# Patient Record
Sex: FEMALE | Race: BLACK OR AFRICAN AMERICAN | NOT HISPANIC OR LATINO | Employment: OTHER | URBAN - METROPOLITAN AREA
[De-identification: names, ages, dates, MRNs, and addresses within clinical notes are randomized per-mention and may not be internally consistent; named-entity substitution may affect disease eponyms.]

---

## 2017-01-24 ENCOUNTER — OFFICE VISIT (OUTPATIENT)
Dept: URGENT CARE | Facility: CLINIC | Age: 57
End: 2017-01-24
Payer: COMMERCIAL

## 2017-01-24 VITALS
WEIGHT: 111.56 LBS | BODY MASS INDEX: 20.53 KG/M2 | HEIGHT: 62 IN | OXYGEN SATURATION: 99 % | TEMPERATURE: 98 F | DIASTOLIC BLOOD PRESSURE: 70 MMHG | RESPIRATION RATE: 18 BRPM | SYSTOLIC BLOOD PRESSURE: 110 MMHG | HEART RATE: 56 BPM

## 2017-01-24 DIAGNOSIS — N32.9 BLADDER PROBLEM: Primary | ICD-10-CM

## 2017-01-24 DIAGNOSIS — N39.0 URINARY TRACT INFECTION WITHOUT HEMATURIA, SITE UNSPECIFIED: ICD-10-CM

## 2017-01-24 LAB
BILIRUB SERPL-MCNC: NEGATIVE MG/DL
BLOOD URINE, POC: NEGATIVE
COLOR, POC UA: YELLOW
GLUCOSE UR QL STRIP: NORMAL
KETONES UR QL STRIP: NEGATIVE
LEUKOCYTE ESTERASE URINE, POC: ABNORMAL
NITRITE, POC UA: NEGATIVE
PH, POC UA: 6
PROTEIN, POC: ABNORMAL
SPECIFIC GRAVITY, POC UA: 1
UROBILINOGEN, POC UA: NORMAL

## 2017-01-24 PROCEDURE — 99999 PR PBB SHADOW E&M-EST. PATIENT-LVL V: CPT | Mod: PBBFAC,,, | Performed by: PHYSICIAN ASSISTANT

## 2017-01-24 PROCEDURE — 87086 URINE CULTURE/COLONY COUNT: CPT

## 2017-01-24 PROCEDURE — 81002 URINALYSIS NONAUTO W/O SCOPE: CPT | Mod: S$GLB,,, | Performed by: PHYSICIAN ASSISTANT

## 2017-01-24 PROCEDURE — 1159F MED LIST DOCD IN RCRD: CPT | Mod: S$GLB,,, | Performed by: PHYSICIAN ASSISTANT

## 2017-01-24 PROCEDURE — 99213 OFFICE O/P EST LOW 20 MIN: CPT | Mod: 25,S$GLB,, | Performed by: PHYSICIAN ASSISTANT

## 2017-01-24 RX ORDER — PHENAZOPYRIDINE HYDROCHLORIDE 200 MG/1
200 TABLET, FILM COATED ORAL 3 TIMES DAILY PRN
Qty: 6 TABLET | Refills: 0 | Status: SHIPPED | OUTPATIENT
Start: 2017-01-24 | End: 2017-02-16 | Stop reason: SDUPTHER

## 2017-01-24 RX ORDER — SULFAMETHOXAZOLE AND TRIMETHOPRIM 800; 160 MG/1; MG/1
1 TABLET ORAL 2 TIMES DAILY
Qty: 10 TABLET | Refills: 0 | Status: SHIPPED | OUTPATIENT
Start: 2017-01-24 | End: 2017-01-29

## 2017-01-24 NOTE — PATIENT INSTRUCTIONS
Drink plenty of clear liquids  Avoid caffeine because this is irritating to the bladder  Take full course of antibiotic  Pyridium as needed for burning on urination.  This medication will turn your urine orange    If symptoms worsen or fail to improve with treatment, see your Primary Care Provider or go to the nearest Emergency Room.          Urinary Tract Infections in Women  Urinary tract infections (UTIs) are most often caused by bacteria (germs). These bacteria enter the urinary tract. The bacteria may come from outside the body. Or they may travel from the skin outside the rectum or vagina into the urethra. Female anatomy makes it easier for bacteria from the bowel to enter a womans urinary tract, which is the most common source of UTI. This means women develop UTIs more often than men. Pain in or around the urinary tract is a common UTI symptom. But the only way to know for sure if you have a UTI for the health care provider to test your urine. The two tests that may be done are the urinalysis and urine culture.  Types of UTIs  · Cystitis: A bladder infection (cystitis) is the most common UTI in women. You may have urgent or frequent urination. You may also have pain, burning when you urinate, and bloody urine.  · Urethritis: This is an inflamed urethra, which is the tube that carries urine from the bladder to outside the body. You may have lower stomach or back pain. You may also have urgent or frequent urination.  · Pyelonephritis: This is a kidney infection. If not treated, it can be serious and damage your kidneys. In severe cases, you may be hospitalized. You may have a fever and lower back pain.  Medications to treat a UTI  Most UTIs are treated with antibiotics. These kill the bacteria. The length of time you need to take them depends on the type of infection. It may be as short as 3 days. If you have repeated UTIs, a low-dose antibiotic may be needed for several months. Take antibiotics exactly as  directed. Dont stop taking them until all of the medication is gone. If you stop taking the antibiotic too soon, the infection may not go away, and you may develop a resistance to the antibiotic. This can make it much harder to treat.  Lifestyle changes to treat and prevent UTIs  The lifestyle changes below will help get rid of your UTI. They may also help prevent future UTIs.  · Drink plenty of fluids. This includes water, juice, or other caffeine-free drinks. Fluids help flush bacteria out of your body.  · Empty your bladder. Always empty your bladder when you feel the urge to urinate. And always urinate before going to sleep. Urine that stays in your bladder can lead to infection. Try to urinate before and after sex as well.  · Practice good personal hygiene. Wipe yourself from front to back after using the toilet. This helps keep bacteria from getting into the urethra.  · Use condoms during sex. These help prevent UTIs caused by sexually transmitted bacteria. Also, avoid using spermicides during sex. These can increase the risk of UTIs. Choose other forms of birth control instead. For women who tend to get UTIs after sex, a low-dose of a preventive antibiotic may be used. Be sure to discuss this option with your health care provider.  · Follow up with your health care provider as directed. He or she may test to make sure the infection has cleared. If necessary, additional treatment may be started.  © 2729-2037 [employer]. [address] [place] [address], [place] [address]. All rights reserved. This information is not intended as a substitute for professional medical care. Always follow your healthcare professional's instructions.

## 2017-01-24 NOTE — MR AVS SNAPSHOT
[facility]  [address] [facility] [place] [address]  Phone: [phone]  Fax: [phone]                  Ivone Monroy   2017 5:10 PM   Office Visit    Description:  Female : 1960   Provider:  Tamiko Clemente PA-C   Department:  [facility]           Reason for Visit     Urinary Tract Infection           Diagnoses this Visit        Comments    Bladder problem    -  Primary     Urinary tract infection without hematuria, site unspecified                To Do List           Goals (5 Years of Data)     None       These Medications        Disp Refills Start End    sulfamethoxazole-trimethoprim 800-160mg (BACTRIM DS) 800-160 mg Tab 10 tablet 0 2017    Take 1 tablet by mouth 2 (two) times daily. - Oral    Pharmacy: [facility] #[id number] - [place] - [address] Ph #: [phone]       phenazopyridine (PYRIDIUM) 200 MG tablet 6 tablet 0 2017     Take 1 tablet (200 mg total) by mouth 3 (three) times daily as needed. - Oral    Pharmacy: [facility] #[id number] - [place] - [address] Ph #: [phone]         [facility]     [facility] -  Assistance  Registered nurses in the [facility] provide clinical advisement, health education, appointment booking, and other advisory services.  Call for this free service at [phone].             Medications           Message regarding Medications     Verify the changes and/or additions to your medication regime listed below are the same as discussed with your clinician today.  If any of these changes or additions are incorrect, please notify your healthcare provider.        START taking these NEW medications        Refills    sulfamethoxazole-trimethoprim 800-160mg (BACTRIM DS) 800-160 mg Tab 0    Sig: Take 1 tablet by mouth 2 (two) times daily.    Class: Normal    Route: Oral    phenazopyridine (PYRIDIUM) 200 MG tablet 0    Sig: Take 1 tablet (200  "mg total) by mouth 3 (three) times daily as needed.    Class: Normal    Route: Oral           Verify that the below list of medications is an accurate representation of the medications you are currently taking.  If none reported, the list may be blank. If incorrect, please contact your healthcare provider. Carry this list with you in case of emergency.           Current Medications     bifidobacterium infantis (ALIGN) 4 mg Cap Take by mouth once daily.    FUSION PLUS 130 mg iron -1,250 mcg Cap Take 1 capsule by mouth once daily.    methylcellulose oral powder Take 3.4 g by mouth once daily.    multivitamin (ONE DAILY MULTIVITAMIN) per tablet Take 1 tablet by mouth once daily.    omeprazole (PRILOSEC) 20 MG capsule Take 20 mg by mouth 2 (two) times daily.     cetirizine 10 mg chewable tablet Take 10 mg by mouth once daily. Prn allergies    phenazopyridine (PYRIDIUM) 200 MG tablet Take 1 tablet (200 mg total) by mouth 3 (three) times daily as needed.    sulfamethoxazole-trimethoprim 800-160mg (BACTRIM DS) 800-160 mg Tab Take 1 tablet by mouth 2 (two) times daily.           Clinical Reference Information           Vital Signs - Last Recorded  Most recent update: 1/24/2017  5:24 PM by Xochilt Thakkar LPN    BP Pulse Temp Resp    110/70 (BP Location: Right arm, Patient Position: Sitting, BP Method: Manual) (!) 56 97.6 °F (36.4 °C) (Tympanic) 18    Ht Wt SpO2 BMI    5' 2" (1.575 m) 50.6 kg (111 lb 8.8 oz) 99% 20.4 kg/m2      Blood Pressure          Most Recent Value    BP  110/70      Allergies as of 1/24/2017     Iodine And Iodide Containing Products    Shellfish Containing Products    Sunscreen Spf 8 [Oxybenzone-padimate O]      Immunizations Administered on Date of Encounter - 1/24/2017     None      Orders Placed During Today's Visit      Normal Orders This Visit    POCT urine dipstick without microscope     Urine culture          1/24/2017  5:37 PM - Xochilt Thakkar LPN      Component Results     Component    " Color, UA    YELLOW    Spec Grav, UA    1.005    pH, UA    6    WBC, UA    +    Nitrite, UA    NEGATIVE    Protein, UA    TRACE    Glucose, UA    NORMAL    Ketones, UA    NEGATIVE    Urobilinogen, UA    NORMAL    Bilirubin, UA    NEGATIVE    Blood, UA    NEGATIVE            Instructions        Drink plenty of clear liquids  Avoid caffeine because this is irritating to the bladder  Take full course of antibiotic  Pyridium as needed for burning on urination.  This medication will turn your urine orange    If symptoms worsen or fail to improve with treatment, see your Primary Care Provider or go to the nearest Emergency Room.          Urinary Tract Infections in Women  Urinary tract infections (UTIs) are most often caused by bacteria (germs). These bacteria enter the urinary tract. The bacteria may come from outside the body. Or they may travel from the skin outside the rectum or vagina into the urethra. Female anatomy makes it easier for bacteria from the bowel to enter a womans urinary tract, which is the most common source of UTI. This means women develop UTIs more often than men. Pain in or around the urinary tract is a common UTI symptom. But the only way to know for sure if you have a UTI for the health care provider to test your urine. The two tests that may be done are the urinalysis and urine culture.  Types of UTIs  · Cystitis: A bladder infection (cystitis) is the most common UTI in women. You may have urgent or frequent urination. You may also have pain, burning when you urinate, and bloody urine.  · Urethritis: This is an inflamed urethra, which is the tube that carries urine from the bladder to outside the body. You may have lower stomach or back pain. You may also have urgent or frequent urination.  · Pyelonephritis: This is a kidney infection. If not treated, it can be serious and damage your kidneys. In severe cases, you may be hospitalized. You may have a fever and lower back pain.  Medications to  treat a UTI  Most UTIs are treated with antibiotics. These kill the bacteria. The length of time you need to take them depends on the type of infection. It may be as short as 3 days. If you have repeated UTIs, a low-dose antibiotic may be needed for several months. Take antibiotics exactly as directed. Dont stop taking them until all of the medication is gone. If you stop taking the antibiotic too soon, the infection may not go away, and you may develop a resistance to the antibiotic. This can make it much harder to treat.  Lifestyle changes to treat and prevent UTIs  The lifestyle changes below will help get rid of your UTI. They may also help prevent future UTIs.  · Drink plenty of fluids. This includes water, juice, or other caffeine-free drinks. Fluids help flush bacteria out of your body.  · Empty your bladder. Always empty your bladder when you feel the urge to urinate. And always urinate before going to sleep. Urine that stays in your bladder can lead to infection. Try to urinate before and after sex as well.  · Practice good personal hygiene. Wipe yourself from front to back after using the toilet. This helps keep bacteria from getting into the urethra.  · Use condoms during sex. These help prevent UTIs caused by sexually transmitted bacteria. Also, avoid using spermicides during sex. These can increase the risk of UTIs. Choose other forms of birth control instead. For women who tend to get UTIs after sex, a low-dose of a preventive antibiotic may be used. Be sure to discuss this option with your health care provider.  · Follow up with your health care provider as directed. He or she may test to make sure the infection has cleared. If necessary, additional treatment may be started.  © 8896-4411 [employer]. [address] [place] [address], [place] [address]. All rights reserved. This information is not intended as a substitute for professional medical care. Always follow your healthcare professional's  instructions.

## 2017-01-24 NOTE — PROGRESS NOTES
"Subjective:    Patient ID: Ivone Monroy is a 56 y.o. female.    Chief Complaint: Urinary Tract Infection    Urinary Frequency    This is a new problem. The current episode started yesterday. The quality of the pain is described as burning. The pain is mild. There has been no fever. She is sexually active. There is no history of pyelonephritis. Associated symptoms include frequency and urgency. Pertinent negatives include no chills, hematuria, nausea, possible pregnancy, vomiting or bubble bath use. She has tried nothing for the symptoms. The treatment provided no relief. There is no history of diabetes insipidus. Recurrent UTIs: last year; most recent UTI August 2016.     Review of Systems   Constitutional: Negative for chills and fever.   HENT: Negative for ear pain and sore throat.    Respiratory: Negative for cough and shortness of breath.    Gastrointestinal: Negative for nausea and vomiting.   Genitourinary: Positive for dysuria, frequency and urgency. Negative for hematuria.     Objective:     Visit Vitals    /70 (BP Location: Right arm, Patient Position: Sitting, BP Method: Manual)    Pulse (!) 56    Temp 97.6 °F (36.4 °C) (Tympanic)    Resp 18    Ht 5' 2" (1.575 m)    Wt 50.6 kg (111 lb 8.8 oz)    SpO2 99%    BMI 20.4 kg/m2       Physical Exam   Constitutional: She is oriented to person, place, and time. She appears well-developed and well-nourished.   HENT:   Head: Normocephalic and atraumatic.   Right Ear: External ear normal.   Left Ear: External ear normal.   Nose: Nose normal.   Eyes: Conjunctivae and EOM are normal.   Neck: Normal range of motion. Neck supple.   Cardiovascular: Normal rate, regular rhythm and normal heart sounds.    Pulmonary/Chest: Effort normal and breath sounds normal. No respiratory distress.   Abdominal: Soft. Bowel sounds are normal. There is no tenderness. There is no rebound, no guarding and no CVA tenderness.   Musculoskeletal: Normal range of motion. "   Neurological: She is alert and oriented to person, place, and time.   Skin: Skin is warm and dry.   Nursing note and vitals reviewed.    Assessment:     1. Bladder problem    2. Urinary tract infection without hematuria, site unspecified      Plan:   Bladder problem  -     POCT urine dipstick without microscope: positive for WBC and trace protein    Urinary tract infection without hematuria, site unspecified  -     sulfamethoxazole-trimethoprim 800-160mg (BACTRIM DS) 800-160 mg Tab; Take 1 tablet by mouth 2 (two) times daily.  Dispense: 10 tablet; Refill: 0  -     phenazopyridine (PYRIDIUM) 200 MG tablet; Take 1 tablet (200 mg total) by mouth 3 (three) times daily as needed.  Dispense: 6 tablet; Refill: 0      Drink plenty of clear liquids  Avoid caffeine because this is irritating to the bladder  Take full course of antibiotic  Pyridium as needed for burning on urination.  This medication will turn your urine orange    If symptoms worsen or fail to improve with treatment, see your Primary Care Provider or go to the nearest Emergency Room.  After visit summary given and discussed.  Patient verbalized understanding and agrees with treatment plan.  Patient remained stable and was discharged in no acute distress.

## 2017-01-27 LAB
BACTERIA UR CULT: NORMAL
BACTERIA UR CULT: NORMAL

## 2017-02-16 ENCOUNTER — PATIENT MESSAGE (OUTPATIENT)
Dept: INTERNAL MEDICINE | Facility: CLINIC | Age: 57
End: 2017-02-16

## 2017-02-16 ENCOUNTER — OFFICE VISIT (OUTPATIENT)
Dept: URGENT CARE | Facility: CLINIC | Age: 57
End: 2017-02-16
Payer: COMMERCIAL

## 2017-02-16 ENCOUNTER — TELEPHONE (OUTPATIENT)
Dept: INTERNAL MEDICINE | Facility: CLINIC | Age: 57
End: 2017-02-16

## 2017-02-16 VITALS
HEIGHT: 62 IN | TEMPERATURE: 98 F | OXYGEN SATURATION: 100 % | BODY MASS INDEX: 21.02 KG/M2 | DIASTOLIC BLOOD PRESSURE: 68 MMHG | HEART RATE: 67 BPM | WEIGHT: 114.19 LBS | SYSTOLIC BLOOD PRESSURE: 110 MMHG

## 2017-02-16 DIAGNOSIS — N39.0 URINARY TRACT INFECTION WITHOUT HEMATURIA, SITE UNSPECIFIED: Primary | ICD-10-CM

## 2017-02-16 LAB
BILIRUB SERPL-MCNC: NEGATIVE MG/DL
BLOOD URINE, POC: NEGATIVE
COLOR, POC UA: ABNORMAL
GLUCOSE UR QL STRIP: NORMAL
KETONES UR QL STRIP: NEGATIVE
LEUKOCYTE ESTERASE URINE, POC: ABNORMAL
NITRITE, POC UA: POSITIVE
PH, POC UA: 5
PROTEIN, POC: NEGATIVE
SPECIFIC GRAVITY, POC UA: 1.01
UROBILINOGEN, POC UA: NORMAL

## 2017-02-16 PROCEDURE — 99999 PR PBB SHADOW E&M-EST. PATIENT-LVL IV: CPT | Mod: PBBFAC,,, | Performed by: NURSE PRACTITIONER

## 2017-02-16 PROCEDURE — 81001 URINALYSIS AUTO W/SCOPE: CPT | Mod: S$GLB,,, | Performed by: NURSE PRACTITIONER

## 2017-02-16 PROCEDURE — 87086 URINE CULTURE/COLONY COUNT: CPT

## 2017-02-16 PROCEDURE — 99213 OFFICE O/P EST LOW 20 MIN: CPT | Mod: 25,S$GLB,, | Performed by: NURSE PRACTITIONER

## 2017-02-16 RX ORDER — PHENAZOPYRIDINE HYDROCHLORIDE 200 MG/1
200 TABLET, FILM COATED ORAL 3 TIMES DAILY PRN
Qty: 6 TABLET | Refills: 0 | Status: SHIPPED | OUTPATIENT
Start: 2017-02-16 | End: 2018-04-11

## 2017-02-16 RX ORDER — AMOXICILLIN AND CLAVULANATE POTASSIUM 875; 125 MG/1; MG/1
1 TABLET, FILM COATED ORAL 2 TIMES DAILY
Qty: 14 TABLET | Refills: 0 | Status: SHIPPED | OUTPATIENT
Start: 2017-02-16 | End: 2017-02-23

## 2017-02-16 NOTE — PATIENT INSTRUCTIONS
· Increase fluids (avoid caffeine or sugary beverages as these will irritate the bladder).   · Take your antibiotics exactly as prescribed and make sure to complete entire course even if you begin to feel better. This will prevent recurrence of infection and antibiotic resistance.   · We have prescribed an antibiotic that covers most bacteria that cause urinary tract infections, however, if your urine culture shows that you have any bacterial resistance to the antibiotic you were prescribed or an unusual bacterial strain, we will notify you and make any necessary changes. Urine cultures usually result in about three days.   · Please follow up with your primary care provider if your symptoms do not improve within 2-3 days or sooner for any new or worsening symptoms.  · For any severe pain, bright red blood in urine, difficulty urinating, fever that does not improve with Tylenol or Ibuprofen, inability to urinate, incontinence of urine or bowels, or for any other urgent concerns, please go to the ER for immediate evaluation.     Urinary Tract Infections in Women    Urinary tract infections (UTIs) are most often caused by bacteria (germs). These bacteria enter the urinary tract. The bacteria may come from outside the body. Or they may travel from the skin outside the rectum or vagina into the urethra. Female anatomy makes it easier for bacteria from the bowel to enter a womans urinary tract, which is the most common source of UTI. This means women develop UTIs more often than men. Pain in or around the urinary tract is a common UTI symptom. But the only way to know for sure if you have a UTI for the health care provider to test your urine. The two tests that may be done are the urinalysis and urine culture.  Types of UTIs  · Cystitis: A bladder infection (cystitis) is the most common UTI in women. You may have urgent or frequent urination. You may also have pain, burning when you urinate, and bloody  urine.  · Urethritis: This is an inflamed urethra, which is the tube that carries urine from the bladder to outside the body. You may have lower stomach or back pain. You may also have urgent or frequent urination.  · Pyelonephritis: This is a kidney infection. If not treated, it can be serious and damage your kidneys. In severe cases, you may be hospitalized. You may have a fever and lower back pain.  Medications to treat a UTI  Most UTIs are treated with antibiotics. These kill the bacteria. The length of time you need to take them depends on the type of infection. It may be as short as 3 days. If you have repeated UTIs, a low-dose antibiotic may be needed for several months. Take antibiotics exactly as directed. Dont stop taking them until all of the medication is gone. If you stop taking the antibiotic too soon, the infection may not go away, and you may develop a resistance to the antibiotic. This can make it much harder to treat.  Lifestyle changes to treat and prevent UTIs  The lifestyle changes below will help get rid of your UTI. They may also help prevent future UTIs.  · Drink plenty of fluids. This includes water, juice, or other caffeine-free drinks. Fluids help flush bacteria out of your body.  · Empty your bladder. Always empty your bladder when you feel the urge to urinate. And always urinate before going to sleep. Urine that stays in your bladder can lead to infection. Try to urinate before and after sex as well.  · Practice good personal hygiene. Wipe yourself from front to back after using the toilet. This helps keep bacteria from getting into the urethra.  · Use condoms during sex. These help prevent UTIs caused by sexually transmitted bacteria. Also, avoid using spermicides during sex. These can increase the risk of UTIs. Choose other forms of birth control instead. For women who tend to get UTIs after sex, a low-dose of a preventive antibiotic may be used. Be sure to discuss this option with  your health care provider.  · Follow up with your health care provider as directed. He or she may test to make sure the infection has cleared. If necessary, additional treatment may be started.  Date Last Reviewed: 9/8/2014  © 8746-5330 [employer]. [address] [place] [address], [place] [address]. All rights reserved. This information is not intended as a substitute for professional medical care. Always follow your healthcare professional's instructions.

## 2017-02-16 NOTE — PROGRESS NOTES
"Subjective:      Patient ID: Ivone Monroy is a 56 y.o. female.    Chief Complaint: Dysuria    Dysuria    This is a new problem. The problem occurs every urination. The problem has been unchanged. Quality: pressure. There has been no fever. Associated symptoms include frequency. Pertinent negatives include no chills, flank pain, hematuria, nausea, possible pregnancy, vomiting or constipation. Treatments tried: treated on 1/24/17 with bactrim and pyridium. Her past medical history is significant for recurrent UTIs.     Review of Systems   Constitutional: Negative.  Negative for chills and fever.   HENT: Negative.    Respiratory: Negative.    Cardiovascular: Negative.    Gastrointestinal: Negative.  Negative for abdominal pain, constipation, nausea and vomiting.   Genitourinary: Positive for dysuria and frequency. Negative for flank pain and hematuria.   Musculoskeletal: Negative.    Skin: Negative.    Neurological: Negative.    Hematological: Negative.        Objective:     Visit Vitals    /68 (BP Location: Right arm, Patient Position: Sitting, BP Method: Manual)    Pulse 67    Temp 98.3 °F (36.8 °C) (Oral)    Ht 5' 2" (1.575 m)    Wt 51.8 kg (114 lb 3.2 oz)    SpO2 100%    BMI 20.89 kg/m2     Physical Exam   Constitutional: She is oriented to person, place, and time. She appears well-developed and well-nourished. No distress.   HENT:   Head: Normocephalic and atraumatic.   Neck: Normal range of motion. Neck supple.   Cardiovascular: Normal rate.    Pulmonary/Chest: Effort normal. No respiratory distress.   Abdominal: Soft. Normal appearance and bowel sounds are normal. She exhibits no distension. There is no hepatosplenomegaly. There is no tenderness. There is no rebound, no guarding, no CVA tenderness, no tenderness at McBurney's point and negative Owens's sign.   Musculoskeletal: Normal range of motion.   Neurological: She is alert and oriented to person, place, and time.   Skin: Skin is warm " and dry. No rash noted. She is not diaphoretic.   Nursing note and vitals reviewed.    Assessment:      1. Urinary tract infection without hematuria, site unspecified       Plan:   Urinary tract infection without hematuria, site unspecified  Comments:  Urine dipstick positive for 2+ WBC and nitrites. Will treat with augmentin -- recently treated with bactrim and had past culture that showed resistance to cipro  Orders:  -     POCT urinalysis, dipstick or tablet reag  -     Urine culture  -     amoxicillin-clavulanate 875-125mg (AUGMENTIN) 875-125 mg per tablet; Take 1 tablet by mouth 2 (two) times daily.  Dispense: 14 tablet; Refill: 0  -     phenazopyridine (PYRIDIUM) 200 MG tablet; Take 1 tablet (200 mg total) by mouth 3 (three) times daily as needed.  Dispense: 6 tablet; Refill: 0    Instructions, follow up, and supportive care as per AVS.  Follow up with PCP/urology if not improved or for any new or worsening symptoms.  AVS provided and reviewed with patient including importance of antibiotic compliance, supportive care, follow up, and red flag symptoms. Patient verbalizes understanding and agrees with treatment plan. Discharged from Urgent Care in stable condition.

## 2017-02-16 NOTE — MR AVS SNAPSHOT
[facility]  [address]  [place] [address]  Phone: [phone]  Fax: [phone]                  Ivone Monroy   2017 4:10 PM   Office Visit    Description:  Female : 1960   Provider:  Rere Hughes NP   Department:  [facility]           Reason for Visit     Dysuria           Diagnoses this Visit        Comments    Dysuria    -  Primary            To Do List           Goals (5 Years of Data)     None      Follow-Up and Disposition     Return if symptoms worsen or fail to improve.       These Medications        Disp Refills Start End    amoxicillin-clavulanate 875-125mg (AUGMENTIN) 875-125 mg per tablet 14 tablet 0 2017    Take 1 tablet by mouth 2 (two) times daily. - Oral    Pharmacy: [facility]/pharmacy #[id number]  [place] - [address] [facility]. Ph #: [phone]       phenazopyridine (PYRIDIUM) 200 MG tablet 6 tablet 0 2017     Take 1 tablet (200 mg total) by mouth 3 (three) times daily as needed. - Oral    Pharmacy: [facility]/pharmacy #[id number] - [place] - [address] [facility]. Ph #: [phone]         [facility]     [facility] On Call Nurse Care Line -  Assistance  Registered nurses in the [facility] Center provide clinical advisement, health education, appointment booking, and other advisory services.  Call for this free service at [phone].             Medications           Message regarding Medications     Verify the changes and/or additions to your medication regime listed below are the same as discussed with your clinician today.  If any of these changes or additions are incorrect, please notify your healthcare provider.        START taking these NEW medications        Refills    amoxicillin-clavulanate 875-125mg (AUGMENTIN) 875-125 mg per tablet 0    Sig: Take 1 tablet by mouth 2 (two) times daily.    Class: Normal    Route: Oral           Verify that the below list of medications is an accurate  "representation of the medications you are currently taking.  If none reported, the list may be blank. If incorrect, please contact your healthcare provider. Carry this list with you in case of emergency.           Current Medications     amoxicillin-clavulanate 875-125mg (AUGMENTIN) 875-125 mg per tablet Take 1 tablet by mouth 2 (two) times daily.    bifidobacterium infantis (ALIGN) 4 mg Cap Take by mouth once daily.    cetirizine 10 mg chewable tablet Take 10 mg by mouth once daily. Prn allergies    FUSION PLUS 130 mg iron -1,250 mcg Cap Take 1 capsule by mouth once daily.    methylcellulose oral powder Take 3.4 g by mouth once daily.    multivitamin (ONE DAILY MULTIVITAMIN) per tablet Take 1 tablet by mouth once daily.    omeprazole (PRILOSEC) 20 MG capsule Take 20 mg by mouth 2 (two) times daily.     phenazopyridine (PYRIDIUM) 200 MG tablet Take 1 tablet (200 mg total) by mouth 3 (three) times daily as needed.           Clinical Reference Information           Your Vitals Were     BP Pulse Temp Height Weight SpO2    110/68 (BP Location: Right arm, Patient Position: Sitting, BP Method: Manual) 67 98.3 °F (36.8 °C) (Oral) 5' 2" (1.575 m) 51.8 kg (114 lb 3.2 oz) 100%    BMI                20.89 kg/m2          Blood Pressure          Most Recent Value    BP  110/68      Allergies as of 2/16/2017     Iodine And Iodide Containing Products    Shellfish Containing Products    Sunscreen Spf 8 [Oxybenzone-padimate O]      Immunizations Administered on Date of Encounter - 2/16/2017     None      Orders Placed During Today's Visit      Normal Orders This Visit    POCT urinalysis, dipstick or tablet reag     Urine culture          2/16/2017  4:27 PM - Krysta Lao LPN      Component Results     Component    Color, UA    Yellow/Clear    Spec Grav, UA    1.015    pH, UA    5    WBC, UA    ++    Nitrite, UA    Positive    Protein, UA    Negative    Glucose, UA    Normal    Ketones, UA    Negative    Urobilinogen, " UA    Normal    Bilirubin, UA    Negative    Blood, UA    Negative            Instructions    · Increase fluids (avoid caffeine or sugary beverages as these will irritate the bladder).   · Take your antibiotics exactly as prescribed and make sure to complete entire course even if you begin to feel better. This will prevent recurrence of infection and antibiotic resistance.   · We have prescribed an antibiotic that covers most bacteria that cause urinary tract infections, however, if your urine culture shows that you have any bacterial resistance to the antibiotic you were prescribed or an unusual bacterial strain, we will notify you and make any necessary changes. Urine cultures usually result in about three days.   · Please follow up with your primary care provider if your symptoms do not improve within 2-3 days or sooner for any new or worsening symptoms.  · For any severe pain, bright red blood in urine, difficulty urinating, fever that does not improve with Tylenol or Ibuprofen, inability to urinate, incontinence of urine or bowels, or for any other urgent concerns, please go to the ER for immediate evaluation.     Urinary Tract Infections in Women    Urinary tract infections (UTIs) are most often caused by bacteria (germs). These bacteria enter the urinary tract. The bacteria may come from outside the body. Or they may travel from the skin outside the rectum or vagina into the urethra. Female anatomy makes it easier for bacteria from the bowel to enter a womans urinary tract, which is the most common source of UTI. This means women develop UTIs more often than men. Pain in or around the urinary tract is a common UTI symptom. But the only way to know for sure if you have a UTI for the health care provider to test your urine. The two tests that may be done are the urinalysis and urine culture.  Types of UTIs  · Cystitis: A bladder infection (cystitis) is the most common UTI in women. You may have urgent or  frequent urination. You may also have pain, burning when you urinate, and bloody urine.  · Urethritis: This is an inflamed urethra, which is the tube that carries urine from the bladder to outside the body. You may have lower stomach or back pain. You may also have urgent or frequent urination.  · Pyelonephritis: This is a kidney infection. If not treated, it can be serious and damage your kidneys. In severe cases, you may be hospitalized. You may have a fever and lower back pain.  Medications to treat a UTI  Most UTIs are treated with antibiotics. These kill the bacteria. The length of time you need to take them depends on the type of infection. It may be as short as 3 days. If you have repeated UTIs, a low-dose antibiotic may be needed for several months. Take antibiotics exactly as directed. Dont stop taking them until all of the medication is gone. If you stop taking the antibiotic too soon, the infection may not go away, and you may develop a resistance to the antibiotic. This can make it much harder to treat.  Lifestyle changes to treat and prevent UTIs  The lifestyle changes below will help get rid of your UTI. They may also help prevent future UTIs.  · Drink plenty of fluids. This includes water, juice, or other caffeine-free drinks. Fluids help flush bacteria out of your body.  · Empty your bladder. Always empty your bladder when you feel the urge to urinate. And always urinate before going to sleep. Urine that stays in your bladder can lead to infection. Try to urinate before and after sex as well.  · Practice good personal hygiene. Wipe yourself from front to back after using the toilet. This helps keep bacteria from getting into the urethra.  · Use condoms during sex. These help prevent UTIs caused by sexually transmitted bacteria. Also, avoid using spermicides during sex. These can increase the risk of UTIs. Choose other forms of birth control instead. For women who tend to get UTIs after sex, a  low-dose of a preventive antibiotic may be used. Be sure to discuss this option with your health care provider.  · Follow up with your health care provider as directed. He or she may test to make sure the infection has cleared. If necessary, additional treatment may be started.  Date Last Reviewed: 9/8/2014  © 9527-1798 [employer]. [address] [place] [address]. All rights reserved. This information is not intended as a substitute for professional medical care. Always follow your healthcare professional's instructions.             Language Assistance Services     ATTENTION: Language assistance services are available, free of charge. Please call [phone].      ATENCIÓN: Si habla español, tiene a valdovinos disposición servicios gratuitos de asistencia lingüística. Llame al [phone].     CHÚ Ý: N?u b?n nói Ti?ng Vi?t, có các d?ch v? h? tr? ngôn ng? mi?n phí dành cho b?n. G?i s? [phone].         [facility] complies with applicable Federal civil rights laws and does not discriminate on the basis of race, color, national origin, age, disability, or sex.

## 2017-02-18 LAB
BACTERIA UR CULT: NORMAL
BACTERIA UR CULT: NORMAL

## 2017-03-13 ENCOUNTER — PATIENT MESSAGE (OUTPATIENT)
Dept: URGENT CARE | Facility: CLINIC | Age: 57
End: 2017-03-13

## 2017-03-13 ENCOUNTER — OFFICE VISIT (OUTPATIENT)
Dept: URGENT CARE | Facility: CLINIC | Age: 57
End: 2017-03-13
Payer: COMMERCIAL

## 2017-03-13 VITALS
OXYGEN SATURATION: 95 % | WEIGHT: 111.44 LBS | BODY MASS INDEX: 20.51 KG/M2 | HEART RATE: 55 BPM | DIASTOLIC BLOOD PRESSURE: 70 MMHG | HEIGHT: 62 IN | TEMPERATURE: 97 F | SYSTOLIC BLOOD PRESSURE: 120 MMHG

## 2017-03-13 DIAGNOSIS — Z87.440 HISTORY OF UTI: Primary | ICD-10-CM

## 2017-03-13 LAB
BILIRUB SERPL-MCNC: NEGATIVE MG/DL
BLOOD URINE, POC: NEGATIVE
COLOR, POC UA: YELLOW
GLUCOSE UR QL STRIP: NORMAL
KETONES UR QL STRIP: NEGATIVE
LEUKOCYTE ESTERASE URINE, POC: NEGATIVE
NITRITE, POC UA: NEGATIVE
PH, POC UA: 5
PROTEIN, POC: NEGATIVE
SPECIFIC GRAVITY, POC UA: 1.01
UROBILINOGEN, POC UA: NORMAL

## 2017-03-13 PROCEDURE — 81002 URINALYSIS NONAUTO W/O SCOPE: CPT | Mod: S$GLB,,, | Performed by: NURSE PRACTITIONER

## 2017-03-13 PROCEDURE — 1160F RVW MEDS BY RX/DR IN RCRD: CPT | Mod: S$GLB,,, | Performed by: NURSE PRACTITIONER

## 2017-03-13 PROCEDURE — 99213 OFFICE O/P EST LOW 20 MIN: CPT | Mod: 25,S$GLB,, | Performed by: NURSE PRACTITIONER

## 2017-03-13 PROCEDURE — 99999 PR PBB SHADOW E&M-EST. PATIENT-LVL III: CPT | Mod: PBBFAC,,, | Performed by: NURSE PRACTITIONER

## 2017-03-13 NOTE — PROGRESS NOTES
Subjective:       Patient ID: Ivone Monroy is a 56 y.o. female.    Chief Complaint: Urinary Tract Infection    HPI Comments: 55 yo female presents to Urgent Care with request to confirm UTI is cured with last antibiotic regimen. Patient reports no other symptoms at this time.    Review of Systems   Constitutional: Negative for activity change and fatigue.   HENT: Negative for rhinorrhea, sinus pressure, sore throat and trouble swallowing.    Eyes: Negative for pain, discharge and visual disturbance.   Respiratory: Negative for cough, shortness of breath and wheezing.    Cardiovascular: Negative for chest pain, palpitations and leg swelling.   Gastrointestinal: Negative for abdominal pain, constipation, diarrhea and nausea.   Endocrine: Negative for cold intolerance.   Genitourinary: Negative for difficulty urinating, dysuria, flank pain, frequency and genital sores.   Musculoskeletal: Negative for arthralgias, back pain and gait problem.   Skin: Negative for rash and wound.   Allergic/Immunologic: Negative for environmental allergies and food allergies.   Neurological: Negative for dizziness, light-headedness, numbness and headaches.   Hematological: Negative for adenopathy.   Psychiatric/Behavioral: Negative for behavioral problems.   All other systems reviewed and are negative.      Objective:      Physical Exam   Constitutional: She is oriented to person, place, and time. She appears well-developed and well-nourished.   HENT:   Head: Normocephalic.   Cardiovascular: Normal rate and normal heart sounds.    Pulmonary/Chest: Effort normal and breath sounds normal.   Abdominal: Soft. Normal appearance. There is no tenderness. There is no CVA tenderness.   Neurological: She is alert and oriented to person, place, and time.   Skin: Skin is warm and dry.   Psychiatric: She has a normal mood and affect.   Nursing note and vitals reviewed.      Assessment:       1. History of UTI        Plan:         Ivone was  seen today for urinary tract infection.    Diagnoses and all orders for this visit:    History of UTI  Comments:  Follow up with PCP  Orders:  -     POCT URINE DIPSTICK WITHOUT MICROSCOPE    POCT urine negative for abnormalities.    Follow prescribed treatment plan as directed.  Stay hydrated and rest.  Report to ER if symptoms worsen.  Follow up with PCP in 2-3 days or sooner if symptoms do not improve.

## 2017-03-13 NOTE — PATIENT INSTRUCTIONS
Anatomy of the Female Urinary Tract  Your urinary tract helps get rid of urine (your bodys liquid waste). The kidneys collect chemicals and water your body doesnt need. This is turned into urine. Urine travels out of the kidneys through the ureters to the bladder. The bladder holds urine until youre ready to release it. The urethra carries urine from the bladder out of the body. The main sphincter muscle circles the mid-urethra.      Front view of female urinary tract.     Date Last Reviewed: 8/27/2014  © 1590-9059 [employer]. [address] [place] [address]. All rights reserved. This information is not intended as a substitute for professional medical care. Always follow your healthcare professional's instructions.

## 2017-03-13 NOTE — MR AVS SNAPSHOT
[facility]  [address] [facility] [address] [facility] [place] [address]  Phone: [phone]  Fax: [phone]                  Ivone Monroy   3/13/2017 11:30 AM   Office Visit    Description:  Female : 1960   Provider:  Nataliya Perales NP   Department:  [facility]           Reason for Visit     Urinary Tract Infection           Diagnoses this Visit        Comments    History of UTI    -  Primary Follow up with PCP           To Do List           Goals (5 Years of Data)     None      [facility]     [facility] On Call Nurse Care Line -  Assistance  Registered nurses in the [facility] On Call Center provide clinical advisement, health education, appointment booking, and other advisory services.  Call for this free service at [phone].             Medications           Message regarding Medications     Verify the changes and/or additions to your medication regime listed below are the same as discussed with your clinician today.  If any of these changes or additions are incorrect, please notify your healthcare provider.             Verify that the below list of medications is an accurate representation of the medications you are currently taking.  If none reported, the list may be blank. If incorrect, please contact your healthcare provider. Carry this list with you in case of emergency.           Current Medications     bifidobacterium infantis (ALIGN) 4 mg Cap Take by mouth once daily.    cetirizine 10 mg chewable tablet Take 10 mg by mouth once daily. Prn allergies    FUSION PLUS 130 mg iron -1,250 mcg Cap Take 1 capsule by mouth once daily.    methylcellulose oral powder Take 3.4 g by mouth once daily.    multivitamin (ONE DAILY MULTIVITAMIN) per tablet Take 1 tablet by mouth once daily.    omeprazole (PRILOSEC) 20 MG capsule Take 20 mg by mouth 2 (two) times daily.     phenazopyridine (PYRIDIUM) 200 MG tablet Take 1 tablet (200 mg total) by mouth 3 (three) times daily as needed.          "  Clinical Reference Information           Your Vitals Were     BP Pulse Temp Height    120/70 (BP Location: Right arm, Patient Position: Sitting, BP Method: Manual) 55 97.1 °F (36.2 °C) (Tympanic) 5' 2" (1.575 m)    Weight SpO2 BMI    50.5 kg (111 lb 7.1 oz) 95% 20.38 kg/m2      Blood Pressure          Most Recent Value    BP  120/70      Allergies as of 3/13/2017     Iodine And Iodide Containing Products    Shellfish Containing Products    Sunscreen Spf 8 [Oxybenzone-padimate O]      Immunizations Administered on Date of Encounter - 3/13/2017     None      Instructions      Anatomy of the Female Urinary Tract  Your urinary tract helps get rid of urine (your bodys liquid waste). The kidneys collect chemicals and water your body doesnt need. This is turned into urine. Urine travels out of the kidneys through the ureters to the bladder. The bladder holds urine until youre ready to release it. The urethra carries urine from the bladder out of the body. The main sphincter muscle circles the mid-urethra.      Front view of female urinary tract.     Date Last Reviewed: 8/27/2014  © 7576-6878 [employer]. [address] [place] [address]. All rights reserved. This information is not intended as a substitute for professional medical care. Always follow your healthcare professional's instructions.             Language Assistance Services     ATTENTION: Language assistance services are available, free of charge. Please call [phone].      ATENCIÓN: Si habla español, tiene a valdovinos disposición servicios gratuitos de asistencia lingüística. Llame al [phone].     CHÚ Ý: N?u b?n nói Ti?ng Vi?t, có các d?ch v? h? tr? ngôn ng? mi?n phí dành cho b?n. G?i s? [phone].         [facility] complies with applicable Federal civil rights laws and does not discriminate on the basis of race, color, national origin, age, disability, or sex.        "

## 2017-06-24 ENCOUNTER — OFFICE VISIT (OUTPATIENT)
Dept: URGENT CARE | Facility: CLINIC | Age: 57
End: 2017-06-24
Payer: COMMERCIAL

## 2017-06-24 VITALS
HEIGHT: 62 IN | DIASTOLIC BLOOD PRESSURE: 73 MMHG | BODY MASS INDEX: 19.94 KG/M2 | TEMPERATURE: 97 F | WEIGHT: 108.38 LBS | SYSTOLIC BLOOD PRESSURE: 122 MMHG | HEART RATE: 59 BPM

## 2017-06-24 DIAGNOSIS — N39.0 URINARY TRACT INFECTION WITHOUT HEMATURIA, SITE UNSPECIFIED: Primary | ICD-10-CM

## 2017-06-24 LAB
BILIRUB UR QL STRIP: NEGATIVE
CLARITY UR: CLEAR
COLOR UR: YELLOW
GLUCOSE UR QL STRIP: NEGATIVE
HGB UR QL STRIP: NEGATIVE
KETONES UR QL STRIP: NEGATIVE
LEUKOCYTE ESTERASE UR QL STRIP: ABNORMAL
MICROSCOPIC COMMENT: NORMAL
NITRITE UR QL STRIP: NEGATIVE
PH UR STRIP: 5 [PH] (ref 5–8)
PROT UR QL STRIP: NEGATIVE
SP GR UR STRIP: <=1.005 (ref 1–1.03)
SQUAMOUS #/AREA URNS HPF: 2 /HPF
URN SPEC COLLECT METH UR: ABNORMAL
WBC #/AREA URNS HPF: 5 /HPF (ref 0–5)

## 2017-06-24 PROCEDURE — 99999 PR PBB SHADOW E&M-EST. PATIENT-LVL II: CPT | Mod: PBBFAC,,, | Performed by: FAMILY MEDICINE

## 2017-06-24 PROCEDURE — 99213 OFFICE O/P EST LOW 20 MIN: CPT | Mod: S$GLB,,, | Performed by: FAMILY MEDICINE

## 2017-06-24 PROCEDURE — 81000 URINALYSIS NONAUTO W/SCOPE: CPT | Mod: PO

## 2017-06-24 PROCEDURE — 87086 URINE CULTURE/COLONY COUNT: CPT

## 2017-06-24 RX ORDER — CEPHALEXIN 500 MG/1
500 CAPSULE ORAL EVERY 8 HOURS
Qty: 21 CAPSULE | Refills: 0 | Status: SHIPPED | OUTPATIENT
Start: 2017-06-24 | End: 2017-07-01

## 2017-06-24 NOTE — PROGRESS NOTES
"  Chief Complaint:    Chief Complaint   Patient presents with    Urinary Tract Infection       History of Present Illness:    Dysuria for 2-3 dys. No fever    ROS:  Review of Systems   Constitutional: Negative for fatigue and fever.   Genitourinary: Positive for dysuria. Negative for difficulty urinating and flank pain.       Past Medical History:   Diagnosis Date    Cancer of gallbladder 2015    chemo radiation - Dr Gatica     Diverticulitis     GERD (gastroesophageal reflux disease)        Social History:  Social History     Social History    Marital status:      Spouse name: Elba    Number of children: 3    Years of education: N/A     Occupational History    Public  [employer]     [facility]     Social History Main Topics    Smoking status: Never Smoker    Smokeless tobacco: Never Used    Alcohol use No      Comment: socially    Drug use: No    Sexual activity: Yes     Partners: Male     Birth control/ protection: None      Comment:      Other Topics Concern    Not on file     Social History Narrative    No narrative on file       Family History:   family history includes Arthritis in her mother; Cancer in her father; Colon cancer in her cousin and paternal uncle; Diabetes in her mother; Heart disease in her father.    Health Maintenance   Topic Date Due    Pneumococcal PCV13 (High Risk) (1 - PCV13 Required) 07/10/1959    Pneumococcal PPSV23 (High Risk) (1) 07/10/1976    Influenza Vaccine  08/01/2017    Mammogram  09/22/2018    Pap Smear with HPV Cotest  09/22/2019    Lipid Panel  08/23/2021    TETANUS VACCINE  07/29/2023    Colonoscopy  02/25/2025    Hepatitis C Screening  Completed       Physical Exam:    Vital Signs  Temp: 97.3 °F (36.3 °C)  Pulse: (!) 59  BP: 122/73  Height and Weight  Height: 5' 2" (157.5 cm)  Weight: 49.2 kg (108 lb 5.7 oz)  BSA (Calculated - sq m): 1.47 sq meters  BMI (Calculated): 19.9  Weight in (lb) to have BMI " = 25: 136.4]    Body mass index is 19.82 kg/m².    Physical Exam   Cardiovascular: Normal rate.    Pulmonary/Chest: Effort normal and breath sounds normal.   Abdominal: Soft.   Musculoskeletal: Normal range of motion.       Lab Results   Component Value Date    CHOL 222 (H) 08/23/2016    CHOL 211 (H) 07/31/2013    TRIG 64 08/23/2016    TRIG 50 07/31/2013    HDL 91 (H) 08/23/2016    HDL 97 (H) 07/31/2013    TOTALCHOLEST 2.4 08/23/2016    TOTALCHOLEST 2.2 07/31/2013    NONHDLCHOL 131 08/23/2016       Lab Results   Component Value Date    HGBA1C 5.2 08/23/2016       Assessment:      ICD-10-CM ICD-9-CM   1. Urinary tract infection without hematuria, site unspecified N39.0 599.0         Plan:  Ivone was seen today for urinary tract infection.    Diagnoses and all orders for this visit:    Urinary tract infection without hematuria, site unspecified  -     URINALYSIS  -     Urine culture    Other orders  -     cephALEXin (KEFLEX) 500 MG capsule; Take 1 capsule (500 mg total) by mouth every 8 (eight) hours.        Orders Placed This Encounter   Procedures    Urine culture    URINALYSIS       Current Outpatient Prescriptions   Medication Sig Dispense Refill    bifidobacterium infantis (ALIGN) 4 mg Cap Take by mouth once daily.      FUSION PLUS 130 mg iron -1,250 mcg Cap Take 1 capsule by mouth once daily.  3    methylcellulose oral powder Take 3.4 g by mouth once daily.      multivitamin (ONE DAILY MULTIVITAMIN) per tablet Take 1 tablet by mouth once daily.      omeprazole (PRILOSEC) 20 MG capsule Take 20 mg by mouth 2 (two) times daily.       cephALEXin (KEFLEX) 500 MG capsule Take 1 capsule (500 mg total) by mouth every 8 (eight) hours. 21 capsule 0    cetirizine 10 mg chewable tablet Take 10 mg by mouth once daily. Prn allergies 30 tablet 0    phenazopyridine (PYRIDIUM) 200 MG tablet Take 1 tablet (200 mg total) by mouth 3 (three) times daily as needed. 6 tablet 0     No current facility-administered medications  for this visit.        There are no discontinued medications.    No Follow-up on file.      Dr Letty Sales MD    Disclaimer: This note is prepared using voice recognition system and as such is likely to have errors and is not proof read.

## 2017-06-25 LAB — BACTERIA UR CULT: NORMAL

## 2017-09-14 ENCOUNTER — OFFICE VISIT (OUTPATIENT)
Dept: URGENT CARE | Facility: CLINIC | Age: 57
End: 2017-09-14
Payer: COMMERCIAL

## 2017-09-14 VITALS
HEART RATE: 62 BPM | WEIGHT: 109.25 LBS | SYSTOLIC BLOOD PRESSURE: 121 MMHG | DIASTOLIC BLOOD PRESSURE: 74 MMHG | OXYGEN SATURATION: 100 % | BODY MASS INDEX: 20.1 KG/M2 | HEIGHT: 62 IN | TEMPERATURE: 97 F

## 2017-09-14 DIAGNOSIS — N39.0 URINARY TRACT INFECTION WITHOUT HEMATURIA, SITE UNSPECIFIED: Primary | ICD-10-CM

## 2017-09-14 DIAGNOSIS — R30.0 DYSURIA: ICD-10-CM

## 2017-09-14 LAB
BILIRUB SERPL-MCNC: ABNORMAL MG/DL
BLOOD URINE, POC: ABNORMAL
COLOR, POC UA: ABNORMAL
GLUCOSE UR QL STRIP: ABNORMAL
KETONES UR QL STRIP: ABNORMAL
LEUKOCYTE ESTERASE URINE, POC: ABNORMAL
NITRITE, POC UA: ABNORMAL
PH, POC UA: 7
PROTEIN, POC: ABNORMAL
SPECIFIC GRAVITY, POC UA: 1
UROBILINOGEN, POC UA: ABNORMAL

## 2017-09-14 PROCEDURE — 81002 URINALYSIS NONAUTO W/O SCOPE: CPT | Mod: S$GLB,,, | Performed by: NURSE PRACTITIONER

## 2017-09-14 PROCEDURE — 3008F BODY MASS INDEX DOCD: CPT | Mod: S$GLB,,, | Performed by: NURSE PRACTITIONER

## 2017-09-14 PROCEDURE — 87086 URINE CULTURE/COLONY COUNT: CPT

## 2017-09-14 PROCEDURE — 99999 PR PBB SHADOW E&M-EST. PATIENT-LVL IV: CPT | Mod: PBBFAC,,, | Performed by: NURSE PRACTITIONER

## 2017-09-14 PROCEDURE — 99214 OFFICE O/P EST MOD 30 MIN: CPT | Mod: 25,S$GLB,, | Performed by: NURSE PRACTITIONER

## 2017-09-14 RX ORDER — NITROFURANTOIN 25; 75 MG/1; MG/1
100 CAPSULE ORAL 2 TIMES DAILY
Qty: 14 CAPSULE | Refills: 0 | Status: SHIPPED | OUTPATIENT
Start: 2017-09-14 | End: 2017-09-21

## 2017-09-14 NOTE — PROGRESS NOTES
"Subjective:       Patient ID: Ivone Monroy is a 57 y.o. female.    Chief Complaint: Urinary Tract Infection ("poss uti, pressure after urinating")    Urinary Tract Infection    This is a new problem. The current episode started in the past 7 days. The problem occurs every urination. The problem has been unchanged. The quality of the pain is described as aching. The pain is at a severity of 2/10. The pain is mild. There has been no fever. She is sexually active. There is no history of pyelonephritis. Associated symptoms include frequency and urgency. Pertinent negatives include no chills, discharge, nausea, vomiting or constipation. She has tried nothing for the symptoms. Her past medical history is significant for recurrent UTIs. There is no history of urinary stasis or a urological procedure.     Review of Systems   Constitutional: Negative for activity change and chills.   Gastrointestinal: Negative for constipation, diarrhea, nausea and vomiting.   Genitourinary: Positive for dysuria, frequency and urgency. Negative for difficulty urinating and dyspareunia.   Neurological: Negative for dizziness, light-headedness and headaches.       Objective:      Physical Exam   Constitutional: She is oriented to person, place, and time. She appears well-developed and well-nourished.   Cardiovascular: Normal rate and normal heart sounds.    Pulmonary/Chest: Effort normal and breath sounds normal.   Abdominal: Soft. Bowel sounds are normal.   Neurological: She is alert and oriented to person, place, and time.   Skin: Skin is warm and dry.   Psychiatric: She has a normal mood and affect.   Nursing note and vitals reviewed.      Assessment:       1. Urinary tract infection without hematuria, site unspecified    2. Dysuria        Plan:         Ivone was seen today for urinary tract infection.    Diagnoses and all orders for this visit:    Urinary tract infection without hematuria, site unspecified  -     nitrofurantoin, " macrocrystal-monohydrate, (MACROBID) 100 MG capsule; Take 1 capsule (100 mg total) by mouth 2 (two) times daily.    Dysuria  -     POCT URINE DIPSTICK WITHOUT MICROSCOPE  -     Urine culture    Antibiotic Therapy  Take antibiotics for entire course.  Do not save medications for later, all medications must be taken for full regimen.  Follow up with PCP or ER if symptoms do not improve or worsen.

## 2017-09-14 NOTE — PATIENT INSTRUCTIONS

## 2017-09-16 LAB — BACTERIA UR CULT: NO GROWTH

## 2017-09-18 DIAGNOSIS — Z12.39 BREAST SCREENING: Primary | ICD-10-CM

## 2017-09-26 ENCOUNTER — HOSPITAL ENCOUNTER (OUTPATIENT)
Dept: RADIOLOGY | Facility: HOSPITAL | Age: 57
Discharge: HOME | End: 2017-09-26
Attending: FAMILY MEDICINE
Payer: COMMERCIAL

## 2017-09-26 VITALS — BODY MASS INDEX: 20.06 KG/M2 | WEIGHT: 109 LBS | HEIGHT: 62 IN

## 2017-09-26 DIAGNOSIS — Z12.39 BREAST SCREENING: ICD-10-CM

## 2017-09-26 PROCEDURE — 77067 SCR MAMMO BI INCL CAD: CPT | Mod: 26,,, | Performed by: RADIOLOGY

## 2017-09-26 PROCEDURE — 77067 SCR MAMMO BI INCL CAD: CPT | Mod: TC

## 2017-09-26 PROCEDURE — 77063 BREAST TOMOSYNTHESIS BI: CPT | Mod: 26,,, | Performed by: RADIOLOGY

## 2018-02-15 ENCOUNTER — OFFICE VISIT (OUTPATIENT)
Dept: URGENT CARE | Facility: CLINIC | Age: 58
End: 2018-02-15
Payer: COMMERCIAL

## 2018-02-15 VITALS
OXYGEN SATURATION: 99 % | HEART RATE: 57 BPM | WEIGHT: 112.44 LBS | DIASTOLIC BLOOD PRESSURE: 68 MMHG | BODY MASS INDEX: 20.69 KG/M2 | RESPIRATION RATE: 12 BRPM | SYSTOLIC BLOOD PRESSURE: 110 MMHG | HEIGHT: 62 IN | TEMPERATURE: 97 F

## 2018-02-15 DIAGNOSIS — N39.0 URINARY TRACT INFECTION WITHOUT HEMATURIA, SITE UNSPECIFIED: Primary | ICD-10-CM

## 2018-02-15 LAB
BILIRUB SERPL-MCNC: NEGATIVE MG/DL
BLOOD URINE, POC: NEGATIVE
COLOR, POC UA: ABNORMAL
GLUCOSE UR QL STRIP: NORMAL
KETONES UR QL STRIP: NEGATIVE
LEUKOCYTE ESTERASE URINE, POC: ABNORMAL
NITRITE, POC UA: NEGATIVE
PH, POC UA: 5
PROTEIN, POC: ABNORMAL
SPECIFIC GRAVITY, POC UA: 1.01
UROBILINOGEN, POC UA: NORMAL

## 2018-02-15 PROCEDURE — 3008F BODY MASS INDEX DOCD: CPT | Mod: S$GLB,,, | Performed by: NURSE PRACTITIONER

## 2018-02-15 PROCEDURE — 99999 PR PBB SHADOW E&M-EST. PATIENT-LVL IV: CPT | Mod: PBBFAC,,, | Performed by: NURSE PRACTITIONER

## 2018-02-15 PROCEDURE — 81001 URINALYSIS AUTO W/SCOPE: CPT | Mod: S$GLB,,, | Performed by: NURSE PRACTITIONER

## 2018-02-15 PROCEDURE — 99214 OFFICE O/P EST MOD 30 MIN: CPT | Mod: 25,S$GLB,, | Performed by: NURSE PRACTITIONER

## 2018-02-15 PROCEDURE — 87086 URINE CULTURE/COLONY COUNT: CPT

## 2018-02-15 RX ORDER — PHENAZOPYRIDINE HYDROCHLORIDE 100 MG/1
100 TABLET, FILM COATED ORAL 3 TIMES DAILY PRN
Qty: 6 TABLET | Refills: 0 | Status: SHIPPED | OUTPATIENT
Start: 2018-02-15 | End: 2018-02-15 | Stop reason: ALTCHOICE

## 2018-02-15 RX ORDER — NITROFURANTOIN 25; 75 MG/1; MG/1
100 CAPSULE ORAL 2 TIMES DAILY
Qty: 14 CAPSULE | Refills: 0 | Status: SHIPPED | OUTPATIENT
Start: 2018-02-15 | End: 2018-02-22

## 2018-02-15 NOTE — PATIENT INSTRUCTIONS
· Increase fluids (avoid caffeine or sugary beverages as these will irritate the bladder).   · Take your antibiotics exactly as prescribed and make sure to complete entire course even if you begin to feel better. This will prevent recurrence of infection and antibiotic resistance.   · We have prescribed an antibiotic that covers most bacteria that cause urinary tract infections, however, if your urine culture shows that you have any bacterial resistance to the antibiotic you were prescribed or an unusual bacterial strain, we will notify you and make any necessary changes. Urine cultures usually result in about three days.   · Please follow up with your primary care provider if your symptoms do not improve within 2-3 days or sooner for any new or worsening symptoms.  · For any severe pain, bright red blood in urine, difficulty urinating, fever that does not improve with Tylenol or Ibuprofen, inability to urinate, incontinence of urine or bowels, or for any other urgent concerns, please go to the ER for immediate evaluation.     Urinary Tract Infections in Women    Urinary tract infections (UTIs) are most often caused by bacteria (germs). These bacteria enter the urinary tract. The bacteria may come from outside the body. Or they may travel from the skin outside the rectum or vagina into the urethra. Female anatomy makes it easy for bacteria from the bowel to enter a womans urinary tract, which is the most common source of UTI. This means women develop UTIs more often than men. Pain in or around the urinary tract is a common UTI symptom. But the only way to know for sure if you have a UTI for the healthcare provider to test your urine. The two tests that may be done are the urinalysis and urine culture.  Types of UTIs  · Cystitis: A bladder infection (cystitis) is the most common UTI in women. You may have urgent or frequent urination. You may also have pain, burning when you urinate, and bloody urine.  · Urethritis:  This is an inflamed urethra, which is the tube that carries urine from the bladder to outside the body. You may have lower stomach or back pain. You may also have urgent or frequent urination.  · Pyelonephritis: This is a kidney infection. If not treated, it can be serious and damage your kidneys. In severe cases, you may be hospitalized. You may have a fever and lower back pain.  Medicines to treat a UTI  Most UTIs are treated with antibiotics. These kill the bacteria. The length of time you need to take them depends on the type of infection. It may be as short as 3 days. If you have repeated UTIs, a low-dose antibiotic may be needed for several months. Take antibiotics exactly as directed. Dont stop taking them until all of the medicine is gone. If you stop taking the antibiotic too soon, the infection may not go away, and you may develop a resistance to the antibiotic. This can make it much harder to treat.  Lifestyle changes to treat and prevent UTIs  The lifestyle changes below will help get rid of your UTI. They may also help prevent future UTIs.  · Drink plenty of fluids. This includes water, juice, or other caffeine-free drinks. Fluids help flush bacteria out of your body.  · Empty your bladder. Always empty your bladder when you feel the urge to urinate. And always urinate before going to sleep. Urine that stays in your bladder can lead to infection. Try to urinate before and after sex as well.  · Practice good personal hygiene. Wipe yourself from front to back after using the toilet. This helps keep bacteria from getting into the urethra.  · Use condoms during sex. These help prevent UTIs caused by sexually transmitted bacteria. Also, avoid using spermicides during sex. These can increase the risk of UTIs. Choose other forms of birth control instead. For women who tend to get UTIs after sex, a low-dose of a preventive antibiotic may be used. Be sure to discuss this option with your healthcare  provider.  · Follow up with your healthcare provider as directed. He or she may test to make sure the infection has cleared. If needed, more treatment may be started.  Date Last Reviewed: 1/1/2017  © 6765-1789 [employer]. [address] [place] [address], [place] [address]. All rights reserved. This information is not intended as a substitute for professional medical care. Always follow your healthcare professional's instructions.

## 2018-02-15 NOTE — PROGRESS NOTES
"Subjective:      Patient ID: Ivone Monroy is a 57 y.o. female.    Chief Complaint: Urinary Tract Infection    Urinary Tract Infection    This is a new problem. The current episode started yesterday. The problem occurs every urination. The problem has been gradually worsening. Quality: pressure. The pain is moderate. There has been no fever. Associated symptoms include frequency and urgency. Pertinent negatives include no chills, flank pain, hematuria, hesitancy, nausea, vomiting or constipation. She has tried nothing for the symptoms. The treatment provided no relief.     Review of Systems   Constitutional: Negative.  Negative for chills and fever.   HENT: Negative.    Respiratory: Negative.    Cardiovascular: Negative.    Gastrointestinal: Negative.  Negative for abdominal pain, constipation, nausea and vomiting.   Genitourinary: Positive for dysuria, frequency and urgency. Negative for flank pain, hematuria and hesitancy.   Musculoskeletal: Negative.    Skin: Negative.    Neurological: Negative.    Hematological: Negative.        Objective:   /68   Pulse (!) 57   Temp 96.8 °F (36 °C) (Tympanic)   Resp 12   Ht 5' 2" (1.575 m)   Wt 51 kg (112 lb 7 oz)   SpO2 99%   BMI 20.56 kg/m²   Physical Exam   Constitutional: She is oriented to person, place, and time. She appears well-developed and well-nourished. No distress.   HENT:   Head: Normocephalic and atraumatic.   Neck: Normal range of motion. Neck supple.   Cardiovascular: Normal rate.    Pulmonary/Chest: Effort normal. No respiratory distress.   Abdominal: Soft. Normal appearance and bowel sounds are normal. She exhibits no distension. There is no hepatosplenomegaly. There is no tenderness. There is no rebound, no guarding, no CVA tenderness, no tenderness at McBurney's point and negative Owens's sign.   Musculoskeletal: Normal range of motion.   Neurological: She is alert and oriented to person, place, and time.   Skin: Skin is warm and dry. " No rash noted. She is not diaphoretic.   Nursing note and vitals reviewed.    Assessment:      1. Urinary tract infection without hematuria, site unspecified       Plan:   Urinary tract infection without hematuria, site unspecified  -     POCT urinalysis, dipstick or tablet reag  -     Urine culture  -     nitrofurantoin, macrocrystal-monohydrate, (MACROBID) 100 MG capsule; Take 1 capsule (100 mg total) by mouth 2 (two) times daily.  Dispense: 14 capsule; Refill: 0  -     Discontinue: phenazopyridine (PYRIDIUM) 100 MG tablet; Take 1 tablet (100 mg total) by mouth 3 (three) times daily as needed for Pain.  Dispense: 6 tablet; Refill: 0    Instructions, follow up, and supportive care as per AVS.  Follow up with PCP if not improved or for any new or worsening symptoms.

## 2018-02-18 LAB
BACTERIA UR CULT: NORMAL
BACTERIA UR CULT: NORMAL

## 2018-04-11 ENCOUNTER — OFFICE VISIT (OUTPATIENT)
Dept: INTERNAL MEDICINE | Facility: CLINIC | Age: 58
End: 2018-04-11
Payer: COMMERCIAL

## 2018-04-11 VITALS
OXYGEN SATURATION: 97 % | HEART RATE: 62 BPM | DIASTOLIC BLOOD PRESSURE: 80 MMHG | HEIGHT: 62 IN | TEMPERATURE: 96 F | BODY MASS INDEX: 20.73 KG/M2 | SYSTOLIC BLOOD PRESSURE: 122 MMHG | WEIGHT: 112.63 LBS

## 2018-04-11 DIAGNOSIS — Z00.00 ROUTINE GENERAL MEDICAL EXAMINATION AT A HEALTH CARE FACILITY: Primary | ICD-10-CM

## 2018-04-11 DIAGNOSIS — Z87.19 HISTORY OF DIVERTICULITIS: ICD-10-CM

## 2018-04-11 DIAGNOSIS — K21.9 GASTROESOPHAGEAL REFLUX DISEASE, ESOPHAGITIS PRESENCE NOT SPECIFIED: ICD-10-CM

## 2018-04-11 DIAGNOSIS — Z85.09 HISTORY OF CANCER OF GALL BLADDER: ICD-10-CM

## 2018-04-11 DIAGNOSIS — Z23 NEED FOR PROPHYLACTIC VACCINATION WITH STREPTOCOCCUS PNEUMONIAE (PNEUMOCOCCUS) AND INFLUENZA VACCINES: ICD-10-CM

## 2018-04-11 PROCEDURE — 99999 PR PBB SHADOW E&M-EST. PATIENT-LVL III: CPT | Mod: PBBFAC,,, | Performed by: FAMILY MEDICINE

## 2018-04-11 PROCEDURE — 99396 PREV VISIT EST AGE 40-64: CPT | Mod: 25,S$GLB,, | Performed by: FAMILY MEDICINE

## 2018-04-11 PROCEDURE — 90471 IMMUNIZATION ADMIN: CPT | Mod: S$GLB,,, | Performed by: FAMILY MEDICINE

## 2018-04-11 PROCEDURE — 90670 PCV13 VACCINE IM: CPT | Mod: S$GLB,,, | Performed by: FAMILY MEDICINE

## 2018-04-11 RX ORDER — DICYCLOMINE HYDROCHLORIDE 10 MG/1
10 CAPSULE ORAL 2 TIMES DAILY
Refills: 11 | COMMUNITY
Start: 2018-03-20

## 2018-04-11 NOTE — PROGRESS NOTES
"Subjective:       Patient ID: Ivone Monroy is a 57 y.o. female.    Chief Complaint: Annual Exam    57-year-old female patient with Patient Active Problem List:     GERD (gastroesophageal reflux disease)     History of diverticulitis     History of cancer of gall bladder  Here for routine annual physicals.  [place] [facility] [place] and has been followed by Dr. rivera with history of gallbladder cancer status post chemoradiation, has not had gallbladder removed but had explorative surgery  Patient has seen her gastroenterologist today  Denies of any changes in appetite, has lost significant weight at the time of cancer.   Denies of any nausea vomiting, changes in bowel movements.         Review of Systems   Constitutional: Negative for fatigue and unexpected weight change.   Eyes: Negative for visual disturbance.   Respiratory: Negative for shortness of breath.    Cardiovascular: Negative for chest pain and leg swelling.   Gastrointestinal: Negative for abdominal pain, nausea and vomiting.   Musculoskeletal: Negative for myalgias.   Skin: Negative for rash.   Neurological: Negative for light-headedness and headaches.   Psychiatric/Behavioral: Negative for sleep disturbance.         /80 (BP Location: Left arm, Patient Position: Sitting)   Pulse 62   Temp 96.1 °F (35.6 °C) (Tympanic)   Ht 5' 2" (1.575 m)   Wt 51.1 kg (112 lb 10.5 oz)   SpO2 97%   BMI 20.60 kg/m²   Objective:      Physical Exam   Constitutional: She is oriented to person, place, and time. She appears well-developed and well-nourished.   HENT:   Head: Normocephalic and atraumatic.   Mouth/Throat: Oropharynx is clear and moist.   Cardiovascular: Normal rate, regular rhythm and normal heart sounds.    No murmur heard.  Pulmonary/Chest: Effort normal and breath sounds normal. She has no wheezes.   Abdominal: Soft. Bowel sounds are normal. There is no " tenderness.   Musculoskeletal: She exhibits no edema.   Neurological: She is alert and oriented to person, place, and time.   Skin: Skin is warm and dry. No rash noted.   Psychiatric: She has a normal mood and affect.         Assessment:       1. Routine general medical examination at a health care facility    2. Gastroesophageal reflux disease, esophagitis presence not specified    3. History of diverticulitis    4. History of cancer of gall bladder    5. Need for prophylactic vaccination with Streptococcus pneumoniae (Pneumococcus) and Influenza vaccines        Plan:   Routine general medical examination at a health care facility  -     CBC auto differential; Future; Expected date: 04/11/2018  -     Comprehensive metabolic panel; Future; Expected date: 04/11/2018  -     Lipid panel; Future; Expected date: 04/11/2018  -     TSH; Future; Expected date: 04/11/2018  -     Urinalysis; Future; Expected date: 04/11/2018  Vital signs stable today.  Clinical exam stable  Encouraged to start lifestyle modifications with low-fat and low-cholesterol diet and exercise 30 minutes daily    Gastroesophageal reflux disease, esophagitis presence not specified-currently on omeprazole 20 mg twice daily, will by GI specialist [facility]    History of diverticulitis    History of cancer of gall bladder-status post chemotherapy and radiation, followed by Dr. rivera every 6 months    Need for prophylactic vaccination with Streptococcus pneumoniae (Pneumococcus) and Influenza vaccines  -     (In Office Administered) Pneumococcal Conjugate Vaccine (13 Valent) (IM)  Prevnar given today

## 2018-04-12 ENCOUNTER — LAB VISIT (OUTPATIENT)
Dept: LAB | Facility: HOSPITAL | Age: 58
End: 2018-04-12
Attending: FAMILY MEDICINE
Payer: COMMERCIAL

## 2018-04-12 DIAGNOSIS — Z00.00 ROUTINE GENERAL MEDICAL EXAMINATION AT A HEALTH CARE FACILITY: ICD-10-CM

## 2018-04-12 LAB
ALBUMIN SERPL BCP-MCNC: 4.1 G/DL
ALP SERPL-CCNC: 86 U/L
ALT SERPL W/O P-5'-P-CCNC: 21 U/L
ANION GAP SERPL CALC-SCNC: 10 MMOL/L
AST SERPL-CCNC: 26 U/L
BASOPHILS # BLD AUTO: 0.02 K/UL
BASOPHILS NFR BLD: 0.5 %
BILIRUB SERPL-MCNC: 0.5 MG/DL
BUN SERPL-MCNC: 17 MG/DL
CALCIUM SERPL-MCNC: 10.3 MG/DL
CHLORIDE SERPL-SCNC: 102 MMOL/L
CHOLEST SERPL-MCNC: 217 MG/DL
CHOLEST/HDLC SERPL: 2.3 {RATIO}
CO2 SERPL-SCNC: 31 MMOL/L
CREAT SERPL-MCNC: 1.1 MG/DL
DIFFERENTIAL METHOD: ABNORMAL
EOSINOPHIL # BLD AUTO: 0.1 K/UL
EOSINOPHIL NFR BLD: 1.5 %
ERYTHROCYTE [DISTWIDTH] IN BLOOD BY AUTOMATED COUNT: 11.9 %
EST. GFR  (AFRICAN AMERICAN): >60 ML/MIN/1.73 M^2
EST. GFR  (NON AFRICAN AMERICAN): 55.1 ML/MIN/1.73 M^2
GLUCOSE SERPL-MCNC: 84 MG/DL
HCT VFR BLD AUTO: 36.7 %
HDLC SERPL-MCNC: 95 MG/DL
HDLC SERPL: 43.8 %
HGB BLD-MCNC: 11.6 G/DL
IMM GRANULOCYTES # BLD AUTO: 0.01 K/UL
IMM GRANULOCYTES NFR BLD AUTO: 0.3 %
LDLC SERPL CALC-MCNC: 109 MG/DL
LYMPHOCYTES # BLD AUTO: 1.3 K/UL
LYMPHOCYTES NFR BLD: 33.5 %
MCH RBC QN AUTO: 30.1 PG
MCHC RBC AUTO-ENTMCNC: 31.6 G/DL
MCV RBC AUTO: 95 FL
MONOCYTES # BLD AUTO: 0.4 K/UL
MONOCYTES NFR BLD: 9.6 %
NEUTROPHILS # BLD AUTO: 2.2 K/UL
NEUTROPHILS NFR BLD: 54.6 %
NONHDLC SERPL-MCNC: 122 MG/DL
NRBC BLD-RTO: 0 /100 WBC
PLATELET # BLD AUTO: 232 K/UL
PMV BLD AUTO: 10.5 FL
POTASSIUM SERPL-SCNC: 4.5 MMOL/L
PROT SERPL-MCNC: 7.5 G/DL
RBC # BLD AUTO: 3.85 M/UL
SODIUM SERPL-SCNC: 143 MMOL/L
TRIGL SERPL-MCNC: 65 MG/DL
TSH SERPL DL<=0.005 MIU/L-ACNC: 1.91 UIU/ML
WBC # BLD AUTO: 3.94 K/UL

## 2018-04-12 PROCEDURE — 80053 COMPREHEN METABOLIC PANEL: CPT

## 2018-04-12 PROCEDURE — 84443 ASSAY THYROID STIM HORMONE: CPT

## 2018-04-12 PROCEDURE — 36415 COLL VENOUS BLD VENIPUNCTURE: CPT | Mod: PO

## 2018-04-12 PROCEDURE — 85025 COMPLETE CBC W/AUTO DIFF WBC: CPT

## 2018-04-12 PROCEDURE — 80061 LIPID PANEL: CPT

## 2018-07-19 ENCOUNTER — OFFICE VISIT (OUTPATIENT)
Dept: URGENT CARE | Facility: CLINIC | Age: 58
End: 2018-07-19
Payer: COMMERCIAL

## 2018-07-19 VITALS
HEIGHT: 63 IN | DIASTOLIC BLOOD PRESSURE: 60 MMHG | OXYGEN SATURATION: 100 % | SYSTOLIC BLOOD PRESSURE: 120 MMHG | TEMPERATURE: 97 F | WEIGHT: 111.31 LBS | BODY MASS INDEX: 19.72 KG/M2 | HEART RATE: 54 BPM | RESPIRATION RATE: 16 BRPM

## 2018-07-19 DIAGNOSIS — N39.0 URINARY TRACT INFECTION WITHOUT HEMATURIA, SITE UNSPECIFIED: Primary | ICD-10-CM

## 2018-07-19 LAB
BILIRUB SERPL-MCNC: 1 MG/DL
BLOOD URINE, POC: ABNORMAL
COLOR, POC UA: ABNORMAL
GLUCOSE UR QL STRIP: NORMAL
KETONES UR QL STRIP: NEGATIVE
LEUKOCYTE ESTERASE URINE, POC: 1
NITRITE, POC UA: NEGATIVE
PH, POC UA: 5
PROTEIN, POC: ABNORMAL
SPECIFIC GRAVITY, POC UA: 1.01
UROBILINOGEN, POC UA: NORMAL

## 2018-07-19 PROCEDURE — 81002 URINALYSIS NONAUTO W/O SCOPE: CPT | Mod: S$GLB,,, | Performed by: NURSE PRACTITIONER

## 2018-07-19 PROCEDURE — 87086 URINE CULTURE/COLONY COUNT: CPT

## 2018-07-19 PROCEDURE — 99214 OFFICE O/P EST MOD 30 MIN: CPT | Mod: 25,S$GLB,, | Performed by: NURSE PRACTITIONER

## 2018-07-19 PROCEDURE — 99999 PR PBB SHADOW E&M-EST. PATIENT-LVL IV: CPT | Mod: PBBFAC,,, | Performed by: NURSE PRACTITIONER

## 2018-07-19 RX ORDER — NITROFURANTOIN 25; 75 MG/1; MG/1
100 CAPSULE ORAL 2 TIMES DAILY
Qty: 14 CAPSULE | Refills: 0 | Status: SHIPPED | OUTPATIENT
Start: 2018-07-19 | End: 2018-07-26

## 2018-07-20 NOTE — PROGRESS NOTES
Subjective:       Patient ID: Ivone Monroy is a 58 y.o. female.    Chief Complaint: Urinary Tract Infection    Urinary Tract Infection    This is a new problem. The current episode started in the past 7 days. The problem occurs intermittently. The problem has been unchanged. The quality of the pain is described as burning. The pain is mild. There has been no fever. Associated symptoms include frequency and urgency. Pertinent negatives include no behavior changes, discharge, flank pain, hematuria, nausea, possible pregnancy, sweats, constipation or withholding. She has tried nothing for the symptoms. Her past medical history is significant for recurrent UTIs.     Review of Systems   Constitutional: Negative for fatigue and fever.   Respiratory: Negative for shortness of breath, wheezing and stridor.    Cardiovascular: Negative for chest pain, palpitations and leg swelling.   Gastrointestinal: Negative for constipation and nausea.   Genitourinary: Positive for dysuria, frequency and urgency. Negative for difficulty urinating, dyspareunia, flank pain and hematuria.   Skin: Negative for color change.   Allergic/Immunologic: Negative for environmental allergies.   Neurological: Negative for dizziness and light-headedness.   Psychiatric/Behavioral: Negative for agitation.       Objective:      Physical Exam   Constitutional: She is oriented to person, place, and time. She appears well-developed and well-nourished.   Cardiovascular: Normal rate and normal heart sounds.    Pulmonary/Chest: Effort normal and breath sounds normal.   Abdominal: Soft. Normal appearance and bowel sounds are normal. There is tenderness in the suprapubic area. There is no rigidity, no rebound, no guarding and no CVA tenderness.   Neurological: She is alert and oriented to person, place, and time.   Nursing note and vitals reviewed.      Assessment:       1. Urinary tract infection without hematuria, site unspecified        Plan:          Ivone was seen today for urinary tract infection.    Diagnoses and all orders for this visit:    Urinary tract infection without hematuria, site unspecified  -     POCT URINE DIPSTICK WITHOUT MICROSCOPE  -     Urine culture  -     nitrofurantoin, macrocrystal-monohydrate, (MACROBID) 100 MG capsule; Take 1 capsule (100 mg total) by mouth 2 (two) times daily. for 7 days    Antibiotic Therapy  Take antibiotics for entire course.  Do not save medications for later, all medications must be taken for full regimen.  Follow up with PCP or ER if symptoms do not improve or worsen.

## 2018-07-20 NOTE — PATIENT INSTRUCTIONS

## 2018-07-21 LAB
BACTERIA UR CULT: NORMAL
BACTERIA UR CULT: NORMAL

## 2018-09-08 ENCOUNTER — OFFICE VISIT (OUTPATIENT)
Dept: URGENT CARE | Facility: CLINIC | Age: 58
End: 2018-09-08
Payer: COMMERCIAL

## 2018-09-08 VITALS
BODY MASS INDEX: 19.47 KG/M2 | DIASTOLIC BLOOD PRESSURE: 74 MMHG | SYSTOLIC BLOOD PRESSURE: 122 MMHG | WEIGHT: 105.81 LBS | TEMPERATURE: 98 F | OXYGEN SATURATION: 99 % | RESPIRATION RATE: 16 BRPM | HEART RATE: 57 BPM | HEIGHT: 62 IN

## 2018-09-08 DIAGNOSIS — R30.0 DYSURIA: ICD-10-CM

## 2018-09-08 DIAGNOSIS — N30.00 ACUTE CYSTITIS WITHOUT HEMATURIA: Primary | ICD-10-CM

## 2018-09-08 LAB
BILIRUB SERPL-MCNC: NEGATIVE MG/DL
BLOOD URINE, POC: NEGATIVE
COLOR, POC UA: 1
GLUCOSE UR QL STRIP: NORMAL
KETONES UR QL STRIP: NEGATIVE
LEUKOCYTE ESTERASE URINE, POC: ABNORMAL
NITRITE, POC UA: NEGATIVE
PH, POC UA: 5
PROTEIN, POC: NEGATIVE
SPECIFIC GRAVITY, POC UA: 1
UROBILINOGEN, POC UA: NORMAL

## 2018-09-08 PROCEDURE — 87088 URINE BACTERIA CULTURE: CPT

## 2018-09-08 PROCEDURE — 99214 OFFICE O/P EST MOD 30 MIN: CPT | Mod: 25,S$GLB,, | Performed by: NURSE PRACTITIONER

## 2018-09-08 PROCEDURE — 81002 URINALYSIS NONAUTO W/O SCOPE: CPT | Mod: S$GLB,,, | Performed by: NURSE PRACTITIONER

## 2018-09-08 PROCEDURE — 87147 CULTURE TYPE IMMUNOLOGIC: CPT

## 2018-09-08 PROCEDURE — 99999 PR PBB SHADOW E&M-EST. PATIENT-LVL V: CPT | Mod: PBBFAC,,, | Performed by: NURSE PRACTITIONER

## 2018-09-08 PROCEDURE — 87086 URINE CULTURE/COLONY COUNT: CPT

## 2018-09-08 RX ORDER — SULFAMETHOXAZOLE AND TRIMETHOPRIM 800; 160 MG/1; MG/1
1 TABLET ORAL 2 TIMES DAILY
Qty: 10 TABLET | Refills: 0 | Status: SHIPPED | OUTPATIENT
Start: 2018-09-08 | End: 2018-09-13

## 2018-09-08 NOTE — PROGRESS NOTES
Subjective:       Patient ID: Ivone Monroy is a 58 y.o. female.    Chief Complaint: Urinary Tract Infection    Urinary Tract Infection    This is a recurrent (pressure sensation ) problem. The current episode started more than 1 year ago. The problem occurs intermittently. The problem has been unchanged. The patient is experiencing no pain. She is sexually active. There is no history of pyelonephritis. Associated symptoms include frequency (intermittent). Pertinent negatives include no behavior changes, chills, discharge, flank pain, hematuria, hesitancy, nausea, urgency, vomiting, bubble bath use or constipation. She has tried increased fluids (cranberry juice) for the symptoms. The treatment provided no relief. Her past medical history is significant for recurrent UTIs (last july 2018, typically uti twice a year, saw urology in past). There is no history of kidney stones or a urological procedure.     Review of Systems   Constitutional: Negative for appetite change, chills, diaphoresis, fatigue and fever.   HENT: Negative.    Eyes: Negative for visual disturbance.   Respiratory: Negative for cough, shortness of breath and wheezing.    Cardiovascular: Negative for chest pain, palpitations and leg swelling.   Gastrointestinal: Negative for abdominal distention, abdominal pain, blood in stool, constipation, diarrhea, nausea and vomiting.   Genitourinary: Positive for frequency (intermittent). Negative for decreased urine volume, difficulty urinating, dysuria, flank pain, hematuria, hesitancy and urgency.   Neurological: Negative.  Negative for dizziness, syncope, speech difficulty, light-headedness and headaches.   Psychiatric/Behavioral: Negative for agitation, confusion and hallucinations. The patient is not nervous/anxious.        Objective:      Physical Exam   Constitutional: She is oriented to person, place, and time. She appears well-developed and well-nourished. No distress.   Eyes: Conjunctivae are  normal.   Cardiovascular: Normal rate, regular rhythm, normal heart sounds and intact distal pulses.   Pulmonary/Chest: Effort normal and breath sounds normal.   Abdominal: Soft. Normal appearance and bowel sounds are normal. There is tenderness (mild suprapubic) in the suprapubic area.   Neurological: She is alert and oriented to person, place, and time.   Skin: Skin is warm and dry.   Psychiatric: She has a normal mood and affect. Her behavior is normal.   Vitals reviewed.      Assessment:       1. Acute cystitis without hematuria    2. Dysuria        Plan:       Ivone was seen today for urinary tract infection.    Diagnoses and all orders for this visit:    Acute cystitis without hematuria  Comments:  Complete antibiotic therapy, 30% pure cranberry juice 8-16 oz daily x 7 days.   Orders:  -     Urine culture  -     sulfamethoxazole-trimethoprim 800-160mg (BACTRIM DS) 800-160 mg Tab; Take 1 tablet by mouth 2 (two) times daily. for 5 days    Dysuria  Comments:  POCT UA: ++leukocytes, no blood, no nitrates.  Orders:  -     POCT URINE DIPSTICK WITHOUT MICROSCOPE         Fu with pcp if not improved in 3-5 days, sooner if worsens or new onset of symptoms

## 2018-09-08 NOTE — PATIENT INSTRUCTIONS

## 2018-09-10 LAB — BACTERIA UR CULT: NORMAL

## 2018-09-13 ENCOUNTER — PATIENT MESSAGE (OUTPATIENT)
Dept: INTERNAL MEDICINE | Facility: CLINIC | Age: 58
End: 2018-09-13

## 2018-09-13 ENCOUNTER — PATIENT MESSAGE (OUTPATIENT)
Dept: OBSTETRICS AND GYNECOLOGY | Facility: CLINIC | Age: 58
End: 2018-09-13

## 2018-09-13 DIAGNOSIS — Z12.39 BREAST CANCER SCREENING: Primary | ICD-10-CM

## 2018-09-14 ENCOUNTER — TELEPHONE (OUTPATIENT)
Dept: INTERNAL MEDICINE | Facility: CLINIC | Age: 58
End: 2018-09-14

## 2018-09-14 DIAGNOSIS — N39.0 URINARY TRACT INFECTION WITHOUT HEMATURIA, SITE UNSPECIFIED: Primary | ICD-10-CM

## 2018-09-14 DIAGNOSIS — Z09 FOLLOW-UP EXAM: ICD-10-CM

## 2018-09-14 NOTE — TELEPHONE ENCOUNTER
Patient was recently treated at urgent care for UTI, would like to come in and do urinalysis and urine culture, orders has been placed

## 2018-09-18 ENCOUNTER — LAB VISIT (OUTPATIENT)
Dept: LAB | Facility: HOSPITAL | Age: 58
End: 2018-09-18
Attending: FAMILY MEDICINE
Payer: COMMERCIAL

## 2018-09-18 DIAGNOSIS — Z09 FOLLOW-UP EXAM: ICD-10-CM

## 2018-09-18 DIAGNOSIS — N39.0 URINARY TRACT INFECTION WITHOUT HEMATURIA, SITE UNSPECIFIED: ICD-10-CM

## 2018-09-18 LAB
BILIRUB UR QL STRIP: NEGATIVE
CLARITY UR: CLEAR
COLOR UR: YELLOW
GLUCOSE UR QL STRIP: NEGATIVE
HGB UR QL STRIP: NEGATIVE
KETONES UR QL STRIP: NEGATIVE
LEUKOCYTE ESTERASE UR QL STRIP: NEGATIVE
NITRITE UR QL STRIP: NEGATIVE
PH UR STRIP: 6 [PH] (ref 5–8)
PROT UR QL STRIP: NEGATIVE
SP GR UR STRIP: <=1.005 (ref 1–1.03)
URN SPEC COLLECT METH UR: ABNORMAL

## 2018-09-18 PROCEDURE — 81003 URINALYSIS AUTO W/O SCOPE: CPT | Mod: PO

## 2018-09-27 ENCOUNTER — HOSPITAL ENCOUNTER (OUTPATIENT)
Dept: RADIOLOGY | Facility: HOSPITAL | Age: 58
Discharge: HOME | End: 2018-09-27
Attending: NURSE PRACTITIONER
Payer: COMMERCIAL

## 2018-09-27 ENCOUNTER — OFFICE VISIT (OUTPATIENT)
Dept: OBSTETRICS AND GYNECOLOGY | Facility: CLINIC | Age: 58
End: 2018-09-27
Payer: COMMERCIAL

## 2018-09-27 VITALS
HEIGHT: 62 IN | DIASTOLIC BLOOD PRESSURE: 68 MMHG | BODY MASS INDEX: 20 KG/M2 | WEIGHT: 108.69 LBS | SYSTOLIC BLOOD PRESSURE: 118 MMHG

## 2018-09-27 DIAGNOSIS — Z12.39 BREAST CANCER SCREENING: ICD-10-CM

## 2018-09-27 DIAGNOSIS — Z01.419 ENCOUNTER FOR GYNECOLOGICAL EXAMINATION WITHOUT ABNORMAL FINDING: Primary | ICD-10-CM

## 2018-09-27 PROCEDURE — 77063 BREAST TOMOSYNTHESIS BI: CPT | Mod: TC,PO

## 2018-09-27 PROCEDURE — 77067 SCR MAMMO BI INCL CAD: CPT | Mod: 26,,, | Performed by: RADIOLOGY

## 2018-09-27 PROCEDURE — 99999 PR PBB SHADOW E&M-EST. PATIENT-LVL III: CPT | Mod: PBBFAC,,, | Performed by: NURSE PRACTITIONER

## 2018-09-27 PROCEDURE — 77063 BREAST TOMOSYNTHESIS BI: CPT | Mod: 26,,, | Performed by: RADIOLOGY

## 2018-09-27 PROCEDURE — 99396 PREV VISIT EST AGE 40-64: CPT | Mod: S$GLB,,, | Performed by: NURSE PRACTITIONER

## 2018-09-27 RX ORDER — IRON FUM,PS CMP/VIT C/NIACIN 125-40-3MG
1 CAPSULE ORAL DAILY
Refills: 3 | COMMUNITY
Start: 2018-08-23 | End: 2022-09-13

## 2018-09-27 NOTE — PROGRESS NOTES
"CC: Well woman exam    Ivone Monroy is a 58 y.o. female  presents for well woman exam.  LMP: No LMP recorded. Patient is postmenopausal..  No issues, problems, or complaints.    MMG today.     Past Medical History:   Diagnosis Date    Anemia     Cancer of gallbladder 2015    chemo radiation - Dr Gatica     Diverticulitis     GERD (gastroesophageal reflux disease)      Past Surgical History:   Procedure Laterality Date    exploration of gallbladder twice for suspected malignancy with no resection       Social History     Socioeconomic History    Marital status:      Spouse name: Elba    Number of children: 3    Years of education: Not on file    Highest education level: Not on file   Social Needs    Financial resource strain: Not on file    Food insecurity - worry: Not on file    Food insecurity - inability: Not on file    Transportation needs - medical: Not on file    Transportation needs - non-medical: Not on file   Occupational History    Occupation: Public      Employer: [employer]     Employer: [employer]   Tobacco Use    Smoking status: Never Smoker    Smokeless tobacco: Never Used   Substance and Sexual Activity    Alcohol use: No     Comment: socially    Drug use: No    Sexual activity: Yes     Partners: Male     Birth control/protection: None     Comment:    Other Topics Concern    Not on file   Social History Narrative    Not on file     Family History   Problem Relation Age of Onset    Diabetes Mother         may have been related to prolonged prednisone use    Arthritis Mother         Rheumatoid    Cancer Father         prostate    Heart disease Father     Colon cancer Cousin     Colon cancer Paternal Uncle      OB History      Para Term  AB Living    3 3       3    SAB TAB Ectopic Multiple Live Births            2          /68 (BP Location: Left arm)   Ht 5' 2" (1.575 m)   Wt 49.3 kg (108 lb " 11 oz)   BMI 19.88 kg/m²       ROS:  GENERAL: Denies weight gain or weight loss. Feeling well overall.   SKIN: Denies rash or lesions.   HEAD: Denies head injury or headache.   NODES: Denies enlarged lymph nodes.   CHEST: Denies chest pain or shortness of breath.   CARDIOVASCULAR: Denies palpitations or left sided chest pain.   ABDOMEN: No abdominal pain, constipation, diarrhea, nausea, vomiting or rectal bleeding.   URINARY: No frequency, dysuria, hematuria, or burning on urination.  REPRODUCTIVE: See HPI.   BREASTS: The patient performs breast self-examination and denies pain, lumps, or nipple discharge.   HEMATOLOGIC: No easy bruisability or excessive bleeding.   MUSCULOSKELETAL: Denies joint pain or swelling.   NEUROLOGIC: Denies syncope or weakness.   PSYCHIATRIC: Denies depression, anxiety or mood swings.    PHYSICAL EXAM:  APPEARANCE: Well nourished, well developed, in no acute distress.  AFFECT: WNL, alert and oriented x 3  SKIN: No acne or hirsutism  NECK: Neck symmetric without masses or thyromegaly  NODES: No inguinal, cervical, axillary, or femoral lymph node enlargement  CHEST: Good respiratory effect  ABDOMEN: Soft.  No tenderness or masses.  No hepatosplenomegaly.  No hernias.  BREASTS: Symmetrical, no skin changes or visible lesions.  No palpable masses, nipple discharge bilaterally.  PELVIC: Normal external genitalia without lesions.  Normal hair distribution.  Adequate perineal body, normal urethral meatus.  Vagina atrophic without lesions or discharge.  Cervix pink, without lesions, discharge or tenderness.  No significant cystocele or rectocele.  Bimanual exam shows uterus to be normal size, regular, mobile and nontender.  Adnexa without masses or tenderness.    EXTREMITIES: No edema.  Physical Exam    1. Encounter for gynecological examination without abnormal finding      AND PLAN:    Patient was counseled today on A.C.S. Pap guidelines and recommendations for yearly pelvic exams, mammograms  and monthly self breast exams; to see her PCP for other health maintenance.

## 2019-02-19 ENCOUNTER — OFFICE VISIT (OUTPATIENT)
Dept: INTERNAL MEDICINE | Facility: CLINIC | Age: 59
End: 2019-02-19
Payer: COMMERCIAL

## 2019-02-19 VITALS
HEART RATE: 57 BPM | TEMPERATURE: 97 F | WEIGHT: 108.94 LBS | BODY MASS INDEX: 19.92 KG/M2 | SYSTOLIC BLOOD PRESSURE: 116 MMHG | DIASTOLIC BLOOD PRESSURE: 74 MMHG | OXYGEN SATURATION: 100 %

## 2019-02-19 DIAGNOSIS — R39.9 UTI SYMPTOMS: Primary | ICD-10-CM

## 2019-02-19 DIAGNOSIS — N39.0 URINARY TRACT INFECTION WITHOUT HEMATURIA, SITE UNSPECIFIED: ICD-10-CM

## 2019-02-19 LAB
BILIRUB SERPL-MCNC: NORMAL MG/DL
BLOOD URINE, POC: NORMAL
COLOR, POC UA: CLEAR
GLUCOSE UR QL STRIP: NORMAL
KETONES UR QL STRIP: NORMAL
LEUKOCYTE ESTERASE URINE, POC: NORMAL
NITRITE, POC UA: NORMAL
PH, POC UA: 5
PROTEIN, POC: NORMAL
SPECIFIC GRAVITY, POC UA: 1
UROBILINOGEN, POC UA: NORMAL

## 2019-02-19 PROCEDURE — 99999 PR PBB SHADOW E&M-EST. PATIENT-LVL IV: ICD-10-PCS | Mod: PBBFAC,,, | Performed by: NURSE PRACTITIONER

## 2019-02-19 PROCEDURE — 99999 PR PBB SHADOW E&M-EST. PATIENT-LVL IV: CPT | Mod: PBBFAC,,, | Performed by: NURSE PRACTITIONER

## 2019-02-19 PROCEDURE — 81001 POCT URINALYSIS, DIPSTICK OR TABLET REAGENT, AUTOMATED, WITH MICROSCOP: ICD-10-PCS | Mod: S$GLB,,, | Performed by: NURSE PRACTITIONER

## 2019-02-19 PROCEDURE — 3008F PR BODY MASS INDEX (BMI) DOCUMENTED: ICD-10-PCS | Mod: CPTII,S$GLB,, | Performed by: NURSE PRACTITIONER

## 2019-02-19 PROCEDURE — 99214 PR OFFICE/OUTPT VISIT, EST, LEVL IV, 30-39 MIN: ICD-10-PCS | Mod: 25,S$GLB,, | Performed by: NURSE PRACTITIONER

## 2019-02-19 PROCEDURE — 99214 OFFICE O/P EST MOD 30 MIN: CPT | Mod: 25,S$GLB,, | Performed by: NURSE PRACTITIONER

## 2019-02-19 PROCEDURE — 87086 URINE CULTURE/COLONY COUNT: CPT

## 2019-02-19 PROCEDURE — 81001 URINALYSIS AUTO W/SCOPE: CPT | Mod: S$GLB,,, | Performed by: NURSE PRACTITIONER

## 2019-02-19 PROCEDURE — 3008F BODY MASS INDEX DOCD: CPT | Mod: CPTII,S$GLB,, | Performed by: NURSE PRACTITIONER

## 2019-02-19 RX ORDER — BUTYROSPERMUM PARKII(SHEA BUTTER), SIMMONDSIA CHINENSIS (JOJOBA) SEED OIL, ALOE BARBADENSIS LEAF EXTRACT .01; 1; 3.5 G/100G; G/100G; G/100G
LIQUID TOPICAL
COMMUNITY
End: 2020-08-26

## 2019-02-19 RX ORDER — CIPROFLOXACIN 250 MG/1
250 TABLET, FILM COATED ORAL EVERY 12 HOURS
Qty: 10 TABLET | Refills: 0 | Status: SHIPPED | OUTPATIENT
Start: 2019-02-19 | End: 2019-04-11

## 2019-02-19 RX ORDER — SIMETHICONE 125 MG
TABLET,CHEWABLE ORAL
COMMUNITY
End: 2022-11-08

## 2019-02-19 NOTE — PROGRESS NOTES
CC:Dysuria.  Ivone Monroy is a 58 y.o. female with a new complaint of pressure, dysuria.   The patient denies chills, flank pain bilaterally and lower abdominal pain.  Symptoms have been present for 1 day(s).   Treatments tried include:none    Review of patient's allergies indicates:   Allergen Reactions    Sunscreen spf 8 [oxybenzone-padimate o] Swelling     Blisters to skin.   Blisters to skin.     Iodine and iodide containing products Swelling     Lips swell with sores    Shellfish containing products Swelling     Lips swell        Outpatient Medications Prior to Visit   Medication Sig Dispense Refill    bifidobacterium infantis (ALIGN) 4 mg Cap Take by mouth once daily.      dicyclomine (BENTYL) 10 MG capsule Take 10 mg by mouth 2 (two) times daily.  11    INTEGRA 125-40-3 mg Cap Take 1 capsule by mouth once daily.  3    methylcellulose oral powder Take 3.4 g by mouth once daily.      multivitamin (ONE DAILY MULTIVITAMIN) per tablet Take 1 tablet by mouth once daily.      omeprazole (PRILOSEC) 20 MG capsule Take 20 mg by mouth 2 (two) times daily.       Saccharomyces boulardii (FLORASTOR) 250 mg capsule Take by mouth.      simethicone (MYLICON) 125 MG chewable tablet Take by mouth.      FUSION PLUS 130 mg iron -1,250 mcg Cap Take 1 capsule by mouth once daily.  3     No facility-administered medications prior to visit.         Physical Exam   /74 (BP Location: Left arm, Patient Position: Sitting, BP Method: Medium (Manual))   Pulse (!) 57   Temp 97.4 °F (36.3 °C) (Tympanic)   Wt 49.4 kg (108 lb 14.5 oz)   SpO2 100%   BMI 19.92 kg/m²   Constitutional: The patient appears well-developed and well-nourished. No distress.   Cardiovascular: Normal rate, regular rhythm and normal heart sounds.  Exam reveals no gallop and no friction rub.    No murmur heard.  Pulmonary/Chest: Effort normal and breath sounds normal. No respiratory distress. She has no wheezes. She has no rales.   Abdominal:  Soft. Bowel sounds are normal. The patient exhibits no distension and no mass. There is no tenderness in the suprapubic area. There is no rebound, no guarding and no CVA tenderness. No hernia.   Skin: The patient is not diaphoretic.     The patient had a urinalysis performed today and it was abnormal.     Encounter Diagnoses   Name Primary?    UTI symptoms Yes    Urinary tract infection without hematuria, site unspecified        PLAN:  Ivone was seen today for urinary tract infection.    Diagnoses and all orders for this visit:    UTI symptoms  -     POCT URINE DIPSTICK WITH MICROSCOPE, AUTOMATED  -     Urine culture; Future  -     Urine culture    Urinary tract infection without hematuria, site unspecified  -     ciprofloxacin HCl (CIPRO) 250 MG tablet; Take 1 tablet (250 mg total) by mouth every 12 (twelve) hours.      Medications Ordered This Encounter   Medications    ciprofloxacin HCl (CIPRO) 250 MG tablet     Sig: Take 1 tablet (250 mg total) by mouth every 12 (twelve) hours.     Dispense:  10 tablet     Refill:  0     [unfilled]  Orders Placed This Encounter   Procedures    Urine culture     Standing Status:   Future     Number of Occurrences:   1     Standing Expiration Date:   4/19/2020    POCT URINE DIPSTICK WITH MICROSCOPE, AUTOMATED     RTC if symptoms are worsening or changing significantly or if not improved by the end of therapy.

## 2019-02-20 LAB
BACTERIA UR CULT: NORMAL
BACTERIA UR CULT: NORMAL

## 2019-03-11 ENCOUNTER — PATIENT MESSAGE (OUTPATIENT)
Dept: INTERNAL MEDICINE | Facility: CLINIC | Age: 59
End: 2019-03-11

## 2019-03-12 ENCOUNTER — TELEPHONE (OUTPATIENT)
Dept: INTERNAL MEDICINE | Facility: CLINIC | Age: 59
End: 2019-03-12

## 2019-03-12 ENCOUNTER — PATIENT MESSAGE (OUTPATIENT)
Dept: INTERNAL MEDICINE | Facility: CLINIC | Age: 59
End: 2019-03-12

## 2019-03-12 DIAGNOSIS — Z29.9 PREVENTIVE MEASURE: Primary | ICD-10-CM

## 2019-03-13 ENCOUNTER — LAB VISIT (OUTPATIENT)
Dept: LAB | Facility: HOSPITAL | Age: 59
End: 2019-03-13
Attending: FAMILY MEDICINE
Payer: COMMERCIAL

## 2019-03-13 DIAGNOSIS — Z29.9 PREVENTIVE MEASURE: ICD-10-CM

## 2019-03-13 LAB
ALBUMIN SERPL BCP-MCNC: 3.9 G/DL
ALP SERPL-CCNC: 84 U/L
ALT SERPL W/O P-5'-P-CCNC: 18 U/L
ANION GAP SERPL CALC-SCNC: 9 MMOL/L
AST SERPL-CCNC: 22 U/L
BASOPHILS # BLD AUTO: 0.01 K/UL
BASOPHILS NFR BLD: 0.3 %
BILIRUB SERPL-MCNC: 0.5 MG/DL
BUN SERPL-MCNC: 11 MG/DL
CALCIUM SERPL-MCNC: 9.8 MG/DL
CHLORIDE SERPL-SCNC: 105 MMOL/L
CHOLEST SERPL-MCNC: 226 MG/DL
CHOLEST/HDLC SERPL: 2.5 {RATIO}
CO2 SERPL-SCNC: 28 MMOL/L
CREAT SERPL-MCNC: 0.8 MG/DL
DIFFERENTIAL METHOD: ABNORMAL
EOSINOPHIL # BLD AUTO: 0 K/UL
EOSINOPHIL NFR BLD: 1.1 %
ERYTHROCYTE [DISTWIDTH] IN BLOOD BY AUTOMATED COUNT: 11.9 %
EST. GFR  (AFRICAN AMERICAN): >60 ML/MIN/1.73 M^2
EST. GFR  (NON AFRICAN AMERICAN): >60 ML/MIN/1.73 M^2
GLUCOSE SERPL-MCNC: 80 MG/DL
HCT VFR BLD AUTO: 38.9 %
HDLC SERPL-MCNC: 90 MG/DL
HDLC SERPL: 39.8 %
HGB BLD-MCNC: 11.8 G/DL
IMM GRANULOCYTES # BLD AUTO: 0 K/UL
IMM GRANULOCYTES NFR BLD AUTO: 0 %
LDLC SERPL CALC-MCNC: 115.8 MG/DL
LYMPHOCYTES # BLD AUTO: 1.6 K/UL
LYMPHOCYTES NFR BLD: 44.4 %
MCH RBC QN AUTO: 30.1 PG
MCHC RBC AUTO-ENTMCNC: 30.3 G/DL
MCV RBC AUTO: 99 FL
MONOCYTES # BLD AUTO: 0.3 K/UL
MONOCYTES NFR BLD: 8.1 %
NEUTROPHILS # BLD AUTO: 1.7 K/UL
NEUTROPHILS NFR BLD: 46.1 %
NONHDLC SERPL-MCNC: 136 MG/DL
NRBC BLD-RTO: 0 /100 WBC
PLATELET # BLD AUTO: 221 K/UL
PMV BLD AUTO: 10.5 FL
POTASSIUM SERPL-SCNC: 4.4 MMOL/L
PROT SERPL-MCNC: 7.1 G/DL
RBC # BLD AUTO: 3.92 M/UL
SODIUM SERPL-SCNC: 142 MMOL/L
TRIGL SERPL-MCNC: 101 MG/DL
TSH SERPL DL<=0.005 MIU/L-ACNC: 2.59 UIU/ML
WBC # BLD AUTO: 3.58 K/UL

## 2019-03-13 PROCEDURE — 84443 ASSAY THYROID STIM HORMONE: CPT

## 2019-03-13 PROCEDURE — 36415 COLL VENOUS BLD VENIPUNCTURE: CPT

## 2019-03-13 PROCEDURE — 80053 COMPREHEN METABOLIC PANEL: CPT

## 2019-03-13 PROCEDURE — 80061 LIPID PANEL: CPT

## 2019-03-13 PROCEDURE — 85025 COMPLETE CBC W/AUTO DIFF WBC: CPT

## 2019-04-11 ENCOUNTER — OFFICE VISIT (OUTPATIENT)
Dept: INTERNAL MEDICINE | Facility: CLINIC | Age: 59
End: 2019-04-11
Payer: COMMERCIAL

## 2019-04-11 VITALS
WEIGHT: 108.44 LBS | DIASTOLIC BLOOD PRESSURE: 70 MMHG | BODY MASS INDEX: 19.96 KG/M2 | OXYGEN SATURATION: 95 % | SYSTOLIC BLOOD PRESSURE: 112 MMHG | TEMPERATURE: 96 F | HEART RATE: 94 BPM | HEIGHT: 62 IN

## 2019-04-11 DIAGNOSIS — Z23 NEED FOR PROPHYLACTIC VACCINATION WITH STREPTOCOCCUS PNEUMONIAE (PNEUMOCOCCUS) AND INFLUENZA VACCINES: ICD-10-CM

## 2019-04-11 DIAGNOSIS — Z29.9 PREVENTIVE MEASURE: ICD-10-CM

## 2019-04-11 DIAGNOSIS — Z85.09 HISTORY OF CANCER OF GALL BLADDER: ICD-10-CM

## 2019-04-11 DIAGNOSIS — Z00.00 ROUTINE GENERAL MEDICAL EXAMINATION AT A HEALTH CARE FACILITY: Primary | ICD-10-CM

## 2019-04-11 DIAGNOSIS — K21.9 GASTROESOPHAGEAL REFLUX DISEASE, ESOPHAGITIS PRESENCE NOT SPECIFIED: ICD-10-CM

## 2019-04-11 PROCEDURE — 99999 PR PBB SHADOW E&M-EST. PATIENT-LVL III: CPT | Mod: PBBFAC,,, | Performed by: FAMILY MEDICINE

## 2019-04-11 PROCEDURE — 90471 PNEUMOCOCCAL POLYSACCHARIDE VACCINE 23-VALENT =>2YO SQ IM: ICD-10-PCS | Mod: S$GLB,,, | Performed by: FAMILY MEDICINE

## 2019-04-11 PROCEDURE — 90732 PNEUMOCOCCAL POLYSACCHARIDE VACCINE 23-VALENT =>2YO SQ IM: ICD-10-PCS | Mod: S$GLB,,, | Performed by: FAMILY MEDICINE

## 2019-04-11 PROCEDURE — 99999 PR PBB SHADOW E&M-EST. PATIENT-LVL III: ICD-10-PCS | Mod: PBBFAC,,, | Performed by: FAMILY MEDICINE

## 2019-04-11 PROCEDURE — 90732 PPSV23 VACC 2 YRS+ SUBQ/IM: CPT | Mod: S$GLB,,, | Performed by: FAMILY MEDICINE

## 2019-04-11 PROCEDURE — 90471 IMMUNIZATION ADMIN: CPT | Mod: S$GLB,,, | Performed by: FAMILY MEDICINE

## 2019-04-11 PROCEDURE — 99396 PREV VISIT EST AGE 40-64: CPT | Mod: 25,S$GLB,, | Performed by: FAMILY MEDICINE

## 2019-04-11 PROCEDURE — 99396 PR PREVENTIVE VISIT,EST,40-64: ICD-10-PCS | Mod: 25,S$GLB,, | Performed by: FAMILY MEDICINE

## 2019-04-11 NOTE — PROGRESS NOTES
"For Subjective:       Patient ID: Ivone Monroy is a 58 y.o. female.    Chief Complaint: Annual Exam    58-year-old  female patient with Patient Active Problem List:     GERD (gastroesophageal reflux disease)     History of diverticulitis     History of cancer of gall bladder  Here for routine annual physicals and reports that patient has been followed by her gastroenterologist Dr. Hartman  and Dr. Gatica with history of gallbladder cancer.   Patient has been doing well with acid reflex taking omeprazole 20 mg daily and takes Bentyl as needed but not regularly.   Denies any changes in bowel movements or appetite.   Reports that she has been drinking adequate fluids and has been exercising regularly  Denies any complaints today    Review of Systems   Constitutional: Negative for activity change, fatigue and unexpected weight change.   HENT: Negative for hearing loss, rhinorrhea and trouble swallowing.    Eyes: Negative for discharge and visual disturbance.   Respiratory: Negative for chest tightness, shortness of breath and wheezing.    Cardiovascular: Negative for chest pain, palpitations and leg swelling.   Gastrointestinal: Negative for abdominal pain, blood in stool, constipation, diarrhea, nausea and vomiting.   Endocrine: Negative for polydipsia and polyuria.   Genitourinary: Negative for difficulty urinating, dysuria, hematuria and menstrual problem.   Musculoskeletal: Negative for arthralgias, joint swelling, myalgias and neck pain.   Skin: Negative for rash.   Neurological: Negative for weakness, light-headedness and headaches.   Psychiatric/Behavioral: Negative for confusion, dysphoric mood and sleep disturbance.         /70   Pulse 94   Temp 96 °F (35.6 °C) (Tympanic)   Ht 5' 2" (1.575 m)   Wt 49.2 kg (108 lb 7.5 oz)   SpO2 95%   BMI 19.84 kg/m²   Objective:      Physical Exam   Constitutional: She is oriented to person, place, and time. She appears well-developed " and well-nourished.   HENT:   Head: Normocephalic and atraumatic.   Mouth/Throat: Oropharynx is clear and moist.   Cardiovascular: Normal rate, regular rhythm and normal heart sounds.   No murmur heard.  Pulmonary/Chest: Effort normal and breath sounds normal. She has no wheezes.   Abdominal: Soft. Bowel sounds are normal. There is no tenderness.   Musculoskeletal: She exhibits no edema.   Neurological: She is alert and oriented to person, place, and time.   Skin: Skin is warm and dry. No rash noted.   Psychiatric: She has a normal mood and affect.       Lab Visit on 03/13/2019   Component Date Value Ref Range Status    Specimen UA 03/13/2019 Urine, Clean Catch   Final    Color, UA 03/13/2019 Yellow  Yellow, Straw, Denice Final    Appearance, UA 03/13/2019 Clear  Clear Final    pH, UA 03/13/2019 6.0  5.0 - 8.0 Final    Specific [place], UA 03/13/2019 1.010  1.005 - 1.030 Final    Protein, UA 03/13/2019 Negative  Negative Final    Comment: Recommend a 24 hour urine protein or a urine   protein/creatinine ratio if globulin induced proteinuria is  clinically suspected.      Glucose, UA 03/13/2019 Negative  Negative Final    Ketones, UA 03/13/2019 Negative  Negative Final    Bilirubin (UA) 03/13/2019 Negative  Negative Final    Occult Blood UA 03/13/2019 Negative  Negative Final    Nitrite, UA 03/13/2019 Negative  Negative Final    Leukocytes, UA 03/13/2019 1+* Negative Final    RBC, UA 03/13/2019 1  0 - 4 /hpf Final    WBC, UA 03/13/2019 4  0 - 5 /hpf Final    WBC Clumps, UA 03/13/2019 CANCELED   Final    Result canceled by the ancillary.    Bacteria, UA 03/13/2019 Rare  None-Occ /hpf Final    Squam Epithel, UA 03/13/2019 2  /hpf Final    Microscopic Comment 03/13/2019 SEE COMMENT   Final    Comment: Other formed elements not mentioned in the report are not   present in the microscopic examination.      Lab Visit on 03/13/2019   Component Date Value Ref Range Status    WBC 03/13/2019 3.58* 3.90 -  12.70 K/uL Final    RBC 03/13/2019 3.92* 4.00 - 5.40 M/uL Final    Hemoglobin 03/13/2019 11.8* 12.0 - 16.0 g/dL Final    Hematocrit 03/13/2019 38.9  37.0 - 48.5 % Final    MCV 03/13/2019 99* 82 - 98 fL Final    MCH 03/13/2019 30.1  27.0 - 31.0 pg Final    MCHC 03/13/2019 30.3* 32.0 - 36.0 g/dL Final    RDW 03/13/2019 11.9  11.5 - 14.5 % Final    Platelets 03/13/2019 221  150 - 350 K/uL Final    MPV 03/13/2019 10.5  9.2 - 12.9 fL Final    Immature Granulocytes 03/13/2019 0.0  0.0 - 0.5 % Final    Gran # (ANC) 03/13/2019 1.7* 1.8 - 7.7 K/uL Final    Immature Grans (Abs) 03/13/2019 0.00  0.00 - 0.04 K/uL Final    Comment: Mild elevation in immature granulocytes is non specific and   can be seen in a variety of conditions including stress response,   acute inflammation, trauma and pregnancy. Correlation with other   laboratory and clinical findings is essential.      Lymph # 03/13/2019 1.6  1.0 - 4.8 K/uL Final    Mono # 03/13/2019 0.3  0.3 - 1.0 K/uL Final    Eos # 03/13/2019 0.0  0.0 - 0.5 K/uL Final    Baso # 03/13/2019 0.01  0.00 - 0.20 K/uL Final    nRBC 03/13/2019 0  0 /100 WBC Final    Gran% 03/13/2019 46.1  38.0 - 73.0 % Final    Lymph% 03/13/2019 44.4  18.0 - 48.0 % Final    Mono% 03/13/2019 8.1  4.0 - 15.0 % Final    Eosinophil% 03/13/2019 1.1  0.0 - 8.0 % Final    Basophil% 03/13/2019 0.3  0.0 - 1.9 % Final    Differential Method 03/13/2019 Automated   Final    Sodium 03/13/2019 142  136 - 145 mmol/L Final    Potassium 03/13/2019 4.4  3.5 - 5.1 mmol/L Final    Chloride 03/13/2019 105  95 - 110 mmol/L Final    CO2 03/13/2019 28  23 - 29 mmol/L Final    Glucose 03/13/2019 80  70 - 110 mg/dL Final    BUN, Bld 03/13/2019 11  6 - 20 mg/dL Final    Creatinine 03/13/2019 0.8  0.5 - 1.4 mg/dL Final    Calcium 03/13/2019 9.8  8.7 - 10.5 mg/dL Final    Total Protein 03/13/2019 7.1  6.0 - 8.4 g/dL Final    Albumin 03/13/2019 3.9  3.5 - 5.2 g/dL Final    Total Bilirubin 03/13/2019 0.5   0.1 - 1.0 mg/dL Final    Comment: For infants and newborns, interpretation of results should be based  on gestational age, weight and in agreement with clinical  observations.  Premature Infant recommended reference ranges:  Up to 24 hours.............<8.0 mg/dL  Up to 48 hours............<12.0 mg/dL  3-5 days..................<15.0 mg/dL  6-29 days.................<15.0 mg/dL      Alkaline Phosphatase 03/13/2019 84  55 - 135 U/L Final    AST 03/13/2019 22  10 - 40 U/L Final    ALT 03/13/2019 18  10 - 44 U/L Final    Anion Gap 03/13/2019 9  8 - 16 mmol/L Final    eGFR if African American 03/13/2019 >60.0  >60 mL/min/1.73 m^2 Final    eGFR if non African American 03/13/2019 >60.0  >60 mL/min/1.73 m^2 Final    Comment: Calculation used to obtain the estimated glomerular filtration  rate (eGFR) is the CKD-EPI equation.       Cholesterol 03/13/2019 226* 120 - 199 mg/dL Final    Comment: The National Cholesterol Education Program (NCEP) has set the  following guidelines (reference ranges) for Cholesterol:  Optimal.....................<200 mg/dL  Borderline High.............200-239 mg/dL  High........................> or = 240 mg/dL      Triglycerides 03/13/2019 101  30 - 150 mg/dL Final    Comment: The National Cholesterol Education Program (NCEP) has set the  following guidelines (reference values) for triglycerides:  Normal......................<150 mg/dL  Borderline High.............150-199 mg/dL  High........................200-499 mg/dL      HDL 03/13/2019 90* 40 - 75 mg/dL Final    Comment: The National Cholesterol Education Program (NCEP) has set the  following guidelines (reference values) for HDL Cholesterol:  Low...............<40 mg/dL  Optimal...........>60 mg/dL      LDL Cholesterol 03/13/2019 115.8  63.0 - 159.0 mg/dL Final    Comment: The National Cholesterol Education Program (NCEP) has set the  following guidelines (reference values) for LDL Cholesterol:  Optimal.......................<130  mg/dL  Borderline High...............130-159 mg/dL  High..........................160-189 mg/dL  Very High.....................>190 mg/dL      HDL/Chol Ratio 03/13/2019 39.8  20.0 - 50.0 % Final    Total Cholesterol/HDL Ratio 03/13/2019 2.5  2.0 - 5.0 Final    Non-HDL Cholesterol 03/13/2019 136  mg/dL Final    Comment: Risk category and Non-HDL cholesterol goals:  Coronary heart disease (CHD)or equivalent (10-year risk of CHD >20%):  Non-HDL cholesterol goal     <130 mg/dL  Two or more CHD risk factors and 10-year risk of CHD <= 20%:  Non-HDL cholesterol goal     <160 mg/dL  0 to 1 CHD risk factor:  Non-HDL cholesterol goal     <190 mg/dL      TSH 03/13/2019 2.586  0.400 - 4.000 uIU/mL Final   Office Visit on 02/19/2019   Component Date Value Ref Range Status    Color, UA 02/19/2019 clear   Final    Spec Grav UA 02/19/2019 1.005   Final    pH, UA 02/19/2019 5   Final    WBC, UA 02/19/2019 ++   Final    Nitrite, UA 02/19/2019 neg   Final    Protein 02/19/2019 neg   Final    Glucose, UA 02/19/2019 normal   Final    Ketones, UA 02/19/2019 neg   Final    Urobilinogen, UA 02/19/2019 normal   Final    Bilirubin 02/19/2019 neg   Final    Blood, UA 02/19/2019 neg   Final    Urine Culture, Routine 02/19/2019 Multiple organisms isolated. None in predominance.  Repeat if   Final    Urine Culture, Routine 02/19/2019 clinically necessary.   Final       Assessment:       1. Routine general medical examination at a health care facility    2. Gastroesophageal reflux disease, esophagitis presence not specified    3. History of cancer of gall bladder    4. Preventive measure    5. Need for prophylactic vaccination with Streptococcus pneumoniae (Pneumococcus) and Influenza vaccines        Plan:   Routine general medical examination at a health care facility  Vital signs stable today.  Clinical exam stable.   Up-to-date with screenings  Reviewed recent labs showing mildly elevated total cholesterol levels but secondary  to elevated HDL  Continue lifestyle modifications with diet and exercise    Gastroesophageal reflux disease, esophagitis presence not specified  Stable on omeprazole 20 mg daily followed by gastroenterologist Dr Hartman    History of cancer of gall bladder  Stable and asymptomatic followed by Dr. Gatica    Preventive measure  -     CBC auto differential; Future; Expected date: 04/10/2020  -     Comprehensive metabolic panel; Future; Expected date: 04/10/2020  -     Lipid panel; Future; Expected date: 04/10/2020  -     TSH; Future; Expected date: 04/10/2020  -     Urinalysis; Future; Expected date: 04/10/2020  Will place labs ahead for next year    Need for prophylactic vaccination with Streptococcus pneumoniae (Pneumococcus) and Influenza vaccines  -     (In Office Administered) Pneumococcal Polysaccharide Vaccine (23 Valent) (SQ/IM)   Pneumovax given today

## 2019-04-25 ENCOUNTER — LAB VISIT (OUTPATIENT)
Dept: LAB | Facility: HOSPITAL | Age: 59
End: 2019-04-25
Attending: NURSE PRACTITIONER
Payer: COMMERCIAL

## 2019-04-25 ENCOUNTER — OFFICE VISIT (OUTPATIENT)
Dept: INTERNAL MEDICINE | Facility: CLINIC | Age: 59
End: 2019-04-25
Payer: COMMERCIAL

## 2019-04-25 VITALS
HEART RATE: 49 BPM | WEIGHT: 109.13 LBS | HEIGHT: 62 IN | BODY MASS INDEX: 20.08 KG/M2 | SYSTOLIC BLOOD PRESSURE: 118 MMHG | OXYGEN SATURATION: 100 % | DIASTOLIC BLOOD PRESSURE: 78 MMHG | TEMPERATURE: 99 F

## 2019-04-25 DIAGNOSIS — R39.9 UTI SYMPTOMS: Primary | ICD-10-CM

## 2019-04-25 DIAGNOSIS — R39.9 UTI SYMPTOMS: ICD-10-CM

## 2019-04-25 DIAGNOSIS — N30.00 ACUTE CYSTITIS WITHOUT HEMATURIA: Primary | ICD-10-CM

## 2019-04-25 LAB
BILIRUB UR QL STRIP: NEGATIVE
CLARITY UR: CLEAR
COLOR UR: ABNORMAL
GLUCOSE UR QL STRIP: NEGATIVE
HGB UR QL STRIP: NEGATIVE
KETONES UR QL STRIP: NEGATIVE
LEUKOCYTE ESTERASE UR QL STRIP: ABNORMAL
MICROSCOPIC COMMENT: ABNORMAL
NITRITE UR QL STRIP: NEGATIVE
PH UR STRIP: 6 [PH] (ref 5–8)
PROT UR QL STRIP: NEGATIVE
SP GR UR STRIP: 1.01 (ref 1–1.03)
URN SPEC COLLECT METH UR: ABNORMAL
WBC #/AREA URNS HPF: 30 /HPF (ref 0–5)
WBC CLUMPS URNS QL MICRO: ABNORMAL

## 2019-04-25 PROCEDURE — 87088 URINE BACTERIA CULTURE: CPT

## 2019-04-25 PROCEDURE — 81000 URINALYSIS NONAUTO W/SCOPE: CPT

## 2019-04-25 PROCEDURE — 3008F BODY MASS INDEX DOCD: CPT | Mod: CPTII,S$GLB,, | Performed by: NURSE PRACTITIONER

## 2019-04-25 PROCEDURE — 99213 OFFICE O/P EST LOW 20 MIN: CPT | Mod: S$GLB,,, | Performed by: NURSE PRACTITIONER

## 2019-04-25 PROCEDURE — 87147 CULTURE TYPE IMMUNOLOGIC: CPT

## 2019-04-25 PROCEDURE — 99213 PR OFFICE/OUTPT VISIT, EST, LEVL III, 20-29 MIN: ICD-10-PCS | Mod: S$GLB,,, | Performed by: NURSE PRACTITIONER

## 2019-04-25 PROCEDURE — 99999 PR PBB SHADOW E&M-EST. PATIENT-LVL III: ICD-10-PCS | Mod: PBBFAC,,, | Performed by: NURSE PRACTITIONER

## 2019-04-25 PROCEDURE — 99999 PR PBB SHADOW E&M-EST. PATIENT-LVL III: CPT | Mod: PBBFAC,,, | Performed by: NURSE PRACTITIONER

## 2019-04-25 PROCEDURE — 3008F PR BODY MASS INDEX (BMI) DOCUMENTED: ICD-10-PCS | Mod: CPTII,S$GLB,, | Performed by: NURSE PRACTITIONER

## 2019-04-25 PROCEDURE — 87086 URINE CULTURE/COLONY COUNT: CPT

## 2019-04-25 RX ORDER — CEPHALEXIN 500 MG/1
500 CAPSULE ORAL EVERY 12 HOURS
Qty: 14 CAPSULE | Refills: 0 | Status: SHIPPED | OUTPATIENT
Start: 2019-04-25 | End: 2019-05-02

## 2019-04-25 NOTE — PROGRESS NOTES
Subjective:       Patient ID: Ivone Monroy is a 58 y.o. female.    Chief Complaint: Urinary Frequency    Dysuria    This is a recurrent problem. The current episode started yesterday. The problem occurs intermittently. The problem has been waxing and waning. The quality of the pain is described as aching. The pain is at a severity of 2/10. The pain is mild. There has been no fever. She is sexually active. There is no history of pyelonephritis. Associated symptoms include frequency. Pertinent negatives include no behavior changes, chills, discharge, flank pain, hematuria, nausea, possible pregnancy, urgency, vomiting, weight loss, constipation or rash. She has tried nothing for the symptoms. Her past medical history is significant for catheterization, genitourinary reflux and recurrent UTIs.           Past Medical History:   Diagnosis Date    Anemia     Cancer of gallbladder 2015    chemo radiation - Dr Gatica     Diverticulitis     GERD (gastroesophageal reflux disease)      Past Surgical History:   Procedure Laterality Date    exploration of gallbladder twice for suspected malignancy with no resection       Social History     Socioeconomic History    Marital status:      Spouse name: Elba    Number of children: 3    Years of education: Not on file    Highest education level: Not on file   Occupational History    Occupation: Public      Employer: [employer]     Employer: [employer]   Social Needs    Financial resource strain: Not very hard    Food insecurity:     Worry: Never true     Inability: Never true    Transportation needs:     Medical: No     Non-medical: No   Tobacco Use    Smoking status: Never Smoker    Smokeless tobacco: Never Used   Substance and Sexual Activity    Alcohol use: No     Frequency: 2-4 times a month     Drinks per session: 1 or 2     Binge frequency: Never     Comment: socially    Drug use: No    Sexual activity: Yes      Partners: Male     Birth control/protection: None     Comment:    Lifestyle    Physical activity:     Days per week: 3 days     Minutes per session: 30 min    Stress: Not at all   Relationships    Social connections:     Talks on phone: Once a week     Gets together: Once a week     Attends [employer] service: Not on file     Active member of club or organization: No     Attends meetings of clubs or organizations: Not on file     Relationship status:    Other Topics Concern    Not on file   Social History Narrative    Not on file     Review of patient's allergies indicates:   Allergen Reactions    Sunscreen spf 8 [oxybenzone-padimate o] Swelling     Blisters to skin.   Blisters to skin.     Iodine and iodide containing products Swelling     Lips swell with sores    Shellfish containing products Swelling     Lips swell      Current Outpatient Medications   Medication Sig    bifidobacterium infantis (ALIGN) 4 mg Cap Take by mouth once daily.    dicyclomine (BENTYL) 10 MG capsule Take 10 mg by mouth 2 (two) times daily.    INTEGRA 125-40-3 mg Cap Take 1 capsule by mouth once daily.    methylcellulose oral powder Take 3.4 g by mouth once daily.    multivitamin (ONE DAILY MULTIVITAMIN) per tablet Take 1 tablet by mouth once daily.    omeprazole (PRILOSEC) 20 MG capsule Take 20 mg by mouth once daily.     simethicone (MYLICON) 125 MG chewable tablet Take by mouth as needed.     Saccharomyces boulardii (FLORASTOR) 250 mg capsule Take by mouth.     No current facility-administered medications for this visit.            Review of Systems   Constitutional: Negative for activity change, appetite change, chills, diaphoresis, fatigue, fever, unexpected weight change and weight loss.   HENT: Negative for congestion, ear pain, postnasal drip, rhinorrhea, sinus pressure, sinus pain, sneezing, sore throat, tinnitus, trouble swallowing and voice change.    Eyes: Negative for photophobia, pain and visual  disturbance.   Respiratory: Negative for cough, chest tightness, shortness of breath and wheezing.    Cardiovascular: Negative for chest pain, palpitations and leg swelling.   Gastrointestinal: Negative for abdominal distention, abdominal pain, constipation, diarrhea, nausea and vomiting.   Genitourinary: Positive for dysuria and frequency. Negative for decreased urine volume, difficulty urinating, flank pain, hematuria and urgency.   Musculoskeletal: Negative for arthralgias, back pain, joint swelling, neck pain and neck stiffness.   Skin: Negative for rash.   Allergic/Immunologic: Negative for immunocompromised state.   Neurological: Negative for dizziness, tremors, seizures, syncope, facial asymmetry, speech difficulty, weakness, light-headedness, numbness and headaches.   Hematological: Negative for adenopathy. Does not bruise/bleed easily.   Psychiatric/Behavioral: Negative for confusion and sleep disturbance.       Objective:      Physical Exam   Cardiovascular: Normal rate, regular rhythm and normal heart sounds.   Pulmonary/Chest: Effort normal and breath sounds normal.   Abdominal: There is tenderness in the suprapubic area.       Assessment:     Vitals:    04/25/19 1318   BP: 118/78   Pulse: (!) 49   Temp: 98.5 °F (36.9 °C)         1. UTI symptoms        Plan:   UTI symptoms  -     Urinalysis; Future; Expected date: 04/25/2019  -     Urine culture; Future; Expected date: 04/25/2019      Further recommendations to follow

## 2019-04-27 LAB — BACTERIA UR CULT: NORMAL

## 2019-05-03 ENCOUNTER — PATIENT MESSAGE (OUTPATIENT)
Dept: INTERNAL MEDICINE | Facility: CLINIC | Age: 59
End: 2019-05-03

## 2019-05-03 ENCOUNTER — TELEPHONE (OUTPATIENT)
Dept: INTERNAL MEDICINE | Facility: CLINIC | Age: 59
End: 2019-05-03

## 2019-05-03 ENCOUNTER — LAB VISIT (OUTPATIENT)
Dept: LAB | Facility: HOSPITAL | Age: 59
End: 2019-05-03
Attending: FAMILY MEDICINE
Payer: COMMERCIAL

## 2019-05-03 DIAGNOSIS — N39.0 URINARY TRACT INFECTION WITHOUT HEMATURIA, SITE UNSPECIFIED: Primary | ICD-10-CM

## 2019-05-03 DIAGNOSIS — Z09 FOLLOW UP: ICD-10-CM

## 2019-05-03 DIAGNOSIS — N39.0 URINARY TRACT INFECTION WITHOUT HEMATURIA, SITE UNSPECIFIED: ICD-10-CM

## 2019-05-03 LAB
BILIRUB UR QL STRIP: NEGATIVE
CLARITY UR: CLEAR
COLOR UR: YELLOW
GLUCOSE UR QL STRIP: NEGATIVE
HGB UR QL STRIP: NEGATIVE
KETONES UR QL STRIP: NEGATIVE
LEUKOCYTE ESTERASE UR QL STRIP: NEGATIVE
NITRITE UR QL STRIP: NEGATIVE
PH UR STRIP: 6 [PH] (ref 5–8)
PROT UR QL STRIP: NEGATIVE
SP GR UR STRIP: 1.01 (ref 1–1.03)
URN SPEC COLLECT METH UR: NORMAL

## 2019-05-03 PROCEDURE — 87086 URINE CULTURE/COLONY COUNT: CPT

## 2019-05-03 PROCEDURE — 81003 URINALYSIS AUTO W/O SCOPE: CPT

## 2019-05-06 LAB — BACTERIA UR CULT: NORMAL

## 2019-06-19 ENCOUNTER — OFFICE VISIT (OUTPATIENT)
Dept: INTERNAL MEDICINE | Facility: CLINIC | Age: 59
End: 2019-06-19
Payer: COMMERCIAL

## 2019-06-19 ENCOUNTER — LAB VISIT (OUTPATIENT)
Dept: LAB | Facility: HOSPITAL | Age: 59
End: 2019-06-19
Payer: COMMERCIAL

## 2019-06-19 VITALS
HEART RATE: 58 BPM | WEIGHT: 110.88 LBS | OXYGEN SATURATION: 98 % | SYSTOLIC BLOOD PRESSURE: 110 MMHG | HEIGHT: 62 IN | DIASTOLIC BLOOD PRESSURE: 60 MMHG | TEMPERATURE: 97 F | BODY MASS INDEX: 20.4 KG/M2

## 2019-06-19 DIAGNOSIS — R39.9 UTI SYMPTOMS: ICD-10-CM

## 2019-06-19 DIAGNOSIS — R39.9 UTI SYMPTOMS: Primary | ICD-10-CM

## 2019-06-19 LAB
BACTERIA #/AREA URNS HPF: ABNORMAL /HPF
BILIRUB UR QL STRIP: NEGATIVE
CLARITY UR: CLEAR
COLOR UR: YELLOW
GLUCOSE UR QL STRIP: NEGATIVE
HGB UR QL STRIP: NEGATIVE
KETONES UR QL STRIP: NEGATIVE
LEUKOCYTE ESTERASE UR QL STRIP: ABNORMAL
MICROSCOPIC COMMENT: ABNORMAL
NITRITE UR QL STRIP: NEGATIVE
PH UR STRIP: 7 [PH] (ref 5–8)
PROT UR QL STRIP: NEGATIVE
RBC #/AREA URNS HPF: 2 /HPF (ref 0–4)
SP GR UR STRIP: <=1.005 (ref 1–1.03)
URN SPEC COLLECT METH UR: ABNORMAL
WBC #/AREA URNS HPF: 10 /HPF (ref 0–5)

## 2019-06-19 PROCEDURE — 99213 PR OFFICE/OUTPT VISIT, EST, LEVL III, 20-29 MIN: ICD-10-PCS | Mod: S$GLB,,, | Performed by: NURSE PRACTITIONER

## 2019-06-19 PROCEDURE — 87086 URINE CULTURE/COLONY COUNT: CPT

## 2019-06-19 PROCEDURE — 99999 PR PBB SHADOW E&M-EST. PATIENT-LVL IV: CPT | Mod: PBBFAC,,, | Performed by: NURSE PRACTITIONER

## 2019-06-19 PROCEDURE — 3008F PR BODY MASS INDEX (BMI) DOCUMENTED: ICD-10-PCS | Mod: CPTII,S$GLB,, | Performed by: NURSE PRACTITIONER

## 2019-06-19 PROCEDURE — 99999 PR PBB SHADOW E&M-EST. PATIENT-LVL IV: ICD-10-PCS | Mod: PBBFAC,,, | Performed by: NURSE PRACTITIONER

## 2019-06-19 PROCEDURE — 3008F BODY MASS INDEX DOCD: CPT | Mod: CPTII,S$GLB,, | Performed by: NURSE PRACTITIONER

## 2019-06-19 PROCEDURE — 99213 OFFICE O/P EST LOW 20 MIN: CPT | Mod: S$GLB,,, | Performed by: NURSE PRACTITIONER

## 2019-06-19 PROCEDURE — 81000 URINALYSIS NONAUTO W/SCOPE: CPT

## 2019-06-19 RX ORDER — CEPHALEXIN 500 MG/1
500 CAPSULE ORAL EVERY 12 HOURS
Qty: 14 CAPSULE | Refills: 0 | Status: SHIPPED | OUTPATIENT
Start: 2019-06-19 | End: 2019-06-26

## 2019-06-19 NOTE — PROGRESS NOTES
Subjective:       Patient ID: Ivone Monroy is a 58 y.o. female.    Chief Complaint: Urinary Tract Infection    Urinary Tract Infection    This is a new problem. The current episode started in the past 7 days. The problem occurs every urination. The problem has been waxing and waning. The quality of the pain is described as burning and aching. The pain is at a severity of 5/10. The pain is mild. There is no history of pyelonephritis. Associated symptoms include frequency and urgency. Pertinent negatives include no behavior changes, chills, discharge, flank pain, hematuria, hesitancy, nausea, possible pregnancy, sweats, vomiting, weight loss, bubble bath use, constipation, rash or withholding. She has tried increased fluids for the symptoms. Her past medical history is significant for recurrent UTIs. There is no history of catheterization, diabetes insipidus, diabetes mellitus, genitourinary reflux, hypertension, kidney stones, a single kidney, STD, urinary stasis or a urological procedure.       Past Surgical History:   Procedure Laterality Date    exploration of gallbladder twice for suspected malignancy with no resection       Social History     Socioeconomic History    Marital status:      Spouse name: Elba    Number of children: 3    Years of education: Not on file    Highest education level: Not on file   Occupational History    Occupation: Public      Employer: [employer]     Employer: [employer]   Social Needs    Financial resource strain: Not very hard    Food insecurity:     Worry: Never true     Inability: Never true    Transportation needs:     Medical: No     Non-medical: No   Tobacco Use    Smoking status: Never Smoker    Smokeless tobacco: Never Used   Substance and Sexual Activity    Alcohol use: No     Frequency: 2-4 times a month     Drinks per session: 1 or 2     Binge frequency: Never     Comment: socially    Drug use: No    Sexual  activity: Yes     Partners: Male     Birth control/protection: None     Comment:    Lifestyle    Physical activity:     Days per week: 3 days     Minutes per session: 30 min    Stress: Not at all   Relationships    Social connections:     Talks on phone: Once a week     Gets together: Once a week     Attends [employer] service: Not on file     Active member of club or organization: No     Attends meetings of clubs or organizations: Not on file     Relationship status:    Other Topics Concern    Not on file   Social History Narrative    Not on file     Review of patient's allergies indicates:   Allergen Reactions    Sunscreen spf 8 [oxybenzone-padimate o] Swelling     Blisters to skin.   Blisters to skin.     Iodine and iodide containing products Swelling     Lips swell with sores    Shellfish containing products Swelling     Lips swell      Current Outpatient Medications   Medication Sig    bifidobacterium infantis (ALIGN) 4 mg Cap Take by mouth once daily.    dicyclomine (BENTYL) 10 MG capsule Take 10 mg by mouth 2 (two) times daily.    INTEGRA 125-40-3 mg Cap Take 1 capsule by mouth once daily.    methylcellulose oral powder Take 3.4 g by mouth once daily.    multivitamin (ONE DAILY MULTIVITAMIN) per tablet Take 1 tablet by mouth once daily.    omeprazole (PRILOSEC) 20 MG capsule Take 20 mg by mouth once daily.     Saccharomyces boulardii (FLORASTOR) 250 mg capsule Take by mouth.    simethicone (MYLICON) 125 MG chewable tablet Take by mouth as needed.      No current facility-administered medications for this visit.            Review of Systems   Constitutional: Negative for activity change, chills, unexpected weight change and weight loss.   HENT: Negative for hearing loss, rhinorrhea and trouble swallowing.    Eyes: Negative for discharge and visual disturbance.   Respiratory: Negative for chest tightness and wheezing.    Cardiovascular: Negative for chest pain and palpitations.    Gastrointestinal: Negative for blood in stool, constipation, diarrhea, nausea and vomiting.   Endocrine: Positive for polyuria. Negative for polydipsia.   Genitourinary: Positive for frequency and urgency. Negative for difficulty urinating, dysuria, flank pain, hematuria, hesitancy and menstrual problem.   Musculoskeletal: Negative for arthralgias, joint swelling and neck pain.   Skin: Negative for rash.   Neurological: Negative for weakness and headaches.   Psychiatric/Behavioral: Negative for confusion and dysphoric mood.       Objective:      Physical Exam   Cardiovascular: Normal rate, regular rhythm and normal heart sounds.   Pulmonary/Chest: Effort normal and breath sounds normal.   Abdominal: Soft. Bowel sounds are normal. There is no tenderness.   Neurological: She is alert.   Skin: Skin is warm and dry.       Assessment:     Vitals:    06/19/19 1434   BP: 110/60   Pulse: (!) 58   Temp: 97 °F (36.1 °C)         1. UTI symptoms        Plan:   UTI symptoms  -     Urinalysis; Future; Expected date: 06/19/2019  -     Urine culture; Future; Expected date: 06/19/2019          Review and recommendations to follow

## 2019-06-20 LAB
BACTERIA UR CULT: NORMAL
BACTERIA UR CULT: NORMAL

## 2019-09-20 ENCOUNTER — TELEPHONE (OUTPATIENT)
Dept: INTERNAL MEDICINE | Facility: CLINIC | Age: 59
End: 2019-09-20

## 2019-09-20 DIAGNOSIS — Z12.39 BREAST SCREENING: Primary | ICD-10-CM

## 2019-09-20 NOTE — TELEPHONE ENCOUNTER
S/w pt and sched MMG appt 10/07/19 since unable to sched same day as [facility] appt 09/27/19. Pt confirmed MM[facility] appt date/tiome 10/07 and to CB PRN.

## 2019-09-20 NOTE — TELEPHONE ENCOUNTER
----- Message from Ila Henderson MA sent at 9/19/2019  2:45 PM CDT -----  Contact: self  Patient needs MM[facility] order placed to schedule appointment.   ----- Message -----  From: Isreal Sotelo  Sent: 9/19/2019  11:18 AM  To: [facility] Staff    Type:  Mammogram    Caller is requesting to schedule their annual mammogram appointment.  Order is not listed in EPIC.  Please enter order and contact patient to schedule.  Name of Caller:Ivone Monroy  Where would they like the mammogram performed? [facility] the [place]  Would the patient rather a call back or a response via [facility]? Call back  Best Call Back Number:[phone]  Additional Information: none    Thanks,  Isreal Sotelo

## 2019-09-27 ENCOUNTER — OFFICE VISIT (OUTPATIENT)
Dept: OBSTETRICS AND GYNECOLOGY | Facility: CLINIC | Age: 59
End: 2019-09-27
Payer: COMMERCIAL

## 2019-09-27 VITALS
BODY MASS INDEX: 20.16 KG/M2 | DIASTOLIC BLOOD PRESSURE: 80 MMHG | HEIGHT: 62 IN | WEIGHT: 109.56 LBS | SYSTOLIC BLOOD PRESSURE: 102 MMHG

## 2019-09-27 DIAGNOSIS — Z78.0 POSTMENOPAUSAL: ICD-10-CM

## 2019-09-27 DIAGNOSIS — Z01.419 ENCOUNTER FOR GYNECOLOGICAL EXAMINATION WITHOUT ABNORMAL FINDING: Primary | ICD-10-CM

## 2019-09-27 PROCEDURE — 99396 PR PREVENTIVE VISIT,EST,40-64: ICD-10-PCS | Mod: S$GLB,,, | Performed by: NURSE PRACTITIONER

## 2019-09-27 PROCEDURE — 88175 CYTOPATH C/V AUTO FLUID REDO: CPT

## 2019-09-27 PROCEDURE — 99999 PR PBB SHADOW E&M-EST. PATIENT-LVL III: CPT | Mod: PBBFAC,,, | Performed by: NURSE PRACTITIONER

## 2019-09-27 PROCEDURE — 99396 PREV VISIT EST AGE 40-64: CPT | Mod: S$GLB,,, | Performed by: NURSE PRACTITIONER

## 2019-09-27 PROCEDURE — 99999 PR PBB SHADOW E&M-EST. PATIENT-LVL III: ICD-10-PCS | Mod: PBBFAC,,, | Performed by: NURSE PRACTITIONER

## 2019-09-27 NOTE — PATIENT INSTRUCTIONS
Breast Health: Breast Self-Awareness  What is breast self-awareness?  Breast self-awareness is knowing how your breasts normally look and feel. Your breasts change as you go through different stages of your life. So its important to learn what is normal for your breasts. Breast self-awareness helps you notice any changes in your breasts right away. Report any changes to your healthcare provider.  Why is breast self-awareness important?  Many experts now say that women should focus on breast self-awareness instead of doing a breast self-examination (BSE). These experts include the American Cancer Society, the U.S. Preventive Services Task Force, and the American Congress of Obstetricians and Gynecologists. Some experts even advise not teaching women to do a BSE. Thats because research hasnt shown a clear benefit to doing BSEs.  Breast self-awareness is different than a BSE. Breast self-awareness isnt about following a certain method and schedule. Its about knowing what's normal for your breasts. That way you can notice even small changes right away. If you see any changes, report them to your healthcare provider.  Changes to look for  Call your healthcare provider if you find any changes in your breasts that concern you. These changes may include:  · A lump  · Nipple discharge other than breast milk, especially a bloody discharge  · Swelling  · A change in size or shape  · Skin irritation, such as redness, thickening, or dimpling of the skin  · Swollen lymph nodes in the armpit  · Nipple problems, such as pain or redness  If you find a lump  Contact your provider if you find lumpiness in one breast, feel something different in the tissue, or feel a definite lump. Sometimes lumpiness may be due to menstrual changes. But there may be reason for concern.  Your provider may want to see you right away if you have:  · Nipple discharge that is bloody  · Skin changes on your breast, such as dimpling or puckering  Its  normal to be upset if you find a lump. But its important to contact your provider right away. Remember that most breast lumps are benign. This means they are not cancer.  Date Last Reviewed: 8/10/2015  © 5000-8740 [employer]. [address] [place] [address]. All rights reserved. This information is not intended as a substitute for professional medical care. Always follow your healthcare professional's instructions.        The Range of Pap Test Results  When your Pap test is sent to the lab, the lab studies your cell samples and reports any abnormal cell changes. Your health care provider can discuss these changes with you. In some cases, an abnormal Pap test is due to an infection. More serious cell changes range from dysplasia to cancer. Talk to your health care provider about your Pap test.    Normal results  Cervical cells, even normal ones, are always changing. As they mature, normal squamous cells move from deeper layers within the cervix. Over time, these cells flatten and cover the surface of the cervix. Within the cervical canal, the cells are different. These glandular cells are taller and not as flat as the cells on the surface of the cervix. When a Pap test sample shows healthy cells of both types, the results are negative. Keep having Pap tests as often as directed.  Abnormal results  A positive Pap test result means some cells in the sample showed abnormal changes. These results are grouped by the type of cell change and the location, or extent, of the changes. Depending on the results, you may need further testing.  · Inflammation: Noncancerous changes are present. They may be due to normal cell repair. Or, they may be caused by an infection, such as HPV or yeast. Further testing may be needed. (Also called reactive cellular changes.)  · Atypical squamous cells: Test results are unclear. Cells on the surface of the cervix show changes, but their significance is not yet known.  Testing for HPV and other sexually transmitted infections (STIs) may be needed. Treatment may be required. (Reported as ASC-US or ASC-H.)  · Atypical glandular cells: Cells lining the cervical canal show abnormal changes. Further testing is likely. You may also have treatment to destroy or remove problem cells. (Reported as AGC.)  · Mild dysplasia: Cells show distinct changes. More testing or HPV typing may be done. You may also have treatment to destroy or remove problem cells. (Reported as low-grade NICOLE or CORINA 1.)  · Moderate to severe dysplasia: Cells show precancerous changes. Or, noninvasive cancer (carcinoma in situ) may be present. Treatment to destroy or remove problem cells is likely. (Reported as high-grade NICOLE or CORINA 2 or CORINA 3.)  · Cancer: Different types of cancer may be detected by your Pap test. More tests to assess the cancer's extent are likely. The type of treatment will depend on the test results and other factors, such as age and health history. (Reported as squamous cell carcinoma, endocervical adenocarcinoma in situ, or adenocarcinoma.)  Date Last Reviewed: 5/12/2015  © 6834-3988 [email or web address]. [address] [place] [address]. All rights reserved. This information is not intended as a substitute for professional medical care. Always follow your healthcare professional's instructions.

## 2019-09-27 NOTE — PROGRESS NOTES
CC: Well woman exam    Ivone Monroy is a 59 y.o. female  presents for well woman exam.  LMP: No LMP recorded. Patient is postmenopausal..  No issues, problems, or complaints.      Past Medical History:   Diagnosis Date    Anemia     Cancer of gallbladder 2015    chemo radiation - Dr Gatica     Diverticulitis     GERD (gastroesophageal reflux disease)      Past Surgical History:   Procedure Laterality Date    exploration of gallbladder twice for suspected malignancy with no resection       Social History     Socioeconomic History    Marital status:      Spouse name: Elba    Number of children: 3    Years of education: Not on file    Highest education level: Not on file   Occupational History    Occupation: Public      Employer: [employer]     Employer: [employer]   Social Needs    Financial resource strain: Not very hard    Food insecurity:     Worry: Never true     Inability: Never true    Transportation needs:     Medical: No     Non-medical: No   Tobacco Use    Smoking status: Never Smoker    Smokeless tobacco: Never Used   Substance and Sexual Activity    Alcohol use: No     Frequency: 2-4 times a month     Drinks per session: 1 or 2     Binge frequency: Never     Comment: socially    Drug use: No    Sexual activity: Yes     Partners: Male     Birth control/protection: None     Comment:    Lifestyle    Physical activity:     Days per week: 3 days     Minutes per session: 30 min    Stress: Not at all   Relationships    Social connections:     Talks on phone: Once a week     Gets together: Once a week     Attends Advent service: Not on file     Active member of club or organization: No     Attends meetings of clubs or organizations: Not on file     Relationship status:    Other Topics Concern    Not on file   Social History Narrative    Not on file     Family History   Problem Relation Age of Onset    Diabetes Mother  "        may have been related to prolonged prednisone use    Arthritis Mother         Rheumatoid    Cancer Father         prostate    Heart disease Father     Colon cancer Cousin     Colon cancer Paternal Uncle      OB History        6    Para   6    Term   3            AB        Living   3       SAB        TAB        Ectopic        Multiple        Live Births   2                 /80   Ht 5' 2" (1.575 m)   Wt 49.7 kg (109 lb 9.1 oz)   BMI 20.04 kg/m²       ROS:  GENERAL: Denies weight gain or weight loss. Feeling well overall.   SKIN: Denies rash or lesions.   HEAD: Denies head injury or headache.   NODES: Denies enlarged lymph nodes.   CHEST: Denies chest pain or shortness of breath.   CARDIOVASCULAR: Denies palpitations or left sided chest pain.   ABDOMEN: No abdominal pain, constipation, diarrhea, nausea, vomiting or rectal bleeding.   URINARY: No frequency, dysuria, hematuria, or burning on urination.  REPRODUCTIVE: See HPI.   BREASTS: The patient performs breast self-examination and denies pain, lumps, or nipple discharge.   HEMATOLOGIC: No easy bruisability or excessive bleeding.   MUSCULOSKELETAL: Denies joint pain or swelling.   NEUROLOGIC: Denies syncope or weakness.   PSYCHIATRIC: Denies depression, anxiety or mood swings.    PHYSICAL EXAM:  APPEARANCE: Well nourished, well developed, in no acute distress.  AFFECT: WNL, alert and oriented x 3  SKIN: No acne or hirsutism  NECK: Neck symmetric without masses or thyromegaly  NODES: No inguinal, cervical, axillary, or femoral lymph node enlargement  CHEST: Good respiratory effect  ABDOMEN: Soft.  No tenderness or masses.  No hepatosplenomegaly.  No hernias.  BREASTS: Symmetrical, no skin changes or visible lesions.  No palpable masses, nipple discharge bilaterally.  PELVIC: Normal external genitalia without lesions.  Normal hair distribution.  Adequate perineal body, normal urethral meatus.  Vagina atrophic without lesions or " discharge.  Cervix pink, without lesions, discharge or tenderness.  No significant cystocele or rectocele.  Bimanual exam shows uterus to be normal size, regular, mobile and nontender.  Adnexa without masses or tenderness.    EXTREMITIES: No edema.  Physical Exam    1. Encounter for gynecological examination without abnormal finding  Liquid-based pap smear, screening   2. Postmenopausal      AND PLAN:    Patient was counseled today on A.C.S. Pap guidelines and recommendations for yearly pelvic exams, mammograms and monthly self breast exams; to see her PCP for other health maintenance.

## 2019-10-07 ENCOUNTER — HOSPITAL ENCOUNTER (OUTPATIENT)
Dept: RADIOLOGY | Facility: HOSPITAL | Age: 59
Discharge: HOME | End: 2019-10-07
Attending: FAMILY MEDICINE
Payer: COMMERCIAL

## 2019-10-07 ENCOUNTER — PATIENT MESSAGE (OUTPATIENT)
Dept: OBSTETRICS AND GYNECOLOGY | Facility: CLINIC | Age: 59
End: 2019-10-07

## 2019-10-07 DIAGNOSIS — Z12.39 BREAST SCREENING: ICD-10-CM

## 2019-10-07 PROCEDURE — 77063 MAMMO DIGITAL SCREENING BILAT WITH TOMOSYNTHESIS_CAD: ICD-10-PCS | Mod: 26,,, | Performed by: RADIOLOGY

## 2019-10-07 PROCEDURE — 77063 BREAST TOMOSYNTHESIS BI: CPT | Mod: 26,,, | Performed by: RADIOLOGY

## 2019-10-07 PROCEDURE — 77067 SCR MAMMO BI INCL CAD: CPT | Mod: TC

## 2019-10-07 PROCEDURE — 77067 MAMMO DIGITAL SCREENING BILAT WITH TOMOSYNTHESIS_CAD: ICD-10-PCS | Mod: 26,,, | Performed by: RADIOLOGY

## 2019-10-07 PROCEDURE — 77067 SCR MAMMO BI INCL CAD: CPT | Mod: 26,,, | Performed by: RADIOLOGY

## 2020-08-10 ENCOUNTER — PATIENT OUTREACH (OUTPATIENT)
Dept: ADMINISTRATIVE | Facility: HOSPITAL | Age: 60
End: 2020-08-10

## 2020-08-10 NOTE — PROGRESS NOTES
L/M for pt to call and schedule appt         Shilpi SEGURA LPN Care Coordinator  Care Coordination Department  [facility]  [phone]

## 2020-08-18 ENCOUNTER — TELEPHONE (OUTPATIENT)
Dept: INTERNAL MEDICINE | Facility: CLINIC | Age: 60
End: 2020-08-18

## 2020-08-18 NOTE — TELEPHONE ENCOUNTER
----- Message from Tab Hadley sent at 8/18/2020  3:54 PM CDT -----  Contact: self  Pt would like to get a sooner appt for an annual with Dr. Puente and blood work before September. Please call back at [phone].    Thanks  Zs

## 2020-08-21 ENCOUNTER — TELEPHONE (OUTPATIENT)
Dept: INTERNAL MEDICINE | Facility: CLINIC | Age: 60
End: 2020-08-21

## 2020-08-21 DIAGNOSIS — Z29.9 PREVENTIVE MEASURE: Primary | ICD-10-CM

## 2020-08-22 ENCOUNTER — LAB VISIT (OUTPATIENT)
Dept: LAB | Facility: HOSPITAL | Age: 60
End: 2020-08-22
Attending: PSYCHIATRY & NEUROLOGY
Payer: COMMERCIAL

## 2020-08-22 ENCOUNTER — LAB VISIT (OUTPATIENT)
Dept: LAB | Facility: HOSPITAL | Age: 60
End: 2020-08-22
Payer: COMMERCIAL

## 2020-08-22 DIAGNOSIS — Z29.9 PREVENTIVE MEASURE: ICD-10-CM

## 2020-08-22 LAB
ALBUMIN SERPL BCP-MCNC: 4 G/DL (ref 3.5–5.2)
ALP SERPL-CCNC: 80 U/L (ref 55–135)
ALT SERPL W/O P-5'-P-CCNC: 25 U/L (ref 10–44)
ANION GAP SERPL CALC-SCNC: 8 MMOL/L (ref 8–16)
AST SERPL-CCNC: 29 U/L (ref 10–40)
BASOPHILS # BLD AUTO: 0.02 K/UL (ref 0–0.2)
BASOPHILS NFR BLD: 0.5 % (ref 0–1.9)
BILIRUB SERPL-MCNC: 0.3 MG/DL (ref 0.1–1)
BILIRUB UR QL STRIP: NEGATIVE
BUN SERPL-MCNC: 13 MG/DL (ref 8–23)
CALCIUM SERPL-MCNC: 9.5 MG/DL (ref 8.7–10.5)
CHLORIDE SERPL-SCNC: 101 MMOL/L (ref 95–110)
CHOLEST SERPL-MCNC: 213 MG/DL (ref 120–199)
CHOLEST/HDLC SERPL: 2.5 {RATIO} (ref 2–5)
CLARITY UR: CLEAR
CO2 SERPL-SCNC: 30 MMOL/L (ref 23–29)
COLOR UR: YELLOW
CREAT SERPL-MCNC: 0.9 MG/DL (ref 0.5–1.4)
DIFFERENTIAL METHOD: ABNORMAL
EOSINOPHIL # BLD AUTO: 0.1 K/UL (ref 0–0.5)
EOSINOPHIL NFR BLD: 1.9 % (ref 0–8)
ERYTHROCYTE [DISTWIDTH] IN BLOOD BY AUTOMATED COUNT: 12.3 % (ref 11.5–14.5)
EST. GFR  (AFRICAN AMERICAN): >60 ML/MIN/1.73 M^2
EST. GFR  (NON AFRICAN AMERICAN): >60 ML/MIN/1.73 M^2
GLUCOSE SERPL-MCNC: 83 MG/DL (ref 70–110)
GLUCOSE UR QL STRIP: NEGATIVE
HCT VFR BLD AUTO: 39.3 % (ref 37–48.5)
HDLC SERPL-MCNC: 85 MG/DL (ref 40–75)
HDLC SERPL: 39.9 % (ref 20–50)
HGB BLD-MCNC: 12 G/DL (ref 12–16)
HGB UR QL STRIP: NEGATIVE
IMM GRANULOCYTES # BLD AUTO: 0.01 K/UL (ref 0–0.04)
IMM GRANULOCYTES NFR BLD AUTO: 0.3 % (ref 0–0.5)
KETONES UR QL STRIP: NEGATIVE
LDLC SERPL CALC-MCNC: 111.4 MG/DL (ref 63–159)
LEUKOCYTE ESTERASE UR QL STRIP: ABNORMAL
LYMPHOCYTES # BLD AUTO: 1.3 K/UL (ref 1–4.8)
LYMPHOCYTES NFR BLD: 33.8 % (ref 18–48)
MCH RBC QN AUTO: 30.1 PG (ref 27–31)
MCHC RBC AUTO-ENTMCNC: 30.5 G/DL (ref 32–36)
MCV RBC AUTO: 99 FL (ref 82–98)
MICROSCOPIC COMMENT: NORMAL
MONOCYTES # BLD AUTO: 0.3 K/UL (ref 0.3–1)
MONOCYTES NFR BLD: 7 % (ref 4–15)
NEUTROPHILS # BLD AUTO: 2.1 K/UL (ref 1.8–7.7)
NEUTROPHILS NFR BLD: 56.5 % (ref 38–73)
NITRITE UR QL STRIP: NEGATIVE
NONHDLC SERPL-MCNC: 128 MG/DL
NRBC BLD-RTO: 0 /100 WBC
PH UR STRIP: 8 [PH] (ref 5–8)
PLATELET # BLD AUTO: 237 K/UL (ref 150–350)
PMV BLD AUTO: 10.4 FL (ref 9.2–12.9)
POTASSIUM SERPL-SCNC: 5 MMOL/L (ref 3.5–5.1)
PROT SERPL-MCNC: 7.3 G/DL (ref 6–8.4)
PROT UR QL STRIP: NEGATIVE
RBC # BLD AUTO: 3.99 M/UL (ref 4–5.4)
RBC #/AREA URNS HPF: 3 /HPF (ref 0–4)
SODIUM SERPL-SCNC: 139 MMOL/L (ref 136–145)
SP GR UR STRIP: 1.01 (ref 1–1.03)
SQUAMOUS #/AREA URNS HPF: 1 /HPF
TRIGL SERPL-MCNC: 83 MG/DL (ref 30–150)
TSH SERPL DL<=0.005 MIU/L-ACNC: 2.88 UIU/ML (ref 0.4–4)
URN SPEC COLLECT METH UR: ABNORMAL
WBC # BLD AUTO: 3.73 K/UL (ref 3.9–12.7)
WBC #/AREA URNS HPF: 1 /HPF (ref 0–5)

## 2020-08-22 PROCEDURE — 36415 COLL VENOUS BLD VENIPUNCTURE: CPT

## 2020-08-22 PROCEDURE — 80053 COMPREHEN METABOLIC PANEL: CPT

## 2020-08-22 PROCEDURE — 85025 COMPLETE CBC W/AUTO DIFF WBC: CPT

## 2020-08-22 PROCEDURE — 86703 HIV-1/HIV-2 1 RESULT ANTBDY: CPT

## 2020-08-22 PROCEDURE — 81000 URINALYSIS NONAUTO W/SCOPE: CPT

## 2020-08-22 PROCEDURE — 80061 LIPID PANEL: CPT

## 2020-08-22 PROCEDURE — 84443 ASSAY THYROID STIM HORMONE: CPT

## 2020-08-25 LAB — HIV 1+2 AB+HIV1 P24 AG SERPL QL IA: NEGATIVE

## 2020-08-26 ENCOUNTER — OFFICE VISIT (OUTPATIENT)
Dept: INTERNAL MEDICINE | Facility: CLINIC | Age: 60
End: 2020-08-26
Payer: COMMERCIAL

## 2020-08-26 VITALS
TEMPERATURE: 98 F | RESPIRATION RATE: 16 BRPM | HEART RATE: 61 BPM | SYSTOLIC BLOOD PRESSURE: 126 MMHG | OXYGEN SATURATION: 99 % | WEIGHT: 109.81 LBS | HEIGHT: 62 IN | DIASTOLIC BLOOD PRESSURE: 72 MMHG | BODY MASS INDEX: 20.21 KG/M2

## 2020-08-26 DIAGNOSIS — Z00.00 ROUTINE GENERAL MEDICAL EXAMINATION AT A HEALTH CARE FACILITY: Primary | ICD-10-CM

## 2020-08-26 DIAGNOSIS — Z85.09 HISTORY OF CANCER OF GALL BLADDER: ICD-10-CM

## 2020-08-26 DIAGNOSIS — K21.9 GASTROESOPHAGEAL REFLUX DISEASE, ESOPHAGITIS PRESENCE NOT SPECIFIED: ICD-10-CM

## 2020-08-26 PROCEDURE — 99396 PR PREVENTIVE VISIT,EST,40-64: ICD-10-PCS | Mod: S$GLB,,, | Performed by: FAMILY MEDICINE

## 2020-08-26 PROCEDURE — 99396 PREV VISIT EST AGE 40-64: CPT | Mod: S$GLB,,, | Performed by: FAMILY MEDICINE

## 2020-08-26 PROCEDURE — 99999 PR PBB SHADOW E&M-EST. PATIENT-LVL IV: ICD-10-PCS | Mod: PBBFAC,,, | Performed by: FAMILY MEDICINE

## 2020-08-26 PROCEDURE — 3008F PR BODY MASS INDEX (BMI) DOCUMENTED: ICD-10-PCS | Mod: CPTII,S$GLB,, | Performed by: FAMILY MEDICINE

## 2020-08-26 PROCEDURE — 99999 PR PBB SHADOW E&M-EST. PATIENT-LVL IV: CPT | Mod: PBBFAC,,, | Performed by: FAMILY MEDICINE

## 2020-08-26 PROCEDURE — 3008F BODY MASS INDEX DOCD: CPT | Mod: CPTII,S$GLB,, | Performed by: FAMILY MEDICINE

## 2020-08-26 NOTE — PROGRESS NOTES
"Subjective:       Patient ID: Ivone Monroy is a 60 y.o. female.    Chief Complaint: Annual Exam    60-year-old  female patient with Patient Active Problem List:     GERD (gastroesophageal reflux disease)     History of diverticulitis     History of cancer of gall bladder  Here for routine annual physicals.  Patient reports that she has been exercising regularly and has been doing well denies any changes to bowel movements, nausea vomiting.  Has been followed by gastroenterologist Dr. Hartman who recently did her colonoscopy.   Denies any worsening acid reflux symptoms and has been taking omeprazole as needed.   Denies any severe abdominal cramps, nausea vomiting or fatigue    Review of Systems   Constitutional: Negative for fatigue.   Eyes: Negative for visual disturbance.   Respiratory: Negative for shortness of breath.    Cardiovascular: Negative for chest pain and leg swelling.   Gastrointestinal: Negative for abdominal pain, nausea and vomiting.   Musculoskeletal: Negative for myalgias.   Skin: Negative for rash.   Neurological: Negative for light-headedness and headaches.   Psychiatric/Behavioral: Negative for sleep disturbance.         /72 (BP Location: Right arm, Patient Position: Sitting, BP Method: Medium (Manual))   Pulse 61   Temp 97.6 °F (36.4 °C) (Tympanic)   Resp 16   Ht 5' 2" (1.575 m)   Wt 49.8 kg (109 lb 12.6 oz)   SpO2 99%   BMI 20.08 kg/m²   Objective:      Physical Exam  Constitutional:       Appearance: She is well-developed.   HENT:      Head: Normocephalic and atraumatic.   Cardiovascular:      Rate and Rhythm: Normal rate and regular rhythm.      Heart sounds: Normal heart sounds. No murmur.   Pulmonary:      Effort: Pulmonary effort is normal.      Breath sounds: Normal breath sounds. No wheezing.   Abdominal:      General: Bowel sounds are normal.      Palpations: Abdomen is soft.      Tenderness: There is no abdominal tenderness.   Skin:     " General: Skin is warm and dry.      Findings: No rash.   Neurological:      Mental Status: She is alert and oriented to person, place, and time.   Psychiatric:         Mood and Affect: Mood normal.         Lab Visit on 08/22/2020   Component Date Value Ref Range Status    WBC 08/22/2020 3.73* 3.90 - 12.70 K/uL Final    RBC 08/22/2020 3.99* 4.00 - 5.40 M/uL Final    Hemoglobin 08/22/2020 12.0  12.0 - 16.0 g/dL Final    Hematocrit 08/22/2020 39.3  37.0 - 48.5 % Final    Mean Corpuscular Volume 08/22/2020 99* 82 - 98 fL Final    Mean Corpuscular Hemoglobin 08/22/2020 30.1  27.0 - 31.0 pg Final    Mean Corpuscular Hemoglobin Conc 08/22/2020 30.5* 32.0 - 36.0 g/dL Final    RDW 08/22/2020 12.3  11.5 - 14.5 % Final    Platelets 08/22/2020 237  150 - 350 K/uL Final    MPV 08/22/2020 10.4  9.2 - 12.9 fL Final    Immature Granulocytes 08/22/2020 0.3  0.0 - 0.5 % Final    Gran # (ANC) 08/22/2020 2.1  1.8 - 7.7 K/uL Final    Immature Grans (Abs) 08/22/2020 0.01  0.00 - 0.04 K/uL Final    Comment: Mild elevation in immature granulocytes is non specific and   can be seen in a variety of conditions including stress response,   acute inflammation, trauma and pregnancy. Correlation with other   laboratory and clinical findings is essential.      Lymph # 08/22/2020 1.3  1.0 - 4.8 K/uL Final    Mono # 08/22/2020 0.3  0.3 - 1.0 K/uL Final    Eos # 08/22/2020 0.1  0.0 - 0.5 K/uL Final    Baso # 08/22/2020 0.02  0.00 - 0.20 K/uL Final    nRBC 08/22/2020 0  0 /100 WBC Final    Gran% 08/22/2020 56.5  38.0 - 73.0 % Final    Lymph% 08/22/2020 33.8  18.0 - 48.0 % Final    Mono% 08/22/2020 7.0  4.0 - 15.0 % Final    Eosinophil% 08/22/2020 1.9  0.0 - 8.0 % Final    Basophil% 08/22/2020 0.5  0.0 - 1.9 % Final    Differential Method 08/22/2020 Automated   Final    Sodium 08/22/2020 139  136 - 145 mmol/L Final    Potassium 08/22/2020 5.0  3.5 - 5.1 mmol/L Final    Chloride 08/22/2020 101  95 - 110 mmol/L Final    CO2  08/22/2020 30* 23 - 29 mmol/L Final    Glucose 08/22/2020 83  70 - 110 mg/dL Final    BUN, Bld 08/22/2020 13  8 - 23 mg/dL Final    Creatinine 08/22/2020 0.9  0.5 - 1.4 mg/dL Final    Calcium 08/22/2020 9.5  8.7 - 10.5 mg/dL Final    Total Protein 08/22/2020 7.3  6.0 - 8.4 g/dL Final    Albumin 08/22/2020 4.0  3.5 - 5.2 g/dL Final    Total Bilirubin 08/22/2020 0.3  0.1 - 1.0 mg/dL Final    Comment: For infants and newborns, interpretation of results should be based  on gestational age, weight and in agreement with clinical  observations.  Premature Infant recommended reference ranges:  Up to 24 hours.............<8.0 mg/dL  Up to 48 hours............<12.0 mg/dL  3-5 days..................<15.0 mg/dL  6-29 days.................<15.0 mg/dL      Alkaline Phosphatase 08/22/2020 80  55 - 135 U/L Final    AST 08/22/2020 29  10 - 40 U/L Final    ALT 08/22/2020 25  10 - 44 U/L Final    Anion Gap 08/22/2020 8  8 - 16 mmol/L Final    eGFR if African American 08/22/2020 >60.0  >60 mL/min/1.73 m^2 Final    eGFR if non African American 08/22/2020 >60.0  >60 mL/min/1.73 m^2 Final    Comment: Calculation used to obtain the estimated glomerular filtration  rate (eGFR) is the CKD-EPI equation.       Cholesterol 08/22/2020 213* 120 - 199 mg/dL Final    Comment: The National Cholesterol Education Program (NCEP) has set the  following guidelines (reference ranges) for Cholesterol:  Optimal.....................<200 mg/dL  Borderline High.............200-239 mg/dL  High........................> or = 240 mg/dL      Triglycerides 08/22/2020 83  30 - 150 mg/dL Final    Comment: The National Cholesterol Education Program (NCEP) has set the  following guidelines (reference values) for triglycerides:  Normal......................<150 mg/dL  Borderline High.............150-199 mg/dL  High........................200-499 mg/dL      HDL 08/22/2020 85* 40 - 75 mg/dL Final    Comment: The National Cholesterol Education Program (NCEP)  has set the  following guidelines (reference values) for HDL Cholesterol:  Low...............<40 mg/dL  Optimal...........>60 mg/dL      LDL Cholesterol 08/22/2020 111.4  63.0 - 159.0 mg/dL Final    Comment: The National Cholesterol Education Program (NCEP) has set the  following guidelines (reference values) for LDL Cholesterol:  Optimal.......................<130 mg/dL  Borderline High...............130-159 mg/dL  High..........................160-189 mg/dL  Very High.....................>190 mg/dL      Hdl/Cholesterol Ratio 08/22/2020 39.9  20.0 - 50.0 % Final    Total Cholesterol/HDL Ratio 08/22/2020 2.5  2.0 - 5.0 Final    Non-HDL Cholesterol 08/22/2020 128  mg/dL Final    Comment: Risk category and Non-HDL cholesterol goals:  Coronary heart disease (CHD)or equivalent (10-year risk of CHD >20%):  Non-HDL cholesterol goal     <130 mg/dL  Two or more CHD risk factors and 10-year risk of CHD <= 20%:  Non-HDL cholesterol goal     <160 mg/dL  0 to 1 CHD risk factor:  Non-HDL cholesterol goal     <190 mg/dL      TSH 08/22/2020 2.885  0.400 - 4.000 uIU/mL Final    HIV 1/2 Ag/Ab 08/22/2020 Negative  Negative Final   Lab Visit on 08/22/2020   Component Date Value Ref Range Status    Specimen UA 08/22/2020 Urine, Clean Catch   Final    Color, UA 08/22/2020 Yellow  Yellow, Straw, Denice Final    Appearance, UA 08/22/2020 Clear  Clear Final    pH, UA 08/22/2020 8.0  5.0 - 8.0 Final    Specific [place], UA 08/22/2020 1.015  1.005 - 1.030 Final    Protein, UA 08/22/2020 Negative  Negative Final    Comment: Recommend a 24 hour urine protein or a urine   protein/creatinine ratio if globulin induced proteinuria is  clinically suspected.      Glucose, UA 08/22/2020 Negative  Negative Final    Ketones, UA 08/22/2020 Negative  Negative Final    Bilirubin (UA) 08/22/2020 Negative  Negative Final    Occult Blood UA 08/22/2020 Negative  Negative Final    Nitrite, UA 08/22/2020 Negative  Negative Final    Leukocytes,  UA 08/22/2020 1+* Negative Final    RBC, UA 08/22/2020 3  0 - 4 /hpf Final    WBC, UA 08/22/2020 1  0 - 5 /hpf Final    Squam Epithel, UA 08/22/2020 1  /hpf Final    Microscopic Comment 08/22/2020 SEE COMMENT   Final    Comment: Other formed elements not mentioned in the report are not   present in the microscopic examination.          Assessment/Plan:   1. Routine general medical examination at a health care facility  Vital signs stable today.  Clinical exam stable  Continue lifestyle modifications with diet and exercise  Encouraged to consider getting shingles vaccination at outside pharmacy and flu shot when available  Reviewed recent labs which looks stable, noted mildly elevated cholesterol levels but also has elevated HDL, will continue to monitor  SHAYNE form signed for screening colonoscopy [facility] Dr. Hartman in April 2020    2. Gastroesophageal reflux disease, esophagitis presence not specified  Stable on omeprazole as needed    3. History of cancer of gall bladder   Followed by Dr. vázquez currently taking Bentyl and Integra daily

## 2020-09-04 ENCOUNTER — PATIENT OUTREACH (OUTPATIENT)
Dept: ADMINISTRATIVE | Facility: HOSPITAL | Age: 60
End: 2020-09-04

## 2020-10-08 ENCOUNTER — TELEPHONE (OUTPATIENT)
Dept: OBSTETRICS AND GYNECOLOGY | Facility: CLINIC | Age: 60
End: 2020-10-08

## 2020-10-08 DIAGNOSIS — Z12.31 ENCOUNTER FOR SCREENING MAMMOGRAM FOR MALIGNANT NEOPLASM OF BREAST: Primary | ICD-10-CM

## 2020-10-08 NOTE — TELEPHONE ENCOUNTER
----- Message from Angelina Guaman sent at 10/8/2020 11:48 AM CDT -----  Contact: pt  Pt is calling to have orders placed for her mammo to have while at her appt on tomorrow mornining ( 10/09) and can be reached at [phone]//thanks/dbw

## 2020-10-09 ENCOUNTER — OFFICE VISIT (OUTPATIENT)
Dept: OBSTETRICS AND GYNECOLOGY | Facility: CLINIC | Age: 60
End: 2020-10-09
Payer: COMMERCIAL

## 2020-10-09 ENCOUNTER — PATIENT OUTREACH (OUTPATIENT)
Dept: ADMINISTRATIVE | Facility: OTHER | Age: 60
End: 2020-10-09

## 2020-10-09 VITALS
WEIGHT: 108.94 LBS | DIASTOLIC BLOOD PRESSURE: 74 MMHG | BODY MASS INDEX: 19.92 KG/M2 | SYSTOLIC BLOOD PRESSURE: 140 MMHG

## 2020-10-09 DIAGNOSIS — Z01.419 ENCOUNTER FOR GYNECOLOGICAL EXAMINATION WITHOUT ABNORMAL FINDING: Primary | ICD-10-CM

## 2020-10-09 PROCEDURE — 87624 HPV HI-RISK TYP POOLED RSLT: CPT

## 2020-10-09 PROCEDURE — 99396 PREV VISIT EST AGE 40-64: CPT | Mod: S$GLB,,, | Performed by: NURSE PRACTITIONER

## 2020-10-09 PROCEDURE — 3008F BODY MASS INDEX DOCD: CPT | Mod: CPTII,S$GLB,, | Performed by: NURSE PRACTITIONER

## 2020-10-09 PROCEDURE — 3008F PR BODY MASS INDEX (BMI) DOCUMENTED: ICD-10-PCS | Mod: CPTII,S$GLB,, | Performed by: NURSE PRACTITIONER

## 2020-10-09 PROCEDURE — 99999 PR PBB SHADOW E&M-EST. PATIENT-LVL III: CPT | Mod: PBBFAC,,, | Performed by: NURSE PRACTITIONER

## 2020-10-09 PROCEDURE — 99999 PR PBB SHADOW E&M-EST. PATIENT-LVL III: ICD-10-PCS | Mod: PBBFAC,,, | Performed by: NURSE PRACTITIONER

## 2020-10-09 PROCEDURE — 88175 CYTOPATH C/V AUTO FLUID REDO: CPT

## 2020-10-09 PROCEDURE — 99396 PR PREVENTIVE VISIT,EST,40-64: ICD-10-PCS | Mod: S$GLB,,, | Performed by: NURSE PRACTITIONER

## 2020-10-09 NOTE — PROGRESS NOTES
CC: Well woman exam    Ivone Monroy is a 60 y.o. female  presents for well woman exam.  LMP: No LMP recorded. Patient is postmenopausal..  No issues, problems, or complaints.  MMG in November.     Past Medical History:   Diagnosis Date    Anemia     Cancer of gallbladder 2015    chemo radiation - Dr Gatica     Diverticulitis     GERD (gastroesophageal reflux disease)      Past Surgical History:   Procedure Laterality Date    exploration of gallbladder twice for suspected malignancy with no resection       Social History     Socioeconomic History    Marital status:      Spouse name: Elba    Number of children: 3    Years of education: Not on file    Highest education level: Not on file   Occupational History    Occupation: [employer]      Employer: [employer]     Employer: [employer]   Social Needs    Financial resource strain: Not very hard    Food insecurity     Worry: Never true     Inability: Never true    Transportation needs     Medical: No     Non-medical: No   Tobacco Use    Smoking status: Never Smoker    Smokeless tobacco: Never Used   Substance and Sexual Activity    Alcohol use: Yes     Frequency: 2-4 times a month     Drinks per session: 1 or 2     Binge frequency: Never     Comment: socially    Drug use: No    Sexual activity: Yes     Partners: Male     Birth control/protection: None     Comment:    Lifestyle    Physical activity     Days per week: 3 days     Minutes per session: 30 min    Stress: Not at all   Relationships    Social connections     Talks on phone: Once a week     Gets together: Once a week     Attends Muslim service: Not on file     Active member of club or organization: No     Attends meetings of clubs or organizations: Not on file     Relationship status:    Other Topics Concern    Not on file   Social History Narrative    Not on file     Family History   Problem Relation Age of Onset     Diabetes Mother         may have been related to prolonged prednisone use    Arthritis Mother         Rheumatoid    Cancer Father         prostate    Heart disease Father     Colon cancer Cousin     Colon cancer Paternal Uncle      OB History        6    Para   6    Term   3            AB        Living   3       SAB        TAB        Ectopic        Multiple        Live Births   2                 BP (!) 140/74   Wt 49.4 kg (108 lb 14.5 oz)   BMI 19.92 kg/m²       ROS:  GENERAL: Denies weight gain or weight loss. Feeling well overall.   SKIN: Denies rash or lesions.   HEAD: Denies head injury or headache.   NODES: Denies enlarged lymph nodes.   CHEST: Denies chest pain or shortness of breath.   CARDIOVASCULAR: Denies palpitations or left sided chest pain.   ABDOMEN: No abdominal pain, constipation, diarrhea, nausea, vomiting or rectal bleeding.   URINARY: No frequency, dysuria, hematuria, or burning on urination.  REPRODUCTIVE: See HPI.   BREASTS: The patient performs breast self-examination and denies pain, lumps, or nipple discharge.   HEMATOLOGIC: No easy bruisability or excessive bleeding.   MUSCULOSKELETAL: Denies joint pain or swelling.   NEUROLOGIC: Denies syncope or weakness.   PSYCHIATRIC: Denies depression, anxiety or mood swings.    PHYSICAL EXAM:  APPEARANCE: Well nourished, well developed, in no acute distress.  AFFECT: WNL, alert and oriented x 3  SKIN: No acne or hirsutism  NECK: Neck symmetric without masses or thyromegaly  NODES: No inguinal, cervical, axillary, or femoral lymph node enlargement  CHEST: Good respiratory effect  ABDOMEN: Soft.  No tenderness or masses.  No hepatosplenomegaly.  No hernias.  BREASTS: Symmetrical, no skin changes or visible lesions.  No palpable masses, nipple discharge bilaterally.  PELVIC: Normal external genitalia without lesions.  Normal hair distribution.  Adequate perineal body, normal urethral meatus.  Vagina atrophic without lesions or  discharge.  Cervix pink, without lesions, discharge or tenderness.  No significant cystocele or rectocele.  Bimanual exam shows uterus to be normal size, regular, mobile and nontender.  Adnexa without masses or tenderness.    EXTREMITIES: No edema.  Physical Exam    1. Encounter for gynecological examination without abnormal finding  Liquid-Based Pap Smear, Screening    HPV High Risk Genotypes, PCR    AND PLAN:    Patient was counseled today on A.C.S. Pap guidelines and recommendations for yearly pelvic exams, mammograms and monthly self breast exams; to see her PCP for other health maintenance.

## 2020-10-23 LAB
HPV HR 12 DNA SPEC QL NAA+PROBE: NEGATIVE
HPV16 AG SPEC QL: NEGATIVE
HPV18 DNA SPEC QL NAA+PROBE: NEGATIVE

## 2020-11-03 ENCOUNTER — HOSPITAL ENCOUNTER (OUTPATIENT)
Dept: RADIOLOGY | Facility: HOSPITAL | Age: 60
Discharge: HOME | End: 2020-11-03
Attending: NURSE PRACTITIONER
Payer: COMMERCIAL

## 2020-11-03 DIAGNOSIS — Z12.31 ENCOUNTER FOR SCREENING MAMMOGRAM FOR MALIGNANT NEOPLASM OF BREAST: ICD-10-CM

## 2020-11-03 PROCEDURE — 77063 MAMMO DIGITAL SCREENING BILAT WITH TOMO: ICD-10-PCS | Mod: 26,,, | Performed by: RADIOLOGY

## 2020-11-03 PROCEDURE — 77067 MAMMO DIGITAL SCREENING BILAT WITH TOMO: ICD-10-PCS | Mod: 26,,, | Performed by: RADIOLOGY

## 2020-11-03 PROCEDURE — 77067 SCR MAMMO BI INCL CAD: CPT | Mod: TC

## 2020-11-03 PROCEDURE — 77063 BREAST TOMOSYNTHESIS BI: CPT | Mod: 26,,, | Performed by: RADIOLOGY

## 2020-11-03 PROCEDURE — 77067 SCR MAMMO BI INCL CAD: CPT | Mod: 26,,, | Performed by: RADIOLOGY

## 2020-11-05 ENCOUNTER — PATIENT MESSAGE (OUTPATIENT)
Dept: OBSTETRICS AND GYNECOLOGY | Facility: CLINIC | Age: 60
End: 2020-11-05

## 2020-11-05 LAB
FINAL PATHOLOGIC DIAGNOSIS: NORMAL
Lab: NORMAL

## 2021-09-24 ENCOUNTER — TELEPHONE (OUTPATIENT)
Dept: INTERNAL MEDICINE | Facility: CLINIC | Age: 61
End: 2021-09-24

## 2021-09-24 DIAGNOSIS — Z12.31 ENCOUNTER FOR SCREENING MAMMOGRAM FOR MALIGNANT NEOPLASM OF BREAST: Primary | ICD-10-CM

## 2021-11-05 ENCOUNTER — IMMUNIZATION (OUTPATIENT)
Dept: PHARMACY | Facility: CLINIC | Age: 61
End: 2021-11-05
Payer: COMMERCIAL

## 2021-11-05 ENCOUNTER — HOSPITAL ENCOUNTER (OUTPATIENT)
Dept: RADIOLOGY | Facility: HOSPITAL | Age: 61
Discharge: HOME | End: 2021-11-05
Attending: FAMILY MEDICINE
Payer: COMMERCIAL

## 2021-11-05 ENCOUNTER — OFFICE VISIT (OUTPATIENT)
Dept: OBSTETRICS AND GYNECOLOGY | Facility: CLINIC | Age: 61
End: 2021-11-05
Payer: COMMERCIAL

## 2021-11-05 VITALS
WEIGHT: 108.94 LBS | HEIGHT: 62 IN | WEIGHT: 108 LBS | SYSTOLIC BLOOD PRESSURE: 138 MMHG | HEIGHT: 62 IN | BODY MASS INDEX: 19.88 KG/M2 | DIASTOLIC BLOOD PRESSURE: 82 MMHG | BODY MASS INDEX: 20.05 KG/M2

## 2021-11-05 DIAGNOSIS — Z12.31 ENCOUNTER FOR SCREENING MAMMOGRAM FOR MALIGNANT NEOPLASM OF BREAST: ICD-10-CM

## 2021-11-05 DIAGNOSIS — Z23 NEED FOR VACCINATION: Primary | ICD-10-CM

## 2021-11-05 DIAGNOSIS — Z01.419 ENCOUNTER FOR GYNECOLOGICAL EXAMINATION WITHOUT ABNORMAL FINDING: Primary | ICD-10-CM

## 2021-11-05 PROCEDURE — 3079F DIAST BP 80-89 MM HG: CPT | Mod: CPTII,S$GLB,, | Performed by: NURSE PRACTITIONER

## 2021-11-05 PROCEDURE — 1159F MED LIST DOCD IN RCRD: CPT | Mod: CPTII,S$GLB,, | Performed by: NURSE PRACTITIONER

## 2021-11-05 PROCEDURE — 3008F PR BODY MASS INDEX (BMI) DOCUMENTED: ICD-10-PCS | Mod: CPTII,S$GLB,, | Performed by: NURSE PRACTITIONER

## 2021-11-05 PROCEDURE — 77063 MAMMO DIGITAL SCREENING BILAT WITH TOMO: ICD-10-PCS | Mod: 26,,, | Performed by: RADIOLOGY

## 2021-11-05 PROCEDURE — 77067 SCR MAMMO BI INCL CAD: CPT | Mod: 26,,, | Performed by: RADIOLOGY

## 2021-11-05 PROCEDURE — 1160F RVW MEDS BY RX/DR IN RCRD: CPT | Mod: CPTII,S$GLB,, | Performed by: NURSE PRACTITIONER

## 2021-11-05 PROCEDURE — 99999 PR PBB SHADOW E&M-EST. PATIENT-LVL III: ICD-10-PCS | Mod: PBBFAC,,, | Performed by: NURSE PRACTITIONER

## 2021-11-05 PROCEDURE — 3075F PR MOST RECENT SYSTOLIC BLOOD PRESS GE 130-139MM HG: ICD-10-PCS | Mod: CPTII,S$GLB,, | Performed by: NURSE PRACTITIONER

## 2021-11-05 PROCEDURE — 77063 BREAST TOMOSYNTHESIS BI: CPT | Mod: 26,,, | Performed by: RADIOLOGY

## 2021-11-05 PROCEDURE — 99396 PREV VISIT EST AGE 40-64: CPT | Mod: S$GLB,,, | Performed by: NURSE PRACTITIONER

## 2021-11-05 PROCEDURE — 77067 MAMMO DIGITAL SCREENING BILAT WITH TOMO: ICD-10-PCS | Mod: 26,,, | Performed by: RADIOLOGY

## 2021-11-05 PROCEDURE — 3075F SYST BP GE 130 - 139MM HG: CPT | Mod: CPTII,S$GLB,, | Performed by: NURSE PRACTITIONER

## 2021-11-05 PROCEDURE — 3079F PR MOST RECENT DIASTOLIC BLOOD PRESSURE 80-89 MM HG: ICD-10-PCS | Mod: CPTII,S$GLB,, | Performed by: NURSE PRACTITIONER

## 2021-11-05 PROCEDURE — 1159F PR MEDICATION LIST DOCUMENTED IN MEDICAL RECORD: ICD-10-PCS | Mod: CPTII,S$GLB,, | Performed by: NURSE PRACTITIONER

## 2021-11-05 PROCEDURE — 99396 PR PREVENTIVE VISIT,EST,40-64: ICD-10-PCS | Mod: S$GLB,,, | Performed by: NURSE PRACTITIONER

## 2021-11-05 PROCEDURE — 3008F BODY MASS INDEX DOCD: CPT | Mod: CPTII,S$GLB,, | Performed by: NURSE PRACTITIONER

## 2021-11-05 PROCEDURE — 1160F PR REVIEW ALL MEDS BY PRESCRIBER/CLIN PHARMACIST DOCUMENTED: ICD-10-PCS | Mod: CPTII,S$GLB,, | Performed by: NURSE PRACTITIONER

## 2021-11-05 PROCEDURE — 77067 SCR MAMMO BI INCL CAD: CPT | Mod: TC

## 2021-11-05 PROCEDURE — 99999 PR PBB SHADOW E&M-EST. PATIENT-LVL III: CPT | Mod: PBBFAC,,, | Performed by: NURSE PRACTITIONER

## 2022-04-27 ENCOUNTER — PATIENT MESSAGE (OUTPATIENT)
Dept: ADMINISTRATIVE | Facility: HOSPITAL | Age: 62
End: 2022-04-27
Payer: COMMERCIAL

## 2022-05-27 ENCOUNTER — IMMUNIZATION (OUTPATIENT)
Dept: PHARMACY | Facility: CLINIC | Age: 62
End: 2022-05-27
Payer: COMMERCIAL

## 2022-05-27 DIAGNOSIS — Z23 NEED FOR VACCINATION: Primary | ICD-10-CM

## 2022-07-07 ENCOUNTER — PATIENT OUTREACH (OUTPATIENT)
Dept: ADMINISTRATIVE | Facility: HOSPITAL | Age: 62
End: 2022-07-07
Payer: COMMERCIAL

## 2022-09-13 ENCOUNTER — LAB VISIT (OUTPATIENT)
Dept: LAB | Facility: HOSPITAL | Age: 62
End: 2022-09-13
Payer: COMMERCIAL

## 2022-09-13 ENCOUNTER — OFFICE VISIT (OUTPATIENT)
Dept: INTERNAL MEDICINE | Facility: CLINIC | Age: 62
End: 2022-09-13
Payer: COMMERCIAL

## 2022-09-13 VITALS
HEART RATE: 56 BPM | BODY MASS INDEX: 19.71 KG/M2 | OXYGEN SATURATION: 99 % | DIASTOLIC BLOOD PRESSURE: 86 MMHG | WEIGHT: 107.13 LBS | SYSTOLIC BLOOD PRESSURE: 132 MMHG | HEIGHT: 62 IN | RESPIRATION RATE: 16 BRPM

## 2022-09-13 DIAGNOSIS — K21.9 GASTROESOPHAGEAL REFLUX DISEASE, UNSPECIFIED WHETHER ESOPHAGITIS PRESENT: ICD-10-CM

## 2022-09-13 DIAGNOSIS — Z87.19 HISTORY OF DIVERTICULITIS: ICD-10-CM

## 2022-09-13 DIAGNOSIS — Z00.00 ROUTINE GENERAL MEDICAL EXAMINATION AT A HEALTH CARE FACILITY: ICD-10-CM

## 2022-09-13 DIAGNOSIS — Z12.31 SCREENING MAMMOGRAM FOR HIGH-RISK PATIENT: ICD-10-CM

## 2022-09-13 DIAGNOSIS — Z00.00 ROUTINE GENERAL MEDICAL EXAMINATION AT A HEALTH CARE FACILITY: Primary | ICD-10-CM

## 2022-09-13 DIAGNOSIS — Z85.09 HISTORY OF CANCER OF GALL BLADDER: ICD-10-CM

## 2022-09-13 LAB
BILIRUB UR QL STRIP: NEGATIVE
CLARITY UR: CLEAR
COLOR UR: YELLOW
GLUCOSE UR QL STRIP: NEGATIVE
HGB UR QL STRIP: NEGATIVE
KETONES UR QL STRIP: NEGATIVE
LEUKOCYTE ESTERASE UR QL STRIP: NEGATIVE
NITRITE UR QL STRIP: NEGATIVE
PH UR STRIP: 7 [PH] (ref 5–8)
PROT UR QL STRIP: NEGATIVE
SP GR UR STRIP: <=1.005 (ref 1–1.03)
URN SPEC COLLECT METH UR: NORMAL

## 2022-09-13 PROCEDURE — 3075F SYST BP GE 130 - 139MM HG: CPT | Mod: CPTII,S$GLB,, | Performed by: FAMILY MEDICINE

## 2022-09-13 PROCEDURE — 90686 IIV4 VACC NO PRSV 0.5 ML IM: CPT | Mod: S$GLB,,, | Performed by: FAMILY MEDICINE

## 2022-09-13 PROCEDURE — 1160F RVW MEDS BY RX/DR IN RCRD: CPT | Mod: CPTII,S$GLB,, | Performed by: FAMILY MEDICINE

## 2022-09-13 PROCEDURE — 3008F BODY MASS INDEX DOCD: CPT | Mod: CPTII,S$GLB,, | Performed by: FAMILY MEDICINE

## 2022-09-13 PROCEDURE — 3008F PR BODY MASS INDEX (BMI) DOCUMENTED: ICD-10-PCS | Mod: CPTII,S$GLB,, | Performed by: FAMILY MEDICINE

## 2022-09-13 PROCEDURE — 99396 PR PREVENTIVE VISIT,EST,40-64: ICD-10-PCS | Mod: 25,S$GLB,, | Performed by: FAMILY MEDICINE

## 2022-09-13 PROCEDURE — 99999 PR PBB SHADOW E&M-EST. PATIENT-LVL III: ICD-10-PCS | Mod: PBBFAC,,, | Performed by: FAMILY MEDICINE

## 2022-09-13 PROCEDURE — 99396 PREV VISIT EST AGE 40-64: CPT | Mod: 25,S$GLB,, | Performed by: FAMILY MEDICINE

## 2022-09-13 PROCEDURE — 81003 URINALYSIS AUTO W/O SCOPE: CPT | Performed by: FAMILY MEDICINE

## 2022-09-13 PROCEDURE — 1159F PR MEDICATION LIST DOCUMENTED IN MEDICAL RECORD: ICD-10-PCS | Mod: CPTII,S$GLB,, | Performed by: FAMILY MEDICINE

## 2022-09-13 PROCEDURE — 1159F MED LIST DOCD IN RCRD: CPT | Mod: CPTII,S$GLB,, | Performed by: FAMILY MEDICINE

## 2022-09-13 PROCEDURE — 90686 FLU VACCINE (QUAD) GREATER THAN OR EQUAL TO 3YO PRESERVATIVE FREE IM: ICD-10-PCS | Mod: S$GLB,,, | Performed by: FAMILY MEDICINE

## 2022-09-13 PROCEDURE — 1160F PR REVIEW ALL MEDS BY PRESCRIBER/CLIN PHARMACIST DOCUMENTED: ICD-10-PCS | Mod: CPTII,S$GLB,, | Performed by: FAMILY MEDICINE

## 2022-09-13 PROCEDURE — 3075F PR MOST RECENT SYSTOLIC BLOOD PRESS GE 130-139MM HG: ICD-10-PCS | Mod: CPTII,S$GLB,, | Performed by: FAMILY MEDICINE

## 2022-09-13 PROCEDURE — 3079F PR MOST RECENT DIASTOLIC BLOOD PRESSURE 80-89 MM HG: ICD-10-PCS | Mod: CPTII,S$GLB,, | Performed by: FAMILY MEDICINE

## 2022-09-13 PROCEDURE — 90471 IMMUNIZATION ADMIN: CPT | Mod: S$GLB,,, | Performed by: FAMILY MEDICINE

## 2022-09-13 PROCEDURE — 3079F DIAST BP 80-89 MM HG: CPT | Mod: CPTII,S$GLB,, | Performed by: FAMILY MEDICINE

## 2022-09-13 PROCEDURE — 99999 PR PBB SHADOW E&M-EST. PATIENT-LVL III: CPT | Mod: PBBFAC,,, | Performed by: FAMILY MEDICINE

## 2022-09-13 PROCEDURE — 90471 FLU VACCINE (QUAD) GREATER THAN OR EQUAL TO 3YO PRESERVATIVE FREE IM: ICD-10-PCS | Mod: S$GLB,,, | Performed by: FAMILY MEDICINE

## 2022-09-13 RX ORDER — PANTOPRAZOLE SODIUM 40 MG/1
40 TABLET, DELAYED RELEASE ORAL DAILY
COMMUNITY
Start: 2022-06-19 | End: 2022-09-13

## 2022-09-13 NOTE — PROGRESS NOTES
"Subjective:       Patient ID: Ivone Monroy is a 62 y.o. female.    Chief Complaint: Annual Exam    62-year-old female patient with Patient Active Problem List:     GERD (gastroesophageal reflux disease)     History of diverticulitis     History of cancer of gall bladder  Here for routine annual physicals.  Patient has been taking omeprazole 20 mg daily and has been doing well with acid reflex and still continues to take Bentyl as needed due to diverticulitis.  Patient has been followed by Dr. Nieves with history of gallbladder cancer status post chemo and radiation.  Denies any weight loss, abdominal pain nausea vomiting, changes to bowel movements or trouble with urine  Has been staying physically active with diet and exercise    Review of Systems   Constitutional:  Negative for activity change and unexpected weight change.   HENT:  Negative for hearing loss, rhinorrhea and trouble swallowing.    Eyes:  Negative for discharge and visual disturbance.   Respiratory:  Negative for chest tightness and wheezing.    Cardiovascular:  Negative for chest pain and palpitations.   Gastrointestinal:  Negative for blood in stool, constipation, diarrhea and vomiting.   Endocrine: Negative for polydipsia and polyuria.   Genitourinary:  Negative for difficulty urinating, dysuria, hematuria and menstrual problem.   Musculoskeletal:  Negative for arthralgias, joint swelling and neck pain.   Neurological:  Negative for weakness and headaches.   Psychiatric/Behavioral:  Negative for confusion and dysphoric mood.        /86 (BP Location: Left arm, Patient Position: Sitting, BP Method: Medium (Manual))   Pulse (!) 56   Resp 16   Ht 5' 2" (1.575 m)   Wt 48.6 kg (107 lb 2.3 oz)   SpO2 99%   BMI 19.60 kg/m²   Objective:      Physical Exam  Constitutional:       Appearance: She is well-developed.   HENT:      Head: Normocephalic and atraumatic.   Cardiovascular:      Rate and Rhythm: Normal rate and regular rhythm.      " Heart sounds: Normal heart sounds. No murmur heard.  Pulmonary:      Effort: Pulmonary effort is normal.      Breath sounds: Normal breath sounds. No wheezing.   Abdominal:      General: Bowel sounds are normal.      Palpations: Abdomen is soft.      Tenderness: There is no abdominal tenderness.   Skin:     General: Skin is warm and dry.      Findings: No rash.   Neurological:      Mental Status: She is alert and oriented to person, place, and time.   Psychiatric:         Mood and Affect: Mood normal.         Assessment/Plan:   1. Routine general medical examination at a health care facility  - CBC Auto Differential; Future  - Comprehensive Metabolic Panel; Future  - Lipid Panel; Future  - TSH; Future  - Urinalysis, Reflex to Urine Culture Urine, Clean Catch; Future  Vital signs stable today.  Clinical exam stable  Continue lifestyle modifications with low-fat and low-cholesterol diet and exercise 30 minutes daily  Flu shot given today    2. Gastroesophageal reflux disease, unspecified whether esophagitis present  Stable on omeprazole 20 mg daily    3. History of diverticulitis  Taking Bentyl as needed    4. History of cancer of gall bladder  Followed by Dr. Nieves clinically doing well status post chemo and radiation in the past    5. Screening mammogram for high-risk patient  - Mammo Digital Screening Bilat w/ Timothy; Future   Due for mammogram in November

## 2022-10-04 ENCOUNTER — PATIENT OUTREACH (OUTPATIENT)
Dept: ADMINISTRATIVE | Facility: HOSPITAL | Age: 62
End: 2022-10-04
Payer: COMMERCIAL

## 2022-11-07 ENCOUNTER — HOSPITAL ENCOUNTER (OUTPATIENT)
Dept: RADIOLOGY | Facility: HOSPITAL | Age: 62
Discharge: HOME | End: 2022-11-07
Attending: FAMILY MEDICINE
Payer: COMMERCIAL

## 2022-11-07 VITALS — HEIGHT: 62 IN | WEIGHT: 105.81 LBS | BODY MASS INDEX: 19.47 KG/M2

## 2022-11-07 DIAGNOSIS — Z12.31 SCREENING MAMMOGRAM FOR HIGH-RISK PATIENT: ICD-10-CM

## 2022-11-07 PROCEDURE — 77063 BREAST TOMOSYNTHESIS BI: CPT | Mod: TC

## 2022-11-07 PROCEDURE — 77067 SCR MAMMO BI INCL CAD: CPT | Mod: 26,,, | Performed by: RADIOLOGY

## 2022-11-07 PROCEDURE — 77067 MAMMO DIGITAL SCREENING BILAT WITH TOMO: ICD-10-PCS | Mod: 26,,, | Performed by: RADIOLOGY

## 2022-11-07 PROCEDURE — 77063 BREAST TOMOSYNTHESIS BI: CPT | Mod: 26,,, | Performed by: RADIOLOGY

## 2022-11-07 PROCEDURE — 77063 MAMMO DIGITAL SCREENING BILAT WITH TOMO: ICD-10-PCS | Mod: 26,,, | Performed by: RADIOLOGY

## 2022-11-08 ENCOUNTER — OFFICE VISIT (OUTPATIENT)
Dept: OBSTETRICS AND GYNECOLOGY | Facility: CLINIC | Age: 62
End: 2022-11-08
Payer: COMMERCIAL

## 2022-11-08 VITALS
BODY MASS INDEX: 19.63 KG/M2 | SYSTOLIC BLOOD PRESSURE: 134 MMHG | WEIGHT: 106.69 LBS | DIASTOLIC BLOOD PRESSURE: 80 MMHG | HEIGHT: 62 IN

## 2022-11-08 DIAGNOSIS — Z01.419 ENCOUNTER FOR GYNECOLOGICAL EXAMINATION WITHOUT ABNORMAL FINDING: Primary | ICD-10-CM

## 2022-11-08 PROCEDURE — 1159F PR MEDICATION LIST DOCUMENTED IN MEDICAL RECORD: ICD-10-PCS | Mod: CPTII,S$GLB,, | Performed by: NURSE PRACTITIONER

## 2022-11-08 PROCEDURE — 99999 PR PBB SHADOW E&M-EST. PATIENT-LVL III: CPT | Mod: PBBFAC,,, | Performed by: NURSE PRACTITIONER

## 2022-11-08 PROCEDURE — 87624 HPV HI-RISK TYP POOLED RSLT: CPT | Performed by: NURSE PRACTITIONER

## 2022-11-08 PROCEDURE — 3075F SYST BP GE 130 - 139MM HG: CPT | Mod: CPTII,S$GLB,, | Performed by: NURSE PRACTITIONER

## 2022-11-08 PROCEDURE — 3079F DIAST BP 80-89 MM HG: CPT | Mod: CPTII,S$GLB,, | Performed by: NURSE PRACTITIONER

## 2022-11-08 PROCEDURE — 88142 CYTOPATH C/V THIN LAYER: CPT | Performed by: NURSE PRACTITIONER

## 2022-11-08 PROCEDURE — 1160F PR REVIEW ALL MEDS BY PRESCRIBER/CLIN PHARMACIST DOCUMENTED: ICD-10-PCS | Mod: CPTII,S$GLB,, | Performed by: NURSE PRACTITIONER

## 2022-11-08 PROCEDURE — 3079F PR MOST RECENT DIASTOLIC BLOOD PRESSURE 80-89 MM HG: ICD-10-PCS | Mod: CPTII,S$GLB,, | Performed by: NURSE PRACTITIONER

## 2022-11-08 PROCEDURE — 1160F RVW MEDS BY RX/DR IN RCRD: CPT | Mod: CPTII,S$GLB,, | Performed by: NURSE PRACTITIONER

## 2022-11-08 PROCEDURE — 99396 PREV VISIT EST AGE 40-64: CPT | Mod: S$GLB,,, | Performed by: NURSE PRACTITIONER

## 2022-11-08 PROCEDURE — 99999 PR PBB SHADOW E&M-EST. PATIENT-LVL III: ICD-10-PCS | Mod: PBBFAC,,, | Performed by: NURSE PRACTITIONER

## 2022-11-08 PROCEDURE — 3008F PR BODY MASS INDEX (BMI) DOCUMENTED: ICD-10-PCS | Mod: CPTII,S$GLB,, | Performed by: NURSE PRACTITIONER

## 2022-11-08 PROCEDURE — 99396 PR PREVENTIVE VISIT,EST,40-64: ICD-10-PCS | Mod: S$GLB,,, | Performed by: NURSE PRACTITIONER

## 2022-11-08 PROCEDURE — 1159F MED LIST DOCD IN RCRD: CPT | Mod: CPTII,S$GLB,, | Performed by: NURSE PRACTITIONER

## 2022-11-08 PROCEDURE — 3008F BODY MASS INDEX DOCD: CPT | Mod: CPTII,S$GLB,, | Performed by: NURSE PRACTITIONER

## 2022-11-08 PROCEDURE — 3075F PR MOST RECENT SYSTOLIC BLOOD PRESS GE 130-139MM HG: ICD-10-PCS | Mod: CPTII,S$GLB,, | Performed by: NURSE PRACTITIONER

## 2022-11-08 RX ORDER — PANTOPRAZOLE SODIUM 40 MG/1
40 TABLET, DELAYED RELEASE ORAL DAILY
COMMUNITY
Start: 2022-09-19

## 2022-11-08 NOTE — PROGRESS NOTES
CC: Well woman exam    Ivone Monroy is a 62 y.o. female  presents for well woman exam.  LMP: No LMP recorded. Patient is postmenopausal..  No issues, problems, or complaints.    MMG done yesterday normal  Pap today     Past Medical History:   Diagnosis Date    Anemia     Cancer of gallbladder 2015    chemo radiation - Dr Gatica     Diverticulitis     GERD (gastroesophageal reflux disease)      Past Surgical History:   Procedure Laterality Date    exploration of gallbladder twice for suspected malignancy with no resection       Social History     Socioeconomic History    Marital status:      Spouse name: Elba    Number of children: 3   Occupational History    Occupation: [employer]     Employer: [employer]     Employer: [employer]   Tobacco Use    Smoking status: Never    Smokeless tobacco: Never   Substance and Sexual Activity    Alcohol use: Yes     Comment: socially    Drug use: No    Sexual activity: Yes     Partners: Male     Birth control/protection: None     Comment:      Social Determinants of Health     Financial Resource Strain: Low Risk     Difficulty of Paying Living Expenses: Not very hard   Food Insecurity: No Food Insecurity    Worried About Running Out of Food in the Last Year: Never true    Ran Out of Food in the Last Year: Never true   Transportation Needs: No Transportation Needs    Lack of Transportation (Medical): No    Lack of Transportation (Non-Medical): No   Physical Activity: Sufficiently Active    Days of Exercise per Week: 4 days    Minutes of Exercise per Session: 40 min   Stress: No Stress Concern Present    Feeling of Stress : Not at all   Social Connections: Unknown    Frequency of Communication with Friends and Family: More than three times a week    Frequency of Social Gatherings with Friends and Family: Once a week    Active Member of Clubs or Organizations: No    Attends Club or Organization Meetings: Never    Marital  "Status:    Housing Stability: Low Risk     Unable to Pay for Housing in the Last Year: No    Number of Places Lived in the Last Year: 1    Unstable Housing in the Last Year: No     Family History   Problem Relation Age of Onset    Diabetes Mother         may have been related to prolonged prednisone use    Arthritis Mother         Rheumatoid    Cancer Father         prostate    Heart disease Father     Colon cancer Cousin     Colon cancer Paternal Uncle      OB History          6    Para   6    Term   3            AB        Living   3         SAB        IAB        Ectopic        Multiple        Live Births   2                 /80 (BP Location: Left arm, Patient Position: Sitting)   Ht 5' 2" (1.575 m)   Wt 48.4 kg (106 lb 11.2 oz)   BMI 19.52 kg/m²       ROS:  GENERAL: Denies weight gain or weight loss. Feeling well overall.   SKIN: Denies rash or lesions.   HEAD: Denies head injury or headache.   NODES: Denies enlarged lymph nodes.   CHEST: Denies chest pain or shortness of breath.   CARDIOVASCULAR: Denies palpitations or left sided chest pain.   ABDOMEN: No abdominal pain, constipation, diarrhea, nausea, vomiting or rectal bleeding.   URINARY: No frequency, dysuria, hematuria, or burning on urination.  REPRODUCTIVE: See HPI.   BREASTS: The patient performs breast self-examination and denies pain, lumps, or nipple discharge.   HEMATOLOGIC: No easy bruisability or excessive bleeding.   MUSCULOSKELETAL: Denies joint pain or swelling.   NEUROLOGIC: Denies syncope or weakness.   PSYCHIATRIC: Denies depression, anxiety or mood swings.    PHYSICAL EXAM:  APPEARANCE: Well nourished, well developed, in no acute distress.  AFFECT: WNL, alert and oriented x 3  SKIN: No acne or hirsutism  NECK: Neck symmetric without masses or thyromegaly  NODES: No inguinal, cervical, axillary, or femoral lymph node enlargement  CHEST: Good respiratory effect  ABDOMEN: Soft.  No tenderness or masses.  No " hepatosplenomegaly.  No hernias.  BREASTS: Symmetrical, no skin changes or visible lesions.  No palpable masses, nipple discharge bilaterally.  PELVIC: Normal external genitalia without lesions.  Normal hair distribution.  Adequate perineal body, normal urethral meatus.  Vagina atrophic without lesions or discharge.  Cervix pink, without lesions, discharge or tenderness.  No significant cystocele or rectocele.  Bimanual exam shows uterus to be normal size, regular, mobile and nontender.  Adnexa without masses or tenderness.    EXTREMITIES: No edema.  Physical Exam    1. Encounter for gynecological examination without abnormal finding  Liquid-Based Pap Smear, Screening    HPV High Risk Genotypes, PCR       AND PLAN:    Patient was counseled today on A.C.S. Pap guidelines and recommendations for yearly pelvic exams, mammograms and monthly self breast exams; to see her PCP for other health maintenance.

## 2022-11-15 LAB
FINAL PATHOLOGIC DIAGNOSIS: NORMAL
HPV HR 12 DNA SPEC QL NAA+PROBE: NEGATIVE
HPV16 AG SPEC QL: NEGATIVE
HPV18 DNA SPEC QL NAA+PROBE: NEGATIVE
Lab: NORMAL

## 2022-11-18 ENCOUNTER — IMMUNIZATION (OUTPATIENT)
Dept: PHARMACY | Facility: CLINIC | Age: 62
End: 2022-11-18
Payer: COMMERCIAL

## 2022-11-18 DIAGNOSIS — Z23 NEED FOR VACCINATION: Primary | ICD-10-CM

## 2023-03-13 ENCOUNTER — PATIENT MESSAGE (OUTPATIENT)
Dept: PAIN MEDICINE | Facility: CLINIC | Age: 63
End: 2023-03-13
Payer: COMMERCIAL

## 2023-10-10 ENCOUNTER — TELEPHONE (OUTPATIENT)
Dept: INTERNAL MEDICINE | Facility: CLINIC | Age: 63
End: 2023-10-10
Payer: MEDICARE

## 2023-10-10 DIAGNOSIS — Z12.31 ENCOUNTER FOR SCREENING MAMMOGRAM FOR MALIGNANT NEOPLASM OF BREAST: Primary | ICD-10-CM

## 2023-10-10 NOTE — TELEPHONE ENCOUNTER
----- Message from Diamone Speed sent at 10/10/2023  3:48 PM CDT -----  Regarding: self  Type:  Mammogram         Caller is requesting to schedule their annual mammogram appointment.  Order is not listed in EPIC.  Please enter order and contact patient to schedule.  Name of Caller:       Where would they like the mammogram performed? [facility]       Would the patient rather a call back or a response via My [facility]? My chart       Best Call Back Number: [phone]

## 2023-10-12 ENCOUNTER — IMMUNIZATION (OUTPATIENT)
Dept: PRIMARY CARE CLINIC | Facility: CLINIC | Age: 63
End: 2023-10-12
Payer: COMMERCIAL

## 2023-11-01 ENCOUNTER — LAB VISIT (OUTPATIENT)
Dept: LAB | Facility: HOSPITAL | Age: 63
End: 2023-11-01
Payer: COMMERCIAL

## 2023-11-01 ENCOUNTER — OFFICE VISIT (OUTPATIENT)
Dept: INTERNAL MEDICINE | Facility: CLINIC | Age: 63
End: 2023-11-01
Payer: MEDICARE

## 2023-11-01 ENCOUNTER — HOSPITAL ENCOUNTER (OUTPATIENT)
Dept: CARDIOLOGY | Facility: HOSPITAL | Age: 63
Discharge: HOME | End: 2023-11-01
Attending: FAMILY MEDICINE
Payer: MEDICARE

## 2023-11-01 VITALS
OXYGEN SATURATION: 100 % | SYSTOLIC BLOOD PRESSURE: 140 MMHG | HEIGHT: 62 IN | HEART RATE: 61 BPM | TEMPERATURE: 98 F | WEIGHT: 107.13 LBS | RESPIRATION RATE: 20 BRPM | DIASTOLIC BLOOD PRESSURE: 82 MMHG | BODY MASS INDEX: 19.71 KG/M2

## 2023-11-01 DIAGNOSIS — Z78.0 ASYMPTOMATIC MENOPAUSAL STATE: ICD-10-CM

## 2023-11-01 DIAGNOSIS — Z85.09 HISTORY OF CANCER OF GALL BLADDER: ICD-10-CM

## 2023-11-01 DIAGNOSIS — Z00.00 ROUTINE GENERAL MEDICAL EXAMINATION AT A HEALTH CARE FACILITY: Primary | ICD-10-CM

## 2023-11-01 DIAGNOSIS — K21.9 GASTROESOPHAGEAL REFLUX DISEASE, UNSPECIFIED WHETHER ESOPHAGITIS PRESENT: ICD-10-CM

## 2023-11-01 DIAGNOSIS — Z00.00 ROUTINE GENERAL MEDICAL EXAMINATION AT A HEALTH CARE FACILITY: ICD-10-CM

## 2023-11-01 DIAGNOSIS — Z87.19 HISTORY OF DIVERTICULITIS: ICD-10-CM

## 2023-11-01 LAB
BILIRUB UR QL STRIP: NEGATIVE
CLARITY UR REFRACT.AUTO: CLEAR
COLOR UR AUTO: YELLOW
GLUCOSE UR QL STRIP: NEGATIVE
HGB UR QL STRIP: NEGATIVE
KETONES UR QL STRIP: NEGATIVE
LEUKOCYTE ESTERASE UR QL STRIP: NEGATIVE
NITRITE UR QL STRIP: NEGATIVE
PH UR STRIP: 6 [PH] (ref 5–8)
PROT UR QL STRIP: NEGATIVE
SP GR UR STRIP: 1.01 (ref 1–1.03)
URN SPEC COLLECT METH UR: NORMAL

## 2023-11-01 PROCEDURE — 93005 ELECTROCARDIOGRAM TRACING: CPT

## 2023-11-01 PROCEDURE — 99397 PER PM REEVAL EST PAT 65+ YR: CPT | Mod: S$PBB,GZ,, | Performed by: FAMILY MEDICINE

## 2023-11-01 PROCEDURE — 99999 PR PBB SHADOW E&M-EST. PATIENT-LVL IV: CPT | Mod: PBBFAC,,, | Performed by: FAMILY MEDICINE

## 2023-11-01 PROCEDURE — 81003 URINALYSIS AUTO W/O SCOPE: CPT | Performed by: FAMILY MEDICINE

## 2023-11-01 PROCEDURE — 99397 PR PREVENTIVE VISIT,EST,65 & OVER: ICD-10-PCS | Mod: S$PBB,GZ,, | Performed by: FAMILY MEDICINE

## 2023-11-01 PROCEDURE — 99999 PR PBB SHADOW E&M-EST. PATIENT-LVL IV: ICD-10-PCS | Mod: PBBFAC,,, | Performed by: FAMILY MEDICINE

## 2023-11-01 PROCEDURE — 93010 EKG 12-LEAD: ICD-10-PCS | Mod: ,,, | Performed by: INTERNAL MEDICINE

## 2023-11-01 PROCEDURE — 93010 ELECTROCARDIOGRAM REPORT: CPT | Mod: ,,, | Performed by: INTERNAL MEDICINE

## 2023-11-01 PROCEDURE — 99214 OFFICE O/P EST MOD 30 MIN: CPT | Mod: PBBFAC | Performed by: FAMILY MEDICINE

## 2023-11-01 NOTE — PROGRESS NOTES
"Subjective:       Patient ID: Ivone Monroy is a 63 y.o. female.    Chief Complaint: Annual Exam    63-year-old  female patient with Patient Active Problem List:     GERD (gastroesophageal reflux disease)     History of diverticulitis     History of cancer of gall bladder  Here for routine annual physicals  Patient reports that she is been staying physically active with diet and exercise and has been followed by her gastroenterologist regularly  Reports that she usually has white coat hypertension but denies any headache or vision disturbances  Denies any abdominal discomfort lately      Review of Systems   Constitutional:  Negative for activity change, fatigue and unexpected weight change.   HENT:  Negative for hearing loss, rhinorrhea and trouble swallowing.    Eyes:  Negative for discharge and visual disturbance.   Respiratory:  Negative for chest tightness, shortness of breath and wheezing.    Cardiovascular:  Negative for chest pain, palpitations and leg swelling.   Gastrointestinal:  Negative for abdominal pain, blood in stool, constipation, diarrhea, nausea and vomiting.   Endocrine: Negative for polydipsia and polyuria.   Genitourinary:  Negative for difficulty urinating, dysuria, hematuria and menstrual problem.   Musculoskeletal:  Negative for arthralgias, joint swelling, myalgias and neck pain.   Skin:  Negative for rash.   Neurological:  Negative for weakness, light-headedness and headaches.   Psychiatric/Behavioral:  Negative for confusion, dysphoric mood and sleep disturbance.          BP (!) 140/82 (BP Location: Left arm, Patient Position: Sitting, BP Method: Medium (Manual))   Pulse 61   Temp 97.9 °F (36.6 °C) (Tympanic)   Resp 20   Ht 5' 2" (1.575 m)   Wt 48.6 kg (107 lb 2.3 oz)   SpO2 100%   BMI 19.60 kg/m²   Objective:      Physical Exam  Constitutional:       Appearance: She is well-developed.   HENT:      Head: Normocephalic and atraumatic.   Cardiovascular:      " Rate and Rhythm: Normal rate and regular rhythm.      Heart sounds: Normal heart sounds. No murmur heard.  Pulmonary:      Effort: Pulmonary effort is normal.      Breath sounds: Normal breath sounds. No wheezing.   Abdominal:      General: Bowel sounds are normal.      Palpations: Abdomen is soft.      Tenderness: There is no abdominal tenderness.   Skin:     General: Skin is warm and dry.      Findings: No rash.   Neurological:      Mental Status: She is alert and oriented to person, place, and time.   Psychiatric:         Mood and Affect: Mood normal.           Assessment/Plan:   1. Routine general medical examination at a health care facility  -     Urinalysis, Reflex to Urine Culture Urine, Clean Catch; Future; Expected date: 11/01/2023  -     Hemoglobin A1C; Future; Expected date: 11/01/2023  -     TSH; Future; Expected date: 11/01/2023  -     Lipid Panel; Future; Expected date: 11/01/2023  -     Comprehensive Metabolic Panel; Future; Expected date: 11/01/2023  -     CBC Auto Differential; Future; Expected date: 11/01/2023  -     EKG 12-lead; Future  Vital signs stable today.  Clinical exam stable  Continue lifestyle modifications with low-fat and low-cholesterol diet and exercise 30 minutes daily  Will get baseline EKG  Encouraged to restrict salt intake and start monitoring blood pressure trends at home    2. Gastroesophageal reflux disease, unspecified whether esophagitis present  Overview:  Dr Hartman  3. History of diverticulitis  Clinically doing well taking pantoprazole 40 mg daily and Bentyl as needed    4. History of cancer of gall bladder  Overview:  S/p chemo and radiation By Dr rivera , last check  Dec 2017  Stable and asymptomatic, followed by Oncology    5. Asymptomatic menopausal state  -     DXA Bone Density Axial Skeleton 1 or more sites; Future; Expected date: 11/01/2023  Due for DEXA scan    Encouraged to consider getting further vaccinations like shingles RSV tetanus

## 2023-11-17 ENCOUNTER — HOSPITAL ENCOUNTER (OUTPATIENT)
Dept: RADIOLOGY | Facility: HOSPITAL | Age: 63
Discharge: HOME | End: 2023-11-17
Attending: FAMILY MEDICINE
Payer: MEDICARE

## 2023-11-17 VITALS — BODY MASS INDEX: 19.71 KG/M2 | WEIGHT: 107.13 LBS | HEIGHT: 62 IN

## 2023-11-17 DIAGNOSIS — Z12.31 ENCOUNTER FOR SCREENING MAMMOGRAM FOR MALIGNANT NEOPLASM OF BREAST: ICD-10-CM

## 2023-11-17 PROCEDURE — 77063 MAMMO DIGITAL SCREENING BILAT WITH TOMO: ICD-10-PCS | Mod: 26,,, | Performed by: RADIOLOGY

## 2023-11-17 PROCEDURE — 77067 SCR MAMMO BI INCL CAD: CPT | Mod: 26,,, | Performed by: RADIOLOGY

## 2023-11-17 PROCEDURE — 77063 BREAST TOMOSYNTHESIS BI: CPT | Mod: 26,,, | Performed by: RADIOLOGY

## 2023-11-17 PROCEDURE — 77067 SCR MAMMO BI INCL CAD: CPT | Mod: TC

## 2023-11-17 PROCEDURE — 77067 MAMMO DIGITAL SCREENING BILAT WITH TOMO: ICD-10-PCS | Mod: 26,,, | Performed by: RADIOLOGY

## 2023-11-20 ENCOUNTER — OFFICE VISIT (OUTPATIENT)
Dept: OBSTETRICS AND GYNECOLOGY | Facility: CLINIC | Age: 63
End: 2023-11-20
Payer: COMMERCIAL

## 2023-11-20 ENCOUNTER — APPOINTMENT (OUTPATIENT)
Dept: RADIOLOGY | Facility: HOSPITAL | Age: 63
End: 2023-11-20
Attending: FAMILY MEDICINE
Payer: MEDICARE

## 2023-11-20 ENCOUNTER — PATIENT MESSAGE (OUTPATIENT)
Dept: INTERNAL MEDICINE | Facility: CLINIC | Age: 63
End: 2023-11-20
Payer: MEDICARE

## 2023-11-20 VITALS
BODY MASS INDEX: 19.68 KG/M2 | SYSTOLIC BLOOD PRESSURE: 142 MMHG | DIASTOLIC BLOOD PRESSURE: 80 MMHG | HEIGHT: 62 IN | WEIGHT: 106.94 LBS

## 2023-11-20 DIAGNOSIS — M81.0 AGE-RELATED OSTEOPOROSIS WITHOUT CURRENT PATHOLOGICAL FRACTURE: Primary | ICD-10-CM

## 2023-11-20 DIAGNOSIS — Z78.0 POSTMENOPAUSAL: ICD-10-CM

## 2023-11-20 DIAGNOSIS — Z78.0 ASYMPTOMATIC MENOPAUSAL STATE: ICD-10-CM

## 2023-11-20 DIAGNOSIS — Z85.09 HISTORY OF CANCER OF GALL BLADDER: ICD-10-CM

## 2023-11-20 DIAGNOSIS — Z01.419 ENCOUNTER FOR GYNECOLOGICAL EXAMINATION WITHOUT ABNORMAL FINDING: Primary | ICD-10-CM

## 2023-11-20 PROCEDURE — 99999 PR PBB SHADOW E&M-EST. PATIENT-LVL III: ICD-10-PCS | Mod: PBBFAC,,, | Performed by: NURSE PRACTITIONER

## 2023-11-20 PROCEDURE — 99213 OFFICE O/P EST LOW 20 MIN: CPT | Mod: PBBFAC,25 | Performed by: NURSE PRACTITIONER

## 2023-11-20 PROCEDURE — 77080 DXA BONE DENSITY AXIAL SKELETON 1 OR MORE SITES: ICD-10-PCS | Mod: 26,,, | Performed by: RADIOLOGY

## 2023-11-20 PROCEDURE — G0101 PR CA SCREEN;PELVIC/BREAST EXAM: ICD-10-PCS | Mod: S$PBB,,, | Performed by: NURSE PRACTITIONER

## 2023-11-20 PROCEDURE — 77080 DXA BONE DENSITY AXIAL: CPT | Mod: 26,,, | Performed by: RADIOLOGY

## 2023-11-20 PROCEDURE — G0101 CA SCREEN;PELVIC/BREAST EXAM: HCPCS | Mod: PBBFAC | Performed by: NURSE PRACTITIONER

## 2023-11-20 PROCEDURE — 77080 DXA BONE DENSITY AXIAL: CPT | Mod: TC

## 2023-11-20 PROCEDURE — G0101 CA SCREEN;PELVIC/BREAST EXAM: HCPCS | Mod: S$PBB,,, | Performed by: NURSE PRACTITIONER

## 2023-11-20 PROCEDURE — 99999 PR PBB SHADOW E&M-EST. PATIENT-LVL III: CPT | Mod: PBBFAC,,, | Performed by: NURSE PRACTITIONER

## 2023-11-20 NOTE — PROGRESS NOTES
CC: Well woman exam    Ivone Monroy is a 63 y.o. female  presents for well woman exam.  LMP: No LMP recorded. Patient is postmenopausal..  No issues, problems, or complaints.    Mmg done and normal  We are doing pap every other year and is not due until next year.     Past Medical History:   Diagnosis Date    Anemia     Cancer of gallbladder     chemo radiation - Dr Gatica     Diverticulitis     GERD (gastroesophageal reflux disease)      Past Surgical History:   Procedure Laterality Date    exploration of gallbladder twice for suspected malignancy with no resection      TUBAL LIGATION  1996     Social History     Socioeconomic History    Marital status:      Spouse name: Elba    Number of children: 3   Occupational History    Occupation: Public      Employer: [employer]     Employer: [employer]   Tobacco Use    Smoking status: Never    Smokeless tobacco: Never   Substance and Sexual Activity    Alcohol use: Yes     Alcohol/week: 2.0 standard drinks of alcohol     Types: 2 Glasses of wine per week     Comment: socially    Drug use: No    Sexual activity: Yes     Partners: Male     Birth control/protection: None     Comment:      Social Determinants of Health     Financial Resource Strain: Medium Risk (10/31/2023)    Overall Financial Resource Strain (CARDIA)     Difficulty of Paying Living Expenses: Somewhat hard   Food Insecurity: No Food Insecurity (10/31/2023)    Hunger Vital Sign     Worried About Running Out of Food in the Last Year: Never true     Ran Out of Food in the Last Year: Never true   Transportation Needs: No Transportation Needs (10/31/2023)    PRAPARE - Transportation     Lack of Transportation (Medical): No     Lack of Transportation (Non-Medical): No   Physical Activity: Sufficiently Active (10/31/2023)    Exercise Vital Sign     Days of Exercise per Week: 5 days     Minutes of Exercise per Session: 60 min   Stress: No  "Stress Concern Present (10/31/2023)    [place] [employer] - Occupational Stress Questionnaire     Feeling of Stress : Not at all   Social Connections: Unknown (10/31/2023)    Social Connection and Isolation Panel [NHANES]     Frequency of Communication with Friends and Family: More than three times a week     Frequency of Social Gatherings with Friends and Family: Once a week     Active Member of Clubs or Organizations: No     Attends Club or Organization Meetings: Never     Marital Status:    Housing Stability: Low Risk  (10/31/2023)    Housing Stability Vital Sign     Unable to Pay for Housing in the Last Year: No     Number of Places Lived in the Last Year: 1     Unstable Housing in the Last Year: No     Family History   Problem Relation Age of Onset    Diabetes Mother         may have been related to prolonged prednisone use    Arthritis Mother         Rheumatoid    Heart disease Mother     Cancer Father         prostate    Heart disease Father     Colon cancer Cousin     Colon cancer Paternal Uncle      OB History          6    Para   6    Term   3            AB        Living   3         SAB        IAB        Ectopic        Multiple        Live Births   2                 BP (!) 142/80 (BP Location: Left arm, Patient Position: Sitting, BP Method: Medium (Manual))   Ht 5' 2" (1.575 m)   Wt 48.5 kg (106 lb 14.8 oz)   BMI 19.56 kg/m²       ROS:  GENERAL: Denies weight gain or weight loss. Feeling well overall.   SKIN: Denies rash or lesions.   HEAD: Denies head injury or headache.   NODES: Denies enlarged lymph nodes.   CHEST: Denies chest pain or shortness of breath.   CARDIOVASCULAR: Denies palpitations or left sided chest pain.   ABDOMEN: No abdominal pain, constipation, diarrhea, nausea, vomiting or rectal bleeding.   URINARY: No frequency, dysuria, hematuria, or burning on urination.  REPRODUCTIVE: See HPI.   BREASTS: The patient performs breast self-examination " and denies pain, lumps, or nipple discharge.   HEMATOLOGIC: No easy bruisability or excessive bleeding.   MUSCULOSKELETAL: Denies joint pain or swelling.   NEUROLOGIC: Denies syncope or weakness.   PSYCHIATRIC: Denies depression, anxiety or mood swings.    PHYSICAL EXAM:  APPEARANCE: Well nourished, well developed, in no acute distress.  AFFECT: WNL, alert and oriented x 3  SKIN: No acne or hirsutism  NECK: Neck symmetric without masses or thyromegaly  NODES: No inguinal, cervical, axillary, or femoral lymph node enlargement  CHEST: Good respiratory effect  ABDOMEN: Soft.  No tenderness or masses.  No hepatosplenomegaly.  No hernias.  BREASTS: Symmetrical, no skin changes or visible lesions.  No palpable masses, nipple discharge bilaterally.  PELVIC: Normal external genitalia without lesions.  Normal hair distribution.  Adequate perineal body, normal urethral meatus.  Vagina atrophic without lesions or discharge.  Cervix pink, without lesions, discharge or tenderness.  No significant cystocele or rectocele.  Bimanual exam shows uterus to be normal size, regular, mobile and nontender.  Adnexa without masses or tenderness.    EXTREMITIES: No edema.  Physical Exam    1. Encounter for gynecological examination without abnormal finding        2. Postmenopausal        3. History of cancer of gall bladder         AND PLAN:    Ivone was seen today for well woman.    Diagnoses and all orders for this visit:    Encounter for gynecological examination without abnormal finding    Postmenopausal    History of cancer of gall bladder       Patient was counseled today on A.C.S. Pap guidelines and recommendations for yearly pelvic exams, mammograms and monthly self breast exams; to see her PCP for other health maintenance.

## 2023-11-28 ENCOUNTER — LAB VISIT (OUTPATIENT)
Dept: LAB | Facility: HOSPITAL | Age: 63
End: 2023-11-28
Attending: FAMILY MEDICINE
Payer: MEDICARE

## 2023-11-28 ENCOUNTER — OFFICE VISIT (OUTPATIENT)
Dept: RHEUMATOLOGY | Facility: CLINIC | Age: 63
End: 2023-11-28
Payer: MEDICARE

## 2023-11-28 VITALS
SYSTOLIC BLOOD PRESSURE: 166 MMHG | HEIGHT: 62 IN | BODY MASS INDEX: 19.76 KG/M2 | DIASTOLIC BLOOD PRESSURE: 79 MMHG | HEART RATE: 75 BPM | WEIGHT: 107.38 LBS

## 2023-11-28 DIAGNOSIS — M81.0 AGE-RELATED OSTEOPOROSIS WITHOUT CURRENT PATHOLOGICAL FRACTURE: Primary | ICD-10-CM

## 2023-11-28 DIAGNOSIS — M81.0 AGE-RELATED OSTEOPOROSIS WITHOUT CURRENT PATHOLOGICAL FRACTURE: ICD-10-CM

## 2023-11-28 LAB
25(OH)D3+25(OH)D2 SERPL-MCNC: 56 NG/ML (ref 30–96)
ALBUMIN SERPL BCP-MCNC: 4.1 G/DL (ref 3.5–5.2)
ALP SERPL-CCNC: 76 U/L (ref 55–135)
ALT SERPL W/O P-5'-P-CCNC: 25 U/L (ref 10–44)
ANION GAP SERPL CALC-SCNC: 11 MMOL/L (ref 8–16)
AST SERPL-CCNC: 29 U/L (ref 10–40)
BILIRUB SERPL-MCNC: 0.3 MG/DL (ref 0.1–1)
BUN SERPL-MCNC: 15 MG/DL (ref 8–23)
CALCIUM SERPL-MCNC: 9.6 MG/DL (ref 8.7–10.5)
CHLORIDE SERPL-SCNC: 101 MMOL/L (ref 95–110)
CO2 SERPL-SCNC: 28 MMOL/L (ref 23–29)
CREAT SERPL-MCNC: 0.9 MG/DL (ref 0.5–1.4)
EST. GFR  (NO RACE VARIABLE): >60 ML/MIN/1.73 M^2
GLUCOSE SERPL-MCNC: 77 MG/DL (ref 70–110)
POTASSIUM SERPL-SCNC: 4.6 MMOL/L (ref 3.5–5.1)
PROT SERPL-MCNC: 7.6 G/DL (ref 6–8.4)
SODIUM SERPL-SCNC: 140 MMOL/L (ref 136–145)

## 2023-11-28 PROCEDURE — 99204 OFFICE O/P NEW MOD 45 MIN: CPT | Mod: S$PBB,,, | Performed by: STUDENT IN AN ORGANIZED HEALTH CARE EDUCATION/TRAINING PROGRAM

## 2023-11-28 PROCEDURE — 80053 COMPREHEN METABOLIC PANEL: CPT | Performed by: STUDENT IN AN ORGANIZED HEALTH CARE EDUCATION/TRAINING PROGRAM

## 2023-11-28 PROCEDURE — 99999 PR PBB SHADOW E&M-EST. PATIENT-LVL IV: ICD-10-PCS | Mod: PBBFAC,,, | Performed by: STUDENT IN AN ORGANIZED HEALTH CARE EDUCATION/TRAINING PROGRAM

## 2023-11-28 PROCEDURE — 99204 PR OFFICE/OUTPT VISIT, NEW, LEVL IV, 45-59 MIN: ICD-10-PCS | Mod: S$PBB,,, | Performed by: STUDENT IN AN ORGANIZED HEALTH CARE EDUCATION/TRAINING PROGRAM

## 2023-11-28 PROCEDURE — 82306 VITAMIN D 25 HYDROXY: CPT | Performed by: STUDENT IN AN ORGANIZED HEALTH CARE EDUCATION/TRAINING PROGRAM

## 2023-11-28 PROCEDURE — 99999 PR PBB SHADOW E&M-EST. PATIENT-LVL IV: CPT | Mod: PBBFAC,,, | Performed by: STUDENT IN AN ORGANIZED HEALTH CARE EDUCATION/TRAINING PROGRAM

## 2023-11-28 PROCEDURE — 99214 OFFICE O/P EST MOD 30 MIN: CPT | Mod: PBBFAC | Performed by: STUDENT IN AN ORGANIZED HEALTH CARE EDUCATION/TRAINING PROGRAM

## 2023-11-28 PROCEDURE — 36415 COLL VENOUS BLD VENIPUNCTURE: CPT | Performed by: STUDENT IN AN ORGANIZED HEALTH CARE EDUCATION/TRAINING PROGRAM

## 2023-11-28 RX ORDER — ALENDRONATE SODIUM 70 MG/1
70 TABLET ORAL
Qty: 4 TABLET | Refills: 11 | Status: SHIPPED | OUTPATIENT
Start: 2023-11-28 | End: 2023-12-12 | Stop reason: SINTOL

## 2023-11-28 NOTE — PROGRESS NOTES
RHEUMATOLOGY CLINIC INITIAL VISIT    Reason for consult:- osteoporosis    Chief complaints, HPI, ROS, EXAM, Assessment & Plans:-    Ivone Monroy is a 63 y.o. pleasant female who presents to be evaluated for treatment of osteoporosis.  Patient had recent DEXA scan that was consistent with osteoporosis with a T-score of -2.7 at the lumbar spine and femoral neck.  She is never been on treatment for her bone density before.  She is not had any fractures.  No plans for invasive dental work.  She is not taking calcium or vitamin-D currently.  She does not smoke.  Drinks limited alcohol and caffeine.  Rheumatologic review of systems otherwise negative.  Physical exam is unremarkable.      Reviewed all available old and outside pertinent medical records.    All lab results personally reviewed and interpreted by me.    1. Age-related osteoporosis without current pathological fracture        Problem List Items Addressed This Visit    None  Visit Diagnoses       Age-related osteoporosis without current pathological fracture    -  Primary    Relevant Medications    alendronate (FOSAMAX) 70 MG tablet    Other Relevant Orders    Comprehensive Metabolic Panel    Vitamin D            Patient presenting to be evaluated for treatment of osteoporosis  She would a DEXA scan consistent with osteoporosis with a T-score of -2.7 at the lumbar spine and femoral neck   FRAX showed risk of major osteoporotic fracture of 13% over the next 10 years and hip fracture 3.3% over the next 10 years  She states that her acid reflux is very well controlled and she does not have issue swallowing pills  We will plan to initiate treatment with alendronate 70 mg weekly  Counseled on rare adverse effects of bisphosphonates including but not limited to osteonecrosis of the jaw and atypical femur fractures  Check calcium and vitamin-D today  Counseled on fall prevention    # Follow up in about 1 year (around 11/28/2024).    Chronic comorbid conditions  affecting medical decision making today:    Past Medical History:   Diagnosis Date    Anemia     Cancer of gallbladder 2015    chemo radiation - Dr Gatica     Diverticulitis     GERD (gastroesophageal reflux disease)        Past Surgical History:   Procedure Laterality Date    exploration of gallbladder twice for suspected malignancy with no resection      TUBAL LIGATION  8/24/1996        Social History     Tobacco Use    Smoking status: Never    Smokeless tobacco: Never   Substance Use Topics    Alcohol use: Yes     Alcohol/week: 2.0 standard drinks of alcohol     Types: 2 Glasses of wine per week     Comment: socially    Drug use: No       Family History   Problem Relation Age of Onset    Diabetes Mother         may have been related to prolonged prednisone use    Arthritis Mother         Rheumatoid    Heart disease Mother     Cancer Father         prostate    Heart disease Father     Colon cancer Cousin     Colon cancer Paternal Uncle        Review of patient's allergies indicates:   Allergen Reactions    Sunscreen spf 8 [oxybenzone-padimate o] Swelling     Blisters to skin.   Blisters to skin.     Iodine and iodide containing products Swelling     Lips swell with sores    Shellfish containing products Swelling     Lips swell        Medication List with Changes/Refills   New Medications    ALENDRONATE (FOSAMAX) 70 MG TABLET    Take 1 tablet (70 mg total) by mouth every 7 days.   Current Medications    DICYCLOMINE (BENTYL) 10 MG CAPSULE    Take 10 mg by mouth 2 (two) times daily.    METHYLCELLULOSE ORAL POWDER    Take 3.4 g by mouth once daily.    MULTIVITAMIN (THERAGRAN) PER TABLET    Take 1 tablet by mouth once daily.    PANTOPRAZOLE (PROTONIX) 40 MG TABLET    Take 40 mg by mouth once daily.         Disclaimer: This note was prepared using voice recognition system and is likely to have sound alike errors and is not proofread.  Please message me with any questions.    45 minutes of total time spent on the  encounter, which includes face to face time and non-face to face time preparing to see the patient (eg, review of tests), Obtaining and/or reviewing separately obtained history, Documenting clinical information in the electronic or other health record, Independently interpreting results (not separately reported) and communicating results to the patient/family/caregiver, or Care coordination (not separately reported).     Thank you for allowing me to participate in the care of Ivone Montoya Eliana.    Juan Carlos Ferraro MD

## 2023-12-09 ENCOUNTER — PATIENT MESSAGE (OUTPATIENT)
Dept: RHEUMATOLOGY | Facility: CLINIC | Age: 63
End: 2023-12-09
Payer: MEDICARE

## 2023-12-09 DIAGNOSIS — M81.0 AGE-RELATED OSTEOPOROSIS WITHOUT CURRENT PATHOLOGICAL FRACTURE: Primary | ICD-10-CM

## 2023-12-12 PROBLEM — M81.0 AGE-RELATED OSTEOPOROSIS WITHOUT CURRENT PATHOLOGICAL FRACTURE: Status: ACTIVE | Noted: 2023-12-12

## 2023-12-12 RX ORDER — HEPARIN 100 UNIT/ML
500 SYRINGE INTRAVENOUS
Status: CANCELLED | OUTPATIENT
Start: 2023-12-12

## 2023-12-12 RX ORDER — SODIUM CHLORIDE 0.9 % (FLUSH) 0.9 %
10 SYRINGE (ML) INJECTION
Status: CANCELLED | OUTPATIENT
Start: 2023-12-12

## 2023-12-12 RX ORDER — ACETAMINOPHEN 325 MG/1
650 TABLET ORAL
Status: CANCELLED | OUTPATIENT
Start: 2023-12-12

## 2023-12-12 RX ORDER — ZOLEDRONIC ACID 5 MG/100ML
5 INJECTION, SOLUTION INTRAVENOUS
Status: CANCELLED | OUTPATIENT
Start: 2023-12-12

## 2023-12-14 ENCOUNTER — LAB VISIT (OUTPATIENT)
Dept: LAB | Facility: HOSPITAL | Age: 63
End: 2023-12-14
Attending: STUDENT IN AN ORGANIZED HEALTH CARE EDUCATION/TRAINING PROGRAM
Payer: COMMERCIAL

## 2023-12-14 DIAGNOSIS — M81.0 AGE-RELATED OSTEOPOROSIS WITHOUT CURRENT PATHOLOGICAL FRACTURE: ICD-10-CM

## 2023-12-14 LAB
ALBUMIN SERPL BCP-MCNC: 3.8 G/DL (ref 3.5–5.2)
ALP SERPL-CCNC: 71 U/L (ref 55–135)
ALT SERPL W/O P-5'-P-CCNC: 21 U/L (ref 10–44)
ANION GAP SERPL CALC-SCNC: 12 MMOL/L (ref 8–16)
AST SERPL-CCNC: 25 U/L (ref 10–40)
BILIRUB SERPL-MCNC: 0.3 MG/DL (ref 0.1–1)
BUN SERPL-MCNC: 12 MG/DL (ref 8–23)
CALCIUM SERPL-MCNC: 9.3 MG/DL (ref 8.7–10.5)
CHLORIDE SERPL-SCNC: 102 MMOL/L (ref 95–110)
CO2 SERPL-SCNC: 26 MMOL/L (ref 23–29)
CREAT SERPL-MCNC: 0.8 MG/DL (ref 0.5–1.4)
EST. GFR  (NO RACE VARIABLE): >60 ML/MIN/1.73 M^2
GLUCOSE SERPL-MCNC: 82 MG/DL (ref 70–110)
POTASSIUM SERPL-SCNC: 4.2 MMOL/L (ref 3.5–5.1)
PROT SERPL-MCNC: 7.2 G/DL (ref 6–8.4)
SODIUM SERPL-SCNC: 140 MMOL/L (ref 136–145)

## 2023-12-14 PROCEDURE — 80053 COMPREHEN METABOLIC PANEL: CPT | Performed by: STUDENT IN AN ORGANIZED HEALTH CARE EDUCATION/TRAINING PROGRAM

## 2023-12-14 PROCEDURE — 36415 COLL VENOUS BLD VENIPUNCTURE: CPT | Performed by: STUDENT IN AN ORGANIZED HEALTH CARE EDUCATION/TRAINING PROGRAM

## 2023-12-21 ENCOUNTER — PATIENT MESSAGE (OUTPATIENT)
Dept: INFUSION THERAPY | Facility: HOSPITAL | Age: 63
End: 2023-12-21
Payer: MEDICARE

## 2023-12-22 ENCOUNTER — PATIENT MESSAGE (OUTPATIENT)
Dept: INFUSION THERAPY | Facility: HOSPITAL | Age: 63
End: 2023-12-22

## 2023-12-22 ENCOUNTER — INFUSION (OUTPATIENT)
Dept: INFUSION THERAPY | Facility: HOSPITAL | Age: 63
End: 2023-12-22
Attending: STUDENT IN AN ORGANIZED HEALTH CARE EDUCATION/TRAINING PROGRAM
Payer: MEDICARE

## 2023-12-22 VITALS
OXYGEN SATURATION: 100 % | TEMPERATURE: 98 F | DIASTOLIC BLOOD PRESSURE: 80 MMHG | RESPIRATION RATE: 16 BRPM | SYSTOLIC BLOOD PRESSURE: 146 MMHG | HEART RATE: 57 BPM | BODY MASS INDEX: 19.64 KG/M2 | WEIGHT: 107.38 LBS

## 2023-12-22 DIAGNOSIS — M81.0 AGE-RELATED OSTEOPOROSIS WITHOUT CURRENT PATHOLOGICAL FRACTURE: Primary | ICD-10-CM

## 2023-12-22 PROCEDURE — 63600175 PHARM REV CODE 636 W HCPCS: Performed by: STUDENT IN AN ORGANIZED HEALTH CARE EDUCATION/TRAINING PROGRAM

## 2023-12-22 PROCEDURE — A4216 STERILE WATER/SALINE, 10 ML: HCPCS | Performed by: STUDENT IN AN ORGANIZED HEALTH CARE EDUCATION/TRAINING PROGRAM

## 2023-12-22 PROCEDURE — 96365 THER/PROPH/DIAG IV INF INIT: CPT

## 2023-12-22 PROCEDURE — 25000003 PHARM REV CODE 250: Performed by: STUDENT IN AN ORGANIZED HEALTH CARE EDUCATION/TRAINING PROGRAM

## 2023-12-22 RX ORDER — ZOLEDRONIC ACID 5 MG/100ML
5 INJECTION, SOLUTION INTRAVENOUS
Status: COMPLETED | OUTPATIENT
Start: 2023-12-22 | End: 2023-12-22

## 2023-12-22 RX ORDER — SODIUM CHLORIDE 0.9 % (FLUSH) 0.9 %
10 SYRINGE (ML) INJECTION
Status: DISCONTINUED | OUTPATIENT
Start: 2023-12-22 | End: 2023-12-22 | Stop reason: HOSPADM

## 2023-12-22 RX ORDER — ZOLEDRONIC ACID 5 MG/100ML
5 INJECTION, SOLUTION INTRAVENOUS
OUTPATIENT
Start: 2023-12-22

## 2023-12-22 RX ORDER — HEPARIN 100 UNIT/ML
500 SYRINGE INTRAVENOUS
OUTPATIENT
Start: 2023-12-22

## 2023-12-22 RX ORDER — ACETAMINOPHEN 325 MG/1
650 TABLET ORAL
Status: COMPLETED | OUTPATIENT
Start: 2023-12-22 | End: 2023-12-22

## 2023-12-22 RX ORDER — SODIUM CHLORIDE 0.9 % (FLUSH) 0.9 %
10 SYRINGE (ML) INJECTION
OUTPATIENT
Start: 2023-12-22

## 2023-12-22 RX ORDER — ACETAMINOPHEN 325 MG/1
650 TABLET ORAL
OUTPATIENT
Start: 2023-12-22

## 2023-12-22 RX ADMIN — ZOLEDRONIC ACID 5 MG: 5 INJECTION, SOLUTION INTRAVENOUS at 10:12

## 2023-12-22 RX ADMIN — Medication 10 ML: at 10:12

## 2023-12-22 RX ADMIN — ACETAMINOPHEN 650 MG: 325 TABLET ORAL at 10:12

## 2023-12-22 NOTE — PLAN OF CARE
Plan of care reviewed with patient. Discussed if there are any new or ongoing concerns. Denies.   Problem: Adult Inpatient Plan of Care  Goal: Plan of Care Review  Outcome: Ongoing, Progressing  Flowsheets (Taken 12/22/2023 1044)  Plan of Care Reviewed With: patient  Goal: Absence of Hospital-Acquired Illness or Injury  Outcome: Ongoing, Progressing  Intervention: Identify and Manage Fall Risk  Flowsheets (Taken 12/22/2023 1044)  Safety Promotion/Fall Prevention: in recliner, wheels locked  Goal: Optimal Comfort and Wellbeing  Outcome: Ongoing, Progressing  Intervention: Provide Person-Centered Care  Flowsheets (Taken 12/22/2023 1044)  Trust Relationship/Rapport:   care explained   questions encouraged   choices provided   reassurance provided   emotional support provided   thoughts/feelings acknowledged   empathic listening provided   questions answered

## 2023-12-22 NOTE — NURSING
Infusion # 1 - Reclast 5 mg q 1 year    Any invasive dental procedures in past 3 months or upcoming 3 months: denies    Recent labs? 12/14/23  Lab Results   Component Value Date    CALCIUM 9.3 12/14/2023     Lab Results   Component Value Date    CREATININE 0.8 12/14/2023     Lab Results   Component Value Date    ESTGFRAFRICA >60.0 08/22/2020     Lab Results   Component Value Date    EGFRNONAA >60.0 08/22/2020     Lab Results   Component Value Date    [id number] 56 11/28/2023         Last Rheumatology provider visit- Seen by Dr. Ferraro on 11/26/23     Premeds? 650 mg Tylenol PO     Reclast 5 mg administered IV at a 15 minute rate per orders; see MAR and vitals for more  details. Tolerated well without adverse events, discharged and ambulatory out of clinic.

## 2024-03-04 ENCOUNTER — OFFICE VISIT (OUTPATIENT)
Dept: DERMATOLOGY | Facility: CLINIC | Age: 64
End: 2024-03-04
Payer: MEDICARE

## 2024-03-04 ENCOUNTER — LAB VISIT (OUTPATIENT)
Dept: LAB | Facility: HOSPITAL | Age: 64
End: 2024-03-04
Attending: PHYSICIAN ASSISTANT
Payer: MEDICARE

## 2024-03-04 DIAGNOSIS — L63.9 ALOPECIA AREATA: ICD-10-CM

## 2024-03-04 DIAGNOSIS — L63.9 ALOPECIA AREATA: Primary | ICD-10-CM

## 2024-03-04 DIAGNOSIS — L80 VITILIGO: ICD-10-CM

## 2024-03-04 PROCEDURE — 86800 THYROGLOBULIN ANTIBODY: CPT | Performed by: PHYSICIAN ASSISTANT

## 2024-03-04 PROCEDURE — 99213 OFFICE O/P EST LOW 20 MIN: CPT | Mod: PBBFAC | Performed by: PHYSICIAN ASSISTANT

## 2024-03-04 PROCEDURE — 36415 COLL VENOUS BLD VENIPUNCTURE: CPT | Performed by: PHYSICIAN ASSISTANT

## 2024-03-04 PROCEDURE — 99999 PR PBB SHADOW E&M-EST. PATIENT-LVL III: CPT | Mod: PBBFAC,,, | Performed by: PHYSICIAN ASSISTANT

## 2024-03-04 PROCEDURE — 99214 OFFICE O/P EST MOD 30 MIN: CPT | Mod: S$PBB,,, | Performed by: PHYSICIAN ASSISTANT

## 2024-03-04 RX ORDER — DESONIDE 0.5 MG/G
CREAM TOPICAL
Qty: 60 G | Refills: 0 | Status: SHIPPED | OUTPATIENT
Start: 2024-03-04

## 2024-03-04 RX ORDER — CLOBETASOL PROPIONATE 0.46 MG/ML
SOLUTION TOPICAL 2 TIMES DAILY
Qty: 50 ML | Refills: 1 | Status: SHIPPED | OUTPATIENT
Start: 2024-03-04 | End: 2024-05-13 | Stop reason: SDUPTHER

## 2024-03-04 NOTE — PROGRESS NOTES
Subjective:      Patient ID:  Ivone Monroy is a 63 y.o. female who presents for   Chief Complaint   Patient presents with    Hair Loss     C/o thinning of hair      History of Present Illness: The patient presents with chief complaint of hair loss.  Location: posterior scalp  Duration: several months (~ 6-7)  Signs/Symptoms: focal patch of thinning first noticed by her , believes the area is getting bigger. +General thinning and shedding x several years. Denies itching, redness, dandruff. Endorses heat styles w/flat iron (~ twice a month x years). History of relaxers in the past (last in 2010).  Denies FHX of hair loss.  Denies surgery, weight loss (> 25 lbs), new meds.     Prior treatments: none      Labs reviewed:   TSH wnl, CBC wnl, and CMP wnl, neil D wnl-  all on 11/1/23    PMHX: gallbladder CA (treated w/ chemo and radiation in 2015, unable to remove gallbladder).         Review of Systems    Objective:   Physical Exam   Constitutional: She appears well-developed and well-nourished. No distress.   Neurological: She is alert and oriented to person, place, and time. She is not disoriented.   Psychiatric: She has a normal mood and affect.   Skin:   Areas Examined (abnormalities noted in diagram):   Scalp / Hair Palpated and Inspected  Head / Face Inspection Performed  Neck Inspection Performed  Chest / Axilla Inspection Performed  Back Inspection Performed  RUE Inspected  LUE Inspection Performed            Diagram Legend     Erythematous scaling macule/papule c/w actinic keratosis       Vascular papule c/w angioma      Pigmented verrucoid papule/plaque c/w seborrheic keratosis      Yellow umbilicated papule c/w sebaceous hyperplasia      Irregularly shaped tan macule c/w lentigo     1-2 mm smooth white papules consistent with Milia      Movable subcutaneous cyst with punctum c/w epidermal inclusion cyst      Subcutaneous movable cyst c/w pilar cyst      Firm pink to brown papule c/w  dermatofibroma      Pedunculated fleshy papule(s) c/w skin tag(s)      Evenly pigmented macule c/w junctional nevus     Mildly variegated pigmented, slightly irregular-bordered macule c/w mildly atypical nevus      Flesh colored to evenly pigmented papule c/w intradermal nevus       Pink pearly papule/plaque c/w basal cell carcinoma      Erythematous hyperkeratotic cursted plaque c/w SCC      Surgical scar with no sign of skin cancer recurrence      Open and closed comedones      Inflammatory papules and pustules      Verrucoid papule consistent consistent with wart     Erythematous eczematous patches and plaques     Dystrophic onycholytic nail with subungual debris c/w onychomycosis     Umbilicated papule    Erythematous-base heme-crusted tan verrucoid plaque consistent with inflamed seborrheic keratosis     Erythematous Silvery Scaling Plaque c/w Psoriasis     See annotation      Assessment / Plan:        Alopecia areata  -     Thyroid Peroxidase Antibody; Future; Expected date: 03/04/2024  -     THYROGLOBULIN AB SCREEN; Future; Expected date: 03/04/2024  -     clobetasoL (TEMOVATE) 0.05 % external solution; Apply topically 2 (two) times daily. PRN alopecia. Strong steroid- do NOT use on face.  Dispense: 50 mL; Refill: 1  Discussed dx, tx options, potential association w/thyroid disease. She declines ILK today. Agree to trail of above rx. May consider otc 5% rogaine qd as desired.    Vitiligo  -     desonide (DESOWEN) 0.05 % cream; AAA qd to bid for vitiligo. May use for up to 4 weeks.  Dispense: 60 g; Refill: 0  Of [place] area. Trial of above rx. Encouraged ambient sun exposures. Check above labs. Discussed association w/thyroid disease. Would consider TCI in future.          Follow up in about 6 weeks (around 4/15/2024) for alopecia areata.

## 2024-03-05 LAB
THYROGLOB AB SERPL IA-ACNC: <4 IU/ML (ref 0–3.9)
THYROPEROXIDASE IGG SERPL-ACNC: <6 IU/ML

## 2024-04-04 ENCOUNTER — PATIENT MESSAGE (OUTPATIENT)
Dept: DERMATOLOGY | Facility: CLINIC | Age: 64
End: 2024-04-04
Payer: MEDICARE

## 2024-05-13 ENCOUNTER — OFFICE VISIT (OUTPATIENT)
Dept: DERMATOLOGY | Facility: CLINIC | Age: 64
End: 2024-05-13
Payer: COMMERCIAL

## 2024-05-13 DIAGNOSIS — L63.9 ALOPECIA AREATA: ICD-10-CM

## 2024-05-13 DIAGNOSIS — L80 VITILIGO: Primary | ICD-10-CM

## 2024-05-13 PROCEDURE — 99999 PR PBB SHADOW E&M-EST. PATIENT-LVL III: CPT | Mod: PBBFAC,,, | Performed by: PHYSICIAN ASSISTANT

## 2024-05-13 PROCEDURE — 1126F AMNT PAIN NOTED NONE PRSNT: CPT | Mod: CPTII,S$GLB,, | Performed by: PHYSICIAN ASSISTANT

## 2024-05-13 PROCEDURE — 1159F MED LIST DOCD IN RCRD: CPT | Mod: CPTII,S$GLB,, | Performed by: PHYSICIAN ASSISTANT

## 2024-05-13 PROCEDURE — 1101F PT FALLS ASSESS-DOCD LE1/YR: CPT | Mod: CPTII,S$GLB,, | Performed by: PHYSICIAN ASSISTANT

## 2024-05-13 PROCEDURE — 1160F RVW MEDS BY RX/DR IN RCRD: CPT | Mod: CPTII,S$GLB,, | Performed by: PHYSICIAN ASSISTANT

## 2024-05-13 PROCEDURE — 3288F FALL RISK ASSESSMENT DOCD: CPT | Mod: CPTII,S$GLB,, | Performed by: PHYSICIAN ASSISTANT

## 2024-05-13 PROCEDURE — 99214 OFFICE O/P EST MOD 30 MIN: CPT | Mod: S$GLB,,, | Performed by: PHYSICIAN ASSISTANT

## 2024-05-13 RX ORDER — PIMECROLIMUS 10 MG/G
CREAM TOPICAL 2 TIMES DAILY
Qty: 30 G | Refills: 2 | Status: SHIPPED | OUTPATIENT
Start: 2024-05-13

## 2024-05-13 RX ORDER — CLOBETASOL PROPIONATE 0.46 MG/ML
SOLUTION TOPICAL 2 TIMES DAILY
Qty: 50 ML | Refills: 1 | Status: SHIPPED | OUTPATIENT
Start: 2024-05-13

## 2024-05-13 NOTE — PROGRESS NOTES
Subjective:      Patient ID:  Ivone Monroy is a 63 y.o. female who presents for   Chief Complaint   Patient presents with    Hair Loss     Follow up, slight improvement      Hx of AA, potential early vitiligo changes of eyebrows/ face, last seen 3/4/24. Trial of clobetasol solution recommended at last visit. She declined ILK.  +Improving. Requesting refill of clobetasol. Denies new patches of hair loss.    For vitiligo, trial of desonide cream recommended. Thyroid antibodies were negative (3/4/24). She notes improvement with rx, but states she is no longer using (stopped at 4 weeks per instructions).             Review of Systems    Objective:   Physical Exam   Constitutional: She appears well-developed and well-nourished. No distress.   Neurological: She is alert and oriented to person, place, and time. She is not disoriented.   Psychiatric: She has a normal mood and affect.   Skin:   Areas Examined (abnormalities noted in diagram):   Head / Face Inspection Performed  Neck Inspection Performed  Chest / Axilla Inspection Performed  Back Inspection Performed  RUE Inspected  LUE Inspection Performed       Diagram Legend     Erythematous scaling macule/papule c/w actinic keratosis       Vascular papule c/w angioma      Pigmented verrucoid papule/plaque c/w seborrheic keratosis      Yellow umbilicated papule c/w sebaceous hyperplasia      Irregularly shaped tan macule c/w lentigo     1-2 mm smooth white papules consistent with Milia      Movable subcutaneous cyst with punctum c/w epidermal inclusion cyst      Subcutaneous movable cyst c/w pilar cyst      Firm pink to brown papule c/w dermatofibroma      Pedunculated fleshy papule(s) c/w skin tag(s)      Evenly pigmented macule c/w junctional nevus     Mildly variegated pigmented, slightly irregular-bordered macule c/w mildly atypical nevus      Flesh colored to evenly pigmented papule c/w intradermal nevus       Pink pearly papule/plaque c/w basal cell  carcinoma      Erythematous hyperkeratotic cursted plaque c/w SCC      Surgical scar with no sign of skin cancer recurrence      Open and closed comedones      Inflammatory papules and pustules      Verrucoid papule consistent consistent with wart     Erythematous eczematous patches and plaques     Dystrophic onycholytic nail with subungual debris c/w onychomycosis     Umbilicated papule    Erythematous-base heme-crusted tan verrucoid plaque consistent with inflamed seborrheic keratosis     Erythematous Silvery Scaling Plaque c/w Psoriasis     See annotation      Assessment / Plan:        Alopecia areata  -     clobetasoL (TEMOVATE) 0.05 % external solution; Apply topically 2 (two) times daily. PRN alopecia. Strong steroid- do NOT use on face.  Dispense: 50 mL; Refill: 1  Improving, agree to refill above rx. Reassurance today. Would reconsider ILK if any worsening or for any new patches.    Vitiligo  -     pimecrolimus (ELIDEL) 1 % cream; Apply topically 2 (two) times daily. PRN vitiligo. Non-steroid. Safe for face (long-term).  Dispense: 30 g; Refill: 2  Stable, agree to above rx. May resume desonide bid for up to 4 more weeks with above rx. Discussed tapering to only non-steroid for longer duration prn.            Follow up in about 3 months (around 8/13/2024).

## 2024-07-08 ENCOUNTER — OFFICE VISIT (OUTPATIENT)
Dept: INTERNAL MEDICINE | Facility: CLINIC | Age: 64
End: 2024-07-08
Payer: MEDICARE

## 2024-07-08 ENCOUNTER — HOSPITAL ENCOUNTER (OUTPATIENT)
Dept: CARDIOLOGY | Facility: HOSPITAL | Age: 64
Discharge: HOME | End: 2024-07-08
Payer: MEDICARE

## 2024-07-08 VITALS
BODY MASS INDEX: 18.54 KG/M2 | SYSTOLIC BLOOD PRESSURE: 145 MMHG | WEIGHT: 100.75 LBS | OXYGEN SATURATION: 95 % | HEART RATE: 76 BPM | TEMPERATURE: 97 F | DIASTOLIC BLOOD PRESSURE: 110 MMHG | HEIGHT: 62 IN

## 2024-07-08 DIAGNOSIS — I16.0 HYPERTENSIVE URGENCY: ICD-10-CM

## 2024-07-08 DIAGNOSIS — K21.9 GASTROESOPHAGEAL REFLUX DISEASE, UNSPECIFIED WHETHER ESOPHAGITIS PRESENT: ICD-10-CM

## 2024-07-08 DIAGNOSIS — M81.0 AGE-RELATED OSTEOPOROSIS WITHOUT CURRENT PATHOLOGICAL FRACTURE: ICD-10-CM

## 2024-07-08 DIAGNOSIS — I16.0 HYPERTENSIVE URGENCY: Primary | ICD-10-CM

## 2024-07-08 DIAGNOSIS — F41.8 SITUATIONAL ANXIETY: ICD-10-CM

## 2024-07-08 LAB
ALBUMIN SERPL BCP-MCNC: 4.3 G/DL (ref 3.5–5.2)
ALP SERPL-CCNC: 63 U/L (ref 55–135)
ALT SERPL W/O P-5'-P-CCNC: 23 U/L (ref 10–44)
ANION GAP SERPL CALC-SCNC: 13 MMOL/L (ref 8–16)
AST SERPL-CCNC: 28 U/L (ref 10–40)
BASOPHILS # BLD AUTO: 0.01 K/UL (ref 0–0.2)
BASOPHILS NFR BLD: 0.2 % (ref 0–1.9)
BILIRUB SERPL-MCNC: 0.6 MG/DL (ref 0.1–1)
BUN SERPL-MCNC: 20 MG/DL (ref 8–23)
CALCIUM SERPL-MCNC: 10.3 MG/DL (ref 8.7–10.5)
CHLORIDE SERPL-SCNC: 97 MMOL/L (ref 95–110)
CO2 SERPL-SCNC: 28 MMOL/L (ref 23–29)
CREAT SERPL-MCNC: 1 MG/DL (ref 0.5–1.4)
DIFFERENTIAL METHOD BLD: NORMAL
EOSINOPHIL # BLD AUTO: 0 K/UL (ref 0–0.5)
EOSINOPHIL NFR BLD: 0.3 % (ref 0–8)
ERYTHROCYTE [DISTWIDTH] IN BLOOD BY AUTOMATED COUNT: 12.5 % (ref 11.5–14.5)
EST. GFR  (NO RACE VARIABLE): >60 ML/MIN/1.73 M^2
GLUCOSE SERPL-MCNC: 87 MG/DL (ref 70–110)
HCT VFR BLD AUTO: 43.6 % (ref 37–48.5)
HGB BLD-MCNC: 14.4 G/DL (ref 12–16)
IMM GRANULOCYTES # BLD AUTO: 0.01 K/UL (ref 0–0.04)
IMM GRANULOCYTES NFR BLD AUTO: 0.2 % (ref 0–0.5)
LYMPHOCYTES # BLD AUTO: 1.8 K/UL (ref 1–4.8)
LYMPHOCYTES NFR BLD: 29.6 % (ref 18–48)
MCH RBC QN AUTO: 30.6 PG (ref 27–31)
MCHC RBC AUTO-ENTMCNC: 33 G/DL (ref 32–36)
MCV RBC AUTO: 93 FL (ref 82–98)
MONOCYTES # BLD AUTO: 0.4 K/UL (ref 0.3–1)
MONOCYTES NFR BLD: 7.1 % (ref 4–15)
NEUTROPHILS # BLD AUTO: 3.8 K/UL (ref 1.8–7.7)
NEUTROPHILS NFR BLD: 62.6 % (ref 38–73)
NRBC BLD-RTO: 0 /100 WBC
OHS QRS DURATION: 72 MS
OHS QTC CALCULATION: 438 MS
PLATELET # BLD AUTO: 269 K/UL (ref 150–450)
PMV BLD AUTO: 10.3 FL (ref 9.2–12.9)
POTASSIUM SERPL-SCNC: 4.3 MMOL/L (ref 3.5–5.1)
PROT SERPL-MCNC: 8.1 G/DL (ref 6–8.4)
RBC # BLD AUTO: 4.7 M/UL (ref 4–5.4)
SODIUM SERPL-SCNC: 138 MMOL/L (ref 136–145)
WBC # BLD AUTO: 6.02 K/UL (ref 3.9–12.7)

## 2024-07-08 PROCEDURE — G2211 COMPLEX E/M VISIT ADD ON: HCPCS | Mod: S$GLB,,, | Performed by: FAMILY MEDICINE

## 2024-07-08 PROCEDURE — 3080F DIAST BP >= 90 MM HG: CPT | Mod: CPTII,S$GLB,, | Performed by: FAMILY MEDICINE

## 2024-07-08 PROCEDURE — 3077F SYST BP >= 140 MM HG: CPT | Mod: CPTII,S$GLB,, | Performed by: FAMILY MEDICINE

## 2024-07-08 PROCEDURE — 85025 COMPLETE CBC W/AUTO DIFF WBC: CPT | Performed by: FAMILY MEDICINE

## 2024-07-08 PROCEDURE — 1160F RVW MEDS BY RX/DR IN RCRD: CPT | Mod: CPTII,S$GLB,, | Performed by: FAMILY MEDICINE

## 2024-07-08 PROCEDURE — 82088 ASSAY OF ALDOSTERONE: CPT | Performed by: FAMILY MEDICINE

## 2024-07-08 PROCEDURE — 93005 ELECTROCARDIOGRAM TRACING: CPT

## 2024-07-08 PROCEDURE — 1126F AMNT PAIN NOTED NONE PRSNT: CPT | Mod: CPTII,S$GLB,, | Performed by: FAMILY MEDICINE

## 2024-07-08 PROCEDURE — 99999 PR PBB SHADOW E&M-EST. PATIENT-LVL III: CPT | Mod: PBBFAC,,, | Performed by: FAMILY MEDICINE

## 2024-07-08 PROCEDURE — 93010 ELECTROCARDIOGRAM REPORT: CPT | Mod: ,,, | Performed by: INTERNAL MEDICINE

## 2024-07-08 PROCEDURE — 84244 ASSAY OF RENIN: CPT | Performed by: FAMILY MEDICINE

## 2024-07-08 PROCEDURE — 3008F BODY MASS INDEX DOCD: CPT | Mod: CPTII,S$GLB,, | Performed by: FAMILY MEDICINE

## 2024-07-08 PROCEDURE — 1159F MED LIST DOCD IN RCRD: CPT | Mod: CPTII,S$GLB,, | Performed by: FAMILY MEDICINE

## 2024-07-08 PROCEDURE — 80053 COMPREHEN METABOLIC PANEL: CPT | Performed by: FAMILY MEDICINE

## 2024-07-08 PROCEDURE — 99214 OFFICE O/P EST MOD 30 MIN: CPT | Mod: S$GLB,,, | Performed by: FAMILY MEDICINE

## 2024-07-08 RX ORDER — AMLODIPINE BESYLATE 10 MG/1
10 TABLET ORAL DAILY
COMMUNITY
Start: 2024-07-06

## 2024-07-08 RX ORDER — HYDROCHLOROTHIAZIDE 12.5 MG/1
12.5 CAPSULE ORAL DAILY
COMMUNITY
Start: 2024-07-06

## 2024-07-08 RX ORDER — LORAZEPAM 0.5 MG/1
0.5 TABLET ORAL EVERY 12 HOURS PRN
Qty: 15 TABLET | Refills: 0 | Status: SHIPPED | OUTPATIENT
Start: 2024-07-08 | End: 2024-08-07

## 2024-07-08 NOTE — PROGRESS NOTES
Subjective:       Patient ID: Ivone Monroy is a 63 y.o. female.    Chief Complaint: Hypertension (Pt present today to address elevated BP; pt states she reported to [facility] ED on 07/05/24 )    63-year-old  female patient with Patient Active Problem List:     GERD (gastroesophageal reflux disease)     History of diverticulitis     History of cancer of gall bladder     Age-related osteoporosis without current pathological fracture  Here for extreme elevation in blood pressures for which she went to [facility] ER on Friday, blood pressures versus in the range of 200/110, for which was started on amlodipine 10 mg and hydrochlorothiazide 12.5 mg, has been taking it regularly but did not take this morning  Denies of any chest tightness or difficulty breathing, tingling or numbness sensation to extremities or headache at this time  Has been anxious about her extreme elevations in blood pressure and requesting medication to be taken as needed      Hypertension  This is a new problem. The current episode started yesterday. The problem has been gradually improving since onset. The problem is resistant. Associated symptoms include anxiety, headaches, malaise/fatigue and neck pain. Pertinent negatives include no blurred vision, chest pain, orthopnea, palpitations, peripheral edema, PND, shortness of breath or sweats. Agents associated with hypertension include NSAIDs. Risk factors for coronary artery disease include family history.     Review of Systems   Constitutional:  Positive for malaise/fatigue. Negative for fatigue.   Eyes:  Negative for blurred vision and visual disturbance.   Respiratory:  Negative for shortness of breath.    Cardiovascular:  Negative for chest pain, palpitations, orthopnea, leg swelling and PND.   Gastrointestinal:  Negative for abdominal pain, nausea and vomiting.   Musculoskeletal:  Positive for neck pain. Negative for myalgias.   Skin:  Negative for rash.  "  Neurological:  Positive for headaches. Negative for weakness, light-headedness and numbness.   Psychiatric/Behavioral:  Negative for dysphoric mood and sleep disturbance. The patient is nervous/anxious.          BP (!) 145/110 (BP Location: Right arm, Patient Position: Sitting, BP Method: Small (Manual))   Pulse 76   Temp 97.2 °F (36.2 °C) (Tympanic)   Ht 5' 2" (1.575 m)   Wt 45.7 kg (100 lb 12 oz)   SpO2 95%   BMI 18.43 kg/m²   Objective:      Physical Exam  Constitutional:       Appearance: She is well-developed.   HENT:      Head: Normocephalic and atraumatic.   Cardiovascular:      Rate and Rhythm: Normal rate and regular rhythm.      Heart sounds: Normal heart sounds. No murmur heard.  Pulmonary:      Effort: Pulmonary effort is normal.      Breath sounds: Normal breath sounds. No wheezing.   Abdominal:      General: Bowel sounds are normal.      Palpations: Abdomen is soft.      Tenderness: There is no abdominal tenderness.   Musculoskeletal:         General: No tenderness.   Skin:     General: Skin is warm and dry.      Findings: No rash.   Neurological:      General: No focal deficit present.      Mental Status: She is alert and oriented to person, place, and time.   Psychiatric:         Mood and Affect: Mood normal.           Assessment/Plan:   1. Hypertensive urgency  -     CBC Auto Differential; Future; Expected date: 07/08/2024  -     Comprehensive Metabolic Panel; Future; Expected date: 07/08/2024  -     EKG 12-lead; Future  -     Aldosterone/Renin Activity Ratio; Future; Expected date: 07/08/2024  Blood pressure extremely elevated, encouraged to take the medications as prescribed and return to the clinic in 3 days as a nurse visit  Will recheck further labs  Patient reports that she had CT scan of the head and labs which was [facility] at [facility]  Encouraged avoid stress  If any worsening symptoms go to ER immediately  Patient verbalized understanding    2. Gastroesophageal reflux " disease, unspecified whether esophagitis present  Overview:  Dr Hartman  Stable on pantoprazole 40 mg daily    3. Age-related osteoporosis without current pathological fracture  Currently taking calcium with vitamin-D supplements    4. Situational anxiety  -     LORazepam (ATIVAN) 0.5 MG tablet; Take 1 tablet (0.5 mg total) by mouth every 12 (twelve) hours as needed for Anxiety.  Dispense: 15 tablet; Refill: 0  Lorazepam prescribed today for symptomatic relief    Visit today included increased complexity associated with the care of the episodic problem  addressed and managing the longitudinal care of the patient due to the serious and/or complex managed problem(s) .

## 2024-07-11 ENCOUNTER — CLINICAL SUPPORT (OUTPATIENT)
Dept: INTERNAL MEDICINE | Facility: CLINIC | Age: 64
End: 2024-07-11
Payer: COMMERCIAL

## 2024-07-11 DIAGNOSIS — I10 PRIMARY HYPERTENSION: Primary | ICD-10-CM

## 2024-07-11 PROCEDURE — 99999 PR PBB SHADOW E&M-EST. PATIENT-LVL II: CPT | Mod: PBBFAC,,,

## 2024-07-12 VITALS — DIASTOLIC BLOOD PRESSURE: 70 MMHG | SYSTOLIC BLOOD PRESSURE: 136 MMHG

## 2024-07-12 LAB
ALDOST SERPL-MCNC: 21.7 NG/DL
ALDOST/RENIN PLAS-RTO: 4.9 RATIO
RENIN PLAS-CCNC: 4.4 NG/ML/HR

## 2024-07-12 NOTE — PROGRESS NOTES
Pt present for blood pressure check. Pt reading stable on today and pt informed to contact the clinic with any elevated readings. Pt voiced understanding./Sindy

## 2024-07-18 DIAGNOSIS — L63.9 ALOPECIA AREATA: ICD-10-CM

## 2024-08-02 ENCOUNTER — PATIENT MESSAGE (OUTPATIENT)
Dept: INTERNAL MEDICINE | Facility: CLINIC | Age: 64
End: 2024-08-02
Payer: MEDICARE

## 2024-08-02 RX ORDER — AMLODIPINE BESYLATE 10 MG/1
10 TABLET ORAL DAILY
Qty: 90 TABLET | Refills: 1 | Status: SHIPPED | OUTPATIENT
Start: 2024-08-02

## 2024-08-02 RX ORDER — HYDROCHLOROTHIAZIDE 12.5 MG/1
12.5 CAPSULE ORAL DAILY
Qty: 90 CAPSULE | Refills: 1 | Status: SHIPPED | OUTPATIENT
Start: 2024-08-02

## 2024-08-02 NOTE — TELEPHONE ENCOUNTER
No care due was identified.  [facility] Embedded Care Due Messages. Reference number: [id number].   8/02/2024 9:35:22 AM CDT

## 2024-08-05 RX ORDER — CLOBETASOL PROPIONATE 0.5 MG/ML
SOLUTION TOPICAL 2 TIMES DAILY
Qty: 50 ML | Refills: 0 | Status: SHIPPED | OUTPATIENT
Start: 2024-08-05

## 2024-08-14 ENCOUNTER — NURSE TRIAGE (OUTPATIENT)
Dept: ADMINISTRATIVE | Facility: CLINIC | Age: 64
End: 2024-08-14
Payer: MEDICARE

## 2024-08-14 NOTE — TELEPHONE ENCOUNTER
Pt reports fatigue, weakness, dizziness, and tingling to bilateral hands. Pt reports tingling has subsided and dizziness has decreased. Denies SOB, palpitations, chest pain, difficulty walking, weakness/numbness/tingling to one side of body, changes in speech. Pt reports she had two episodes of rectal bleeding s/t hemorrhoid on Friday and Saturday and reports her symptoms worsened following these episodes. Pt denies current rectal bleeding. Pt reports current /89 and HR of 75. Care advice to see physician within 4 hours. Pt verbalizes understanding.   Reason for Disposition   [1] MODERATE dizziness (e.g., interferes with normal activities) AND [2] has NOT been evaluated by doctor (or NP/PA) for this  (Exception: Dizziness caused by heat exposure, sudden standing, or poor fluid intake.)   [1] MODERATE weakness (i.e., interferes with work, school, normal activities) AND [2] cause unknown  (Exceptions: Weakness from acute minor illness or poor fluid intake; weakness is chronic and not worse.)    Additional Information   Negative: SEVERE difficulty breathing (e.g., struggling for each breath, speaks in single words)   Negative: [1] Difficulty breathing or swallowing AND [2] started suddenly after medicine, an allergic food or bee sting   Negative: Shock suspected (e.g., cold/pale/clammy skin, too weak to stand, low BP, rapid pulse)   Negative: Difficult to awaken or acting confused (e.g., disoriented, slurred speech)   Negative: [1] Weakness (i.e., paralysis, loss of muscle strength) of the face, arm or leg on one side of the body AND [2] sudden onset AND [3] present now   Negative: [1] Numbness (i.e., loss of sensation) of the face, arm or leg on one side of the body AND [2] sudden onset AND [3] present now   Negative: [1] Loss of speech or garbled speech AND [2] sudden onset AND [3] present now   Negative: Overdose (accidental or intentional) of medications   Negative: [1] Fainted > 15 minutes ago AND [2] still  "feels too weak or dizzy to stand   Negative: Heart beating < 50 beats per minute OR > 140 beats per minute   Negative: Sounds like a life-threatening emergency to the triager   Negative: Difficulty breathing   Negative: SEVERE dizziness (e.g., unable to stand, requires support to walk, feels like passing out now)   Negative: Extra heartbeats, irregular heart beating, or heart is beating very fast  (i.e., "palpitations")   Negative: [1] Drinking very little AND [2] dehydration suspected (e.g., no urine > 12 hours, very dry mouth, very lightheaded)   Negative: [1] Weakness (i.e., paralysis, loss of muscle strength) of the face, arm / hand, or leg / foot on one side of the body AND [2] sudden onset AND [3] brief (now gone)   Negative: [1] Numbness (i.e., loss of sensation) of the face, arm / hand, or leg / foot on one side of the body AND [2] sudden onset AND [3] brief (now gone)   Negative: [1] Loss of speech or garbled speech AND [2] sudden onset AND [3] brief (now gone)   Negative: Loss of vision or double vision  (Exception: Similar to previous migraines.)   Negative: Patient sounds very sick or weak to the triager   Negative: [1] Dizziness caused by heat exposure, sudden standing, or poor fluid intake AND [2] no improvement after 2 hours of rest and fluids   Negative: [1] Fever > 103 F (39.4 C) AND [2] not able to get the fever down using Fever Care Advice   Negative: [1] Fever > 101 F (38.3 C) AND [2] age > 60 years   Negative: [1] Fever > 100 F (37.8 C) AND [2] bedridden (e.g., CVA, chronic illness, recovering from surgery)   Negative: [1] Fever > 100 F (37.8 C) AND [2] diabetes mellitus or weak immune system (e.g., HIV positive, cancer chemo, splenectomy, organ transplant, chronic steroids)   Negative: SEVERE difficulty breathing (e.g., struggling for each breath, speaks in single words)   Negative: Shock suspected (e.g., cold/pale/clammy skin, too weak to stand, low BP, rapid pulse)   Negative: Difficult to " "awaken or acting confused (e.g., disoriented, slurred speech)   Negative: [1] Fainted > 15 minutes ago AND [2] still feels too weak or dizzy to stand   Negative: [1] SEVERE weakness (i.e., unable to walk or barely able to walk, requires support) AND [2] new-onset or getting worse   Negative: Sounds like a life-threatening emergency to the triager   Negative: Difficulty breathing   Negative: Heart beating < 50 beats per minute OR > 140 beats per minute   Negative: Extra heartbeats, irregular heart beating, or heart is beating very fast  (i.e., "palpitations")   Negative: Follows large amount of bleeding (e.g., from vomiting, rectum, vagina  (Exception: Small brief weakness from sight of a small amount blood.)   Negative: Black or tarry bowel movements   Negative: [1] Drinking very little AND [2] dehydration suspected (e.g., no urine > 12 hours, very dry mouth, very lightheaded)   Negative: Patient sounds very sick or weak to the triager    Protocols used: Dizziness - [id number], Weakness (Generalized) and Fatigue-A-AH    "

## 2024-08-16 ENCOUNTER — PATIENT MESSAGE (OUTPATIENT)
Dept: INTERNAL MEDICINE | Facility: CLINIC | Age: 64
End: 2024-08-16

## 2024-08-16 ENCOUNTER — LAB VISIT (OUTPATIENT)
Dept: LAB | Facility: HOSPITAL | Age: 64
End: 2024-08-16
Attending: FAMILY MEDICINE
Payer: MEDICARE

## 2024-08-16 ENCOUNTER — OFFICE VISIT (OUTPATIENT)
Dept: INTERNAL MEDICINE | Facility: CLINIC | Age: 64
End: 2024-08-16
Payer: COMMERCIAL

## 2024-08-16 DIAGNOSIS — R20.2 NUMBNESS AND TINGLING IN BOTH HANDS: ICD-10-CM

## 2024-08-16 DIAGNOSIS — I10 PRIMARY HYPERTENSION: Primary | ICD-10-CM

## 2024-08-16 DIAGNOSIS — I10 PRIMARY HYPERTENSION: ICD-10-CM

## 2024-08-16 DIAGNOSIS — R20.0 NUMBNESS AND TINGLING IN BOTH HANDS: ICD-10-CM

## 2024-08-16 LAB
ANION GAP SERPL CALC-SCNC: 10 MMOL/L (ref 8–16)
BASOPHILS # BLD AUTO: 0.02 K/UL (ref 0–0.2)
BASOPHILS NFR BLD: 0.2 % (ref 0–1.9)
BUN SERPL-MCNC: 13 MG/DL (ref 8–23)
CALCIUM SERPL-MCNC: 10 MG/DL (ref 8.7–10.5)
CHLORIDE SERPL-SCNC: 97 MMOL/L (ref 95–110)
CO2 SERPL-SCNC: 28 MMOL/L (ref 23–29)
CREAT SERPL-MCNC: 0.8 MG/DL (ref 0.5–1.4)
DIFFERENTIAL METHOD BLD: ABNORMAL
EOSINOPHIL # BLD AUTO: 0 K/UL (ref 0–0.5)
EOSINOPHIL NFR BLD: 0.2 % (ref 0–8)
ERYTHROCYTE [DISTWIDTH] IN BLOOD BY AUTOMATED COUNT: 12.1 % (ref 11.5–14.5)
EST. GFR  (NO RACE VARIABLE): >60 ML/MIN/1.73 M^2
GLUCOSE SERPL-MCNC: 87 MG/DL (ref 70–110)
HCT VFR BLD AUTO: 36 % (ref 37–48.5)
HGB BLD-MCNC: 11.6 G/DL (ref 12–16)
IMM GRANULOCYTES # BLD AUTO: 0.02 K/UL (ref 0–0.04)
IMM GRANULOCYTES NFR BLD AUTO: 0.2 % (ref 0–0.5)
LYMPHOCYTES # BLD AUTO: 1.4 K/UL (ref 1–4.8)
LYMPHOCYTES NFR BLD: 16.7 % (ref 18–48)
MAGNESIUM SERPL-MCNC: 2 MG/DL (ref 1.6–2.6)
MCH RBC QN AUTO: 30.8 PG (ref 27–31)
MCHC RBC AUTO-ENTMCNC: 32.2 G/DL (ref 32–36)
MCV RBC AUTO: 96 FL (ref 82–98)
MONOCYTES # BLD AUTO: 0.5 K/UL (ref 0.3–1)
MONOCYTES NFR BLD: 6.2 % (ref 4–15)
NEUTROPHILS # BLD AUTO: 6.3 K/UL (ref 1.8–7.7)
NEUTROPHILS NFR BLD: 76.5 % (ref 38–73)
NRBC BLD-RTO: 0 /100 WBC
PLATELET # BLD AUTO: 245 K/UL (ref 150–450)
PMV BLD AUTO: 10.4 FL (ref 9.2–12.9)
POTASSIUM SERPL-SCNC: 3.6 MMOL/L (ref 3.5–5.1)
RBC # BLD AUTO: 3.77 M/UL (ref 4–5.4)
SODIUM SERPL-SCNC: 135 MMOL/L (ref 136–145)
TSH SERPL DL<=0.005 MIU/L-ACNC: 1.91 UIU/ML (ref 0.4–4)
WBC # BLD AUTO: 8.27 K/UL (ref 3.9–12.7)

## 2024-08-16 PROCEDURE — 83735 ASSAY OF MAGNESIUM: CPT | Performed by: FAMILY MEDICINE

## 2024-08-16 PROCEDURE — 82607 VITAMIN B-12: CPT | Performed by: FAMILY MEDICINE

## 2024-08-16 PROCEDURE — 80048 BASIC METABOLIC PNL TOTAL CA: CPT | Performed by: FAMILY MEDICINE

## 2024-08-16 PROCEDURE — 85025 COMPLETE CBC W/AUTO DIFF WBC: CPT | Performed by: FAMILY MEDICINE

## 2024-08-16 PROCEDURE — 36415 COLL VENOUS BLD VENIPUNCTURE: CPT | Performed by: FAMILY MEDICINE

## 2024-08-16 PROCEDURE — 84443 ASSAY THYROID STIM HORMONE: CPT | Performed by: FAMILY MEDICINE

## 2024-08-16 NOTE — TELEPHONE ENCOUNTER
contacted and informed of  instructions to schedule an appointment to f/u on c/o fatigue,  dizziness, and tingling to bilateral hands. Next available appointment is 10/31/24. Pt informed that she would be contacted by   to schedule a sooner appointment. Pt verbalized understanding

## 2024-08-16 NOTE — PROGRESS NOTES
The patient location is: Home  The chief complaint leading to consultation is:  Tingling and numbness sensation to extremities    Visit type: audiovisual    Face to Face time with patient:   15 minutes of total time spent on the encounter, which includes face to face time and non-face to face time preparing to see the patient (eg, review of tests), Obtaining and/or reviewing separately obtained history, Documenting clinical information in the electronic or other health record, Independently interpreting results (not separately reported) and communicating results to the patient/family/caregiver, or Care coordination (not separately reported).         Each patient to whom he or she provides medical services by telemedicine is:  (1) informed of the relationship between the physician and patient and the respective role of any other health care provider with respect to management of the patient; and (2) notified that he or she may decline to receive medical services by telemedicine and may withdraw from such care at any time.    Notes:      Subjective:       Patient ID: Ivone Monroy is a 64 y.o. female.    Chief Complaint: No chief complaint on file.    64-year-old  female patient with Patient Active Problem List:     GERD (gastroesophageal reflux disease)     History of diverticulitis     History of cancer of gall bladder     Age-related osteoporosis without current pathological fracture     Primary hypertension  Here reports that she has been feeling tired and tingling and numbness sensation to hands off and on lately, patient has been taking her blood pressure medication regularly and did not know whether this is due to side effect of the blood pressure medication  Denies of any chest tightness or difficulty breathing with palpitations    Hypertension  The current episode started more than 1 month ago. The problem has been gradually improving since onset. The problem is resistant. Associated  symptoms include anxiety and malaise/fatigue. Pertinent negatives include no blurred vision, chest pain, headaches, neck pain, orthopnea, palpitations, peripheral edema, PND, shortness of breath or sweats. There are no associated agents to hypertension. Risk factors for coronary artery disease include family history. Past treatments include nothing. The current treatment provides mild improvement. Compliance problems include exercise.      Review of Systems   Constitutional:  Positive for fatigue and malaise/fatigue.   Eyes:  Negative for blurred vision.   Respiratory:  Negative for chest tightness and shortness of breath.    Cardiovascular:  Negative for chest pain, palpitations, orthopnea and PND.   Musculoskeletal:  Negative for neck pain.   Neurological:  Positive for numbness. Negative for weakness and headaches.         There were no vitals taken for this visit.  Objective:      Physical Exam  Pulmonary:      Effort: No respiratory distress.   Neurological:      Mental Status: She is alert and oriented to person, place, and time.   Psychiatric:         Mood and Affect: Mood normal.           Assessment/Plan:   1. Primary hypertension  -     Basic Metabolic Panel; Future; Expected date: 08/16/2024  -     TSH; Future; Expected date: 08/16/2024  -     Magnesium; Future; Expected date: 08/16/2024  Patient took blood pressure during the virtual visit and was 161/92  Advised to increase hydrochlorothiazide from 12.5-25 mg daily, and will plan further labs  Patient to let us know if blood pressure remains stable, if remains elevated beyond 140/90 let us know      2. Numbness and tingling in both hands  -     Basic Metabolic Panel; Future; Expected date: 08/16/2024  -     Vitamin B12; Future; Expected date: 08/16/2024  -     Magnesium; Future; Expected date: 08/16/2024  -     CBC Auto Differential; Future; Expected date: 08/16/2024  Will check further labs, could be secondary to elevated blood pressure as well  If  symptoms continue to persist patient to let us know  Encouraged to drink adequate fluids    Patient verbalized understanding

## 2024-08-17 LAB — VIT B12 SERPL-MCNC: 959 PG/ML (ref 210–950)

## 2024-08-19 DIAGNOSIS — D64.9 ANEMIA, UNSPECIFIED TYPE: Primary | ICD-10-CM

## 2024-08-21 ENCOUNTER — PATIENT MESSAGE (OUTPATIENT)
Dept: INTERNAL MEDICINE | Facility: CLINIC | Age: 64
End: 2024-08-21
Payer: MEDICARE

## 2024-08-21 DIAGNOSIS — F41.8 SITUATIONAL ANXIETY: ICD-10-CM

## 2024-08-21 NOTE — TELEPHONE ENCOUNTER
No care due was identified.  [facility] Embedded Care Due Messages. Reference number: [id number].   8/21/2024 5:56:30 PM CDT

## 2024-08-22 RX ORDER — LORAZEPAM 0.5 MG/1
0.5 TABLET ORAL EVERY 12 HOURS PRN
Qty: 15 TABLET | Refills: 0 | Status: SHIPPED | OUTPATIENT
Start: 2024-08-22 | End: 2024-09-21

## 2024-08-26 ENCOUNTER — E-VISIT (OUTPATIENT)
Dept: INTERNAL MEDICINE | Facility: CLINIC | Age: 64
End: 2024-08-26
Payer: COMMERCIAL

## 2024-08-26 ENCOUNTER — PATIENT MESSAGE (OUTPATIENT)
Dept: INTERNAL MEDICINE | Facility: CLINIC | Age: 64
End: 2024-08-26
Payer: MEDICARE

## 2024-08-26 DIAGNOSIS — L30.9 DERMATITIS: Primary | ICD-10-CM

## 2024-08-26 RX ORDER — METHYLPREDNISOLONE 4 MG/1
TABLET ORAL
Qty: 1 EACH | Refills: 0 | Status: SHIPPED | OUTPATIENT
Start: 2024-08-26

## 2024-08-26 NOTE — PROGRESS NOTES
Patient ID: Ivone Monroy is a 64 y.o. female.    Chief Complaint: Rash (Entered automatically based on patient selection in Done..)    The patient initiated a request through Done. on 8/26/2024 for evaluation and management with a chief complaint of Rash (Entered automatically based on patient selection in Done..)     I evaluated the questionnaire submission on 8/26/24.    Ohs Peq Evisit Rash    8/26/2024 10:20 AM CDT - Filed by Patient   Do you agree to participate in an E-Visit? Yes   If you have any of the following symptoms, please present to your local emergency room or call 911:  I acknowledge   Choose the state of your primary residence [place]   What is the main issue you would like addressed today? Rash   How would you describe your skin problem? Rash   When did your symptoms first appear? 8/22/2024   Where is it located?  Neck;  Chest;  Back;  Arm(s)   Does it itch? No   Does it hurt? No   Is there discharge or drainage? No   Is there bleeding? No   Describe the character Raised   Describe the color Red   Has it changed over time? No change   Frequency of skin problem Fluctuates at random   Duration of the skin problem (how long does it stay when it is present) Days   I have had a new exposure to Medications   I have had a new exposure to Medications   What have you used to treat the skin problem? Benadryl Cream   If you have used anything for treatment, has it helped the symptoms? Yes   Other generalized symptoms that you associate with the rash No other symptoms   Provide any additional information you feel is important. The bumps are raised on my check, arms, neck, and back and some are red.  They are red on my legs.   At least one photo is required for treatment to be provided. You can upload a maximum of three photos of the affected area.     Are you able to take your vital signs? No         Encounter Diagnosis   Name Primary?    Dermatitis Yes        No orders of the defined types  were placed in this encounter.     Medications Ordered This Encounter   Medications    methylPREDNISolone (MEDROL DOSEPACK) 4 mg tablet     Sig: use as directed     Dispense:  1 each     Refill:  0        No follow-ups on file.      E-Visit Time Trackin minutes

## 2024-09-01 ENCOUNTER — PATIENT MESSAGE (OUTPATIENT)
Dept: INTERNAL MEDICINE | Facility: CLINIC | Age: 64
End: 2024-09-01
Payer: MEDICARE

## 2024-09-01 DIAGNOSIS — I10 PRIMARY HYPERTENSION: Primary | ICD-10-CM

## 2024-09-02 ENCOUNTER — OFFICE VISIT (OUTPATIENT)
Dept: URGENT CARE | Facility: CLINIC | Age: 64
End: 2024-09-02
Payer: COMMERCIAL

## 2024-09-02 ENCOUNTER — TELEPHONE (OUTPATIENT)
Dept: URGENT CARE | Facility: CLINIC | Age: 64
End: 2024-09-02

## 2024-09-02 VITALS
BODY MASS INDEX: 19.4 KG/M2 | DIASTOLIC BLOOD PRESSURE: 85 MMHG | RESPIRATION RATE: 16 BRPM | WEIGHT: 102.75 LBS | OXYGEN SATURATION: 100 % | HEART RATE: 69 BPM | HEIGHT: 61 IN | SYSTOLIC BLOOD PRESSURE: 132 MMHG | TEMPERATURE: 96 F

## 2024-09-02 DIAGNOSIS — T78.40XA ALLERGIC REACTION, INITIAL ENCOUNTER: Primary | ICD-10-CM

## 2024-09-02 DIAGNOSIS — R21 RASH: ICD-10-CM

## 2024-09-02 DIAGNOSIS — I10 PRIMARY HYPERTENSION: ICD-10-CM

## 2024-09-02 DIAGNOSIS — F41.9 ANXIETY: ICD-10-CM

## 2024-09-02 PROCEDURE — 99213 OFFICE O/P EST LOW 20 MIN: CPT | Mod: S$GLB,,, | Performed by: NURSE PRACTITIONER

## 2024-09-02 RX ORDER — TRIAMCINOLONE ACETONIDE 1 MG/G
OINTMENT TOPICAL 2 TIMES DAILY
Qty: 30 G | Refills: 1 | Status: SHIPPED | OUTPATIENT
Start: 2024-09-02 | End: 2024-09-16

## 2024-09-02 RX ORDER — PREDNISONE 20 MG/1
20 TABLET ORAL DAILY
Qty: 5 TABLET | Refills: 0 | Status: SHIPPED | OUTPATIENT
Start: 2024-09-02 | End: 2024-09-07

## 2024-09-02 RX ORDER — FAMOTIDINE 40 MG/1
40 TABLET, FILM COATED ORAL DAILY
Qty: 3 TABLET | Refills: 0 | Status: SHIPPED | OUTPATIENT
Start: 2024-09-02 | End: 2024-09-05

## 2024-09-02 NOTE — PROGRESS NOTES
"Subjective:      Patient ID: Ivone Monroy is a 64 y.o. female.    Vitals:  height is 5' 0.63" (1.54 m) and weight is 46.6 kg (102 lb 11.8 oz). Her tympanic temperature is 96.3 °F (35.7 °C). Her blood pressure is 132/85 and her pulse is 69. Her respiration is 16 and oxygen saturation is 100%.     Chief Complaint: Rash (This morning her BP was 134/80 she took it a second time 141/80 she typically takes her BP on the right arm.)    Ivone Monroy is a 64 y.o. year old female whom presents to urgent care for evaluation of rash that has been present for approximately 12 days. Patient initially assumed that the rash was caused by an increase in her iron supplements. Now patient is attributing her symptoms to her BP medication. Her PCP's office is closed today in observance of labor day. Symptoms include high anxiety, dizzy, right side jaw soreness, small raised bumps with petechia, patient reports a tingling quality, and slight itching. Patient reports stiffness on right side of neck. Patient reports these symptoms come in waves and are not present at this time. Pateint reports she completed a medrol dose pack and noticed the rash was improving while taking the medrol dose pack but as soon as she stopped taking the medrol dose pack the rash worsened.        Rash  This is a new problem. The current episode started 1 to 4 weeks ago (About 12 days). The problem has been waxing and waning since onset. The rash is diffuse. She was exposed to a new medication. Pertinent negatives include no cough, diarrhea, shortness of breath, sore throat or vomiting. (Denies burning sensation, nausea) Past treatments include anti-itch cream (benedryl cream and lorazapam). Improvement on treatment: Temporary. There is no history of allergies, asthma, eczema or varicella.       HENT:  Negative for sore throat.    Respiratory:  Negative for cough and shortness of breath.    Gastrointestinal:  Negative for vomiting and diarrhea. "   Skin:  Positive for rash.      Objective:     Physical Exam   Constitutional: She is oriented to person, place, and time. She appears well-developed. She is cooperative.   HENT:   Head: Normocephalic and atraumatic.   Ears:   Right Ear: Hearing, tympanic membrane, external ear and ear canal normal.   Left Ear: Hearing, tympanic membrane, external ear and ear canal normal.   Nose: Nose normal. No mucosal edema or nasal deformity. No epistaxis. Right sinus exhibits no maxillary sinus tenderness and no frontal sinus tenderness. Left sinus exhibits no maxillary sinus tenderness and no frontal sinus tenderness.   Mouth/Throat: Uvula is midline, oropharynx is clear and moist and mucous membranes are normal. No trismus in the jaw. Normal dentition. No uvula swelling.   Eyes: Conjunctivae and lids are normal.   Neck: Trachea normal and phonation normal. Neck supple.   Cardiovascular: Normal rate, regular rhythm, normal heart sounds and normal pulses.   Pulmonary/Chest: Effort normal and breath sounds normal.   Abdominal: Normal appearance and bowel sounds are normal. Soft.   Musculoskeletal: Normal range of motion.         General: Normal range of motion.   Neurological: She is alert and oriented to person, place, and time. She exhibits normal muscle tone.   Skin: Skin is warm, dry, intact and rash.   Psychiatric: Her speech is normal and behavior is normal. Judgment and thought content normal.   Nursing note and vitals reviewed.      Assessment:     1. Allergic reaction, initial encounter    2. Rash    3. Anxiety    4. Primary hypertension        Plan:       Allergic reaction, initial encounter  -     predniSONE (DELTASONE) 20 MG tablet; Take 1 tablet (20 mg total) by mouth once daily. for 5 days  Dispense: 5 tablet; Refill: 0  -     famotidine (PEPCID) 40 MG tablet; Take 1 tablet (40 mg total) by mouth once daily. for 3 days  Dispense: 3 tablet; Refill: 0  -     triamcinolone acetonide 0.1% (KENALOG) 0.1 % ointment;  Apply topically 2 (two) times daily. for 14 days  Dispense: 30 g; Refill: 1    Rash  -     predniSONE (DELTASONE) 20 MG tablet; Take 1 tablet (20 mg total) by mouth once daily. for 5 days  Dispense: 5 tablet; Refill: 0  -     triamcinolone acetonide 0.1% (KENALOG) 0.1 % ointment; Apply topically 2 (two) times daily. for 14 days  Dispense: 30 g; Refill: 1    Anxiety    Primary hypertension          Medical Decision Making:   Urgent Care Management:  Previous encounters and labs were independently reviewed. Discussed with patient  all pertinent information and results. Discussed patient diagnosis and plan of treatment. Additional plan of care as outlined above. Patient  was given all follow up and return instructions. All questions and concerns were addressed at this time. Patient  expresses understanding of information and instructions, and is comfortable with plan.    Patient was instructed to follow up with his Primary Care Provider as scheduled or go to ED immediately for any worsening or change in current symptoms.  Treatment plan as well as options and alternatives reviewed and discussed with patient. All of the patients questions and concerns were addressed.The patient verbalized understanding and agrees with the discussed plan of care. Patient remained stable and was discharged in no acute distress.                 Patient Instructions   Please drink plenty of fluids. Please get plenty of rest.  Please return here or go to the Emergency Department for any concerns or worsening of condition.  Please take over the counter Pepcid or Zantac as directed for the next 24-72hours as needed.  Monitor your blood pressure if greater than 140/90 take your blood pressure medication as prescribed. You may discuss further alterations of your blood pressure medication with your PCP at your upcoming visit.   Do not use steroids more than 3 times per year.   If you have diabetes, please check you blood sugar frequently.  If you  have high blood pressure, please check your blood pressure frequently.      ORAL STEROIDS   A short course of prednisone or methylprednisolone will almost certainly make you feel better. Steroids boast your energy level, alleviate pain and nausea, block allergies, reduce swelling, shrink nasal polyps, alleviate asthma, and can even restore hearing in some patients with sudden deafness. However, steroids must be used with caution, because they can have significant addictive potential and cause serious side effects - especially with long-term use. Steroids may increase your blood sugar and blood pressure, these will regulate after stopping the steroid.           If you have a localized reaction it is ok to apply OTC  topical creams (e.g. Cortaid) as directed to the affected area. You can also use a cool compress to reduce itching.     Please take Claritin or Zyrtec or Allegra (24 hours) twice a day.  You can add Benadryl or Hydroxyzine as needed for itching, however these may make you drowsy, so do not  drive or operate heavy equipment or machinery while taking these medications.      In the future make sure to keep benadryl with you or an Epi-pen if you were prescribed one.

## 2024-09-02 NOTE — TELEPHONE ENCOUNTER
09/02/2024 07:02 PM CDT by Eliana Neves MA   Patient called back, confirmed 2x patient identifiers, and told her that the prescription is sent the pharmacy.    09/02/2024 06:44 PM CDT by Eliana Neves MA   Called patient, patient answered, confirmed 2x identifiers, and asked if she had any issues at the pharmacy. Patient states she received 2 oral medications, but wanted to know if the provider would send in a cream. I told her that the provider is in a room and I would follow-up as soon as possible.      ----- Message from Mattie Islas sent at 9/2/2024  6:35 PM CDT -----  Contact: [phone]  Patient is calling about a prescription.

## 2024-09-02 NOTE — PATIENT INSTRUCTIONS
Please drink plenty of fluids. Please get plenty of rest.  Please return here or go to the Emergency Department for any concerns or worsening of condition.  Please take over the counter Pepcid or Zantac as directed for the next 24-72hours as needed.  Monitor your blood pressure if greater than 140/90 take your blood pressure medication as prescribed. You may discuss further alterations of your blood pressure medication with your PCP at your upcoming visit.   Do not use steroids more than 3 times per year.   If you have diabetes, please check you blood sugar frequently.  If you have high blood pressure, please check your blood pressure frequently.      ORAL STEROIDS   A short course of prednisone or methylprednisolone will almost certainly make you feel better. Steroids boast your energy level, alleviate pain and nausea, block allergies, reduce swelling, shrink nasal polyps, alleviate asthma, and can even restore hearing in some patients with sudden deafness. However, steroids must be used with caution, because they can have significant addictive potential and cause serious side effects - especially with long-term use. Steroids may increase your blood sugar and blood pressure, these will regulate after stopping the steroid.           If you have a localized reaction it is ok to apply OTC  topical creams (e.g. Cortaid) as directed to the affected area. You can also use a cool compress to reduce itching.     Please take Claritin or Zyrtec or Allegra (24 hours) twice a day.  You can add Benadryl or Hydroxyzine as needed for itching, however these may make you drowsy, so do not  drive or operate heavy equipment or machinery while taking these medications.      In the future make sure to keep benadryl with you or an Epi-pen if you were prescribed one.

## 2024-09-04 ENCOUNTER — OFFICE VISIT (OUTPATIENT)
Dept: INTERNAL MEDICINE | Facility: CLINIC | Age: 64
End: 2024-09-04
Payer: COMMERCIAL

## 2024-09-04 VITALS
WEIGHT: 102.06 LBS | BODY MASS INDEX: 19.27 KG/M2 | HEIGHT: 61 IN | RESPIRATION RATE: 18 BRPM | DIASTOLIC BLOOD PRESSURE: 60 MMHG | HEART RATE: 87 BPM | SYSTOLIC BLOOD PRESSURE: 120 MMHG | OXYGEN SATURATION: 99 %

## 2024-09-04 DIAGNOSIS — I10 PRIMARY HYPERTENSION: Primary | ICD-10-CM

## 2024-09-04 DIAGNOSIS — L30.9 DERMATITIS: ICD-10-CM

## 2024-09-04 PROCEDURE — 3008F BODY MASS INDEX DOCD: CPT | Mod: CPTII,S$GLB,, | Performed by: FAMILY MEDICINE

## 2024-09-04 PROCEDURE — 3288F FALL RISK ASSESSMENT DOCD: CPT | Mod: CPTII,S$GLB,, | Performed by: FAMILY MEDICINE

## 2024-09-04 PROCEDURE — G2211 COMPLEX E/M VISIT ADD ON: HCPCS | Mod: S$GLB,,, | Performed by: FAMILY MEDICINE

## 2024-09-04 PROCEDURE — 3078F DIAST BP <80 MM HG: CPT | Mod: CPTII,S$GLB,, | Performed by: FAMILY MEDICINE

## 2024-09-04 PROCEDURE — 99999 PR PBB SHADOW E&M-EST. PATIENT-LVL IV: CPT | Mod: PBBFAC,,, | Performed by: FAMILY MEDICINE

## 2024-09-04 PROCEDURE — 99213 OFFICE O/P EST LOW 20 MIN: CPT | Mod: S$GLB,,, | Performed by: FAMILY MEDICINE

## 2024-09-04 PROCEDURE — 1160F RVW MEDS BY RX/DR IN RCRD: CPT | Mod: CPTII,S$GLB,, | Performed by: FAMILY MEDICINE

## 2024-09-04 PROCEDURE — 1159F MED LIST DOCD IN RCRD: CPT | Mod: CPTII,S$GLB,, | Performed by: FAMILY MEDICINE

## 2024-09-04 PROCEDURE — 1101F PT FALLS ASSESS-DOCD LE1/YR: CPT | Mod: CPTII,S$GLB,, | Performed by: FAMILY MEDICINE

## 2024-09-04 PROCEDURE — 3074F SYST BP LT 130 MM HG: CPT | Mod: CPTII,S$GLB,, | Performed by: FAMILY MEDICINE

## 2024-09-04 NOTE — PROGRESS NOTES
Subjective:       Patient ID: Ivone Monroy is a 64 y.o. female.    Chief Complaint: Rash (Chest/Arms/Back/Neck)    64-year-old  female patient with Patient Active Problem List:     GERD (gastroesophageal reflux disease)     History of diverticulitis     History of cancer of gall bladder     Age-related osteoporosis without current pathological fracture     Primary hypertension  Here for follow-up on blood pressure and rash  Patient reports that she started having rash since increasing hydrochlorothiazide from 12.5 mg to 25 mg, and reports that she started taking iron supplements as will secondary to mild anemia  Rashes been gradually improving since taking prednisone, and using steroid cream  Denies of any chest tightness or difficulty breathing with palpitations    Hypertension  This is a recurrent problem. The current episode started more than 1 month ago. The problem has been gradually improving since onset. The problem is resistant. Associated symptoms include anxiety. Pertinent negatives include no chest pain, headaches or shortness of breath. Agents associated with hypertension include steroids. Risk factors for coronary artery disease include family history. Past treatments include nothing. The current treatment provides moderate improvement. Compliance problems include medication side effects.    Rash  Pertinent negatives include no fatigue, shortness of breath or vomiting.     Review of Systems   Constitutional:  Negative for fatigue.   Eyes:  Negative for visual disturbance.   Respiratory:  Negative for shortness of breath.    Cardiovascular:  Negative for chest pain and leg swelling.   Gastrointestinal:  Negative for abdominal pain, nausea and vomiting.   Musculoskeletal:  Negative for myalgias.   Skin:  Positive for rash.   Neurological:  Negative for light-headedness and headaches.   Psychiatric/Behavioral:  Negative for sleep disturbance.          /60 (BP Location: Right  "arm, Patient Position: Sitting, BP Method: Medium (Manual))   Pulse 87   Resp 18   Ht 5' 0.63" (1.54 m)   Wt 46.3 kg (102 lb 1.2 oz)   SpO2 99%   BMI 19.52 kg/m²   Objective:      Physical Exam  Constitutional:       Appearance: She is well-developed.   HENT:      Head: Normocephalic and atraumatic.   Cardiovascular:      Rate and Rhythm: Normal rate and regular rhythm.      Heart sounds: Normal heart sounds. No murmur heard.  Pulmonary:      Effort: Pulmonary effort is normal.      Breath sounds: Normal breath sounds. No wheezing.   Abdominal:      General: Bowel sounds are normal.      Palpations: Abdomen is soft.      Tenderness: There is no abdominal tenderness.   Skin:     General: Skin is warm and dry.      Findings: Erythema and rash present.      Comments: Fine scattered erythematous rash noted to upper extremities   Neurological:      Mental Status: She is alert and oriented to person, place, and time.   Psychiatric:         Mood and Affect: Mood normal.           Assessment/Plan:   1. Primary hypertension  Blood pressure is stable since taking amlodipine 10 mg and discontinued hydrochlorothiazide completely    2. Dermatitis  Could be medication induced, patient was also advised to hold off on iron supplements once daily  Continue prednisone and desonide cream as prescribed  If ongoing rash let us know  Avoid scented soaps and lotions    Visit today included increased complexity associated with the care of the episodic problem  addressed and managing the longitudinal care of the patient due to the serious and/or complex managed problem(s) .              "

## 2024-09-05 ENCOUNTER — OFFICE VISIT (OUTPATIENT)
Dept: DERMATOLOGY | Facility: CLINIC | Age: 64
End: 2024-09-05
Payer: COMMERCIAL

## 2024-09-05 VITALS — BODY MASS INDEX: 20.04 KG/M2 | WEIGHT: 102.06 LBS | HEIGHT: 60 IN

## 2024-09-05 DIAGNOSIS — L63.9 ALOPECIA AREATA: Primary | ICD-10-CM

## 2024-09-05 DIAGNOSIS — L80 VITILIGO: ICD-10-CM

## 2024-09-05 PROCEDURE — 3288F FALL RISK ASSESSMENT DOCD: CPT | Mod: CPTII,S$GLB,, | Performed by: PHYSICIAN ASSISTANT

## 2024-09-05 PROCEDURE — 3008F BODY MASS INDEX DOCD: CPT | Mod: CPTII,S$GLB,, | Performed by: PHYSICIAN ASSISTANT

## 2024-09-05 PROCEDURE — 1159F MED LIST DOCD IN RCRD: CPT | Mod: CPTII,S$GLB,, | Performed by: PHYSICIAN ASSISTANT

## 2024-09-05 PROCEDURE — 1160F RVW MEDS BY RX/DR IN RCRD: CPT | Mod: CPTII,S$GLB,, | Performed by: PHYSICIAN ASSISTANT

## 2024-09-05 PROCEDURE — 1126F AMNT PAIN NOTED NONE PRSNT: CPT | Mod: CPTII,S$GLB,, | Performed by: PHYSICIAN ASSISTANT

## 2024-09-05 PROCEDURE — 1101F PT FALLS ASSESS-DOCD LE1/YR: CPT | Mod: CPTII,S$GLB,, | Performed by: PHYSICIAN ASSISTANT

## 2024-09-05 PROCEDURE — 99999 PR PBB SHADOW E&M-EST. PATIENT-LVL III: CPT | Mod: PBBFAC,,, | Performed by: PHYSICIAN ASSISTANT

## 2024-09-05 PROCEDURE — 99214 OFFICE O/P EST MOD 30 MIN: CPT | Mod: S$GLB,,, | Performed by: PHYSICIAN ASSISTANT

## 2024-09-05 RX ORDER — TACROLIMUS 1 MG/G
OINTMENT TOPICAL
Qty: 30 G | Refills: 1 | Status: SHIPPED | OUTPATIENT
Start: 2024-09-05

## 2024-09-05 NOTE — PROGRESS NOTES
Subjective:      Patient ID:  Ivone Monroy is a 64 y.o. female who presents for   Chief Complaint   Patient presents with    Hair Loss    Medication Refill     Hx of AA, potential early vitiligo changes of eyebrows/ face, last seen 5/13/24. Here for f/u and believes new growth of the scalp. Denies new patches of hair loss, itching, or loss of body hair. Current tx: clobetasol solution bid. She declined ILK.  +Improving. Requesting refill of clobetasol. Denies new patches of hair loss.    For vitiligo, trial of desonide cream recommended initially (s/p about 4-6 weeks). Thyroid antibodies were negative (3/4/24). Current tx: elidel cream bid. Denies worsening or spreading.             Review of Systems    Objective:   Physical Exam   Constitutional: She appears well-developed and well-nourished. No distress.   Neurological: She is alert and oriented to person, place, and time. She is not disoriented.   Psychiatric: She has a normal mood and affect.   Skin:   Areas Examined (abnormalities noted in diagram):   Scalp / Hair Palpated and Inspected  Head / Face Inspection Performed  Neck Inspection Performed  Chest / Axilla Inspection Performed  Back Inspection Performed  RUE Inspected  LUE Inspection Performed            Diagram Legend     Erythematous scaling macule/papule c/w actinic keratosis       Vascular papule c/w angioma      Pigmented verrucoid papule/plaque c/w seborrheic keratosis      Yellow umbilicated papule c/w sebaceous hyperplasia      Irregularly shaped tan macule c/w lentigo     1-2 mm smooth white papules consistent with Milia      Movable subcutaneous cyst with punctum c/w epidermal inclusion cyst      Subcutaneous movable cyst c/w pilar cyst      Firm pink to brown papule c/w dermatofibroma      Pedunculated fleshy papule(s) c/w skin tag(s)      Evenly pigmented macule c/w junctional nevus     Mildly variegated pigmented, slightly irregular-bordered macule c/w mildly atypical nevus       Flesh colored to evenly pigmented papule c/w intradermal nevus       Pink pearly papule/plaque c/w basal cell carcinoma      Erythematous hyperkeratotic cursted plaque c/w SCC      Surgical scar with no sign of skin cancer recurrence      Open and closed comedones      Inflammatory papules and pustules      Verrucoid papule consistent consistent with wart     Erythematous eczematous patches and plaques     Dystrophic onycholytic nail with subungual debris c/w onychomycosis     Umbilicated papule    Erythematous-base heme-crusted tan verrucoid plaque consistent with inflamed seborrheic keratosis     Erythematous Silvery Scaling Plaque c/w Psoriasis     See annotation      Assessment / Plan:        Alopecia areata  Improving, negative thyroid antibodies (3/4/24). Continue clobetasol solution bid prn. Encouraged regular monitoring of thyroid labs at least annually with PCP.    Vitiligo  -     tacrolimus (PROTOPIC) 0.1 % ointment; apply to affected area twice daily of vitiligo. Non-steroid med.  Dispense: 30 g; Refill: 1  Subjectively stable per pt. Will get pictures today. Trial of above TCI as alternative to elidel. May resume desonide cream bid for up 4-6 weeks.  Encouraged ambient sun exposure. Would reconsider phototherapy in future. Caution w/consideration of [place]STON topical given h/o HTN.          Follow up in about 3 months (around 12/5/2024).

## 2024-09-05 NOTE — Clinical Note
Hi Dr. Brasher,   I am trying a trial of protopic with desonide for 4-6 weeks for vitiligo. Please let me know if you have any other recommendations for treatment or further workup.  Thanks! Hawa

## 2024-09-06 ENCOUNTER — PATIENT MESSAGE (OUTPATIENT)
Dept: DERMATOLOGY | Facility: CLINIC | Age: 64
End: 2024-09-06
Payer: MEDICARE

## 2024-09-06 DIAGNOSIS — L80 VITILIGO: ICD-10-CM

## 2024-09-06 NOTE — TELEPHONE ENCOUNTER
----- Message from Hawa Bustillo PA-C sent at 9/5/2024  4:45 PM CDT -----  Please encourage pt to get natural sunlight 40 min to 1 hour a day.  ----- Message -----  From: Tess Brasher MD  Sent: 9/5/2024   4:25 PM CDT  To: Hawa Bustillo PA-C    That's good, encourage natural sunlight 40 min to 1 hour daily.  You can also consider Opzelura cream (send to [facility]), I typically document that they have failed Protopic and desonide in the comment section to the pharmacy.  ----- Message -----  From: Hawa Bustillo PA-C  Sent: 9/5/2024   4:21 PM CDT  To: Tess Brasher MD    Hi Dr. Brasher,     I am trying a trial of protopic with desonide for 4-6 weeks for vitiligo. Please let me know if you have any other recommendations for treatment or further workup.    Thanks!  Hawa

## 2024-09-06 NOTE — TELEPHONE ENCOUNTER
Called pt to inform her that PA would like her to get natural sunlight for 40 minutes to 1 hour. Pt stated that she will try but she is on a BP medication which prevents her to be outside as much.

## 2024-09-09 RX ORDER — DESONIDE 0.5 MG/G
CREAM TOPICAL
Qty: 60 G | Refills: 0 | Status: SHIPPED | OUTPATIENT
Start: 2024-09-09

## 2024-09-12 RX ORDER — LOSARTAN POTASSIUM 50 MG/1
50 TABLET ORAL DAILY
Qty: 30 TABLET | Refills: 3 | Status: SHIPPED | OUTPATIENT
Start: 2024-09-12 | End: 2025-09-12

## 2024-09-30 ENCOUNTER — OFFICE VISIT (OUTPATIENT)
Dept: CARDIOLOGY | Facility: CLINIC | Age: 64
End: 2024-09-30
Payer: COMMERCIAL

## 2024-09-30 VITALS
HEART RATE: 73 BPM | WEIGHT: 103.63 LBS | RESPIRATION RATE: 16 BRPM | SYSTOLIC BLOOD PRESSURE: 160 MMHG | BODY MASS INDEX: 23.98 KG/M2 | DIASTOLIC BLOOD PRESSURE: 80 MMHG | HEIGHT: 55 IN | OXYGEN SATURATION: 100 %

## 2024-09-30 DIAGNOSIS — I10 PRIMARY HYPERTENSION: Primary | ICD-10-CM

## 2024-09-30 DIAGNOSIS — R00.1 SINUS BRADYCARDIA: ICD-10-CM

## 2024-09-30 DIAGNOSIS — R20.0 NUMBNESS AND TINGLING IN LEFT ARM: ICD-10-CM

## 2024-09-30 DIAGNOSIS — R29.898 WEAKNESS OF BOTH LOWER EXTREMITIES: ICD-10-CM

## 2024-09-30 DIAGNOSIS — M79.602 LEFT ARM PAIN: ICD-10-CM

## 2024-09-30 DIAGNOSIS — R20.2 NUMBNESS AND TINGLING IN LEFT ARM: ICD-10-CM

## 2024-09-30 DIAGNOSIS — R94.31 ABNORMAL ECG: ICD-10-CM

## 2024-09-30 PROCEDURE — 3079F DIAST BP 80-89 MM HG: CPT | Mod: CPTII,S$GLB,, | Performed by: INTERNAL MEDICINE

## 2024-09-30 PROCEDURE — 1159F MED LIST DOCD IN RCRD: CPT | Mod: CPTII,S$GLB,, | Performed by: INTERNAL MEDICINE

## 2024-09-30 PROCEDURE — 3008F BODY MASS INDEX DOCD: CPT | Mod: CPTII,S$GLB,, | Performed by: INTERNAL MEDICINE

## 2024-09-30 PROCEDURE — 1160F RVW MEDS BY RX/DR IN RCRD: CPT | Mod: CPTII,S$GLB,, | Performed by: INTERNAL MEDICINE

## 2024-09-30 PROCEDURE — 4010F ACE/ARB THERAPY RXD/TAKEN: CPT | Mod: CPTII,S$GLB,, | Performed by: INTERNAL MEDICINE

## 2024-09-30 PROCEDURE — 99999 PR PBB SHADOW E&M-EST. PATIENT-LVL IV: CPT | Mod: PBBFAC,,, | Performed by: INTERNAL MEDICINE

## 2024-09-30 PROCEDURE — 3288F FALL RISK ASSESSMENT DOCD: CPT | Mod: CPTII,S$GLB,, | Performed by: INTERNAL MEDICINE

## 2024-09-30 PROCEDURE — 99205 OFFICE O/P NEW HI 60 MIN: CPT | Mod: S$GLB,,, | Performed by: INTERNAL MEDICINE

## 2024-09-30 PROCEDURE — 1101F PT FALLS ASSESS-DOCD LE1/YR: CPT | Mod: CPTII,S$GLB,, | Performed by: INTERNAL MEDICINE

## 2024-09-30 PROCEDURE — 1126F AMNT PAIN NOTED NONE PRSNT: CPT | Mod: CPTII,S$GLB,, | Performed by: INTERNAL MEDICINE

## 2024-09-30 PROCEDURE — 3077F SYST BP >= 140 MM HG: CPT | Mod: CPTII,S$GLB,, | Performed by: INTERNAL MEDICINE

## 2024-09-30 NOTE — PROGRESS NOTES
Subjective   Patient ID:  Ivone Monroy is a 64 y.o. female who presents for evaluation of Hypertension (New pt )        HPI: Pt presents for eval.  Current medical conditions include HTN.  Nonsmoker.  Mother had RA, CAD.  Grandmother had MI.    No prior h/o CV disease.  Has anxiety sxs.  Put on Amlodipine and HCTZ this year w PCP.  Intolerance, weakness of legs, heaviness in head, and rash and put on steroids.  Now on Losartan.  Some left arm pain, 10 minutes or so.  Still w head pressure.  Ecg 7/8/24 NSR, possible LVH.  Older ecgs mild sinus bradycardia.  No syncope.        Past Medical History:   Diagnosis Date    Anemia     Cancer of gallbladder 2015    chemo radiation - Dr Gatica     Diverticulitis     GERD (gastroesophageal reflux disease)        Current Outpatient Medications:     clobetasoL (TEMOVATE) 0.05 % external solution, Apply topically 2 (two) times daily as needed for alopecia. Strong steroid- do NOT use on face., Disp: 50 mL, Rfl: 0    desonide (DESOWEN) 0.05 % cream, Apply to the affected area once to twice daily for vitiligo. May use for up to 4 weeks., Disp: 60 g, Rfl: 0    dicyclomine (BENTYL) 10 MG capsule, Take 10 mg by mouth 2 (two) times daily., Disp: , Rfl: 11    losartan (COZAAR) 50 MG tablet, Take 1 tablet (50 mg total) by mouth once daily., Disp: 30 tablet, Rfl: 3    multivitamin (THERAGRAN) per tablet, Take 1 tablet by mouth once daily., Disp: , Rfl:     pantoprazole (PROTONIX) 40 MG tablet, Take 40 mg by mouth once daily., Disp: , Rfl:     tacrolimus (PROTOPIC) 0.1 % ointment, apply to affected area twice daily of vitiligo. Non-steroid med., Disp: 30 g, Rfl: 1    famotidine (PEPCID) 40 MG tablet, Take 1 tablet (40 mg total) by mouth once daily. for 3 days, Disp: 3 tablet, Rfl: 0    LORazepam (ATIVAN) 0.5 MG tablet, Take 1 tablet (0.5 mg total) by mouth every 12 (twelve) hours as needed for Anxiety., Disp: 15 tablet, Rfl: 0    triamcinolone acetonide 0.1% (KENALOG) 0.1 %  "ointment, Apply topically 2 (two) times daily. for 14 days, Disp: 30 g, Rfl: 1      Review of Systems   Constitutional: Positive for malaise/fatigue.   HENT: Negative.     Eyes: Negative.    Cardiovascular: Negative.    Respiratory: Negative.     Endocrine: Negative.    Hematologic/Lymphatic: Negative.    Skin: Negative.    Musculoskeletal:  Positive for arthritis and stiffness.   Gastrointestinal: Negative.    Genitourinary: Negative.    Neurological:  Positive for headaches, numbness, sensory change and weakness.   Psychiatric/Behavioral: Negative.     Allergic/Immunologic: Negative.        BP (!) 160/80   Pulse 73   Resp 16   Ht 1' 5" (0.432 m)   Wt 47 kg (103 lb 9.9 oz)   SpO2 100%   .08 kg/m²     Wt Readings from Last 3 Encounters:   09/30/24 47 kg (103 lb 9.9 oz)   09/05/24 46.3 kg (102 lb 1.2 oz)   09/04/24 46.3 kg (102 lb 1.2 oz)     Temp Readings from Last 3 Encounters:   09/02/24 96.3 °F (35.7 °C) (Tympanic)   07/08/24 97.2 °F (36.2 °C) (Tympanic)   12/22/23 97.8 °F (36.6 °C)     BP Readings from Last 3 Encounters:   09/30/24 (!) 160/80   09/04/24 120/60   09/02/24 132/85     Pulse Readings from Last 3 Encounters:   09/30/24 73   09/04/24 87   09/02/24 69            Objective     Physical Exam  Vitals and nursing note reviewed.   Constitutional:       General: She is not in acute distress.     Appearance: Normal appearance. She is well-developed. She is not ill-appearing or diaphoretic.   HENT:      Head: Normocephalic.   Neck:      Thyroid: No thyromegaly.      Vascular: Normal carotid pulses. No carotid bruit, hepatojugular reflux or JVD.   Cardiovascular:      Rate and Rhythm: Normal rate and regular rhythm.      Chest Wall: PMI is not displaced.      Pulses: Normal pulses.           Radial pulses are 2+ on the right side and 2+ on the left side.      Heart sounds: Normal heart sounds, S1 normal and S2 normal. No murmur heard.     No friction rub. No gallop.   Pulmonary:      Effort: " Pulmonary effort is normal.      Breath sounds: Normal breath sounds. No wheezing or rales.   Abdominal:      General: Bowel sounds are normal. There is no abdominal bruit.      Palpations: Abdomen is soft. There is no hepatomegaly, splenomegaly or mass.      Tenderness: There is no abdominal tenderness.   Musculoskeletal:      Cervical back: Neck supple.   Lymphadenopathy:      Cervical: No cervical adenopathy.   Skin:     General: Skin is warm.   Neurological:      Mental Status: She is alert and oriented to person, place, and time.   Psychiatric:         Behavior: Behavior normal. Behavior is cooperative.       I have reviewed all pertinent labs and cardiac studies.        Chemistry        Component Value Date/Time     (L) 08/16/2024 1315    K 3.6 08/16/2024 1315    CL 97 08/16/2024 1315    CO2 28 08/16/2024 1315    BUN 13 08/16/2024 1315    CREATININE 0.8 08/16/2024 1315    GLU 87 08/16/2024 1315        Component Value Date/Time    CALCIUM 10.0 08/16/2024 1315    ALKPHOS 63 07/08/2024 0912    AST 28 07/08/2024 0912    ALT 23 07/08/2024 0912    BILITOT 0.6 07/08/2024 0912    ESTGFRAFRICA >60.0 08/22/2020 0915    EGFRNONAA >60.0 08/22/2020 0915        Lab Results   Component Value Date    WBC 8.27 08/16/2024    HGB 11.6 (L) 08/16/2024    HCT 36.0 (L) 08/16/2024    MCV 96 08/16/2024     08/16/2024       Lab Results   Component Value Date    TSH 1.909 08/16/2024         Lab Results   Component Value Date    HGBA1C 5.0 11/02/2023       Lab Results   Component Value Date    CHOL 218 (H) 11/02/2023    CHOL 217 (H) 09/13/2022    CHOL 213 (H) 08/22/2020     Lab Results   Component Value Date     (H) 11/02/2023     (H) 09/13/2022    HDL 85 (H) 08/22/2020     Lab Results   Component Value Date    LDLCALC 103.8 11/02/2023    LDLCALC 100.0 09/13/2022    LDLCALC 111.4 08/22/2020     Lab Results   Component Value Date    TRIG 66 11/02/2023    TRIG 85 09/13/2022    TRIG 83 08/22/2020       Lab Results    Component Value Date    CHOLHDL 46.3 11/02/2023    CHOLHDL 46.1 09/13/2022    CHOLHDL 39.9 08/22/2020            Assessment and Plan     1. Primary hypertension    2. Left arm pain    3. Abnormal ECG    4. Sinus bradycardia    5. Weakness of both lower extremities    6. Numbness and tingling in left arm        Plan:    Stress related rise in HTN.  Control stress/anxiety.  Continue Losartan.  Home BP monitoring.  Goal < 130/80.  Echocardiogram.  Stress test.  GOOD.  Carotid u/s.  Cardiac low salt diet.    F/u as scheduled later this month w Dr. Addison, Cardiology.

## 2024-10-01 ENCOUNTER — PATIENT MESSAGE (OUTPATIENT)
Dept: INTERNAL MEDICINE | Facility: CLINIC | Age: 64
End: 2024-10-01
Payer: MEDICARE

## 2024-10-10 ENCOUNTER — HOSPITAL ENCOUNTER (OUTPATIENT)
Dept: RADIOLOGY | Facility: HOSPITAL | Age: 64
Discharge: HOME | End: 2024-10-10
Attending: FAMILY MEDICINE
Payer: COMMERCIAL

## 2024-10-10 ENCOUNTER — OFFICE VISIT (OUTPATIENT)
Dept: INTERNAL MEDICINE | Facility: CLINIC | Age: 64
End: 2024-10-10
Payer: COMMERCIAL

## 2024-10-10 ENCOUNTER — TELEPHONE (OUTPATIENT)
Dept: INTERNAL MEDICINE | Facility: CLINIC | Age: 64
End: 2024-10-10

## 2024-10-10 VITALS
DIASTOLIC BLOOD PRESSURE: 82 MMHG | HEART RATE: 57 BPM | WEIGHT: 103.63 LBS | HEIGHT: 61 IN | BODY MASS INDEX: 19.57 KG/M2 | SYSTOLIC BLOOD PRESSURE: 138 MMHG | OXYGEN SATURATION: 99 % | TEMPERATURE: 98 F

## 2024-10-10 DIAGNOSIS — R51.9 SINUS HEADACHE: ICD-10-CM

## 2024-10-10 DIAGNOSIS — I10 PRIMARY HYPERTENSION: ICD-10-CM

## 2024-10-10 DIAGNOSIS — R20.0 NUMBNESS AND TINGLING IN LEFT ARM: ICD-10-CM

## 2024-10-10 DIAGNOSIS — R20.0 NUMBNESS AND TINGLING IN BOTH HANDS: Primary | ICD-10-CM

## 2024-10-10 DIAGNOSIS — R20.0 NUMBNESS AND TINGLING OF LEFT SIDE OF FACE: ICD-10-CM

## 2024-10-10 DIAGNOSIS — M81.0 AGE-RELATED OSTEOPOROSIS WITHOUT CURRENT PATHOLOGICAL FRACTURE: ICD-10-CM

## 2024-10-10 DIAGNOSIS — K21.9 GASTROESOPHAGEAL REFLUX DISEASE, UNSPECIFIED WHETHER ESOPHAGITIS PRESENT: ICD-10-CM

## 2024-10-10 DIAGNOSIS — R20.2 NUMBNESS AND TINGLING IN LEFT ARM: ICD-10-CM

## 2024-10-10 DIAGNOSIS — R20.2 NUMBNESS AND TINGLING OF LEFT SIDE OF FACE: ICD-10-CM

## 2024-10-10 DIAGNOSIS — Z00.00 ROUTINE GENERAL MEDICAL EXAMINATION AT A HEALTH CARE FACILITY: Primary | ICD-10-CM

## 2024-10-10 DIAGNOSIS — R20.2 NUMBNESS AND TINGLING IN BOTH HANDS: Primary | ICD-10-CM

## 2024-10-10 DIAGNOSIS — Z85.09 HISTORY OF CANCER OF GALL BLADDER: ICD-10-CM

## 2024-10-10 DIAGNOSIS — I21.4 ACUTE NON-ST ELEVATION MYOCARDIAL INFARCTION (NSTEMI): ICD-10-CM

## 2024-10-10 PROBLEM — R94.31 ABNORMAL ECG: Status: RESOLVED | Noted: 2024-09-30 | Resolved: 2024-10-10

## 2024-10-10 PROBLEM — M79.602 LEFT ARM PAIN: Status: RESOLVED | Noted: 2024-09-30 | Resolved: 2024-10-10

## 2024-10-10 PROBLEM — R29.898 WEAKNESS OF BOTH LOWER EXTREMITIES: Status: RESOLVED | Noted: 2024-09-30 | Resolved: 2024-10-10

## 2024-10-10 LAB
ALBUMIN SERPL BCP-MCNC: 3.8 G/DL (ref 3.5–5.2)
ALP SERPL-CCNC: 65 U/L (ref 55–135)
ALT SERPL W/O P-5'-P-CCNC: 21 U/L (ref 10–44)
ANION GAP SERPL CALC-SCNC: 10 MMOL/L (ref 8–16)
AST SERPL-CCNC: 23 U/L (ref 10–40)
BILIRUB SERPL-MCNC: 0.4 MG/DL (ref 0.1–1)
BILIRUB UR QL STRIP: NEGATIVE
BUN SERPL-MCNC: 12 MG/DL (ref 8–23)
CALCIUM SERPL-MCNC: 9.4 MG/DL (ref 8.7–10.5)
CHLORIDE SERPL-SCNC: 106 MMOL/L (ref 95–110)
CHOLEST SERPL-MCNC: 186 MG/DL (ref 120–199)
CHOLEST/HDLC SERPL: 2.4 {RATIO} (ref 2–5)
CLARITY UR: CLEAR
CO2 SERPL-SCNC: 24 MMOL/L (ref 23–29)
COLOR UR: YELLOW
CREAT SERPL-MCNC: 0.9 MG/DL (ref 0.5–1.4)
CRP SERPL-MCNC: 2.4 MG/L (ref 0–8.2)
EST. GFR  (NO RACE VARIABLE): >60 ML/MIN/1.73 M^2
GLUCOSE SERPL-MCNC: 46 MG/DL (ref 70–110)
GLUCOSE UR QL STRIP: NEGATIVE
HDLC SERPL-MCNC: 79 MG/DL (ref 40–75)
HDLC SERPL: 42.5 % (ref 20–50)
HGB UR QL STRIP: NEGATIVE
KETONES UR QL STRIP: NEGATIVE
LDLC SERPL CALC-MCNC: 79.8 MG/DL (ref 63–159)
LEUKOCYTE ESTERASE UR QL STRIP: NEGATIVE
NITRITE UR QL STRIP: NEGATIVE
NONHDLC SERPL-MCNC: 107 MG/DL
PH UR STRIP: 6 [PH] (ref 5–8)
POTASSIUM SERPL-SCNC: 4.1 MMOL/L (ref 3.5–5.1)
PROT SERPL-MCNC: 7 G/DL (ref 6–8.4)
PROT UR QL STRIP: NEGATIVE
SODIUM SERPL-SCNC: 140 MMOL/L (ref 136–145)
SP GR UR STRIP: <=1.005 (ref 1–1.03)
TRIGL SERPL-MCNC: 136 MG/DL (ref 30–150)
TSH SERPL DL<=0.005 MIU/L-ACNC: 2.86 UIU/ML (ref 0.4–4)
URN SPEC COLLECT METH UR: ABNORMAL

## 2024-10-10 PROCEDURE — 80053 COMPREHEN METABOLIC PANEL: CPT | Performed by: FAMILY MEDICINE

## 2024-10-10 PROCEDURE — 70220 X-RAY EXAM OF SINUSES: CPT | Mod: TC

## 2024-10-10 PROCEDURE — 80061 LIPID PANEL: CPT | Performed by: FAMILY MEDICINE

## 2024-10-10 PROCEDURE — 83036 HEMOGLOBIN GLYCOSYLATED A1C: CPT | Performed by: FAMILY MEDICINE

## 2024-10-10 PROCEDURE — 99999 PR PBB SHADOW E&M-EST. PATIENT-LVL V: CPT | Mod: PBBFAC,,, | Performed by: FAMILY MEDICINE

## 2024-10-10 PROCEDURE — 85025 COMPLETE CBC W/AUTO DIFF WBC: CPT | Performed by: FAMILY MEDICINE

## 2024-10-10 PROCEDURE — 70220 X-RAY EXAM OF SINUSES: CPT | Mod: 26,,, | Performed by: RADIOLOGY

## 2024-10-10 PROCEDURE — 72050 X-RAY EXAM NECK SPINE 4/5VWS: CPT | Mod: TC

## 2024-10-10 PROCEDURE — 84443 ASSAY THYROID STIM HORMONE: CPT | Performed by: FAMILY MEDICINE

## 2024-10-10 PROCEDURE — 81003 URINALYSIS AUTO W/O SCOPE: CPT | Performed by: FAMILY MEDICINE

## 2024-10-10 PROCEDURE — 85652 RBC SED RATE AUTOMATED: CPT | Performed by: FAMILY MEDICINE

## 2024-10-10 PROCEDURE — 73030 X-RAY EXAM OF SHOULDER: CPT | Mod: 26,LT,, | Performed by: RADIOLOGY

## 2024-10-10 PROCEDURE — 73030 X-RAY EXAM OF SHOULDER: CPT | Mod: TC,LT

## 2024-10-10 PROCEDURE — 86140 C-REACTIVE PROTEIN: CPT | Performed by: FAMILY MEDICINE

## 2024-10-10 PROCEDURE — 72050 X-RAY EXAM NECK SPINE 4/5VWS: CPT | Mod: 26,,, | Performed by: RADIOLOGY

## 2024-10-10 RX ORDER — METOPROLOL SUCCINATE 25 MG/1
25 TABLET, EXTENDED RELEASE ORAL DAILY
COMMUNITY

## 2024-10-10 NOTE — PROGRESS NOTES
Subjective:       Patient ID: Ivone Monroy is a 64 y.o. female presents with   Patient Active Problem List   Diagnosis    GERD (gastroesophageal reflux disease)    History of diverticulitis    History of cancer of gall bladder    Age-related osteoporosis without current pathological fracture    Primary hypertension    Sinus bradycardia    Acute non-ST elevation myocardial infarction (NSTEMI)        Chief Complaint: Hospital Follow Up (Hospital f/u, went to [facility] on [place] 10/6/24 for increased ocular pressure, admitted d/t elevated cardiac enzymes. Pt d/c on Tuesday 10/8/24. Pt cont to c/o feeling pressure in her head.)    History of Present Illness    Ivone presents today for routine annual physicals and follow up from recent hospitalization at [facility] on 10/06/2024 for chest pressure and arm pain. She reports experiencing daily heaviness and pressure in her chest, extending to her cheeks, right side, and down to her shoulder. She also experiences left arm pain from the shoulder to the elbow and hand, described as pressure rather than numbness. The pain worsens with arm movement, particularly when raising it. She denies associated neck pain or tingling sensations.      Patient went to [facility] ER on 10/06/2024 and noted elevated cardiac enzymes for which cardiac catheterization was performed  which revealed no blockage. She is scheduled for an echocardiogram and GOOD (Ankle-Brachial Index) test.    She reports ongoing shoulder tenderness, describing it as feeling numb or pressured, with pain upon arm movement. She started blood pressure medication several months ago.   Patient continues to have sinus pressure and headaches more on the left side, has discontinued over-the-counter sinus medication with decongestants    ROS:  General: -fever, -chills, -fatigue, -weight gain, -weight loss, -loss of appetite  Eyes: -vision changes, -blurry vision, -eye pain, -eye  "discharge  ENT: -ear pain, -hearing loss, -tinnitus, -nasal congestion, -sore throat, + sinus pressure  Cardiovascular: -chest pain, -palpitations, -lower extremity edema  Respiratory: -cough, -shortness of breath, -wheezing, -sputum production  Endocrine: -polyuria, -polydipsia, -heat intolerance, -cold intolerance  Gastrointestinal: -abdominal pain, -heartburn, -nausea, +vomiting, -diarrhea, -constipation, -blood in stool  Genitourinary: -dysuria, -urgency, -frequency, -hematuria, -nocturia, -incontinence  Heme & Lymphatic: -easy or excessive bleeding, -easy bruising, -swollen lymph nodes  Musculoskeletal: -muscle pain, -back pain, +joint pain, -joint swelling  Skin: -rash, -lesion, -itching, -skin texture changes, -skin color changes  Neurological: +headache, -dizziness, +numbness, +tingling, -seizure activity, -speech difficulty, -memory loss, -confusion  Psychiatric: -anxiety, -depression, -sleep difficulty                /82   Pulse (!) 57   Temp 98.3 °F (36.8 °C) (Oral)   Ht 5' 1" (1.549 m)   Wt 47 kg (103 lb 9.9 oz)   SpO2 99%   BMI 19.58 kg/m²   Objective:      Physical Exam  Constitutional:       Appearance: She is well-developed.   HENT:      Head: Normocephalic and atraumatic.      Nose: No congestion.   Cardiovascular:      Rate and Rhythm: Normal rate and regular rhythm.      Heart sounds: Normal heart sounds. No murmur heard.  Pulmonary:      Effort: Pulmonary effort is normal. No respiratory distress.      Breath sounds: Normal breath sounds. No wheezing.   Chest:      Chest wall: No tenderness.   Abdominal:      General: Bowel sounds are normal.      Palpations: Abdomen is soft.      Tenderness: There is no abdominal tenderness.   Lymphadenopathy:      Cervical: No cervical adenopathy.   Skin:     General: Skin is warm and dry.      Findings: No rash.   Neurological:      General: No focal deficit present.      Mental Status: She is alert and oriented to person, place, and time.      " Cranial Nerves: No cranial nerve deficit.   Psychiatric:         Mood and Affect: Mood normal.           Assessment/Plan:   1. Routine general medical examination at a health care facility  -     Urinalysis, Reflex to Urine Culture Urine, Clean Catch; Future; Expected date: 10/10/2024  -     Hemoglobin A1C; Future; Expected date: 10/10/2024  -     TSH; Future; Expected date: 10/10/2024  -     Lipid Panel; Future; Expected date: 10/10/2024  -     Comprehensive Metabolic Panel; Future; Expected date: 10/10/2024  -     CBC Auto Differential; Future; Expected date: 10/10/2024  Vital signs stable today.  Clinical exam stable  Continue lifestyle modifications with low-fat and low-cholesterol diet and exercise 30 minutes daily    2. Primary hypertension  -     Urinalysis, Reflex to Urine Culture Urine, Clean Catch; Future; Expected date: 10/10/2024  -     TSH; Future; Expected date: 10/10/2024  -     Lipid Panel; Future; Expected date: 10/10/2024  -     Comprehensive Metabolic Panel; Future; Expected date: 10/10/2024  Blood pressure is stable currently on losartan 50 mg and metoprolol 25 mg daily which has been started at the time of discharge from [facility]  Patient has appointment with Cardiology soon for complete evaluation    3. Age-related osteoporosis without current pathological fracture  Currently taking calcium with vitamin-D supplements    4. History of cancer of gall bladder  Overview:  S/p chemo and radiation By Dr rivera , last check  Dec 2017  Stable and asymptomatic    5. Gastroesophageal reflux disease, unspecified whether esophagitis present  Overview:  Dr Hartman  Currently on pantoprazole 40 mg and dicyclomine as needed    6. Sinus headache  -     X-Ray Sinuses 3 or more views; Future; Expected date: 10/10/2024  -     Sedimentation rate; Future; Expected date: 10/10/2024  -     C-REACTIVE PROTEIN; Future; Expected date: 10/10/2024  -     Ambulatory referral/consult to Neurology; Future;  Expected date: 10/17/2024  Secondary to sinus headache will get further evaluation rule out any organic etiology  Secondary to ongoing headache with no improvement with taking over-the-counter allergy medications Will refer to neurology  Encouraged to take Tylenol as needed for headache    7. Numbness and tingling in left arm  -     X-Ray Cervical Spine Complete 5 view; Future; Expected date: 10/10/2024  -     X-Ray Shoulder 2 or More Views Left; Future; Expected date: 10/10/2024  Secondary to ongoing numbness to the left arm for which patient has been tested in the ER and had left heart catheterization which was normal will get cervical and left shoulder x-rays to rule out any radiculopathy symptoms    8. Numbness and tingling of left side of face  -     Ambulatory referral/consult to Neurology; Future; Expected date: 10/17/2024  Secondary to ongoing left-sided tingling and numbness sensation more on the left side compared to the right Will refer to neurology    9. Acute non-ST elevation myocardial infarction (NSTEMI)  Overview:  Acute non-ST segment elevation myocardial infarction (Problem)- Cardiac cath at [facility] -10/8/24 -negative , started on metoprolol 25 mg   Patient reported that she had elevated cardiac enzymes at [facility] over the weekend and had heart catheterization which was negative and was sent home on metoprolol  Will obtain medical records from [facility] for complete evaluation and patient has appointment with cardiologist in 2 weeks for follow-up  Patient denies of any chest pains           This note was generated with the assistance of ambient listening technology. Verbal consent was obtained by the patient and accompanying visitor(s) for the recording of patient appointment to facilitate this note. I attest to having reviewed and edited the generated note for accuracy, though some syntax or spelling errors may persist. Please contact the author of this note for any  clarification.

## 2024-10-10 NOTE — TELEPHONE ENCOUNTER
Noted x-ray of the shoulder with mild arthritis changes to the left shoulder as well as cervical spine, secondary to ongoing tingling and numbness sensation, will refer to physical therapy for strengthening

## 2024-10-11 ENCOUNTER — HOSPITAL ENCOUNTER (OUTPATIENT)
Dept: CARDIOLOGY | Facility: HOSPITAL | Age: 64
Discharge: HOME | End: 2024-10-11
Attending: INTERNAL MEDICINE
Payer: COMMERCIAL

## 2024-10-11 VITALS
DIASTOLIC BLOOD PRESSURE: 63 MMHG | WEIGHT: 103 LBS | WEIGHT: 103 LBS | DIASTOLIC BLOOD PRESSURE: 63 MMHG | WEIGHT: 103 LBS | HEIGHT: 61 IN | BODY MASS INDEX: 19.45 KG/M2 | HEIGHT: 61 IN | SYSTOLIC BLOOD PRESSURE: 132 MMHG | BODY MASS INDEX: 19.45 KG/M2 | DIASTOLIC BLOOD PRESSURE: 63 MMHG | SYSTOLIC BLOOD PRESSURE: 132 MMHG | SYSTOLIC BLOOD PRESSURE: 132 MMHG | HEIGHT: 61 IN | HEART RATE: 53 BPM | BODY MASS INDEX: 19.45 KG/M2

## 2024-10-11 DIAGNOSIS — R20.0 NUMBNESS AND TINGLING IN LEFT ARM: ICD-10-CM

## 2024-10-11 DIAGNOSIS — M79.602 LEFT ARM PAIN: ICD-10-CM

## 2024-10-11 DIAGNOSIS — R94.31 ABNORMAL ECG: ICD-10-CM

## 2024-10-11 DIAGNOSIS — R20.2 NUMBNESS AND TINGLING IN LEFT ARM: ICD-10-CM

## 2024-10-11 DIAGNOSIS — R00.1 SINUS BRADYCARDIA: ICD-10-CM

## 2024-10-11 DIAGNOSIS — I10 PRIMARY HYPERTENSION: ICD-10-CM

## 2024-10-11 DIAGNOSIS — R29.898 WEAKNESS OF BOTH LOWER EXTREMITIES: ICD-10-CM

## 2024-10-11 LAB
ABI POST MINUTES1: 2 MIN
AORTIC ROOT ANNULUS: 2.83 CM
ASCENDING AORTA: 2.25 CM
AV INDEX (PROSTH): 0.77
AV MEAN GRADIENT: 4.6 MMHG
AV PEAK GRADIENT: 9 MMHG
AV VALVE AREA BY VELOCITY RATIO: 2.5 CM²
AV VALVE AREA: 2.2 CM²
AV VELOCITY RATIO: 0.87
BASOPHILS # BLD AUTO: 0.03 K/UL (ref 0–0.2)
BASOPHILS NFR BLD: 0.3 % (ref 0–1.9)
BSA FOR ECHO PROCEDURE: 1.42 M2
CV ECHO LV RWT: 0.36 CM
DIFFERENTIAL METHOD BLD: ABNORMAL
DOP CALC AO PEAK VEL: 1.5 M/S
DOP CALC AO VTI: 36.1 CM
DOP CALC LVOT AREA: 2.8 CM2
DOP CALC LVOT DIAMETER: 1.9 CM
DOP CALC LVOT PEAK VEL: 1.3 M/S
DOP CALC LVOT STROKE VOLUME: 78.8 CM3
DOP CALC RVOT PEAK VEL: 0.66 M/S
DOP CALC RVOT VTI: 19.8 CM
DOP CALCLVOT PEAK VEL VTI: 27.8 CM
E WAVE DECELERATION TIME: 144.93 MSEC
E/A RATIO: 1.12
E/E' RATIO: 9.42 M/S
ECHO LV POSTERIOR WALL: 0.7 CM (ref 0.6–1.1)
EOSINOPHIL # BLD AUTO: 0 K/UL (ref 0–0.5)
EOSINOPHIL NFR BLD: 0.1 % (ref 0–8)
ERYTHROCYTE [DISTWIDTH] IN BLOOD BY AUTOMATED COUNT: 12.8 % (ref 11.5–14.5)
ERYTHROCYTE [SEDIMENTATION RATE] IN BLOOD BY PHOTOMETRIC METHOD: 11 MM/HR (ref 0–36)
ESTIMATED AVG GLUCOSE: 100 MG/DL (ref 68–131)
FRACTIONAL SHORTENING: 35.9 % (ref 28–44)
HBA1C MFR BLD: 5.1 % (ref 4–5.6)
HCT VFR BLD AUTO: 42.7 % (ref 37–48.5)
HGB BLD-MCNC: 12.8 G/DL (ref 12–16)
IMM GRANULOCYTES # BLD AUTO: 0.04 K/UL (ref 0–0.04)
IMM GRANULOCYTES NFR BLD AUTO: 0.4 % (ref 0–0.5)
IMMEDIATE ARM BP: 172 MMHG
IMMEDIATE LEFT ABI: 0.98
IMMEDIATE LEFT TIBIAL: 168 MMHG
IMMEDIATE RIGHT ABI: 1.06
IMMEDIATE RIGHT TIBIAL: 183 MMHG
INTERVENTRICULAR SEPTUM: 0.8 CM (ref 0.6–1.1)
IVC DIAMETER: 1.13 CM
IVRT: 87.54 MSEC
LA MAJOR: 5.31 CM
LA MINOR: 4.51 CM
LA WIDTH: 3.2 CM
LEFT ABI: 1.14
LEFT ARM BP: 122 MMHG
LEFT ARM DIASTOLIC BLOOD PRESSURE: 59 MMHG
LEFT ARM SYSTOLIC BLOOD PRESSURE: 122 MMHG
LEFT ATRIUM AREA SYSTOLIC (APICAL 2 CHAMBER): 16.3 CM2
LEFT ATRIUM AREA SYSTOLIC (APICAL 4 CHAMBER): 14.11 CM2
LEFT ATRIUM SIZE: 3.14 CM
LEFT ATRIUM VOLUME INDEX MOD: 27.3 ML/M2
LEFT ATRIUM VOLUME INDEX: 29.3 ML/M2
LEFT ATRIUM VOLUME MOD: 38.75 ML
LEFT ATRIUM VOLUME: 41.66 CM3
LEFT CBA DIAS: 14 CM/S
LEFT CBA SYS: 56 CM/S
LEFT CCA DIST DIAS: 19 CM/S
LEFT CCA DIST SYS: 75 CM/S
LEFT CCA MID DIAS: 19 CM/S
LEFT CCA MID SYS: 77 CM/S
LEFT CCA PROX DIAS: 17 CM/S
LEFT CCA PROX SYS: 87 CM/S
LEFT DORSALIS PEDIS: 151 MMHG
LEFT ECA DIAS: 12 CM/S
LEFT ECA SYS: 99 CM/S
LEFT ICA DIST DIAS: 42 CM/S
LEFT ICA DIST SYS: 127 CM/S
LEFT ICA MID DIAS: 36 CM/S
LEFT ICA MID SYS: 118 CM/S
LEFT ICA PROX DIAS: 30 CM/S
LEFT ICA PROX SYS: 97 CM/S
LEFT INTERNAL DIMENSION IN SYSTOLE: 2.5 CM (ref 2.1–4)
LEFT POSTERIOR TIBIAL: 133 MMHG
LEFT TBI: 0.81
LEFT TOE PRESSURE: 107 MMHG
LEFT VENTRICLE DIASTOLIC VOLUME INDEX: 45.41 ML/M2
LEFT VENTRICLE DIASTOLIC VOLUME: 64.48 ML
LEFT VENTRICLE END SYSTOLIC VOLUME APICAL 2 CHAMBER: 45.68 ML
LEFT VENTRICLE END SYSTOLIC VOLUME APICAL 4 CHAMBER: 30.31 ML
LEFT VENTRICLE MASS INDEX: 57.9 G/M2
LEFT VENTRICLE SYSTOLIC VOLUME INDEX: 15.2 ML/M2
LEFT VENTRICLE SYSTOLIC VOLUME: 21.6 ML
LEFT VENTRICULAR INTERNAL DIMENSION IN DIASTOLE: 3.9 CM (ref 3.5–6)
LEFT VENTRICULAR MASS: 82.3 G
LEFT VERTEBRAL DIAS: 19 CM/S
LEFT VERTEBRAL SYS: 72 CM/S
LV LATERAL E/E' RATIO: 9.42 M/S
LV SEPTAL E/E' RATIO: 9.42 M/S
LVED V (TEICH): 64.48 ML
LVES V (TEICH): 21.6 ML
LVOT MG: 3 MMHG
LVOT MV: 0.79 CM/S
LYMPHOCYTES # BLD AUTO: 2 K/UL (ref 1–4.8)
LYMPHOCYTES NFR BLD: 18.5 % (ref 18–48)
MCH RBC QN AUTO: 30 PG (ref 27–31)
MCHC RBC AUTO-ENTMCNC: 30 G/DL (ref 32–36)
MCV RBC AUTO: 100 FL (ref 82–98)
MONOCYTES # BLD AUTO: 0.6 K/UL (ref 0.3–1)
MONOCYTES NFR BLD: 5.9 % (ref 4–15)
MV PEAK A VEL: 1.01 M/S
MV PEAK E VEL: 1.13 M/S
MV STENOSIS PRESSURE HALF TIME: 42.03 MS
MV VALVE AREA P 1/2 METHOD: 5.23 CM2
NEUTROPHILS # BLD AUTO: 8 K/UL (ref 1.8–7.7)
NEUTROPHILS NFR BLD: 74.8 % (ref 38–73)
NRBC BLD-RTO: 0 /100 WBC
OHS CV CAROTID RIGHT ICA EDV HIGHEST: 44
OHS CV CAROTID ULTRASOUND LEFT ICA/CCA RATIO: 1.69
OHS CV CAROTID ULTRASOUND RIGHT ICA/CCA RATIO: 1.99
OHS CV PV CAROTID LEFT HIGHEST CCA: 87
OHS CV PV CAROTID LEFT HIGHEST ICA: 127
OHS CV PV CAROTID RIGHT HIGHEST CCA: 87
OHS CV PV CAROTID RIGHT HIGHEST ICA: 135
OHS CV US CAROTID LEFT HIGHEST EDV: 42
PISA TR MAX VEL: 2.4 M/S
PLATELET # BLD AUTO: 328 K/UL (ref 150–450)
PMV BLD AUTO: 10 FL (ref 9.2–12.9)
POST1 ARM BP: 160 MMHG
POST1 LEFT ABI: 1
POST1 LEFT TIBIAL: 160 MMHG
POST1 RIGHT ABI: 1.14
POST1 RIGHT TIBIAL: 182 MMHG
PULM VEIN S/D RATIO: 1.04
PV MEAN GRADIENT: 1 MMHG
PV PEAK D VEL: 0.76 M/S
PV PEAK GRADIENT: 4 MMHG
PV PEAK S VEL: 0.79 M/S
PV PEAK VELOCITY: 0.99 M/S
RA MAJOR: 3.85 CM
RA PRESSURE ESTIMATED: 3 MMHG
RA WIDTH: 3.48 CM
RBC # BLD AUTO: 4.27 M/UL (ref 4–5.4)
RIGHT ABI: 1.23
RIGHT ARM BP: 132 MMHG
RIGHT ARM DIASTOLIC BLOOD PRESSURE: 63 MMHG
RIGHT ARM SYSTOLIC BLOOD PRESSURE: 132 MMHG
RIGHT CBA DIAS: 15 CM/S
RIGHT CBA SYS: 60 CM/S
RIGHT CCA DIST DIAS: 17 CM/S
RIGHT CCA DIST SYS: 68 CM/S
RIGHT CCA MID DIAS: 20 CM/S
RIGHT CCA MID SYS: 87 CM/S
RIGHT CCA PROX DIAS: 14 CM/S
RIGHT CCA PROX SYS: 73 CM/S
RIGHT DORSALIS PEDIS: 149 MMHG
RIGHT ECA DIAS: 8 CM/S
RIGHT ECA SYS: 92 CM/S
RIGHT ICA DIST DIAS: 44 CM/S
RIGHT ICA DIST SYS: 135 CM/S
RIGHT ICA MID DIAS: 40 CM/S
RIGHT ICA MID SYS: 118 CM/S
RIGHT ICA PROX DIAS: 29 CM/S
RIGHT ICA PROX SYS: 97 CM/S
RIGHT POSTERIOR TIBIAL: 163 MMHG
RIGHT TBI: 0.77
RIGHT TOE PRESSURE: 102 MMHG
RIGHT VENTRICULAR END-DIASTOLIC DIMENSION: 2.96 CM
RIGHT VERTEBRAL DIAS: 16 CM/S
RIGHT VERTEBRAL SYS: 70 CM/S
RV TB RVSP: 5 MMHG
SINUS: 2.78 CM
STJ: 2.46 CM
TDI LATERAL: 0.12 M/S
TDI SEPTAL: 0.12 M/S
TDI: 0.12 M/S
TR MAX PG: 23 MMHG
TREADMILL GRADE: 10 %
TREADMILL SPEED: 2 MPH
TREADMILL TIME: 2.3 MIN
TRICUSPID ANNULAR PLANE SYSTOLIC EXCURSION: 2.19 CM
TV REST PULMONARY ARTERY PRESSURE: 26 MMHG
WBC # BLD AUTO: 10.73 K/UL (ref 3.9–12.7)
Z-SCORE OF LEFT VENTRICULAR DIMENSION IN END DIASTOLE: -1.12
Z-SCORE OF LEFT VENTRICULAR DIMENSION IN END SYSTOLE: -0.63

## 2024-10-11 PROCEDURE — 93924 LWR XTR VASC STDY BILAT: CPT | Mod: 26,,, | Performed by: INTERNAL MEDICINE

## 2024-10-11 PROCEDURE — 93306 TTE W/DOPPLER COMPLETE: CPT | Mod: 26,,, | Performed by: INTERNAL MEDICINE

## 2024-10-11 PROCEDURE — 93306 TTE W/DOPPLER COMPLETE: CPT

## 2024-10-11 PROCEDURE — 93924 LWR XTR VASC STDY BILAT: CPT

## 2024-10-11 PROCEDURE — 93880 EXTRACRANIAL BILAT STUDY: CPT | Mod: 26,,, | Performed by: INTERNAL MEDICINE

## 2024-10-11 PROCEDURE — 93880 EXTRACRANIAL BILAT STUDY: CPT

## 2024-10-14 ENCOUNTER — OFFICE VISIT (OUTPATIENT)
Dept: NEUROLOGY | Facility: CLINIC | Age: 64
End: 2024-10-14
Payer: COMMERCIAL

## 2024-10-14 ENCOUNTER — LAB VISIT (OUTPATIENT)
Dept: LAB | Facility: HOSPITAL | Age: 64
End: 2024-10-14
Payer: COMMERCIAL

## 2024-10-14 ENCOUNTER — PATIENT MESSAGE (OUTPATIENT)
Dept: OTOLARYNGOLOGY | Facility: CLINIC | Age: 64
End: 2024-10-14
Payer: MEDICARE

## 2024-10-14 VITALS
SYSTOLIC BLOOD PRESSURE: 125 MMHG | HEART RATE: 69 BPM | WEIGHT: 103.63 LBS | DIASTOLIC BLOOD PRESSURE: 77 MMHG | RESPIRATION RATE: 16 BRPM | OXYGEN SATURATION: 99 % | BODY MASS INDEX: 19.57 KG/M2 | HEIGHT: 61 IN

## 2024-10-14 DIAGNOSIS — R51.9 ACUTE NONINTRACTABLE HEADACHE, UNSPECIFIED HEADACHE TYPE: Primary | ICD-10-CM

## 2024-10-14 DIAGNOSIS — R53.83 FATIGUE, UNSPECIFIED TYPE: ICD-10-CM

## 2024-10-14 DIAGNOSIS — M25.512 CHRONIC LEFT SHOULDER PAIN: ICD-10-CM

## 2024-10-14 DIAGNOSIS — R09.82 POSTNASAL DRIP: ICD-10-CM

## 2024-10-14 DIAGNOSIS — R06.83 SNORING: ICD-10-CM

## 2024-10-14 DIAGNOSIS — R51.9 ACUTE NONINTRACTABLE HEADACHE, UNSPECIFIED HEADACHE TYPE: ICD-10-CM

## 2024-10-14 DIAGNOSIS — R51.9 PRESSURE IN HEAD: ICD-10-CM

## 2024-10-14 DIAGNOSIS — G89.29 CHRONIC LEFT SHOULDER PAIN: ICD-10-CM

## 2024-10-14 DIAGNOSIS — G47.30 SLEEP APNEA, UNSPECIFIED TYPE: ICD-10-CM

## 2024-10-14 LAB
FOLATE SERPL-MCNC: 12.8 NG/ML (ref 4–24)
HCYS SERPL-SCNC: 7.6 UMOL/L (ref 4–15.5)

## 2024-10-14 PROCEDURE — 3288F FALL RISK ASSESSMENT DOCD: CPT | Mod: CPTII,S$GLB,,

## 2024-10-14 PROCEDURE — 3074F SYST BP LT 130 MM HG: CPT | Mod: CPTII,S$GLB,,

## 2024-10-14 PROCEDURE — 3008F BODY MASS INDEX DOCD: CPT | Mod: CPTII,S$GLB,,

## 2024-10-14 PROCEDURE — 36415 COLL VENOUS BLD VENIPUNCTURE: CPT

## 2024-10-14 PROCEDURE — 4010F ACE/ARB THERAPY RXD/TAKEN: CPT | Mod: CPTII,S$GLB,,

## 2024-10-14 PROCEDURE — 1101F PT FALLS ASSESS-DOCD LE1/YR: CPT | Mod: CPTII,S$GLB,,

## 2024-10-14 PROCEDURE — 3078F DIAST BP <80 MM HG: CPT | Mod: CPTII,S$GLB,,

## 2024-10-14 PROCEDURE — 86038 ANTINUCLEAR ANTIBODIES: CPT

## 2024-10-14 PROCEDURE — 99999 PR PBB SHADOW E&M-EST. PATIENT-LVL V: CPT | Mod: PBBFAC,,,

## 2024-10-14 PROCEDURE — 83090 ASSAY OF HOMOCYSTEINE: CPT

## 2024-10-14 PROCEDURE — 99205 OFFICE O/P NEW HI 60 MIN: CPT | Mod: S$GLB,,,

## 2024-10-14 PROCEDURE — 1159F MED LIST DOCD IN RCRD: CPT | Mod: CPTII,S$GLB,,

## 2024-10-14 PROCEDURE — 3044F HG A1C LEVEL LT 7.0%: CPT | Mod: CPTII,S$GLB,,

## 2024-10-14 PROCEDURE — 1126F AMNT PAIN NOTED NONE PRSNT: CPT | Mod: CPTII,S$GLB,,

## 2024-10-14 PROCEDURE — 82746 ASSAY OF FOLIC ACID SERUM: CPT

## 2024-10-14 NOTE — PROGRESS NOTES
"Subjective:       Patient ID: Ivone Monryo is a 64 y.o. female.      Chief Complaint: "Head Pressure"        HPI    HPI 64 Years old Female with PMHx of NSTEMI / HTN / Gall Bladder Cancer / Diverticulitis  and other medical conditions came for the evaluation and recommendation of "Head Pressure".    Started: In August 2024, the patient presented with complaints of "head pressure," which they initially believed was related to the recent initiation of blood pressure medications (Amlodipine and Hydrochlorothiazide) prescribed by their primary care physician for hypertension management. However, both medications were discontinued due to a rash and inadequate blood pressure control. The patient was subsequently started on Losartan 50 mg daily and Toprol-XL 25 mg daily, taken at bedtime. Upon evaluation, the patient denied any association between the changes in blood pressure medication and the head pressure symptoms. Notably, they reported a reduction in the intensity of head pressure after taking Tylenol the previous day.  Describes: Patient describes "Pressure" to face including (bilateral cheeks, bilateral frontal areas, and nose)  Timing: Duration of 5-10 minutes  Frequency: Intermittent, Daily   Pain: 4-8/10 to Head Pressure   Location: CNS  Family: No known family history of Migraines.    Medications: OTC Tylenol / Claritin - helps to decrease pressure  Worsen: Stress / Denies worsening of pressure with bending the head downward. Denies worsening with Valsalva maneuver.  Alleviated: Better with laying down  Associated symptoms: Associated left shoulder Pain "Aching" / Nasal Drip / The patient experienced ear fullness that resolved spontaneously, resulting in an improvement in hearing ability following the resolution of the fullness.   Pertinent Negatives: No associated neurological deficits, no scalp sensitivity, nausea, vomiting, neck pain with radiation, ear ringing, dizziness, balance issues, acute " thunderclap onset, double or blurry vision, focal or bilateral limb weakness, or extremity numbness and tingling. No light or sound sensitivity. No loss of awareness.   Triggers: Stress  Prodrome symptoms: None    Exclusions: No head history of trauma, seizures, smoking history, caffeine overuse, vertigo, blackouts, fever, chills, or significant memory loss. No history of strokes or falls.    Ophthalmological History: Last eye clinic appointment on over 5 years ago, with normal pressures, no papilledema, and no abnormalities reported.     Blood Pressure: Compliant with medication; home readings around 140's/90's.    Sleep History: Patient does report symptoms of sleep apnea (e.g., snoring, gasping, frequent nighttime awakening, daytime fatigue).     ER Evaluation: On October 6, 2024, the patient was evaluated in the emergency department at [facility] due to an increase in the intensity of head pressure. During the evaluation, elevated cardiac enzymes were noted; however, the patient reported that a subsequent heart catheterization showed no abnormalities.    Referral History: The cardiologist conducted a comprehensive evaluation and found no acute abnormalities per patient. The primary care physician's workup also returned negative results; however, arthritis was diagnosed in the left shoulder, and the patient is scheduled for physical therapy. Additionally, the patient has a history of temporomandibular joint (TMJ) disorder and has not been compliant with the recommended use of a mouth guard. A dental appointment is scheduled for today to address this issue.    Review of Systems   Constitutional:  Positive for fatigue. Negative for activity change, appetite change, chills, diaphoresis, fever and unexpected weight change.   HENT:  Positive for postnasal drip. Negative for congestion, dental problem, drooling, ear discharge, ear pain, facial swelling, hearing loss, mouth sores, nosebleeds, rhinorrhea, sinus  pressure, sinus pain, sneezing, sore throat, tinnitus, trouble swallowing and voice change.         The patient experienced ear fullness that resolved spontaneously, resulting in an improvement in hearing ability following the resolution of the fullness.     Eyes:  Negative for photophobia, pain, discharge, redness, itching and visual disturbance.   Respiratory:  Positive for apnea. Negative for cough, chest tightness, shortness of breath and wheezing.    Cardiovascular:  Negative for chest pain, palpitations and leg swelling.   Gastrointestinal:  Negative for abdominal distention, abdominal pain, blood in stool, constipation, diarrhea, nausea and vomiting.   Endocrine: Negative for cold intolerance, heat intolerance, polydipsia, polyphagia and polyuria.   Genitourinary:  Negative for decreased urine volume, difficulty urinating, dysuria, flank pain, frequency, hematuria, pelvic pain, urgency and vaginal discharge.   Musculoskeletal:  Positive for arthralgias. Negative for back pain, gait problem, joint swelling, myalgias, neck pain and neck stiffness.   Skin:  Negative for color change and rash.   Allergic/Immunologic: Negative for immunocompromised state.   Neurological:  Positive for headaches. Negative for dizziness, tremors, seizures, syncope, facial asymmetry, speech difficulty, weakness, light-headedness and numbness.   Hematological:  Negative for adenopathy. Does not bruise/bleed easily.   Psychiatric/Behavioral:  Positive for sleep disturbance. Negative for agitation, behavioral problems, confusion, decreased concentration, dysphoric mood, hallucinations, self-injury and suicidal ideas. The patient is not nervous/anxious and is not hyperactive.    All other systems reviewed and are negative.                Current Outpatient Medications:     clobetasoL (TEMOVATE) 0.05 % external solution, Apply topically 2 (two) times daily as needed for alopecia. Strong steroid- do NOT use on face., Disp: 50 mL, Rfl: 0     desonide (DESOWEN) 0.05 % cream, Apply to the affected area once to twice daily for vitiligo. May use for up to 4 weeks., Disp: 60 g, Rfl: 0    dicyclomine (BENTYL) 10 MG capsule, Take 10 mg by mouth 2 (two) times daily., Disp: , Rfl: 11    LORazepam (ATIVAN) 0.5 MG tablet, Take 1 tablet (0.5 mg total) by mouth every 12 (twelve) hours as needed for Anxiety., Disp: 15 tablet, Rfl: 0    losartan (COZAAR) 50 MG tablet, Take 1 tablet (50 mg total) by mouth once daily., Disp: 30 tablet, Rfl: 3    metoprolol succinate (TOPROL-XL) 25 MG 24 hr tablet, Take 25 mg by mouth once daily., Disp: , Rfl:     multivitamin (THERAGRAN) per tablet, Take 1 tablet by mouth once daily., Disp: , Rfl:     pantoprazole (PROTONIX) 40 MG tablet, Take 40 mg by mouth once daily., Disp: , Rfl:     tacrolimus (PROTOPIC) 0.1 % ointment, apply to affected area twice daily of vitiligo. Non-steroid med., Disp: 30 g, Rfl: 1    Past Medical History:   Diagnosis Date    Anemia     Cancer of gallbladder 2015    chemo radiation - Dr Gatica     Diverticulitis     GERD (gastroesophageal reflux disease)        Past Surgical History:   Procedure Laterality Date    exploration of gallbladder twice for suspected malignancy with no resection      TUBAL LIGATION  8/24/1996       Social History     Socioeconomic History    Marital status:      Spouse name: Elba    Number of children: 3   Occupational History    Occupation: Public      Employer: [employer]     Employer: [employer]   Tobacco Use    Smoking status: Never    Smokeless tobacco: Never   Substance and Sexual Activity    Alcohol use: Yes     Alcohol/week: 2.0 standard drinks of alcohol     Types: 2 Glasses of wine per week     Comment: socially    Drug use: No    Sexual activity: Yes     Partners: Male     Birth control/protection: None     Comment:      Social Drivers of Health     Financial Resource Strain: Medium Risk (10/31/2023)    Overall Financial  Resource Strain (CARDIA)     Difficulty of Paying Living Expenses: Somewhat hard   Food Insecurity: No Food Insecurity (7/7/2024)    Hunger Vital Sign     Worried About Running Out of Food in the Last Year: Never true     Ran Out of Food in the Last Year: Never true   Transportation Needs: No Transportation Needs (10/31/2023)    PRAPARE - Transportation     Lack of Transportation (Medical): No     Lack of Transportation (Non-Medical): No   Physical Activity: Sufficiently Active (7/7/2024)    Exercise Vital Sign     Days of Exercise per Week: 4 days     Minutes of Exercise per Session: 40 min   Stress: No Stress Concern Present (7/7/2024)    [employer] - Occupational Stress Questionnaire     Feeling of Stress : Only a little   Housing Stability: Low Risk  (10/31/2023)    Housing Stability Vital Sign     Unable to Pay for Housing in the Last Year: No     Number of Places Lived in the Last Year: 1     Unstable Housing in the Last Year: No         Past/Current Medical/Surgical History, Past/Current Social History, Past/Current Family History and Past/Current Medications were reviewed in detail.    Objective:           VITAL SIGNS WERE REVIEWED      GENERAL APPEARANCE:     The patient looks comfortable.    BMI    No signs of respiratory distress.    Normal breathing pattern.    No dysmorphic features    Normal eye contact.       GENERAL MEDICAL EXAM:    HEENT:  Head is atraumatic normocephalic.     FUNDUSCOPIC (OPHTHALMOSCOPIC) EXAMINATION showed no disc edema (papilledema).      NECK: No JVD. No visible lesions or goiters.     CHEST-CARDIOPULMONARY: No cyanosis. No tachypnea. Normal respiratory effort.    [id number]: No jaundice. No stomas or lesions. No visible hernias. No catheters.     SKIN, HAIR, NAILS: No pathognomonic skin rash.No neurofibromatosis. No visible lesions.No stigmata of autoimmune disease. No clubbing.    LIMBS: No varicose veins. No visible  swelling.    MUSCULOSKELETAL: No visible deformities.No visible lesions.             Neurological Exam  Mental Status  Awake, alert and oriented to person, place and time. Oriented to person, place, time and situation. Recent and remote memory are intact. At 5 minutes recalls 3 of 3 objects. Speech is normal. Language is fluent with no aphasia. Attention and concentration are normal. Fund of knowledge is appropriate for level of education. Apraxia absent.    Cranial Nerves  CN I: Sense of smell is normal.  CN II: Visual acuity is normal. Visual fields full to confrontation. Right funduscopic exam: disc intact. Left funduscopic exam: disc intact.  CN III, IV, VI: Extraocular movements intact bilaterally. Normal lids and orbits bilaterally. Pupils equal round and reactive to light bilaterally.  CN V: Facial sensation is normal.  CN VII: Full and symmetric facial movement.  CN VIII: Hearing is normal.  CN IX, X: Palate elevates symmetrically. Normal gag reflex.  CN XI: Shoulder shrug strength is normal.  CN XII: Tongue midline without atrophy or fasciculations.    Motor  Normal muscle bulk throughout. No fasciculations present. Normal muscle tone. No abnormal involuntary movements. Strength is 5/5 throughout all four extremities.    Sensory  Sensation is intact to light touch, pinprick, vibration and proprioception in all four extremities.    Reflexes  Deep tendon reflexes are 2+ and symmetric in all four extremities.    Coordination  Right: Finger-to-nose normal. Rapid alternating movement normal. Heel-to-shin normal.Left: Finger-to-nose normal. Rapid alternating movement normal. Heel-to-shin normal.    Gait  Casual gait is normal including stance, stride, and arm swing.Normal toe walking. Normal heel walking. Normal tandem gait. Romberg is absent. Normal pull test. Able to rise from chair without using arms.        Lab Results   Component Value Date    WBC 10.73 10/10/2024    HGB 12.8 10/10/2024    HCT 42.7  10/10/2024     (H) 10/10/2024     10/10/2024       Sodium   Date Value Ref Range Status   10/10/2024 140 136 - 145 mmol/L Final     Potassium   Date Value Ref Range Status   10/10/2024 4.1 3.5 - 5.1 mmol/L Final     Chloride   Date Value Ref Range Status   10/10/2024 106 95 - 110 mmol/L Final     CO2   Date Value Ref Range Status   10/10/2024 24 23 - 29 mmol/L Final     Glucose   Date Value Ref Range Status   10/10/2024 46 (LL) 70 - 110 mg/dL Final     Comment:     *Critical value notification by DARIAN with confirmation of receipt to   Dr. BASHIR Epps at  Date10/10/24 Time23:01       BUN   Date Value Ref Range Status   10/10/2024 12 8 - 23 mg/dL Final     Creatinine   Date Value Ref Range Status   10/10/2024 0.9 0.5 - 1.4 mg/dL Final     Calcium   Date Value Ref Range Status   10/10/2024 9.4 8.7 - 10.5 mg/dL Final     Total Protein   Date Value Ref Range Status   10/10/2024 7.0 6.0 - 8.4 g/dL Final     Albumin   Date Value Ref Range Status   10/10/2024 3.8 3.5 - 5.2 g/dL Final     Total Bilirubin   Date Value Ref Range Status   10/10/2024 0.4 0.1 - 1.0 mg/dL Final     Comment:     For infants and newborns, interpretation of results should be based  on gestational age, weight and in agreement with clinical  observations.    Premature Infant recommended reference ranges:  Up to 24 hours.............<8.0 mg/dL  Up to 48 hours............<12.0 mg/dL  3-5 days..................<15.0 mg/dL  6-29 days.................<15.0 mg/dL       Alkaline Phosphatase   Date Value Ref Range Status   10/10/2024 65 55 - 135 U/L Final     AST   Date Value Ref Range Status   10/10/2024 23 10 - 40 U/L Final     ALT   Date Value Ref Range Status   10/10/2024 21 10 - 44 U/L Final     Anion Gap   Date Value Ref Range Status   10/10/2024 10 8 - 16 mmol/L Final     eGFR if    Date Value Ref Range Status   08/22/2020 >60.0 >60 mL/min/1.73 m^2 Final     eGFR if non    Date Value Ref Range Status  "  08/22/2020 >60.0 >60 mL/min/1.73 m^2 Final     Comment:     Calculation used to obtain the estimated glomerular filtration  rate (eGFR) is the CKD-EPI equation.          Lab Results   Component Value Date    [id number] 959 (H) 08/16/2024       Lab Results   Component Value Date    TSH 2.860 10/10/2024       No results found in the last 24 hours.    No results found in the last 24 hours.    Reviewed the neuroimaging independently       Assessment:   64 Years old Female with PMH as above came for an evaluation of "Head Pressure".    Acute nonintractable headache, unspecified headache type     Pressure in head     Postnasal drip     Snoring     Fatigue, unspecified type     Sleep apnea, unspecified type     Chronic left shoulder pain     Plan:   The patient's neurological assessment is non-focal, and the history and physical examination suggest the need to rule out sinus-related headaches and Tension Type Headaches. Additionally, intracranial hypertension (IIH) will also be considered in the differential diagnosis due to the reported head pressure.      Acute nonintractable headache, unspecified headache type / Pressure in head     -Pharmacological therapy for headaches was discussed with the patient; however, they declined the offer and expressed a preference to continue using over-the-counter Tylenol, which they report has been effective in alleviating their symptoms.    -The patient requested to postpone testing for HIV and RPR (Rapid Plasma Reagin).    -Order ZULMA / FA / Homocysteine / for Neurological Evaluation.     -Order Brain MRI WO Contrast to rule out structural abnormalities contributing to symptoms.    -Order an ophthalmology referral for a routine eye exam to assess for any abnormalities, including papilledema, that may be contributing to the patient's head pressure.    Postnasal drip     -Order ENT Referral for further evaluation and recommendation of Headaches, Postnasal Drip, Ear Fullness, and TMJ " History.     -Continue following with Dentist for TMJ management.     Snoring / Fatigue, unspecified type / Sleep apnea, unspecified type     -Order Sleep Disorders Referral to rule out SUSSY.     Chronic left shoulder pain     -Continue following with PCP for management and continue Planned PT evaluation.            LABORATORY EVALUATION    Labs: (2024) A1C / TSH / Lipid Panel / CMP / CBC / Sed Rate / CRP / Magnesium / B-12 / Vitamin D /   -personally reviewed -non-significant abnormalities except     Glucose (46) - followed by PCP      RADIOLOGY EVALUATION     Personally Reviewed X-Ray Cervical Spine - done 10/2024 - no acute abnormalities, Focal disc degeneration at C5-6     Personally Reviewed X-Ray Sinuses - 10/2024 - No acute findings     Personally Reviewed X-Ray Left Shoulder - 10/2024 - No acute findings       NEUROPHYSIOLOGY EVALUATION       PATHOLOGY EVALUATION        NEUROCOGNITIVE AND NEUROPSYCHOLOGY EVALUATION                         TENSION HEADACHE (TENSION-TYPE HEADACHE TTH)      MANAGEMENT    Discussed the fact that management of TTH is difficult and should be individualized. A combination of therapeutic methods can be utilized to decrease the burden of TTH and lead to acceptable outcome.      NON-PHARMACOLOGICAL MANAGEMENT        Recommended nonpharmacologic management as the first line and this includes:     Physical and occupational therapy.     Avoiding triggers.    Regulation of sleep, exercise, hydration and meals.    Relaxation and stress management.    Yoga and deep breathing.    Biofeedback.    Self-hypnosis.    Cognitive behavioral therapy.      PHARMACOLOGICAL MANAGEMENT        Limit needed over-the-counter medications to no more than two times a week to avoid rebound, medication overuse headaches and the vicious cycle of chronic daily headaches.    Avoid the use of sedative medications like antihistamines, barbiturates (butabital) and opiates (narcotics). Such medications worsen the TTH  and cause chronic daily headaches.     Consider preventive medications if/when TTH attacks occur more than 2 days a week. The goal of preventative treatment is 50% reduction in severity and frequency of TTH attacks. Complete elimination of TTH is currently not possible. The most effective medications are antidepressants. First, second, and third-line preventive medications are amitriptyline, mirtazapine, and venlafaxine, respectively. Explained to the patient that antidepressants are not addictive and, as many other medications, have different clinical uses and utilized in several related and unrelated conditions.     Try Amitriptyline/Elavil. Will titrate very slowly to 25-75 mg QHS which can cause sleepiness, dry eyes, dry mouth, urinary retention and rarely cardiac arrhythmias. The patient verbalized understanding of the most common SEs and was encouraged to read the leaflet for more details.       NEXT OPTIONS    Mirtazapine (Remeron).    Venlafaxine (Effexor)        MEDICAL/SURGICAL COMORBIDITIES     All relevant medical comorbidities noted and managed by primary care physician and medical care team.          HEALTHY LIFESTYLE AND PREVENTATIVE CARE    The patient to adhere to the age-appropriate health maintenance guidelines including screening tests and vaccinations. The patient to adhere to  healthy lifestyle, optimal weight, exercise, healthy diet, good sleep hygiene and avoiding drugs including smoking, alcohol and recreational drugs.    I spent a total of 63 minutes on the day of the visit.This includes face to face time and non-face to face time preparing to see the patient (eg, review of tests), obtaining and/or reviewing separately obtained history, documenting clinical information in the electronic or other health record, independently interpreting results and communicating results to the patient/family/caregiver, or care coordinator.       Please do not hesitate to contact me with any updates,  questions or concerns.    No follow-ups on file.    Ted Bianchi, MSN, FNP-C    General Neurology

## 2024-10-15 ENCOUNTER — PATIENT MESSAGE (OUTPATIENT)
Dept: CARDIOLOGY | Facility: HOSPITAL | Age: 64
End: 2024-10-15
Payer: MEDICARE

## 2024-10-15 LAB — ANA SER QL IF: NORMAL

## 2024-10-17 NOTE — PROGRESS NOTES
Chief complaint:    Chief Complaint   Patient presents with    Sinus Problem     Pt is coming in today for sinus problem pt states this problem has been going on for a month now pt states she is having congestion problems pt also states she having dizziness problems to she take tylenol but its not working her neurologist referred her to see ENT for further evaluation              Referring Provider:  Self, Aaareferral  No address on file      History of present illness:     Ms. Monroy is a 64 y.o. presenting for evaluation of sinus issues.     She endorses headache, facial pressure for 3-4 months. Noted over the forehead, temples, ears, occiput, cheeks. Almost entire head.    Associated sensitivity to light at times. Did have an episode of visual aura.     No photophobia, n/v, lightheadedness   Denies nasal obstruction, purulent rhinorrhea, or anosmia/hyposmia.      At first was worse in the evenings. Now also worse in the mornings.     She states it started a couple weeks after changing blood pressure medications. Has been seen by cardiology and BP well controlled.    Recently was in the hospital for 2 days due to the severity of the headache. Underwent heart cath which was normal. Headache has no improved.    Treatment has included tylenol with good, but temporary improvement.     She denies room spinning dizziness.            History      Past Medical History:   Past Medical History:   Diagnosis Date    Anemia     Cancer of gallbladder 2015    chemo radiation - Dr Gatica     Diverticulitis     GERD (gastroesophageal reflux disease)          Past Surgical History:  Past Surgical History:   Procedure Laterality Date    exploration of gallbladder twice for suspected malignancy with no resection      TUBAL LIGATION  8/24/1996         Medications: Medication list reviewed. She  has a current medication list which includes the following prescription(s): clobetasol, desonide, dicyclomine, losartan, metoprolol  succinate, multivitamin, pantoprazole, tacrolimus, and lorazepam.     Allergies:   Review of patient's allergies indicates:   Allergen Reactions    Sunscreen spf 8 [oxybenzone-padimate o] Swelling     Blisters to skin.   Blisters to skin.     Iodine and iodide containing products Swelling     Lips swell with sores    Shellfish containing products Swelling     Lips swell          Family history: family history includes Arthritis in her mother; Cancer in her father; Colon cancer in her cousin and paternal uncle; Diabetes in her mother; Heart disease in her father and mother.         Social History          Alcohol use:  reports current alcohol use of about 2.0 standard drinks of alcohol per week.            Tobacco:  reports that she has never smoked. She has never used smokeless tobacco.         Physical Examination      Vitals: Weight 47.7 kg (105 lb 2.6 oz).      General: Well developed, well nourished, well hydrated.     Voice: no dysphonia, no dysarthria      Head/Face: Normocephalic, atraumatic. No scars or lesions. Facial musculature equal.     Eyes: No scleral icterus or conjunctival hemorrhage. EOMI. PERRLA.     Ears:     Right ear: No gross deformity. EAC is clear of debris and erythema. TM are intact with a pneumatized middle ear. No signs of retraction, fluid or infection.      Left ear: No gross deformity. EAC is clear of debris and erythema. TM are intact with a pneumatized middle ear. No signs of retraction, fluid or infection.      Nose: No gross deformity or lesions. No purulent discharge. No significant NSD.     Mouth/Oropharynx: Lips without any lesions. No mucosal lesions within the oropharynx. No tonsillar exudate or lesions. Pharyngeal walls symmetrical. Uvula midline. Tongue midline without lesions.     Neurologic: Moving all extremities without gross abnormality.CN II-XII grossly intact. House-Brackmann 1/6. No signs of nystagmus.          Data reviewed      Review of records:      I reviewed  records from the referring provider's office visits describing the history, workup, and/or treatment of this problem thus far.      Images    I have independently reviewed the following imaging:    XR sinus 10/10/24    Clear paranasal sinuses       Neurology 10/14/24  Acute nonintractable headache, unspecified headache type / Pressure in head      -Pharmacological therapy for headaches was discussed with the patient; however, they declined the offer and expressed a preference to continue using over-the-counter Tylenol, which they report has been effective in alleviating their symptoms.     -The patient requested to postpone testing for HIV and RPR (Rapid Plasma Reagin).     -Order ZULMA / FA / Homocysteine / for Neurological Evaluation.      -Order Brain MRI WO Contrast to rule out structural abnormalities contributing to symptoms.     -Order an ophthalmology referral for a routine eye exam to assess for any abnormalities, including papilledema, that may be contributing to the patient's head pressure.     Postnasal drip      -Order ENT Referral for further evaluation and recommendation of Headaches, Postnasal Drip, Ear Fullness, and TMJ History.      -Continue following with Dentist for TMJ management.      Snoring / Fatigue, unspecified type / Sleep apnea, unspecified type      -Order Sleep Disorders Referral to rule out SUSSY.         Procedure: ear microscopy with removal of cerumen    Description: The patient was in agreement with the examination and debridement of the ears. Removal of the cerumen required use of an operating microscope and multiple micro-instruments.     With the patient in the supine position, we used the operating microscope to examine both ears with the appropriate sized ear speculum.  A variety of sterile, micro-instruments were utilized to remove the cerumen atraumatically.  I performed the procedure which required a significant amount of time and effort. The tympanic membrane was then well  visualized.  The patient tolerated the procedure well and there were no complications.    Findings:   Right ear had significant wax, the EAC was normal, and the tympanic membrane was intact with no evidence of middle ear fluid.    Left ear had significant wax, the EAC was normal, and the tympanic membrane was intact with no evidence of middle ear fluid.        Assessment/Plan:    1. Acute nonintractable headache, unspecified headache type    2. Impacted cerumen, bilateral        Her sinuses and ears are clear and XR was negative for sinusitis. She does not have clinical evidence of Chronic Rhinosinusitis.  We discussed medical management of her severe daily headaches with neurology. Consider medical therapy beyond OTC medications as offered. She will call their office back.      Samuel Silveira MD  [facility]   [facility] - [address]  [place] [address]  P: [phone]  F: [phone]

## 2024-10-18 ENCOUNTER — OFFICE VISIT (OUTPATIENT)
Dept: OTOLARYNGOLOGY | Facility: CLINIC | Age: 64
End: 2024-10-18
Payer: COMMERCIAL

## 2024-10-18 ENCOUNTER — TELEPHONE (OUTPATIENT)
Dept: NEUROLOGY | Facility: CLINIC | Age: 64
End: 2024-10-18
Payer: MEDICARE

## 2024-10-18 VITALS — BODY MASS INDEX: 19.87 KG/M2 | WEIGHT: 105.19 LBS

## 2024-10-18 DIAGNOSIS — R51.9 ACUTE NONINTRACTABLE HEADACHE, UNSPECIFIED HEADACHE TYPE: ICD-10-CM

## 2024-10-18 DIAGNOSIS — R51.9 ACUTE NONINTRACTABLE HEADACHE, UNSPECIFIED HEADACHE TYPE: Primary | ICD-10-CM

## 2024-10-18 DIAGNOSIS — H61.23 IMPACTED CERUMEN, BILATERAL: ICD-10-CM

## 2024-10-18 PROCEDURE — 99999 PR PBB SHADOW E&M-EST. PATIENT-LVL III: CPT | Mod: PBBFAC,,, | Performed by: STUDENT IN AN ORGANIZED HEALTH CARE EDUCATION/TRAINING PROGRAM

## 2024-10-18 RX ORDER — TOPIRAMATE 25 MG/1
25 TABLET ORAL 2 TIMES DAILY
Qty: 60 TABLET | Refills: 0 | Status: SHIPPED | OUTPATIENT
Start: 2024-10-18 | End: 2024-11-17

## 2024-10-18 NOTE — TELEPHONE ENCOUNTER
Please tell the patient:    I have sent in a prescription for Topamax (25 mg, PO BID) for headache prevention therapy. Please be aware that potential side effects may include dizziness, drowsiness, fatigue, mood changes, numbness, glaucoma, and kidney stones. If you experience any of these side effects, please let me know so we can explore alternative therapies.   Please let me know if you have any questions or concerns.  Have a great day!    ONEIDA Jean

## 2024-10-18 NOTE — TELEPHONE ENCOUNTER
Patient left message with  that the ENT stated that her headaches are not caused from sinuses. Forwarded this message to ms. Jean. Ms. Jean asked that call patient to advise of:    -Pharmacological therapy for headaches was discussed with the patient; however, they declined the offer and expressed a preference to continue using over-the-counter Tylenol, which they report has been effective in alleviating their symptoms. -The patient requested to postpone testing for HIV and RPR (Rapid Plasma Reagin). -Order ZULMA / FA / Homocysteine / for Neurological Evaluation. -Order Brain MRI WO Contrast to rule out structural abnormalities contributing to symptoms. -Order an ophthalmology referral for a routine eye exam to assess for any abnormalities, including papilledema, that may be contributing to the patient's head pressure. Postnasal drip -Order ENT Referral for further evaluation and recommendation of Headaches, Postnasal Drip, Ear Fullness, and TMJ History. -Continue following with Dentist for TMJ management. Snoring / Fatigue, unspecified type / Sleep apnea, unspecified type -Order Sleep Disorders Referral to rule out SUSSY. ---- continue with plan of care and if she'd to start medications, let us know.           I called patient with this information. She asked that something be sent in for the headaches. Confirmed that she would like her meds sent to [facility] on [address]. Pt verbalized understanding.

## 2024-10-18 NOTE — TELEPHONE ENCOUNTER
Yes ma'am. I spoke with her and advised her to call us if she experiences any adverse side effects! She verbalized understanding.

## 2024-10-18 NOTE — TELEPHONE ENCOUNTER
Called pt to advise of potential side effects from medication. Let her know to call us if she experiences any adverse side effects, and Ms. Jean will try another medication. Pt verbalized understanding.

## 2024-10-21 ENCOUNTER — OFFICE VISIT (OUTPATIENT)
Dept: OPHTHALMOLOGY | Facility: CLINIC | Age: 64
End: 2024-10-21
Payer: COMMERCIAL

## 2024-10-21 DIAGNOSIS — R51.9 ACUTE NONINTRACTABLE HEADACHE, UNSPECIFIED HEADACHE TYPE: Primary | ICD-10-CM

## 2024-10-21 DIAGNOSIS — H25.813 COMBINED FORM OF AGE-RELATED CATARACT, BOTH EYES: ICD-10-CM

## 2024-10-21 PROCEDURE — 99999 PR PBB SHADOW E&M-EST. PATIENT-LVL III: CPT | Mod: PBBFAC,,, | Performed by: OPTOMETRIST

## 2024-10-21 PROCEDURE — 3044F HG A1C LEVEL LT 7.0%: CPT | Mod: CPTII,S$GLB,, | Performed by: OPTOMETRIST

## 2024-10-21 PROCEDURE — 4010F ACE/ARB THERAPY RXD/TAKEN: CPT | Mod: CPTII,S$GLB,, | Performed by: OPTOMETRIST

## 2024-10-21 PROCEDURE — 99204 OFFICE O/P NEW MOD 45 MIN: CPT | Mod: S$GLB,,, | Performed by: OPTOMETRIST

## 2024-10-21 PROCEDURE — 1159F MED LIST DOCD IN RCRD: CPT | Mod: CPTII,S$GLB,, | Performed by: OPTOMETRIST

## 2024-10-21 NOTE — PROGRESS NOTES
HPI     Annual Exam            Comments: NP to [facility]  Patient here today for yearly eye exam            Comments    Vision changes since last eye exam?: Yes at distance and near  No correction     Any eye pain today: No    Other ocular symptoms: Pressure behind eyes  Went to ENT referred to see optometry     Interested in contact lens fitting today? No                      Last edited by Alycia Weinberg, PCT on 10/21/2024  1:29 PM.            Assessment /Plan     For exam results, see Encounter Report.    1. Acute nonintractable headache, unspecified headache type  Not likely ocular in origin  Minimal refractive error  Normal, well-perfused optic nerves bilaterally with no edema  Continue care with PCP and/or specialists   Consider HVF 30-2 if symptoms persist.      2. Combined form of age-related cataract, both eyes  Cataracts are not visually significant and not affecting activities of daily living. Annual observation is recommended at this time. Patient to call or return to clinic with any significant change in vision prior to next visit.    3. Refractive error  Doing well with OTC readers PRN.     RTC 1 yr for dilated eye exam or sooner if any changes to vision.   Discussed above and answered questions.

## 2024-10-23 DIAGNOSIS — F41.8 SITUATIONAL ANXIETY: ICD-10-CM

## 2024-10-23 RX ORDER — LORAZEPAM 0.5 MG/1
0.5 TABLET ORAL EVERY 12 HOURS PRN
Qty: 15 TABLET | Refills: 0 | Status: SHIPPED | OUTPATIENT
Start: 2024-10-23 | End: 2024-11-22

## 2024-10-23 NOTE — TELEPHONE ENCOUNTER
No care due was identified.  [facility] Embedded Care Due Messages. Reference number: [id number].   10/23/2024 1:46:45 PM CDT

## 2024-10-24 ENCOUNTER — PATIENT MESSAGE (OUTPATIENT)
Dept: OPHTHALMOLOGY | Facility: CLINIC | Age: 64
End: 2024-10-24
Payer: MEDICARE

## 2024-10-25 ENCOUNTER — TELEPHONE (OUTPATIENT)
Dept: NEUROLOGY | Facility: CLINIC | Age: 64
End: 2024-10-25
Payer: MEDICARE

## 2024-10-25 DIAGNOSIS — Z51.81 MEDICATION MONITORING ENCOUNTER: Primary | ICD-10-CM

## 2024-10-25 DIAGNOSIS — R51.9 ACUTE NONINTRACTABLE HEADACHE, UNSPECIFIED HEADACHE TYPE: ICD-10-CM

## 2024-10-25 RX ORDER — NORTRIPTYLINE HYDROCHLORIDE 25 MG/1
25 CAPSULE ORAL NIGHTLY
Qty: 30 CAPSULE | Refills: 0 | Status: SHIPPED | OUTPATIENT
Start: 2024-10-25 | End: 2024-11-24

## 2024-10-25 NOTE — TELEPHONE ENCOUNTER
Called pt to schedule EKG per Ms. Jean to ensure that the patient's heart rhythm is good before starting new medication. Pt advised to not take the medication until we can call her with the results. Pt verbalized understanding

## 2024-10-25 NOTE — TELEPHONE ENCOUNTER
Would like to start Pamelor 25 mg PO QHS for headache prevention therapy. Called patient and discussed in detail Cardiac History. Patient reports that a MI was suspected at ER visit to [facility], but upon further evaluation and testing it was concluded that she did not have an MI at the [facility] per patient. Patient denies chest pain, SOB, or heart palpitations. She is receptive to starting Pamelor for headache therapy and understands the risks. Instructed her to continue following Cardiology Routinely and to see if they agree with treatment. Plan to check EKG prior to and after treatment initiation.

## 2024-10-25 NOTE — TELEPHONE ENCOUNTER
Called pt to discuss ER visit from 10/08/24. She spoke with Ms. Jean and expressed her concerns. She was advised to discontinue Topamax, and to start Nortriptyline- 1 tablet, nightly, tomorrow night 10/26 (being sent to [facility] on [address]) She was advised to continue care with her cardiologist and Ms. Jean would order more tests in a month to see how the medication is affecting her. Pt verbalized understanding.

## 2024-10-28 ENCOUNTER — TELEPHONE (OUTPATIENT)
Dept: NEUROLOGY | Facility: CLINIC | Age: 64
End: 2024-10-28
Payer: MEDICARE

## 2024-10-28 ENCOUNTER — HOSPITAL ENCOUNTER (OUTPATIENT)
Dept: CARDIOLOGY | Facility: HOSPITAL | Age: 64
Discharge: HOME | End: 2024-10-28
Payer: COMMERCIAL

## 2024-10-28 ENCOUNTER — PATIENT MESSAGE (OUTPATIENT)
Dept: NEUROLOGY | Facility: CLINIC | Age: 64
End: 2024-10-28
Payer: MEDICARE

## 2024-10-28 DIAGNOSIS — Z51.81 MEDICATION MONITORING ENCOUNTER: ICD-10-CM

## 2024-10-28 LAB
OHS QRS DURATION: 74 MS
OHS QTC CALCULATION: 420 MS

## 2024-10-28 PROCEDURE — 93010 ELECTROCARDIOGRAM REPORT: CPT | Mod: ,,, | Performed by: INTERNAL MEDICINE

## 2024-10-28 PROCEDURE — 93005 ELECTROCARDIOGRAM TRACING: CPT

## 2024-10-29 ENCOUNTER — CLINICAL SUPPORT (OUTPATIENT)
Dept: REHABILITATION | Facility: HOSPITAL | Age: 64
End: 2024-10-29
Payer: COMMERCIAL

## 2024-10-29 DIAGNOSIS — R29.898 DECREASED RANGE OF MOTION OF NECK: Primary | ICD-10-CM

## 2024-10-29 PROCEDURE — 97110 THERAPEUTIC EXERCISES: CPT

## 2024-10-29 PROCEDURE — 97162 PT EVAL MOD COMPLEX 30 MIN: CPT

## 2024-10-29 PROCEDURE — 97140 MANUAL THERAPY 1/> REGIONS: CPT

## 2024-10-31 ENCOUNTER — CLINICAL SUPPORT (OUTPATIENT)
Dept: REHABILITATION | Facility: HOSPITAL | Age: 64
End: 2024-10-31
Payer: COMMERCIAL

## 2024-10-31 ENCOUNTER — DOCUMENTATION ONLY (OUTPATIENT)
Dept: REHABILITATION | Facility: HOSPITAL | Age: 64
End: 2024-10-31
Payer: MEDICARE

## 2024-10-31 ENCOUNTER — OFFICE VISIT (OUTPATIENT)
Dept: CARDIOLOGY | Facility: CLINIC | Age: 64
End: 2024-10-31
Payer: COMMERCIAL

## 2024-10-31 VITALS
HEIGHT: 61 IN | HEART RATE: 64 BPM | WEIGHT: 105.38 LBS | OXYGEN SATURATION: 99 % | DIASTOLIC BLOOD PRESSURE: 64 MMHG | SYSTOLIC BLOOD PRESSURE: 120 MMHG | BODY MASS INDEX: 19.9 KG/M2

## 2024-10-31 DIAGNOSIS — R20.2 NUMBNESS AND TINGLING IN LEFT ARM: ICD-10-CM

## 2024-10-31 DIAGNOSIS — R29.898 DECREASED RANGE OF MOTION OF NECK: Primary | ICD-10-CM

## 2024-10-31 DIAGNOSIS — M79.602 LEFT ARM PAIN: ICD-10-CM

## 2024-10-31 DIAGNOSIS — I16.0 HYPERTENSIVE URGENCY: ICD-10-CM

## 2024-10-31 DIAGNOSIS — R29.898 WEAKNESS OF BOTH LOWER EXTREMITIES: ICD-10-CM

## 2024-10-31 DIAGNOSIS — R00.1 SINUS BRADYCARDIA: ICD-10-CM

## 2024-10-31 DIAGNOSIS — K21.9 GASTROESOPHAGEAL REFLUX DISEASE, UNSPECIFIED WHETHER ESOPHAGITIS PRESENT: ICD-10-CM

## 2024-10-31 DIAGNOSIS — I21.4 ACUTE NON-ST ELEVATION MYOCARDIAL INFARCTION (NSTEMI): ICD-10-CM

## 2024-10-31 DIAGNOSIS — R20.0 NUMBNESS AND TINGLING IN LEFT ARM: ICD-10-CM

## 2024-10-31 DIAGNOSIS — I10 PRIMARY HYPERTENSION: Primary | ICD-10-CM

## 2024-10-31 DIAGNOSIS — R94.31 ABNORMAL ECG: ICD-10-CM

## 2024-10-31 PROCEDURE — 3288F FALL RISK ASSESSMENT DOCD: CPT | Mod: CPTII,S$GLB,, | Performed by: STUDENT IN AN ORGANIZED HEALTH CARE EDUCATION/TRAINING PROGRAM

## 2024-10-31 PROCEDURE — 1159F MED LIST DOCD IN RCRD: CPT | Mod: CPTII,S$GLB,, | Performed by: STUDENT IN AN ORGANIZED HEALTH CARE EDUCATION/TRAINING PROGRAM

## 2024-10-31 PROCEDURE — 3008F BODY MASS INDEX DOCD: CPT | Mod: CPTII,S$GLB,, | Performed by: STUDENT IN AN ORGANIZED HEALTH CARE EDUCATION/TRAINING PROGRAM

## 2024-10-31 PROCEDURE — 3044F HG A1C LEVEL LT 7.0%: CPT | Mod: CPTII,S$GLB,, | Performed by: STUDENT IN AN ORGANIZED HEALTH CARE EDUCATION/TRAINING PROGRAM

## 2024-10-31 PROCEDURE — 99999 PR PBB SHADOW E&M-EST. PATIENT-LVL III: CPT | Mod: PBBFAC,,, | Performed by: STUDENT IN AN ORGANIZED HEALTH CARE EDUCATION/TRAINING PROGRAM

## 2024-10-31 PROCEDURE — 1101F PT FALLS ASSESS-DOCD LE1/YR: CPT | Mod: CPTII,S$GLB,, | Performed by: STUDENT IN AN ORGANIZED HEALTH CARE EDUCATION/TRAINING PROGRAM

## 2024-10-31 PROCEDURE — 97140 MANUAL THERAPY 1/> REGIONS: CPT

## 2024-10-31 PROCEDURE — 97110 THERAPEUTIC EXERCISES: CPT

## 2024-10-31 PROCEDURE — 99214 OFFICE O/P EST MOD 30 MIN: CPT | Mod: S$GLB,,, | Performed by: STUDENT IN AN ORGANIZED HEALTH CARE EDUCATION/TRAINING PROGRAM

## 2024-10-31 PROCEDURE — 4010F ACE/ARB THERAPY RXD/TAKEN: CPT | Mod: CPTII,S$GLB,, | Performed by: STUDENT IN AN ORGANIZED HEALTH CARE EDUCATION/TRAINING PROGRAM

## 2024-10-31 PROCEDURE — 3078F DIAST BP <80 MM HG: CPT | Mod: CPTII,S$GLB,, | Performed by: STUDENT IN AN ORGANIZED HEALTH CARE EDUCATION/TRAINING PROGRAM

## 2024-10-31 PROCEDURE — 97112 NEUROMUSCULAR REEDUCATION: CPT

## 2024-10-31 PROCEDURE — 3074F SYST BP LT 130 MM HG: CPT | Mod: CPTII,S$GLB,, | Performed by: STUDENT IN AN ORGANIZED HEALTH CARE EDUCATION/TRAINING PROGRAM

## 2024-10-31 RX ORDER — ASPIRIN 81 MG/1
81 TABLET ORAL DAILY
Qty: 90 TABLET | Refills: 3 | Status: SHIPPED | OUTPATIENT
Start: 2024-10-31 | End: 2025-10-31

## 2024-11-01 ENCOUNTER — HOSPITAL ENCOUNTER (OUTPATIENT)
Dept: RADIOLOGY | Facility: HOSPITAL | Age: 64
Discharge: HOME | End: 2024-11-01
Payer: COMMERCIAL

## 2024-11-01 DIAGNOSIS — R51.9 ACUTE NONINTRACTABLE HEADACHE, UNSPECIFIED HEADACHE TYPE: ICD-10-CM

## 2024-11-01 PROCEDURE — 70551 MRI BRAIN STEM W/O DYE: CPT | Mod: 26,,, | Performed by: STUDENT IN AN ORGANIZED HEALTH CARE EDUCATION/TRAINING PROGRAM

## 2024-11-01 PROCEDURE — 70551 MRI BRAIN STEM W/O DYE: CPT | Mod: TC,PN

## 2024-11-01 NOTE — PROGRESS NOTES
Good-afternoon Mrs. Wiseman,     Your Brain MRI results are in, and they show no signs of immediate problems in your brain. There is mild loss of brain volume normal to see in the aging process and some changes in the white matter, likely due to long-term small blood vessel issues.    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    To look more closely at this, I will order a Head CT Angiogram (CTA). I'll also order some blood tests to check your kidney function before the imaging. My nurses will help schedule these tests for you.     We will discuss results further at your next follow up visit.   Please let me know if you have any questions or concerns.  Have a great day!    ONEIDA Jean

## 2024-11-04 ENCOUNTER — OFFICE VISIT (OUTPATIENT)
Facility: CLINIC | Age: 64
End: 2024-11-04
Payer: COMMERCIAL

## 2024-11-04 ENCOUNTER — HOSPITAL ENCOUNTER (OUTPATIENT)
Dept: RADIOLOGY | Facility: HOSPITAL | Age: 64
Discharge: HOME | End: 2024-11-04
Attending: PHYSICIAN ASSISTANT
Payer: COMMERCIAL

## 2024-11-04 ENCOUNTER — PATIENT MESSAGE (OUTPATIENT)
Dept: INTERNAL MEDICINE | Facility: CLINIC | Age: 64
End: 2024-11-04
Payer: MEDICARE

## 2024-11-04 VITALS — WEIGHT: 104 LBS | BODY MASS INDEX: 19.63 KG/M2 | HEIGHT: 61 IN

## 2024-11-04 DIAGNOSIS — I10 PRIMARY HYPERTENSION: ICD-10-CM

## 2024-11-04 DIAGNOSIS — M54.2 CERVICAL PAIN: ICD-10-CM

## 2024-11-04 DIAGNOSIS — M25.512 LEFT SHOULDER PAIN, UNSPECIFIED CHRONICITY: ICD-10-CM

## 2024-11-04 DIAGNOSIS — M25.512 LEFT SHOULDER PAIN, UNSPECIFIED CHRONICITY: Primary | ICD-10-CM

## 2024-11-04 DIAGNOSIS — K21.9 GASTROESOPHAGEAL REFLUX DISEASE, UNSPECIFIED WHETHER ESOPHAGITIS PRESENT: ICD-10-CM

## 2024-11-04 PROCEDURE — 1159F MED LIST DOCD IN RCRD: CPT | Mod: CPTII,S$GLB,, | Performed by: PHYSICIAN ASSISTANT

## 2024-11-04 PROCEDURE — 1101F PT FALLS ASSESS-DOCD LE1/YR: CPT | Mod: CPTII,S$GLB,, | Performed by: PHYSICIAN ASSISTANT

## 2024-11-04 PROCEDURE — 3008F BODY MASS INDEX DOCD: CPT | Mod: CPTII,S$GLB,, | Performed by: PHYSICIAN ASSISTANT

## 2024-11-04 PROCEDURE — 73020 X-RAY EXAM OF SHOULDER: CPT | Mod: TC,PN,LT

## 2024-11-04 PROCEDURE — 99999 PR PBB SHADOW E&M-EST. PATIENT-LVL IV: CPT | Mod: PBBFAC,,, | Performed by: PHYSICIAN ASSISTANT

## 2024-11-04 PROCEDURE — 99204 OFFICE O/P NEW MOD 45 MIN: CPT | Mod: S$GLB,,, | Performed by: PHYSICIAN ASSISTANT

## 2024-11-04 PROCEDURE — 73020 X-RAY EXAM OF SHOULDER: CPT | Mod: 26,LT,, | Performed by: RADIOLOGY

## 2024-11-04 PROCEDURE — 1125F AMNT PAIN NOTED PAIN PRSNT: CPT | Mod: CPTII,S$GLB,, | Performed by: PHYSICIAN ASSISTANT

## 2024-11-04 PROCEDURE — 4010F ACE/ARB THERAPY RXD/TAKEN: CPT | Mod: CPTII,S$GLB,, | Performed by: PHYSICIAN ASSISTANT

## 2024-11-04 PROCEDURE — 3044F HG A1C LEVEL LT 7.0%: CPT | Mod: CPTII,S$GLB,, | Performed by: PHYSICIAN ASSISTANT

## 2024-11-04 PROCEDURE — 1160F RVW MEDS BY RX/DR IN RCRD: CPT | Mod: CPTII,S$GLB,, | Performed by: PHYSICIAN ASSISTANT

## 2024-11-04 PROCEDURE — 3288F FALL RISK ASSESSMENT DOCD: CPT | Mod: CPTII,S$GLB,, | Performed by: PHYSICIAN ASSISTANT

## 2024-11-04 RX ORDER — METOPROLOL SUCCINATE 25 MG/1
25 TABLET, EXTENDED RELEASE ORAL DAILY
Qty: 90 TABLET | Refills: 1 | Status: SHIPPED | OUTPATIENT
Start: 2024-11-04

## 2024-11-04 NOTE — TELEPHONE ENCOUNTER
Refill Routing Note   Medication(s) are not appropriate for processing by [facility] for the following reason(s):        No active prescription written by provider    [facility] action(s):  Defer               Appointments  past 12m or future 3m with PCP    Date Provider   Last Visit   10/10/2024 Nehal Puente MD   Next Visit   4/4/2025 Nehal Puente MD   ED visits in past 90 days: 0        Note composed:3:11 PM 11/04/2024

## 2024-11-04 NOTE — TELEPHONE ENCOUNTER
No care due was identified.  [facility] Embedded Care Due Messages. Reference number: [id number].   11/04/2024 11:04:43 AM CST

## 2024-11-05 ENCOUNTER — TELEPHONE (OUTPATIENT)
Dept: PAIN MEDICINE | Facility: CLINIC | Age: 64
End: 2024-11-05
Payer: MEDICARE

## 2024-11-05 ENCOUNTER — PATIENT MESSAGE (OUTPATIENT)
Dept: NEUROLOGY | Facility: CLINIC | Age: 64
End: 2024-11-05
Payer: MEDICARE

## 2024-11-05 ENCOUNTER — TELEPHONE (OUTPATIENT)
Dept: NEUROLOGY | Facility: CLINIC | Age: 64
End: 2024-11-05
Payer: MEDICARE

## 2024-11-05 ENCOUNTER — CLINICAL SUPPORT (OUTPATIENT)
Dept: REHABILITATION | Facility: HOSPITAL | Age: 64
End: 2024-11-05
Payer: COMMERCIAL

## 2024-11-05 DIAGNOSIS — R51.9 ACUTE NONINTRACTABLE HEADACHE, UNSPECIFIED HEADACHE TYPE: ICD-10-CM

## 2024-11-05 DIAGNOSIS — R51.9 PRESSURE IN HEAD: ICD-10-CM

## 2024-11-05 DIAGNOSIS — D18.00 CAVERNOMA: Primary | ICD-10-CM

## 2024-11-05 DIAGNOSIS — R29.898 DECREASED RANGE OF MOTION OF NECK: Primary | ICD-10-CM

## 2024-11-05 PROCEDURE — 97112 NEUROMUSCULAR REEDUCATION: CPT

## 2024-11-05 PROCEDURE — 97110 THERAPEUTIC EXERCISES: CPT

## 2024-11-05 PROCEDURE — 97140 MANUAL THERAPY 1/> REGIONS: CPT

## 2024-11-05 NOTE — PROGRESS NOTES
Patient ID: Ivone Monroy  YOB: 1960  MRN: [id number]    Chief Complaint: Pain of the Left Shoulder and Pain of the Neck    Referred By:  Online    History of Present Illness: Ivone Monroy is a RHD 64 y.o. female   [place] [employer] Employee with a chief complaint of Pain of the Left Shoulder and Pain of the Neck      Ivone Monroy is a 64 y.o. presents today for evaluation and management of left shoulder and neck pain.   Patient has a past medical history of hypertension and GERD.   Patient states that she has been having left shoulder and neck pain along with pressure in her head for several months now.  She has been to the emergency room on 10/06/2024 for chest and arm pain, she has followed up with Cardiology and had a workup.  She has followed up with neurology and ENT.  She states that the pain extends from her head and goes all the way down her arm.  States that she was referred to physical therapy which she started last week at [facility] and has had dry needling.  Patient denies any falls or any past injuries.  Patient describes the pain is gradually and intermittent but is increasingly gotten worse.  She rates pain as an 8/10.  At this time she denies any fevers, chills, shortness of breath, night sweats, and numbness.  Recall previous HPI:       Past Medical History:   Past Medical History:   Diagnosis Date    Anemia     Cancer of gallbladder 2015    chemo radiation - Dr Gatica     Diverticulitis     GERD (gastroesophageal reflux disease)      Past Surgical History:   Procedure Laterality Date    exploration of gallbladder twice for suspected malignancy with no resection      TUBAL LIGATION  8/24/1996     Family History   Problem Relation Name Age of Onset    Diabetes Mother Odeal         may have been related to prolonged prednisone use    Arthritis Mother Odeal         Rheumatoid    Heart disease Mother Odeal     Cancer Father Iry         prostate     Heart disease Father Iratul     Colon cancer Cousin      Colon cancer Paternal Uncle       Social History     Socioeconomic History    Marital status:      Spouse name: Elba    Number of children: 3   Occupational History    Occupation: Public      Employer: [employer]     Employer: [employer]   Tobacco Use    Smoking status: Never    Smokeless tobacco: Never   Substance and Sexual Activity    Alcohol use: Yes     Alcohol/week: 2.0 standard drinks of alcohol     Types: 2 Glasses of wine per week     Comment: socially    Drug use: No    Sexual activity: Yes     Partners: Male     Birth control/protection: None     Comment:      Social Drivers of Health     Financial Resource Strain: Medium Risk (10/31/2023)    Overall Financial Resource Strain (CARDIA)     Difficulty of Paying Living Expenses: Somewhat hard   Food Insecurity: No Food Insecurity (7/7/2024)    Hunger Vital Sign     Worried About Running Out of Food in the Last Year: Never true     Ran Out of Food in the Last Year: Never true   Transportation Needs: No Transportation Needs (10/31/2023)    PRAPARE - Transportation     Lack of Transportation (Medical): No     Lack of Transportation (Non-Medical): No   Physical Activity: Sufficiently Active (7/7/2024)    Exercise Vital Sign     Days of Exercise per Week: 4 days     Minutes of Exercise per Session: 40 min   Stress: No Stress Concern Present (7/7/2024)    [place] [employer] - Occupational Stress Questionnaire     Feeling of Stress : Only a little   Housing Stability: Low Risk  (10/31/2023)    Housing Stability Vital Sign     Unable to Pay for Housing in the Last Year: No     Number of Places Lived in the Last Year: 1     Unstable Housing in the Last Year: No     Medication List with Changes/Refills   Current Medications    ASPIRIN (ECOTRIN) 81 MG EC TABLET    Take 1 tablet (81 mg total) by mouth once daily.    CLOBETASOL (TEMOVATE) 0.05 %  EXTERNAL SOLUTION    Apply topically 2 (two) times daily as needed for alopecia. Strong steroid- do NOT use on face.    DESONIDE (DESOWEN) 0.05 % CREAM    Apply to the affected area once to twice daily for vitiligo. May use for up to 4 weeks.    DICYCLOMINE (BENTYL) 10 MG CAPSULE    Take 10 mg by mouth 2 (two) times daily.    LORAZEPAM (ATIVAN) 0.5 MG TABLET    Take 1 tablet (0.5 mg total) by mouth every 12 (twelve) hours as needed for Anxiety.    LOSARTAN (COZAAR) 50 MG TABLET    Take 1 tablet (50 mg total) by mouth once daily.    MULTIVITAMIN (THERAGRAN) PER TABLET    Take 1 tablet by mouth once daily.    PANTOPRAZOLE (PROTONIX) 40 MG TABLET    Take 40 mg by mouth once daily.    TACROLIMUS (PROTOPIC) 0.1 % OINTMENT    apply to affected area twice daily of vitiligo. Non-steroid med.   Changed and/or Refilled Medications    Modified Medication Previous Medication    METOPROLOL SUCCINATE (TOPROL-XL) 25 MG 24 HR TABLET metoprolol succinate (TOPROL-XL) 25 MG 24 hr tablet       Take 1 tablet (25 mg total) by mouth once daily.    Take 25 mg by mouth once daily.     Review of patient's allergies indicates:   Allergen Reactions    Sunscreen spf 8 [oxybenzone-padimate o] Swelling     Blisters to skin.   Blisters to skin.     Iodine and iodide containing products Swelling     Lips swell with sores    Shellfish containing products Swelling     Lips swell      Review of Systems   Constitutional: Negative for chills and fever.   HENT:  Negative for sore throat.    Eyes:  Negative for pain.   Cardiovascular:  Negative for chest pain and leg swelling.   Respiratory:  Negative for cough and shortness of breath.    Skin:  Negative for itching and rash.   Musculoskeletal:  Positive for back pain.   Gastrointestinal:  Negative for abdominal pain, nausea and vomiting.   Genitourinary:  Negative for dysuria.   Neurological:  Negative for dizziness, numbness and paresthesias.       Physical Exam:   Body mass index is 19.65 kg/m².  There  were no vitals filed for this visit.   GENERAL: Well appearing, appropriate for stated age, no acute distress.  CARDIOVASCULAR: Pulses regular by peripheral palpation.  PULMONARY: Respirations are even and non-labored.  NEURO: Awake, alert, and oriented x 3.  PSYCH: Mood & affect are appropriate.  HEENT: Head is normocephalic and atraumatic.  General    Nursing note and vitals reviewed.        Back (L-Spine & T-Spine) / Neck (C-Spine) Exam     Tenderness Left paramedian tenderness of the Upper C-Spine and Lower C-Spine.     Spinal Sensation   Left Side Sensation  C-Spine Level: normal    Other     Suggestions for Possible Nonorganic Illness      pain with simulated axial load              Comments:  Pain with lateral rotation  Right Shoulder Exam   Right shoulder exam is normal.    Tenderness   The patient is experiencing no tenderness.    Range of Motion   Active abduction:  140   Forward Elevation: 160  External Rotation 0 degrees:  60   Internal rotation 0 degrees:  T12     Other   Sensation: normal    Left Shoulder Exam   Left shoulder exam is normal.    Range of Motion   Active abduction:  140   Forward Elevation: 160  External Rotation 0 degrees:  60   Internal rotation 0 degrees:  T12     Tests & Signs   Donato test: negative  Impingement: negative  Active Compression test (Pettis's Sign): negative  Speed's Test: negative    Other   Sensation: normal       Muscle Strength   Right Upper Extremity   Shoulder Abduction: 5/5   Shoulder Internal Rotation: 5/5   Shoulder External Rotation: 5/5   Supraspinatus: 5/5   Subscapularis: 5/5   Biceps: 5/5   Left Upper Extremity  Shoulder Abduction: 5/5   Shoulder Internal Rotation: 5/5   Shoulder External Rotation: 5/5   Supraspinatus: 5/5   Subscapularis: 5/5   Biceps: 5/5     Vascular Exam     Right Pulses      Radial:                    2+      Left Pulses      Radial:                    2+      Capillary Refill  Right Hand: normal capillary refill  Left Hand: normal  capillary refill        All compartments soft and compressible. Intact extensor pollicis longus, flexor pollicis longus, finger flexion, finger extension, finger abduction and adduction. Sensation intact to radial, median, ulnar, and axillary nerve distributions. Hand warm and well perfused with capillary refill of less than 2 seconds, and palpable distal radial pulses.       Imaging:    X-Ray Shoulder Left 1 View  Narrative: EXAM: XR SHOULDER 1 VIEW LEFT    CLINICAL HISTORY: Pain    FINDINGS:  Compared to 10/10/2024.    A single transaxillary view of the left shoulder was submitted.  The humeral head is in normal position and the glenohumeral joint.  No fracture deformities are appreciated.  Impression:  No evidence of acute or recent injury.    Finalized on: 11/4/2024 4:04 PM By:  Terrance Hess  R# [id number]      2024-11-04 16:06:21.475    [facility]    X-Ray Shoulder 2 or More Views Left  Order: [id number]  Status: Final result       Visible to patient: Yes (seen)       Next appt: Today at 04:30 PM in Outpatient Rehab ([facility])       Dx: Numbness and tingling in left arm    0 Result Notes       1 Patient Communication       1 Follow-up Encounter  Details    Reading Physician Reading Date Result Priority   Terrance Hess MD  [phone] 10/10/2024      Narrative & Impression  EXAM: XR SHOULDER COMPLETE 2 OR MORE VIEWS LEFT     CLINICAL HISTORY: Unspecified skin paresthesia     FINDINGS:  3 views of the left shoulder including a transscapular view were performed.  No fracture, dislocation or abnormal soft tissue calcifications are identified.  There is mild degenerative spurring at the left acromioclavicular joint.  Slight inferior offset of the acromion with respect to the lateral head of the clavicle is also visible and may be associated with a history of prior low-grade AC joint separation.        Impression:     1.  No evidence of acute or recent injury.  2.  Mild degenerative spurring and  slight offset at the left AC joint.  Question old low-grade AC joint separation.     Finalized on: 10/10/2024 11:53 AM By:  Terrance Hess  R# [id number]      2024-10-10 11:55:44.096    [facility]       X-Ray Cervical Spine Complete 5 view  Order: [id number]  Status: Final result       Visible to patient: Yes (seen)       Next appt: Today at 04:30 PM in Outpatient Rehab ([facility], [facility])       Dx: Numbness and tingling in left arm    0 Result Notes       1 Patient Communication  Details    Reading Physician Reading Date Result Priority   Terrance Hess MD  [phone] 10/10/2024      Narrative & Impression  EXAM: XR CERVICAL SPINE COMPLETE 5 VIEW     CLINICAL HISTORY: Unspecified skin paresthesia     FINDINGS:  There is moderate degenerative disc space narrowing at C5-6 and mild anterior offset of C6 with respect to C5 measuring 2.5 to 3.0 mm.  Alignment is otherwise normal and remaining disc spaces are maintained.  Vertebral body heights are normal.  No fractures or prevertebral soft tissue swelling are identified.  Lateral foramina are patent at all levels bilaterally on the oblique views.  No fractures or prevertebral soft tissue swelling identified.  The odontoid process is intact.        Impression:   Focal disc degeneration at C5-6 described above.  No evidence of acute injury is appreciated.     Finalized on: 10/10/2024 11:51 AM By:  Terrance Hess  RG# [id number]      2024-10-10 11:53:43.950    [facility]       Relevant imaging results reviewed and interpreted by me, discussed with the patient and / or family today.     Other Tests:         Patient Instructions   Assessment:  Ivone Monroy is a RHD 64 y.o. female   [place] [employer] Employee with a chief complaint of Pain of the Left Shoulder and Pain of the Neck  Ortho Walk-In clinic, patient presents today with left shoulder and neck pain  Cervical pain    Encounter Diagnoses   Name Primary?    Left shoulder pain, unspecified chronicity  Yes    Cervical pain     Primary hypertension     Gastroesophageal reflux disease, unspecified whether esophagitis present       Plan:  Continue physical therapy at [facility]  Referral to back and spine  Follow up in 6 weeks for reassessment     Follow-up: 6 weeks or sooner if there are any problems between now and then.    Leave Review:   Google: Leave Google Review  Healthgrades: Leave Healthgrades Review    After Hours Number: [phone]      Provider Note/Medical Decision Making:   Discussed with patient I believe that this is coming from her cervical spine which could be causing impingement.  Will refer to back and spine I will have the patient follow-up after completing physical therapy to make sure that shoulder pain does improve.  She does have on x-ray a slight offset of her left AC joint which is low-grade and old.  She has no pain in the AC joint on palpation and denies any pain with crossover testing.      I discussed worrisome and red flag signs and symptoms with the patient. The patient expressed understanding and agreed to alert me immediately or to go to the emergency room if they experience any of these.   Treatment plan was developed with input from the patient/family, and they expressed understanding and agreement with the plan. All questions were answered today.      Roxann Turner PA-C  Sports Medicine Physician Assistant       Disclaimer: This note was prepared using a voice recognition system and is likely to have sound alike errors within the text.

## 2024-11-05 NOTE — TELEPHONE ENCOUNTER
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 Can you please assist the patient in scheduling the Brain MRA? Thank you!!    ONEIDA Jean

## 2024-11-05 NOTE — TELEPHONE ENCOUNTER
Called pt to advise of result notes. Patient verified . Advised that I did schedule the MRA to be in [place]. Pt asked for a clearer explanation of the malformation. I told her I would get with Ms. Keenancarlo to see. She verbalized understanding.

## 2024-11-05 NOTE — PATIENT INSTRUCTIONS
Assessment:  Ivone Monroy is a RHD 64 y.o. female   [place] [employer] Employee with a chief complaint of Pain of the Left Shoulder and Pain of the Neck  Ortho Walk-In clinic, patient presents today with left shoulder and neck pain  Cervical pain    Encounter Diagnoses   Name Primary?    Left shoulder pain, unspecified chronicity Yes    Cervical pain     Primary hypertension     Gastroesophageal reflux disease, unspecified whether esophagitis present       Plan:  Continue physical therapy at [facility]  Referral to back and spine  Follow up in 6 weeks for reassessment     Follow-up: 6 weeks or sooner if there are any problems between now and then.    Leave Review:   Google: Leave Google Review  Healthgrades: Leave Healthgrades Review    After Hours Number: [phone]

## 2024-11-05 NOTE — PROGRESS NOTES
"[facility]   Physical Therapy Treatment Note        Name: Ivoen Monroy  Clinic Number: [id number]    Therapy Diagnosis:   Encounter Diagnosis   Name Primary?    Decreased range of motion of neck Yes     Physician: Nehal Puente MD    Visit Date: 11/5/2024    Physician Orders: PT Eval and Treat   Medical Diagnosis from Referral: Numbness and tingling in left arm [R20.0, R20.2]   Evaluation Date: 10/29/2024  Authorization Period Expiration: 12/31/24  Plan of Care Expiration: 12/27/24  Visit # / Visits authorized: 2 / 10 (+1 evaluation)  FOTO: 1/3  PTA Visit #: 0/5     Time In: 1629  Time Out: 1731  Total Billable Time: 60 minutes (Billing reflects 1 on 1 treatment time spent with patient)    Subjective     Patient reports: she felt good improvement from dry needling but didn't not maintain. Had MRI last Friday. Results in media.    He/She was compliant with home exercise program.  Response to previous treatment: initial evaluation  Functional change: performing home exercise plan    Pain: 3/10     Location: cervical    Objective      Objective Measures updated at progress report or POC update only unless otherwise noted.     Treatment     Ivone received the treatments listed below:     CPT Intervention Performed   Today Duration / Intensity   MT Soft tissue massage x Suboccipital release    Joint Mobilizations x Cervical distraction         TE UBE x 2.5'/2.5'    Pec Stretch x 3 x 30" corner    Cervical Extension SNAGS x x25    Rows x 3 x 10 GTB                     NMR Scapular Retractions x 20 x 5" holds    Chin Tucks x 3 x 10 with 5 sec holds    Bilateral External Rotation x 3 x 10 YTB    Prone Series   x  x 20 x 5" holds  I's  T's         TA                        PLAN        CPT Codes available for Billing:   (25) minutes of Manual therapy (MT) to improve pain and ROM.  (16) minutes of Therapeutic Exercise (TE) to develop strength, endurance, range of motion, and " flexibility.  (18) minutes of Neuromuscular Re-Education (NMR)  to improve: Balance, Coordination, Kinesthetic, Sense, Proprioception, and Posture.  (00) minutes of Therapeutic Activities (TA) to improve functional performance.  Vasopneumatic Device Therapy () for management of swelling/edema. (36512)  Unattended Electrical Stimulation (ES) for muscle performance or pain modulation.  BFR: Blood flow restriction applied during exercise    Patient Education and Home Exercises       Home Exercises Provided and Patient Education Provided     Education provided: time included in treatment time.   PURPOSE: Patient educated on the impairments noted above and the effects of physical therapy intervention to improve overall condition and QOL.   EXERCISE: Patient was educated on all the above exercise prior/during/after for proper posture, positioning, and execution for safe performance with home exercise program.   STRENGTH: Patient educated on the importance of improved core and extremity strength in order to improve alignment of the spine and extremities with static positions and dynamic movement.   POSTURE: Patient educated on postural awareness to reduce stress and maintain optimal alignment of the spine with static positions and dynamic movement   SLEEPING POSITIONS: Patient educated on the use of pillows to aid in neutral alignment of spine and extremities when sleeping in supine or side lying.  TRANSFERS & TRANSITIONS: Patient educated on proper technique for bed mobility, transitions and transfers to improve body mechanics and decrease risk of injury.   ERGONOMICS: Patient educated on proper ergonomics at the work station in order to maintain optimal alignment of the musculoskeletal system and improve efficiency in the work environment.    Written Home Exercises Provided: yes.  Exercises were reviewed and Ivone was able to demonstrate them prior to the end of the session.  Ivone demonstrated good  understanding of  the education provided. See EMR under Patient Instructions for exercises provided during therapy sessions.    Assessment     Patient tolerated session well. Patient able to demonstrate home exercise plan well with mild cueing for form. Patient progressed with further parascapular activation. Reduction of tenderness to palpation with soft tissue massage to suboccipitals and paraspinals.     Ivone is progressing well towards her goals.   Patient prognosis is Good.     Patient will continue to benefit from skilled outpatient physical therapy to address the deficits listed in the problem list box on initial evaluation, provide pt/family education and to maximize patient's level of independence in the home and community environment.     Patient's spiritual, cultural and educational needs considered and pt agreeable to plan of care and goals.    Goals:  Short Term Goals: 4 weeks   Pt will be IND with HEP.     Long Term Goals: 8 weeks   Pt will improve cervical extension to 75% without pain.  Pt will improve FOTO score to 66% or greater.  Pt will report decreases in headache frequency to 1-2x/week or less.    Plan     Continue Plan of Care (POC) and progress per patient tolerance. See treatment section for details on planned progressions next session.    [facility], PT

## 2024-11-07 ENCOUNTER — OFFICE VISIT (OUTPATIENT)
Dept: PAIN MEDICINE | Facility: CLINIC | Age: 64
End: 2024-11-07
Payer: COMMERCIAL

## 2024-11-07 ENCOUNTER — CLINICAL SUPPORT (OUTPATIENT)
Dept: REHABILITATION | Facility: HOSPITAL | Age: 64
End: 2024-11-07
Payer: COMMERCIAL

## 2024-11-07 VITALS
HEART RATE: 68 BPM | DIASTOLIC BLOOD PRESSURE: 82 MMHG | BODY MASS INDEX: 19.98 KG/M2 | SYSTOLIC BLOOD PRESSURE: 140 MMHG | HEIGHT: 61 IN | WEIGHT: 105.81 LBS

## 2024-11-07 DIAGNOSIS — M54.2 CERVICALGIA: ICD-10-CM

## 2024-11-07 DIAGNOSIS — R29.898 DECREASED RANGE OF MOTION OF NECK: Primary | ICD-10-CM

## 2024-11-07 DIAGNOSIS — G89.29 CHRONIC LEFT SHOULDER PAIN: ICD-10-CM

## 2024-11-07 DIAGNOSIS — M25.512 CHRONIC LEFT SHOULDER PAIN: ICD-10-CM

## 2024-11-07 DIAGNOSIS — M50.30 DDD (DEGENERATIVE DISC DISEASE), CERVICAL: Primary | ICD-10-CM

## 2024-11-07 DIAGNOSIS — M54.12 CERVICAL RADICULOPATHY: ICD-10-CM

## 2024-11-07 DIAGNOSIS — M54.2 CERVICAL PAIN: ICD-10-CM

## 2024-11-07 DIAGNOSIS — M47.812 CERVICAL SPONDYLOSIS: ICD-10-CM

## 2024-11-07 PROCEDURE — 99999 PR PBB SHADOW E&M-EST. PATIENT-LVL III: CPT | Mod: PBBFAC,,, | Performed by: ANESTHESIOLOGY

## 2024-11-07 PROCEDURE — 97112 NEUROMUSCULAR REEDUCATION: CPT

## 2024-11-07 PROCEDURE — 97110 THERAPEUTIC EXERCISES: CPT

## 2024-11-07 PROCEDURE — 97140 MANUAL THERAPY 1/> REGIONS: CPT

## 2024-11-07 RX ORDER — PREGABALIN 50 MG/1
50 CAPSULE ORAL NIGHTLY
Qty: 30 CAPSULE | Refills: 1 | Status: SHIPPED | OUTPATIENT
Start: 2024-11-07
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
## 2024-11-07 NOTE — PROGRESS NOTES
"[facility]   Physical Therapy Treatment Note        Name: Ivone Monroy  Clinic Number: [id number]    Therapy Diagnosis:   Encounter Diagnosis   Name Primary?    Decreased range of motion of neck Yes       Physician: Nehal Puente MD    Visit Date: 11/7/2024    Physician Orders: PT Eval and Treat   Medical Diagnosis from Referral: Numbness and tingling in left arm [R20.0, R20.2]   Evaluation Date: 10/29/2024  Authorization Period Expiration: 12/31/24  Plan of Care Expiration: 12/27/24  Visit # / Visits authorized: 3 / 10 (+1 evaluation)  FOTO: 1/3  PTA Visit #: 0/5     Time In: 1:30 pm  Time Out: 2:20 pm  Total Billable Time: 50 minutes     Subjective     Patient reports: better overall but still having some pressure in head mainly in front of face and forehead today.     He/She was compliant with home exercise program.  Response to previous treatment: initial evaluation  Functional change: able to lie on L side to sleep     Pain: 3/10     Location: cervical    Objective      Objective Measures updated at progress report or POC update only unless otherwise noted.     Treatment     Ivone received the treatments listed below:     CPT Intervention Performed   Today Duration / Intensity   MT Soft tissue massage x Suboccipital release    Joint Mobilizations x Cervical distraction         TE UBE - 2.5'/2.5'    Pec Stretch x 3 x 30" corner    Cervical Extension SNAGS x x25    Rows x 3 x 10 GTB                     NMR Scapular Retractions x 20 x 5" holds    Chin Tucks x 3 x 10 with 5 sec holds    Bilateral External Rotation x 3 x 10 YTB    Prone Series   x  x 20 x 5" holds  I's  T's    Shoulder ext x RTB 2x10   TA                        PLAN        CPT Codes available for Billing:   (10) minutes of Manual therapy (MT) to improve pain and ROM.  (10) minutes of Therapeutic Exercise (TE) to develop strength, endurance, range of motion, and flexibility.  (25) minutes of Neuromuscular " Re-Education (NMR)  to improve: Balance, Coordination, Kinesthetic, Sense, Proprioception, and Posture.  (00) minutes of Therapeutic Activities (TA) to improve functional performance.  Vasopneumatic Device Therapy () for management of swelling/edema. (76070)  Unattended Electrical Stimulation (ES) for muscle performance or pain modulation.  BFR: Blood flow restriction applied during exercise    Patient Education and Home Exercises       Home Exercises Provided and Patient Education Provided     Education provided: time included in treatment time.   PURPOSE: Patient educated on the impairments noted above and the effects of physical therapy intervention to improve overall condition and QOL.   EXERCISE: Patient was educated on all the above exercise prior/during/after for proper posture, positioning, and execution for safe performance with home exercise program.   STRENGTH: Patient educated on the importance of improved core and extremity strength in order to improve alignment of the spine and extremities with static positions and dynamic movement.   POSTURE: Patient educated on postural awareness to reduce stress and maintain optimal alignment of the spine with static positions and dynamic movement   SLEEPING POSITIONS: Patient educated on the use of pillows to aid in neutral alignment of spine and extremities when sleeping in supine or side lying.  TRANSFERS & TRANSITIONS: Patient educated on proper technique for bed mobility, transitions and transfers to improve body mechanics and decrease risk of injury.   ERGONOMICS: Patient educated on proper ergonomics at the work station in order to maintain optimal alignment of the musculoskeletal system and improve efficiency in the work environment.    Written Home Exercises Provided: yes.  Exercises were reviewed and Ivone was able to demonstrate them prior to the end of the session.  Ivone demonstrated good  understanding of the education provided. See EMR under Patient  Instructions for exercises provided during therapy sessions.    Assessment     Patient tolerated session well. Patient able to perform most exercises without increase in pain and with min cueing for formPedro Luis Wiseman is progressing well towards her goals.   Patient prognosis is Good.     Patient will continue to benefit from skilled outpatient physical therapy to address the deficits listed in the problem list box on initial evaluation, provide pt/family education and to maximize patient's level of independence in the home and community environment.     Patient's spiritual, cultural and educational needs considered and pt agreeable to plan of care and goals.    Goals:  Short Term Goals: 4 weeks   Pt will be IND with HEP.     Long Term Goals: 8 weeks   Pt will improve cervical extension to 75% without pain.  Pt will improve FOTO score to 66% or greater.  Pt will report decreases in headache frequency to 1-2x/week or less.    Plan     Continue Plan of Care (POC) and progress per patient tolerance. See treatment section for details on planned progressions next session.    Jennifer Trejo, PT

## 2024-11-11 ENCOUNTER — OFFICE VISIT (OUTPATIENT)
Dept: OTOLARYNGOLOGY | Facility: CLINIC | Age: 64
End: 2024-11-11
Payer: COMMERCIAL

## 2024-11-11 ENCOUNTER — CLINICAL SUPPORT (OUTPATIENT)
Dept: REHABILITATION | Facility: HOSPITAL | Age: 64
End: 2024-11-11
Payer: MEDICARE

## 2024-11-11 VITALS — WEIGHT: 99 LBS | HEIGHT: 61 IN | BODY MASS INDEX: 18.69 KG/M2

## 2024-11-11 DIAGNOSIS — R51.9 ACUTE NONINTRACTABLE HEADACHE, UNSPECIFIED HEADACHE TYPE: ICD-10-CM

## 2024-11-11 DIAGNOSIS — R29.898 DECREASED RANGE OF MOTION OF NECK: Primary | ICD-10-CM

## 2024-11-11 DIAGNOSIS — H69.90 ETD (EUSTACHIAN TUBE DYSFUNCTION), UNSPECIFIED LATERALITY: Primary | ICD-10-CM

## 2024-11-11 PROCEDURE — 3008F BODY MASS INDEX DOCD: CPT | Mod: CPTII,S$GLB,, | Performed by: PHYSICIAN ASSISTANT

## 2024-11-11 PROCEDURE — 1126F AMNT PAIN NOTED NONE PRSNT: CPT | Mod: CPTII,S$GLB,, | Performed by: PHYSICIAN ASSISTANT

## 2024-11-11 PROCEDURE — 4010F ACE/ARB THERAPY RXD/TAKEN: CPT | Mod: CPTII,S$GLB,, | Performed by: PHYSICIAN ASSISTANT

## 2024-11-11 PROCEDURE — 3288F FALL RISK ASSESSMENT DOCD: CPT | Mod: CPTII,S$GLB,, | Performed by: PHYSICIAN ASSISTANT

## 2024-11-11 PROCEDURE — 97112 NEUROMUSCULAR REEDUCATION: CPT

## 2024-11-11 PROCEDURE — 1159F MED LIST DOCD IN RCRD: CPT | Mod: CPTII,S$GLB,, | Performed by: PHYSICIAN ASSISTANT

## 2024-11-11 PROCEDURE — 3044F HG A1C LEVEL LT 7.0%: CPT | Mod: CPTII,S$GLB,, | Performed by: PHYSICIAN ASSISTANT

## 2024-11-11 PROCEDURE — 97110 THERAPEUTIC EXERCISES: CPT

## 2024-11-11 PROCEDURE — 99213 OFFICE O/P EST LOW 20 MIN: CPT | Mod: S$GLB,,, | Performed by: PHYSICIAN ASSISTANT

## 2024-11-11 PROCEDURE — 1101F PT FALLS ASSESS-DOCD LE1/YR: CPT | Mod: CPTII,S$GLB,, | Performed by: PHYSICIAN ASSISTANT

## 2024-11-11 PROCEDURE — 97140 MANUAL THERAPY 1/> REGIONS: CPT

## 2024-11-11 PROCEDURE — 99999 PR PBB SHADOW E&M-EST. PATIENT-LVL III: CPT | Mod: PBBFAC,,, | Performed by: PHYSICIAN ASSISTANT

## 2024-11-11 NOTE — PROGRESS NOTES
"[facility]   Physical Therapy Treatment Note        Name: Ivone Monroy  Clinic Number: [id number]    Therapy Diagnosis:   Encounter Diagnosis   Name Primary?    Decreased range of motion of neck Yes       Physician: Nehal Puente MD    Visit Date: 11/11/2024    Physician Orders: PT Eval and Treat   Medical Diagnosis from Referral: Numbness and tingling in left arm [R20.0, R20.2]   Evaluation Date: 10/29/2024  Authorization Period Expiration: 12/31/24  Plan of Care Expiration: 12/27/24  Visit # / Visits authorized: 3 / 10 (+1 evaluation)  FOTO: 1/3  PTA Visit #: 0/5     Time In: 1:35 pm  Time Out: 2:33 pm  Total Billable Time: 56 minutes     Subjective     Patient reports: she feels she is steadily improving. Feels moderate pressure in her head today and a mild headache.     He/She was compliant with home exercise program.  Response to previous treatment: mild soreness  Functional change: able to lie on L side to sleep     Pain: 3/10     Location: cervical    Objective      Objective Measures updated at progress report or POC update only unless otherwise noted.     Treatment     Ivone received the treatments listed below:     CPT Intervention Performed   Today Duration / Intensity   MT Soft tissue massage x Suboccipital release    Joint Mobilizations x Cervical distraction, upglides, side glides         TE UBE x 2.5'/2.5'    Pec Stretch x 3 x 30" corner    Cervical Extension SNAGS x x25    Rows x 3 x 10 GTB    Foam Roll Series x  x  x  x Pec Stretch 2 minute  Bear Hugs 1 minutes  Field Goals 1 minute  Swimmers 1 minute               NMR Scapular Retractions x 20 x 5" holds    Chin Tucks x 3 x 10 with 5 sec holds    Bilateral External Rotation x 3 x 10 YTB    Prone Series   x  x 20 x 5" holds  I's  T's    Shoulder ext x RTB 3x10   TA                        PLAN        CPT Codes available for Billing:   (12) minutes of Manual therapy (MT) to improve pain and ROM.  (26) minutes of " Therapeutic Exercise (TE) to develop strength, endurance, range of motion, and flexibility.  (18) minutes of Neuromuscular Re-Education (NMR)  to improve: Balance, Coordination, Kinesthetic, Sense, Proprioception, and Posture.  (00) minutes of Therapeutic Activities (TA) to improve functional performance.  Vasopneumatic Device Therapy () for management of swelling/edema. (78306)  Unattended Electrical Stimulation (ES) for muscle performance or pain modulation.  BFR: Blood flow restriction applied during exercise    Patient Education and Home Exercises       Home Exercises Provided and Patient Education Provided     Education provided: time included in treatment time.   PURPOSE: Patient educated on the impairments noted above and the effects of physical therapy intervention to improve overall condition and QOL.   EXERCISE: Patient was educated on all the above exercise prior/during/after for proper posture, positioning, and execution for safe performance with home exercise program.   STRENGTH: Patient educated on the importance of improved core and extremity strength in order to improve alignment of the spine and extremities with static positions and dynamic movement.   POSTURE: Patient educated on postural awareness to reduce stress and maintain optimal alignment of the spine with static positions and dynamic movement   SLEEPING POSITIONS: Patient educated on the use of pillows to aid in neutral alignment of spine and extremities when sleeping in supine or side lying.  TRANSFERS & TRANSITIONS: Patient educated on proper technique for bed mobility, transitions and transfers to improve body mechanics and decrease risk of injury.   ERGONOMICS: Patient educated on proper ergonomics at the work station in order to maintain optimal alignment of the musculoskeletal system and improve efficiency in the work environment.    Written Home Exercises Provided: yes.  Exercises were reviewed and Ivone was able to demonstrate  them prior to the end of the session.  Ivone demonstrated good  understanding of the education provided. See EMR under Patient Instructions for exercises provided during therapy sessions.    Assessment     Patient tolerated session well. Progressed with foam roll series for thoracic mobility. Decreased joint stiffness noted following cervical upglides and side glides.     Ivone is progressing well towards her goals.   Patient prognosis is Good.     Patient will continue to benefit from skilled outpatient physical therapy to address the deficits listed in the problem list box on initial evaluation, provide pt/family education and to maximize patient's level of independence in the home and community environment.     Patient's spiritual, cultural and educational needs considered and pt agreeable to plan of care and goals.    Goals:  Short Term Goals: 4 weeks   Pt will be IND with HEP.     Long Term Goals: 8 weeks   Pt will improve cervical extension to 75% without pain.  Pt will improve FOTO score to 66% or greater.  Pt will report decreases in headache frequency to 1-2x/week or less.    Plan     Continue Plan of Care (POC) and progress per patient tolerance. See treatment section for details on planned progressions next session.    Rancho Mendoza, PT

## 2024-11-11 NOTE — PROGRESS NOTES
Subjective:   Patient ID: Ivone Monroy is a 64 y.o. female.    Chief Complaint: Dizziness (Pt is coming in for dizziness pt was told to f/u with ENT for further evaluation due to dizziness L ear pressure and nose)    Patient is a very pleasant 64 y.o. female here to see me today for for evaluation of head pressure. She saw Dr. Silveira in October for same issues.      She endorses headache, facial pressure for 3-4 months. Noted over the forehead, temples, ears, occiput, cheeks. Almost entire head. Having more so in bilateral ears. She feels pressure like when she is in airplane.      Associated sensitivity to light at times. Did have an episode of visual aura. No photophobia, n/v, lightheadedness. Denies nasal obstruction, purulent rhinorrhea, or anosmia/hyposmia.       At first was worse in the evenings. Now also worse in the mornings.      She states it started a couple weeks after changing blood pressure medications. Has been seen by cardiology and BP well controlled.     Recently was in the hospital for 2 days due to the severity of the headache. Underwent heart cath which was normal. Headache has no improved.     Treatment has included tylenol with good, but temporary improvement.      She denies room spinning dizziness.         Review of patient's allergies indicates:   Allergen Reactions    Sunscreen spf 8 [oxybenzone-padimate o] Swelling     Blisters to skin.   Blisters to skin.     Iodine and iodide containing products Swelling     Lips swell with sores    Shellfish containing products Swelling     Lips swell            Review of Systems   Constitutional:  Negative for chills, fatigue, fever and unexpected weight change.   HENT:  Negative for congestion, dental problem, ear discharge, ear pain, facial swelling, hearing loss, nosebleeds, postnasal drip, rhinorrhea, sinus pressure, sneezing, sore throat, tinnitus, trouble swallowing and voice change.    Eyes:  Negative for redness, itching and visual  "disturbance.   Respiratory:  Negative for cough, choking, shortness of breath and wheezing.    Cardiovascular:  Negative for chest pain and palpitations.   Gastrointestinal:  Negative for abdominal pain.        No reflux.   Musculoskeletal:  Negative for gait problem.   Skin:  Negative for rash.   Neurological:  Negative for dizziness, light-headedness and headaches.         Objective:   Ht 5' 1.2" (1.554 m)   Wt 44.9 kg (98 lb 15.8 oz)   BMI 18.58 kg/m²     Physical Exam  Constitutional:       General: She is not in acute distress.     Appearance: She is well-developed.   HENT:      Head: Normocephalic and atraumatic.      Right Ear: Tympanic membrane, ear canal and external ear normal.      Left Ear: Tympanic membrane, ear canal and external ear normal.      Ears:      Comments: Negative for BPPV.      Nose: Nose normal. No nasal deformity, septal deviation, mucosal edema or rhinorrhea.      Right Sinus: No maxillary sinus tenderness or frontal sinus tenderness.      Left Sinus: No maxillary sinus tenderness or frontal sinus tenderness.      Mouth/Throat:      Mouth: Mucous membranes are not pale and not dry.      Dentition: No dental caries.      Pharynx: Uvula midline. No oropharyngeal exudate or posterior oropharyngeal erythema.   Eyes:      General: Lids are normal. No scleral icterus.     Extraocular Movements:      Right eye: Normal extraocular motion and no nystagmus.      Left eye: Normal extraocular motion and no nystagmus.      Conjunctiva/sclera: Conjunctivae normal.      Right eye: Right conjunctiva is not injected. No chemosis.     Left eye: Left conjunctiva is not injected. No chemosis.     Pupils: Pupils are equal, round, and reactive to light.   Neck:      Thyroid: No thyroid mass or thyromegaly.      Trachea: Trachea and phonation normal. No tracheal tenderness or tracheal deviation.   Pulmonary:      Effort: Pulmonary effort is normal. No respiratory distress.      Breath sounds: No stridor. "   Abdominal:      General: There is no distension.   Lymphadenopathy:      Head:      Right side of head: No submental, submandibular, preauricular, posterior auricular or occipital adenopathy.      Left side of head: No submental, submandibular, preauricular, posterior auricular or occipital adenopathy.      Cervical: No cervical adenopathy.   Skin:     General: Skin is warm and dry.      Findings: No erythema or rash.   Neurological:      Mental Status: She is alert and oriented to person, place, and time.      Cranial Nerves: No cranial nerve deficit.   Psychiatric:         Behavior: Behavior normal.              Assessment:     1. ETD (Eustachian tube dysfunction), unspecified laterality    2. Acute nonintractable headache, unspecified headache type        Plan:     ETD (Eustachian tube dysfunction), unspecified laterality    Acute nonintractable headache, unspecified headache type    Continue follow up with neurology. Vestibular testing not warranted.    We had a long discussion regarding the anatomy and function of the eustachian tube.  We discussed that the eustachian tube acts as a pump to keep the appropriate amount of pressure behind the ear drum.  I gave the patient a prescription for a nasal steroid spray to be used on a daily basis, and we discussed that it will take 2-3 weeks of daily use to achieve maximal effectiveness.

## 2024-11-12 ENCOUNTER — HOSPITAL ENCOUNTER (OUTPATIENT)
Dept: RADIOLOGY | Facility: HOSPITAL | Age: 64
Discharge: HOME | End: 2024-11-12
Payer: MEDICARE

## 2024-11-12 ENCOUNTER — TELEPHONE (OUTPATIENT)
Dept: NEUROLOGY | Facility: CLINIC | Age: 64
End: 2024-11-12
Payer: MEDICARE

## 2024-11-12 DIAGNOSIS — R51.9 PRESSURE IN HEAD: ICD-10-CM

## 2024-11-12 DIAGNOSIS — D18.00 CAVERNOMA: ICD-10-CM

## 2024-11-12 DIAGNOSIS — R51.9 ACUTE NONINTRACTABLE HEADACHE, UNSPECIFIED HEADACHE TYPE: ICD-10-CM

## 2024-11-12 PROCEDURE — 70544 MR ANGIOGRAPHY HEAD W/O DYE: CPT | Mod: TC,PN

## 2024-11-12 NOTE — TELEPHONE ENCOUNTER
----- Message from Ted Bianchi NP sent at 11/12/2024 12:52 PM CST -----  Good-afternoon Mrs. Wiseman,     Your Brain MRA results are in and show no immediate problems. There are no blockages in the large blood vessels in your brain, no significant narrowing of the blood vessels, and no aneurysms (bulges in the blood vessels). 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    We will discuss results further at your next follow up visit.   Please let me know if you have any questions or concerns.  Have a great day!    ONEIDA Jean

## 2024-11-12 NOTE — PROGRESS NOTES
Good-afternoon Mrs. Wiseman,     Your Brain MRA results are in and show no immediate problems. There are no blockages in the large blood vessels in your brain, no significant narrowing of the blood vessels, and no aneurysms (bulges in the blood vessels). 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    We will discuss results further at your next follow up visit.   Please let me know if you have any questions or concerns.  Have a great day!    ONEIDA Jean

## 2024-11-14 ENCOUNTER — CLINICAL SUPPORT (OUTPATIENT)
Dept: REHABILITATION | Facility: HOSPITAL | Age: 64
End: 2024-11-14
Payer: MEDICARE

## 2024-11-14 DIAGNOSIS — R29.898 DECREASED RANGE OF MOTION OF NECK: Primary | ICD-10-CM

## 2024-11-14 PROCEDURE — 97110 THERAPEUTIC EXERCISES: CPT

## 2024-11-14 PROCEDURE — 97112 NEUROMUSCULAR REEDUCATION: CPT

## 2024-11-14 PROCEDURE — 97140 MANUAL THERAPY 1/> REGIONS: CPT

## 2024-11-14 NOTE — PROGRESS NOTES
"[facility]   Physical Therapy Treatment Note        Name: Ivone Monroy  Clinic Number: [id number]    Therapy Diagnosis:   Encounter Diagnosis   Name Primary?    Decreased range of motion of neck Yes       Physician: Nehal Puente MD    Visit Date: 11/14/2024    Physician Orders: PT Eval and Treat   Medical Diagnosis from Referral: Numbness and tingling in left arm [R20.0, R20.2]   Evaluation Date: 10/29/2024  Authorization Period Expiration: 12/31/24  Plan of Care Expiration: 12/27/24  Visit # / Visits authorized: 3 / 10 (+1 evaluation)  FOTO: 1/3  PTA Visit #: 0/5     Time In: 1:35 pm  Time Out: 2:34 pm  Total Billable Time: 40 minutes     Subjective     Patient reports: fatigue from work today. Mild headache at the moment.      He/She was compliant with home exercise program.  Response to previous treatment: mild soreness  Functional change: able to lie on L side to sleep     Pain: 3/10     Location: cervical    Objective      Objective Measures updated at progress report or POC update only unless otherwise noted.     Treatment     Ivone received the treatments listed below:     CPT Intervention Performed   Today Duration / Intensity   MT Soft tissue massage x Suboccipital release    Joint Mobilizations x Cervical distraction, upglides, side glides         TE UBE x 2.5'/2.5'    Pec Stretch x 3 x 30" corner    Cervical Extension SNAGS x x25    Rows X - NB 3 x 10 GTB    Foam Roll Series X - NB  X - NB  X - NB  X - NB Pec Stretch 2 minute  Bear Hugs 1 minutes  Field Goals 1 minute  Swimmers 1 minute               NMR Scapular Retractions x 20 x 5" holds    Chin Tucks x 3 x 10 with 5 sec holds    Bilateral External Rotation x 3 x 10 YTB    Prone Series   x  x 20 x 5" holds  I's  T's    Shoulder ext X - NB RTB 3 x10   TA                        PLAN        CPT Codes available for Billing:   (10) minutes of Manual therapy (MT) to improve pain and ROM.  (12) minutes of Therapeutic " Exercise (TE) to develop strength, endurance, range of motion, and flexibility.  (18) minutes of Neuromuscular Re-Education (NMR)  to improve: Balance, Coordination, Kinesthetic, Sense, Proprioception, and Posture.  (00) minutes of Therapeutic Activities (TA) to improve functional performance.  Vasopneumatic Device Therapy () for management of swelling/edema. (68001)  Unattended Electrical Stimulation (ES) for muscle performance or pain modulation.  BFR: Blood flow restriction applied during exercise    Patient Education and Home Exercises       Home Exercises Provided and Patient Education Provided     Education provided: time included in treatment time.   PURPOSE: Patient educated on the impairments noted above and the effects of physical therapy intervention to improve overall condition and QOL.   EXERCISE: Patient was educated on all the above exercise prior/during/after for proper posture, positioning, and execution for safe performance with home exercise program.   STRENGTH: Patient educated on the importance of improved core and extremity strength in order to improve alignment of the spine and extremities with static positions and dynamic movement.   POSTURE: Patient educated on postural awareness to reduce stress and maintain optimal alignment of the spine with static positions and dynamic movement   SLEEPING POSITIONS: Patient educated on the use of pillows to aid in neutral alignment of spine and extremities when sleeping in supine or side lying.  TRANSFERS & TRANSITIONS: Patient educated on proper technique for bed mobility, transitions and transfers to improve body mechanics and decrease risk of injury.   ERGONOMICS: Patient educated on proper ergonomics at the work station in order to maintain optimal alignment of the musculoskeletal system and improve efficiency in the work environment.    Written Home Exercises Provided: yes.  Exercises were reviewed and Iovne was able to demonstrate them prior to  the end of the session.  Ivone demonstrated good  understanding of the education provided. See EMR under Patient Instructions for exercises provided during therapy sessions.    Assessment     Patient tolerated session well. Continued tightness and tenderness to palpation in cervical musculature but reduced with soft tissue massage and joint mobilization.     Ivone is progressing well towards her goals.   Patient prognosis is Good.     Patient will continue to benefit from skilled outpatient physical therapy to address the deficits listed in the problem list box on initial evaluation, provide pt/family education and to maximize patient's level of independence in the home and community environment.     Patient's spiritual, cultural and educational needs considered and pt agreeable to plan of care and goals.    Goals:  Short Term Goals: 4 weeks   Pt will be IND with HEP.     Long Term Goals: 8 weeks   Pt will improve cervical extension to 75% without pain.  Pt will improve FOTO score to 66% or greater.  Pt will report decreases in headache frequency to 1-2x/week or less.    Plan     Continue Plan of Care (POC) and progress per patient tolerance. See treatment section for details on planned progressions next session.    Rancho Mendoza, PT

## 2024-11-16 ENCOUNTER — HOSPITAL ENCOUNTER (OUTPATIENT)
Dept: RADIOLOGY | Facility: HOSPITAL | Age: 64
Discharge: HOME | End: 2024-11-16
Attending: ANESTHESIOLOGY
Payer: MEDICARE

## 2024-11-16 DIAGNOSIS — M54.12 CERVICAL RADICULOPATHY: ICD-10-CM

## 2024-11-16 DIAGNOSIS — M50.30 DDD (DEGENERATIVE DISC DISEASE), CERVICAL: ICD-10-CM

## 2024-11-16 PROCEDURE — 72141 MRI NECK SPINE W/O DYE: CPT | Mod: 26,,, | Performed by: RADIOLOGY

## 2024-11-16 PROCEDURE — 72141 MRI NECK SPINE W/O DYE: CPT | Mod: TC

## 2024-11-18 ENCOUNTER — CLINICAL SUPPORT (OUTPATIENT)
Dept: REHABILITATION | Facility: HOSPITAL | Age: 64
End: 2024-11-18
Payer: MEDICARE

## 2024-11-18 ENCOUNTER — HOSPITAL ENCOUNTER (OUTPATIENT)
Dept: RADIOLOGY | Facility: HOSPITAL | Age: 64
Discharge: HOME | End: 2024-11-18
Attending: FAMILY MEDICINE
Payer: MEDICARE

## 2024-11-18 VITALS — WEIGHT: 99 LBS | BODY MASS INDEX: 18.69 KG/M2 | HEIGHT: 61 IN

## 2024-11-18 DIAGNOSIS — R29.898 DECREASED RANGE OF MOTION OF NECK: Primary | ICD-10-CM

## 2024-11-18 DIAGNOSIS — Z12.31 ENCOUNTER FOR SCREENING MAMMOGRAM FOR BREAST CANCER: ICD-10-CM

## 2024-11-18 PROCEDURE — 97140 MANUAL THERAPY 1/> REGIONS: CPT

## 2024-11-18 PROCEDURE — 77063 BREAST TOMOSYNTHESIS BI: CPT | Mod: TC

## 2024-11-18 PROCEDURE — 97112 NEUROMUSCULAR REEDUCATION: CPT

## 2024-11-18 PROCEDURE — 77067 SCR MAMMO BI INCL CAD: CPT | Mod: 26,,, | Performed by: RADIOLOGY

## 2024-11-18 PROCEDURE — 77063 BREAST TOMOSYNTHESIS BI: CPT | Mod: 26,,, | Performed by: RADIOLOGY

## 2024-11-18 NOTE — PROGRESS NOTES
"[facility]   Physical Therapy Treatment Note      Name: Ivone Monroy  Clinic Number: [id number]    Therapy Diagnosis:   Encounter Diagnosis   Name Primary?    Decreased range of motion of neck Yes     Physician: Nehal Puente MD    Visit Date: 11/18/2024    Physician Orders: PT Eval and Treat   Medical Diagnosis from Referral: Numbness and tingling in left arm [R20.0, R20.2]   Evaluation Date: 10/29/2024  Authorization Period Expiration: 12/31/24  Plan of Care Expiration: 12/27/24  Visit # / Visits authorized: 6 / 10 (+1 evaluation)  FOTO: 1/3  PTA Visit #: 0/5     Time In: 2:30 pm  Time Out: 3:28 pm  Total Billable Time: 31 minutes     Subjective     Patient reports: feeling okay today. More tightness than pain really today.     He/She was compliant with home exercise program.  Response to previous treatment: mild soreness  Functional change: able to lie on L side to sleep     Pain: 3/10     Location: cervical    Objective      Objective Measures updated at progress report or POC update only unless otherwise noted.     Treatment     Ivone received the treatments listed below:     CPT Intervention Performed   Today Duration / Intensity   MT Soft tissue massage x Suboccipital release    Joint Mobilizations x Cervical distraction, upglides, side glides         TE UBE X  NB 3'/3'    Pec Stretch  3 x 30" corner    Cervical SNAGS X - NB  x X25 Extensions  X20 rotation bilaterally    Rows X - NB 3 x 10 GTB    Foam Roll Series x  x  X - NB  X -NB  X - NB  X - NB Pec Stretch 2 minute  Bear Hugs 1 minutes  Field Goals 1 minute  Swimmers 1 minute  Shoulder Rolls clock wise 1 minute  Shoulder Rolls counter clockwise               NMR Scapular Retractions x 20 x 5" holds    Chin Tucks x 3 x 10 with 5 sec holds    Bilateral External Rotation  3 x 10 YTB    Prone Series   x  x  X - NB 20 x 5" holds  I's  T's  W's    Shoulder ext X - NB RTB 3 x10   TA                        PLAN        CPT Codes " available for Billing:   (09) minutes of Manual therapy (MT) to improve pain and ROM.  (05) minutes of Therapeutic Exercise (TE) to develop strength, endurance, range of motion, and flexibility.  (15) minutes of Neuromuscular Re-Education (NMR)  to improve: Balance, Coordination, Kinesthetic, Sense, Proprioception, and Posture.  (00) minutes of Therapeutic Activities (TA) to improve functional performance.  Vasopneumatic Device Therapy () for management of swelling/edema. (18929)  Unattended Electrical Stimulation (ES) for muscle performance or pain modulation.  BFR: Blood flow restriction applied during exercise    Patient Education and Home Exercises       Home Exercises Provided and Patient Education Provided     Education provided: time included in treatment time.   PURPOSE: Patient educated on the impairments noted above and the effects of physical therapy intervention to improve overall condition and QOL.   EXERCISE: Patient was educated on all the above exercise prior/during/after for proper posture, positioning, and execution for safe performance with home exercise program.   STRENGTH: Patient educated on the importance of improved core and extremity strength in order to improve alignment of the spine and extremities with static positions and dynamic movement.   POSTURE: Patient educated on postural awareness to reduce stress and maintain optimal alignment of the spine with static positions and dynamic movement   SLEEPING POSITIONS: Patient educated on the use of pillows to aid in neutral alignment of spine and extremities when sleeping in supine or side lying.  TRANSFERS & TRANSITIONS: Patient educated on proper technique for bed mobility, transitions and transfers to improve body mechanics and decrease risk of injury.   ERGONOMICS: Patient educated on proper ergonomics at the work station in order to maintain optimal alignment of the musculoskeletal system and improve efficiency in the work  environment.    Written Home Exercises Provided: yes.  Exercises were reviewed and Ivone was able to demonstrate them prior to the end of the session.  Ivone demonstrated good  understanding of the education provided. See EMR under Patient Instructions for exercises provided during therapy sessions.    Assessment     Patient tolerated session well. Patient progressed with performance of prone W's for improved parascapular activation of lower trap. Reduced muscle tension and tenderness to palpation with soft tissue massage of cervical musculature.     Ivone is progressing well towards her goals.   Patient prognosis is Good.     Patient will continue to benefit from skilled outpatient physical therapy to address the deficits listed in the problem list box on initial evaluation, provide pt/family education and to maximize patient's level of independence in the home and community environment.     Patient's spiritual, cultural and educational needs considered and pt agreeable to plan of care and goals.    Goals:  Short Term Goals: 4 weeks   Pt will be IND with HEP.     Long Term Goals: 8 weeks   Pt will improve cervical extension to 75% without pain.  Pt will improve FOTO score to 66% or greater.  Pt will report decreases in headache frequency to 1-2x/week or less.    Plan     Continue Plan of Care (POC) and progress per patient tolerance. See treatment section for details on planned progressions next session.    Rancho Mendoza, PT

## 2024-11-19 ENCOUNTER — OFFICE VISIT (OUTPATIENT)
Dept: SLEEP MEDICINE | Facility: CLINIC | Age: 64
End: 2024-11-19
Payer: MEDICARE

## 2024-11-19 VITALS
DIASTOLIC BLOOD PRESSURE: 60 MMHG | BODY MASS INDEX: 20.22 KG/M2 | HEIGHT: 61 IN | HEART RATE: 60 BPM | WEIGHT: 107.13 LBS | RESPIRATION RATE: 16 BRPM | SYSTOLIC BLOOD PRESSURE: 122 MMHG | OXYGEN SATURATION: 99 %

## 2024-11-19 DIAGNOSIS — R06.83 SNORING: ICD-10-CM

## 2024-11-19 DIAGNOSIS — G47.30 SLEEP APNEA, UNSPECIFIED TYPE: ICD-10-CM

## 2024-11-19 DIAGNOSIS — R51.9 ACUTE NONINTRACTABLE HEADACHE, UNSPECIFIED HEADACHE TYPE: ICD-10-CM

## 2024-11-19 PROCEDURE — 99203 OFFICE O/P NEW LOW 30 MIN: CPT | Mod: S$PBB,,, | Performed by: NURSE PRACTITIONER

## 2024-11-19 PROCEDURE — 99999 PR PBB SHADOW E&M-EST. PATIENT-LVL IV: CPT | Mod: PBBFAC,,, | Performed by: NURSE PRACTITIONER

## 2024-11-19 PROCEDURE — 99214 OFFICE O/P EST MOD 30 MIN: CPT | Mod: PBBFAC | Performed by: NURSE PRACTITIONER

## 2024-11-19 NOTE — PROGRESS NOTES
Subjective:      Patient ID: Ivone Monroy is a 64 y.o. female.    Chief Complaint: Sleeping Problem and Headache    Headache     Patient presents today for evaluation of sleep apnea. Referred by neurology with headache/pressure.Patient with snoring. Unsure of apnea events. Patient not having problems falling asleep, but wakes up frequently throughout the night.  Patient does not wake up feeling refreshed in the morning.  Patient with daytime hypersomnolence. Comorbidities include HTN, NSTEMI, sinus Bradycardia.  Bedtime: 10-10:30PM  Wake time: 7AM    STOP - BANG Questionnaire:     1. Snoring : Do you snore loudly ?    Yes    2. Tired : Do you often feel tired, fatigued, or sleepy during daytime?   Yes    3. Observed: Has anyone observed you stop breathing during your sleep?   No    4. Blood pressure : Do you have or are you being treated for high blood pressure?   Yes    5. BMI :BMI more than 35 kg/m2?   No    6. Age : Age over 50 yr old?   Yes    7. Neck circumference: Neck circumference greater than 40 cm?   No    8. Gender: Gender male?   No    High risk of SUSSY: Yes 5 - 8  Intermediate risk of SUSSY: Yes 3 - 4  Low risk of SUSSY: Yes 0 - 2      References:   STOP Questionnaire   A Tool to Screen Patients for Obstructive Sleep Apnea: [facility]., Aldair Marie M.B.B.S., Charlie Dallas M.D.,Stephanie Plunkett, Ph.D., VERNON Heaton.B.B.S.,_ Heather Potts.,_ Pasha Joshua M.D., MINISTERIO Spear.R.C.P.C.; Anesthesiology 2008; [id number] Copyright © 2008, the American Society of Anesthesiologists, Inc. [employer]       [place] Questionnaire (validated SUSSY screening questionnaire)    Yes -- Snoring/apnea    Yes -- Fatigue    Body mass index is Body mass index is 20.24 kg/m²..  (>25 is overweight, >30 is obese)    Blood Pressure = Hypertension  (PreHTN 120-139/80-89, Stg1 140-159/90-99, Stg2 >160/>100)  [place] = three of three SUSSY categories are positive  "(high risk is 2-3 positive categories)         11/19/2024     2:06 PM   EPWORTH SLEEPINESS SCALE   Sitting and reading 1   Watching TV 2   Sitting, inactive in a public place (e.g. a theatre or a meeting) 0   As a passenger in a car for an hour without a break 2   Lying down to rest in the afternoon when circumstances permit 2   Sitting and talking to someone 0   Sitting quietly after a lunch without alcohol 1   In a car, while stopped for a few minutes in traffic 0   Total score 8      (validated sleepiness questionnaire with a higher score indicating greater sleepiness; range 0-24)    Patient Active Problem List   Diagnosis    GERD (gastroesophageal reflux disease)    History of diverticulitis    History of cancer of gall bladder    Age-related osteoporosis without current pathological fracture    Primary hypertension    Sinus bradycardia    Acute non-ST elevation myocardial infarction (NSTEMI)    Decreased range of motion of neck     /60   Pulse 60   Resp 16   Ht 5' 1" (1.549 m)   Wt 48.6 kg (107 lb 2.3 oz)   SpO2 99%   BMI 20.24 kg/m²   Body mass index is 20.24 kg/m².    Review of Systems   Constitutional:  Positive for fatigue.   HENT: Negative.     Respiratory:  Positive for snoring.    Cardiovascular: Negative.    Musculoskeletal: Negative.    Gastrointestinal: Negative.    Neurological:  Positive for headaches.   Psychiatric/Behavioral:  Positive for sleep disturbance.          Objective:      Physical Exam  Constitutional:       Appearance: Normal appearance. She is well-developed.   HENT:      Head: Normocephalic and atraumatic.      Nose: Nose normal.   Cardiovascular:      Rate and Rhythm: Normal rate and regular rhythm.      Heart sounds: No murmur heard.     No gallop.   Pulmonary:      Effort: Pulmonary effort is normal.      Breath sounds: Normal breath sounds.   Abdominal:      Palpations: Abdomen is soft.      Tenderness: There is no abdominal tenderness.   Musculoskeletal:         " General: Normal range of motion.      Cervical back: Normal range of motion and neck supple.   Skin:     General: Skin is warm and dry.   Neurological:      Mental Status: She is alert and oriented to person, place, and time.   Psychiatric:         Mood and Affect: Mood normal.         Behavior: Behavior normal.         Assessment:     1. Acute nonintractable headache, unspecified headache type    2. Snoring    3. Sleep apnea, unspecified type       Outpatient Encounter Medications as of 11/19/2024   Medication Sig Dispense Refill    aspirin (ECOTRIN) 81 MG EC tablet Take 1 tablet (81 mg total) by mouth once daily. 90 tablet 3    clobetasoL (TEMOVATE) 0.05 % external solution Apply topically 2 (two) times daily as needed for alopecia. Strong steroid- do NOT use on face. 50 mL 0    desonide (DESOWEN) 0.05 % cream Apply to the affected area once to twice daily for vitiligo. May use for up to 4 weeks. 60 g 0    dicyclomine (BENTYL) 10 MG capsule Take 10 mg by mouth 2 (two) times daily.  11    LORazepam (ATIVAN) 0.5 MG tablet Take 1 tablet (0.5 mg total) by mouth every 12 (twelve) hours as needed for Anxiety. 15 tablet 0    losartan (COZAAR) 50 MG tablet Take 1 tablet (50 mg total) by mouth once daily. 30 tablet 3    metoprolol succinate (TOPROL-XL) 25 MG 24 hr tablet Take 1 tablet (25 mg total) by mouth once daily. 90 tablet 1    multivitamin (THERAGRAN) per tablet Take 1 tablet by mouth once daily.      pantoprazole (PROTONIX) 40 MG tablet Take 40 mg by mouth once daily.      pregabalin (LYRICA) 50 MG capsule Take 1 capsule (50 mg total) by mouth every evening. 30 capsule 1    tacrolimus (PROTOPIC) 0.1 % ointment apply to affected area twice daily of vitiligo. Non-steroid med. 30 g 1     No facility-administered encounter medications on file as of 11/19/2024.     No orders of the defined types were placed in this encounter.    Plan:   Sleep study to evaluate for SUSSY- headaches and cardiac history. Snoring and fatigue.        1. Acute nonintractable headache, unspecified headache type  -     Ambulatory referral/consult to Sleep Disorders    2. Snoring  -     Ambulatory referral/consult to Sleep Disorders    3. Sleep apnea, unspecified type  -     Ambulatory referral/consult to Sleep Disorders

## 2024-11-21 ENCOUNTER — CLINICAL SUPPORT (OUTPATIENT)
Dept: REHABILITATION | Facility: HOSPITAL | Age: 64
End: 2024-11-21
Payer: MEDICARE

## 2024-11-21 ENCOUNTER — PATIENT MESSAGE (OUTPATIENT)
Dept: PAIN MEDICINE | Facility: CLINIC | Age: 64
End: 2024-11-21
Payer: MEDICARE

## 2024-11-21 DIAGNOSIS — R29.898 DECREASED RANGE OF MOTION OF NECK: Primary | ICD-10-CM

## 2024-11-21 PROCEDURE — 97112 NEUROMUSCULAR REEDUCATION: CPT | Mod: KX

## 2024-11-21 PROCEDURE — 97110 THERAPEUTIC EXERCISES: CPT | Mod: KX

## 2024-11-21 PROCEDURE — 97140 MANUAL THERAPY 1/> REGIONS: CPT | Mod: KX

## 2024-11-21 NOTE — PROGRESS NOTES
"Subjective:       Patient ID: Ivone Monroy is a 64 y.o. female.      Chief Complaint: "Head Pressure"        HPI    HPI 64 Years old Female with PMHx of NSTEMI / HTN / Gall Bladder Cancer / Diverticulitis  and other medical conditions came for the follow-up evaluation and recommendation of "Head Pressure".    Interval History: (10/14/2024) - Patient declined Pharmacological therapy for headaches / Ordered -Order ZULMA / FA / Homocysteine / Brain MRI WO Contrast / Brain MRA WO Contrast / ophthalmology referral / ENT Referral / Sleep Disorders Referral       Started: In August 2024, the patient presented with complaints of "head pressure," which they initially believed was related to the recent initiation of blood pressure medications (Amlodipine and Hydrochlorothiazide) prescribed by their primary care physician for hypertension management. However, both medications were discontinued due to a rash and inadequate blood pressure control. The patient was subsequently started on Losartan 50 mg daily and Toprol-XL 25 mg daily, taken at bedtime. Upon evaluation, the patient denied any association between the changes in blood pressure medication and the head pressure symptoms. Notably, they reported a reduction in the intensity of head pressure after taking Tylenol the previous day.  Describes: Patient describes "Pressure" to face including (bilateral cheeks, bilateral frontal areas, and nose)  Timing: Duration of 5-10 minutes  Frequency: Intermittent, Daily   Pain: 4-8/10 to Head Pressure   Location: CNS  Family: No known family history of Migraines.    Medications: OTC Tylenol / Claritin - helps to decrease pressure  Worsen: Stress / Denies worsening of pressure with bending the head downward. Denies worsening with Valsalva maneuver.  Alleviated: Better with laying down  Associated symptoms: Associated left shoulder Pain "Aching" / Nasal Drip / The patient experienced ear fullness that resolved spontaneously, " resulting in an improvement in hearing ability following the resolution of the fullness.   Pertinent Negatives: No associated neurological deficits, no scalp sensitivity, nausea, vomiting, neck pain with radiation, ear ringing, dizziness, balance issues, acute thunderclap onset, double or blurry vision, focal or bilateral limb weakness, or extremity numbness and tingling. No light or sound sensitivity. No loss of awareness.   Triggers: Stress  Prodrome symptoms: None    Exclusions: No head history of trauma, seizures, smoking history, caffeine overuse, vertigo, blackouts, fever, chills, or significant memory loss. No history of strokes or falls.    Blood Pressure: Compliant with medication; home readings around 140's/90's.    Sleep History: Patient does report symptoms of sleep apnea (e.g., snoring, gasping, frequent nighttime awakening, daytime fatigue).     ER Evaluation: On October 6, 2024, the patient was evaluated in the emergency department at [facility] due to an increase in the intensity of head pressure. During the evaluation, elevated cardiac enzymes were noted; however, the patient reported that a subsequent heart catheterization showed no abnormalities.    Referral History: The cardiologist conducted a comprehensive evaluation and found no acute abnormalities per patient. The primary care physician's workup also returned negative results; however, arthritis was diagnosed in the left shoulder, and the patient is scheduled for physical therapy. Additionally, the patient has a history of temporomandibular joint (TMJ) disorder and has not been compliant with the recommended use of a mouth guard. A dental appointment is scheduled for today to address this issue.        New Issues: (11/22/2024) -     No new issues per patient.     On 10/18/2024, patient requested to start medication for headache alleviation. She was started on Topamax (25 mg, PO BID) which was not effective. Topamax was discontinued  "on 10/25/2024 and Pamelor 25 mg PO QHS was started and an EKG was ordered. Patient's EKG revealed normal sinus rhythm and a normal ECG. She was instructed to continue to follow up with Cardiology regularly to ensure they agree with this treatment plan. Patient reports that she never started taking Pamelor 25 mg PO QHS due to fear of side effects. Patient expresses that she is not interested in starting medications for "head pressure" alleviation. She reports they are effectively managed well with OTC Tylenol PRN.     Headaches have significantly improved per patient since last visit.   Location is "Pressure" to face including (bilateral cheeks, bilateral frontal areas, and nose)  No Headache auras   Frequency Intermittent, Daily   Duration - decreased -  < 5 minutes  Still "Pressure" in nature  Pain intensity is decreased to 3/10  Worsened / Triggered by Stress at home   Alleviated with laying down / OTC Tylenol / Claritin - helps to decrease pressure  Associated symptoms include Associated left shoulder Pain "Aching" / The patient experienced ear fullness that resolved spontaneously, resulting in an improvement in hearing ability following the resolution of the fullness. Patient reports that when headaches come on she reports mild incoordination and balance issues.   Nasal Drip has resolved.    Pertinent Negatives: No associated neurological deficits, no scalp sensitivity, nausea, vomiting, neck pain with radiation, ear ringing, dizziness, balance issues, acute thunderclap onset, double or blurry vision, focal or bilateral limb weakness, or extremity numbness and tingling. No light or sound sensitivity. No loss of awareness.       The patient reports experiencing a sensation of "head pressure," which she associates with stress at home. In light of her concerns, she has expressed interest in a referral to psychiatry for further evaluation and support.     Vision:  No reported changes in vision.  Ophthalmology " referral: last seen on October 21, 2024; no acute abnormalities or papilledema noted.  ENT:  Referral completed on November 11, 2024; prescribed nasal steroid spray.  Vestibular testing results were negative per patient.  Sleep Disorders:  Consultation on November 19, 2024; plans for a sleep study in place.  Pain Management:  Upcoming appointment for referral for cervical spine MRI evaluation.  Physical Therapy:  Patient reports that therapy is effectively alleviating left shoulder pain.    Medications tried and failed:     Topamax / Metoprolol Succinate (Toprol-XL) - not effective         Review of Systems   Constitutional:  Positive for fatigue. Negative for activity change, appetite change, chills, diaphoresis, fever and unexpected weight change.   HENT:  Negative for congestion, dental problem, drooling, ear discharge, ear pain, facial swelling, hearing loss, mouth sores, nosebleeds, postnasal drip, rhinorrhea, sinus pressure, sinus pain, sneezing, sore throat, tinnitus, trouble swallowing and voice change.         The patient experienced ear fullness that resolved spontaneously, resulting in an improvement in hearing ability following the resolution of the fullness.     Eyes:  Negative for photophobia, pain, discharge, redness, itching and visual disturbance.   Respiratory:  Positive for apnea. Negative for cough, chest tightness, shortness of breath and wheezing.    Cardiovascular:  Negative for chest pain, palpitations and leg swelling.   Gastrointestinal:  Negative for abdominal distention, abdominal pain, blood in stool, constipation, diarrhea, nausea and vomiting.   Endocrine: Negative for cold intolerance, heat intolerance, polydipsia, polyphagia and polyuria.   Genitourinary:  Negative for decreased urine volume, difficulty urinating, dysuria, flank pain, frequency, hematuria, pelvic pain, urgency and vaginal discharge.   Musculoskeletal:  Positive for arthralgias. Negative for back pain, gait problem,  joint swelling, myalgias, neck pain and neck stiffness.   Skin:  Negative for color change and rash.   Allergic/Immunologic: Negative for immunocompromised state.   Neurological:  Positive for headaches. Negative for dizziness, tremors, seizures, syncope, facial asymmetry, speech difficulty, weakness, light-headedness and numbness.        Patient reports that when headaches come on she reports mild incoordination and balance issues.      Hematological:  Negative for adenopathy. Does not bruise/bleed easily.   Psychiatric/Behavioral:  Positive for sleep disturbance. Negative for agitation, behavioral problems, confusion, decreased concentration, dysphoric mood, hallucinations, self-injury and suicidal ideas. The patient is not nervous/anxious and is not hyperactive.    All other systems reviewed and are negative.                Current Outpatient Medications:     aspirin (ECOTRIN) 81 MG EC tablet, Take 1 tablet (81 mg total) by mouth once daily., Disp: 90 tablet, Rfl: 3    clobetasoL (TEMOVATE) 0.05 % external solution, Apply topically 2 (two) times daily as needed for alopecia. Strong steroid- do NOT use on face., Disp: 50 mL, Rfl: 0    desonide (DESOWEN) 0.05 % cream, Apply to the affected area once to twice daily for vitiligo. May use for up to 4 weeks., Disp: 60 g, Rfl: 0    dicyclomine (BENTYL) 10 MG capsule, Take 10 mg by mouth 2 (two) times daily., Disp: , Rfl: 11    LORazepam (ATIVAN) 0.5 MG tablet, Take 1 tablet (0.5 mg total) by mouth every 12 (twelve) hours as needed for Anxiety., Disp: 15 tablet, Rfl: 0    losartan (COZAAR) 50 MG tablet, Take 1 tablet (50 mg total) by mouth once daily., Disp: 30 tablet, Rfl: 3    metoprolol succinate (TOPROL-XL) 25 MG 24 hr tablet, Take 1 tablet (25 mg total) by mouth once daily., Disp: 90 tablet, Rfl: 1    multivitamin (THERAGRAN) per tablet, Take 1 tablet by mouth once daily., Disp: , Rfl:     pantoprazole (PROTONIX) 40 MG tablet, Take 40 mg by mouth once daily., Disp:  , Rfl:     pregabalin (LYRICA) 50 MG capsule, Take 1 capsule (50 mg total) by mouth every evening., Disp: 30 capsule, Rfl: 1    tacrolimus (PROTOPIC) 0.1 % ointment, apply to affected area twice daily of vitiligo. Non-steroid med., Disp: 30 g, Rfl: 1    Past Medical History:   Diagnosis Date    Anemia     Cancer of gallbladder 2015    chemo radiation - Dr Gatica     Diverticulitis     GERD (gastroesophageal reflux disease)        Past Surgical History:   Procedure Laterality Date    exploration of gallbladder twice for suspected malignancy with no resection      TUBAL LIGATION  8/24/1996       Social History     Socioeconomic History    Marital status:      Spouse name: Elba    Number of children: 3   Occupational History    Occupation: Public      Employer: [employer]     Employer: [employer]   Tobacco Use    Smoking status: Never    Smokeless tobacco: Never   Substance and Sexual Activity    Alcohol use: Yes     Alcohol/week: 2.0 standard drinks of alcohol     Types: 2 Glasses of wine per week     Comment: socially    Drug use: No    Sexual activity: Yes     Partners: Male     Birth control/protection: None     Comment:      Social Drivers of Health     Financial Resource Strain: Medium Risk (10/31/2023)    Overall Financial Resource Strain (CARDIA)     Difficulty of Paying Living Expenses: Somewhat hard   Food Insecurity: No Food Insecurity (7/7/2024)    Hunger Vital Sign     Worried About Running Out of Food in the Last Year: Never true     Ran Out of Food in the Last Year: Never true   Transportation Needs: No Transportation Needs (10/31/2023)    PRAPARE - Transportation     Lack of Transportation (Medical): No     Lack of Transportation (Non-Medical): No   Physical Activity: Sufficiently Active (7/7/2024)    Exercise Vital Sign     Days of Exercise per Week: 4 days     Minutes of Exercise per Session: 40 min   Stress: No Stress Concern Present (7/7/2024)     [facility] - Occupational Stress Questionnaire     Feeling of Stress : Only a little   Housing Stability: Low Risk  (10/31/2023)    Housing Stability Vital Sign     Unable to Pay for Housing in the Last Year: No     Number of Places Lived in the Last Year: 1     Unstable Housing in the Last Year: No         Past/Current Medical/Surgical History, Past/Current Social History, Past/Current Family History and Past/Current Medications were reviewed in detail.    Objective:           VITAL SIGNS WERE REVIEWED      GENERAL APPEARANCE:     The patient looks comfortable.    BMI    No signs of respiratory distress.    Normal breathing pattern.    No dysmorphic features    Normal eye contact.       GENERAL MEDICAL EXAM:    HEENT:  Head is atraumatic normocephalic.     FUNDUSCOPIC (OPHTHALMOSCOPIC) EXAMINATION showed no disc edema (papilledema).      NECK: No JVD. No visible lesions or goiters.     CHEST-CARDIOPULMONARY: No cyanosis. No tachypnea. Normal respiratory effort.    [id number]: No jaundice. No stomas or lesions. No visible hernias. No catheters.     SKIN, HAIR, NAILS: No pathognomonic skin rash.No neurofibromatosis. No visible lesions.No stigmata of autoimmune disease. No clubbing.    LIMBS: No varicose veins. No visible swelling.    MUSCULOSKELETAL: No visible deformities.No visible lesions.             Neurological Exam  Mental Status  Awake, alert and oriented to person, place and time. Oriented to person, place, time and situation. Recent and remote memory are intact. At 5 minutes recalls 3 of 3 objects. Speech is normal. Language is fluent with no aphasia. Attention and concentration are normal. Fund of knowledge is appropriate for level of education. Apraxia absent.    Cranial Nerves  CN I: Sense of smell is normal.  CN II: Visual acuity is normal. Visual fields full to confrontation. Right funduscopic exam: disc intact. Left funduscopic exam: disc  intact.  CN III, IV, VI: Extraocular movements intact bilaterally. Normal lids and orbits bilaterally. Pupils equal round and reactive to light bilaterally.  CN V: Facial sensation is normal.  CN VII: Full and symmetric facial movement.  CN VIII: Hearing is normal.  CN IX, X: Palate elevates symmetrically. Normal gag reflex.  CN XI: Shoulder shrug strength is normal.  CN XII: Tongue midline without atrophy or fasciculations.    Motor  Normal muscle bulk throughout. No fasciculations present. Normal muscle tone. No abnormal involuntary movements. Strength is 5/5 throughout all four extremities.    Sensory  Sensation is intact to light touch, pinprick, vibration and proprioception in all four extremities.    Reflexes  Deep tendon reflexes are 2+ and symmetric in all four extremities.    Coordination  Right: Finger-to-nose normal. Rapid alternating movement normal. Heel-to-shin normal.Left: Finger-to-nose normal. Rapid alternating movement normal. Heel-to-shin normal.    Gait  Casual gait is normal including stance, stride, and arm swing.Normal toe walking. Normal heel walking. Normal tandem gait. Romberg is absent. Normal pull test. Able to rise from chair without using arms.        Lab Results   Component Value Date    WBC 10.73 10/10/2024    HGB 12.8 10/10/2024    HCT 42.7 10/10/2024     (H) 10/10/2024     10/10/2024       Sodium   Date Value Ref Range Status   10/10/2024 140 136 - 145 mmol/L Final     Potassium   Date Value Ref Range Status   10/10/2024 4.1 3.5 - 5.1 mmol/L Final     Chloride   Date Value Ref Range Status   10/10/2024 106 95 - 110 mmol/L Final     CO2   Date Value Ref Range Status   10/10/2024 24 23 - 29 mmol/L Final     Glucose   Date Value Ref Range Status   10/10/2024 46 (LL) 70 - 110 mg/dL Final     Comment:     *Critical value notification by DARIAN with confirmation of receipt to   Dr. BASHIR Epps at  Date10/10/24 Time23:01       BUN   Date Value Ref Range Status   10/10/2024 12 8  "- 23 mg/dL Final     Creatinine   Date Value Ref Range Status   10/10/2024 0.9 0.5 - 1.4 mg/dL Final     Calcium   Date Value Ref Range Status   10/10/2024 9.4 8.7 - 10.5 mg/dL Final     Total Protein   Date Value Ref Range Status   10/10/2024 7.0 6.0 - 8.4 g/dL Final     Albumin   Date Value Ref Range Status   10/10/2024 3.8 3.5 - 5.2 g/dL Final     Total Bilirubin   Date Value Ref Range Status   10/10/2024 0.4 0.1 - 1.0 mg/dL Final     Comment:     For infants and newborns, interpretation of results should be based  on gestational age, weight and in agreement with clinical  observations.    Premature Infant recommended reference ranges:  Up to 24 hours.............<8.0 mg/dL  Up to 48 hours............<12.0 mg/dL  3-5 days..................<15.0 mg/dL  6-29 days.................<15.0 mg/dL       Alkaline Phosphatase   Date Value Ref Range Status   10/10/2024 65 55 - 135 U/L Final     AST   Date Value Ref Range Status   10/10/2024 23 10 - 40 U/L Final     ALT   Date Value Ref Range Status   10/10/2024 21 10 - 44 U/L Final     Anion Gap   Date Value Ref Range Status   10/10/2024 10 8 - 16 mmol/L Final     eGFR if    Date Value Ref Range Status   08/22/2020 >60.0 >60 mL/min/1.73 m^2 Final     eGFR if non    Date Value Ref Range Status   08/22/2020 >60.0 >60 mL/min/1.73 m^2 Final     Comment:     Calculation used to obtain the estimated glomerular filtration  rate (eGFR) is the CKD-EPI equation.          Lab Results   Component Value Date    [id number] 959 (H) 08/16/2024       Lab Results   Component Value Date    TSH 2.860 10/10/2024       No results found in the last 24 hours.    No results found in the last 24 hours.    Reviewed the neuroimaging independently       Assessment:   64 Years old Female with PMH as above came for an evaluation of "Head Pressure".    Acute nonintractable headache, unspecified headache type     Pressure in head     Postnasal drip     Snoring     Fatigue, " unspecified type     Sleep apnea, unspecified type     Chronic left shoulder pain     Cavernoma     Stress at home     Plan:   The patient's neurological assessment is non-focal, and the history and physical examination suggest the need to rule out sinus-related headaches and Tension Type Headaches. Additionally, intracranial hypertension (IIH) will also be considered in the differential diagnosis due to the reported head pressure.      Acute nonintractable headache, unspecified headache type / Pressure in head     -Pharmacological therapy for headaches was discussed with the patient; however, they declined the offer and expressed a preference to continue using over-the-counter Tylenol, which they report has been effective in alleviating their symptoms.    -The patient requested to postpone testing for HIV and RPR (Rapid Plasma Reagin).    -Reviewed results of Brain MRI WO Contrast / Brain MRA WO Contrast  / ZULMA / FA / Homocysteine / for Neurological Evaluation. Patient verbalized understanding.     -Continue ophthalmology referral for a routine eye exam to assess for any abnormalities, including papilledema, that may be contributing to the patient's head pressure.    Postnasal drip     -Continue ENT Referral for further evaluation and recommendation of Headaches, Postnasal Drip, Ear Fullness, and TMJ History.     -Continue following with Dentist for TMJ management.     Snoring / Fatigue, unspecified type / Sleep apnea, unspecified type     -Continue Sleep Disorders Referral to rule out SUSSY.     Chronic left shoulder pain     -Continue following with PCP for management and continue Planned PT evaluation.     -Continue following with pain management for cervical spine MRI findings.    Cavernoma     -Ordered Referral to Neurosurgery for further evaluation and recommendation of 10 mm right cerebellar cavernoma.  Patient reports that when headaches come on she reports mild incoordination and balance issues. Would like  "to rule out cerebellar cavernoma as potential cause.     Stress at home      -The patient reports experiencing a sensation of "head pressure," which she associates with stress at home. In light of her concerns, she has expressed interest in a referral to psychiatry for further evaluation and support. A referral has been placed today to address her needs.        LABORATORY EVALUATION    Labs: (2024) ZULMA / FA / Homocysteine / A1C / TSH / Lipid Panel / CMP / CBC / Sed Rate / CRP / Magnesium / B-12 / Vitamin D /   -personally reviewed -non-significant abnormalities except     Glucose (46) - followed by PCP          RADIOLOGY EVALUATION     Personally Reviewed MRI Cervical Spine - done 11/2024 - C5-6 disc degeneration. No significant central or foraminal stenosis    Personally Reviewed Brain MRA WO Contrast - done 11/2024 - No intracranial large vessel occlusion, hemodynamically significant stenosis or aneurysm.     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       Personally Reviewed Brain MRI WO -  done 10/2024 - no acute abnormalities, Mild cerebral volume loss, chronic microvascular ischemia, 10 mm right cerebellar cavernoma.     Personally Reviewed X-Ray Cervical Spine - done 10/2024 - no acute abnormalities, Focal disc degeneration at C5-6     Personally Reviewed X-Ray Sinuses - 10/2024 - No acute findings     Personally Reviewed X-Ray Left Shoulder - 10/2024 - No acute findings       NEUROPHYSIOLOGY EVALUATION       PATHOLOGY EVALUATION        NEUROCOGNITIVE AND NEUROPSYCHOLOGY EVALUATION                         TENSION HEADACHE (TENSION-TYPE HEADACHE TTH)      MANAGEMENT    Discussed the fact that management of TTH is difficult and should be individualized. A combination of therapeutic methods can be utilized to decrease the burden of TTH and lead to acceptable outcome.      NON-PHARMACOLOGICAL MANAGEMENT        Recommended nonpharmacologic management as the first line and " this includes:     Physical and occupational therapy.     Avoiding triggers.    Regulation of sleep, exercise, hydration and meals.    Relaxation and stress management.    Yoga and deep breathing.    Biofeedback.    Self-hypnosis.    Cognitive behavioral therapy.      PHARMACOLOGICAL MANAGEMENT        Limit needed over-the-counter medications to no more than two times a week to avoid rebound, medication overuse headaches and the vicious cycle of chronic daily headaches.    Avoid the use of sedative medications like antihistamines, barbiturates (butabital) and opiates (narcotics). Such medications worsen the TTH and cause chronic daily headaches.     Consider preventive medications if/when TTH attacks occur more than 2 days a week. The goal of preventative treatment is 50% reduction in severity and frequency of TTH attacks. Complete elimination of TTH is currently not possible. The most effective medications are antidepressants. First, second, and third-line preventive medications are amitriptyline, mirtazapine, and venlafaxine, respectively. Explained to the patient that antidepressants are not addictive and, as many other medications, have different clinical uses and utilized in several related and unrelated conditions.     Try Amitriptyline/Elavil. Will titrate very slowly to 25-75 mg QHS which can cause sleepiness, dry eyes, dry mouth, urinary retention and rarely cardiac arrhythmias. The patient verbalized understanding of the most common SEs and was encouraged to read the leaflet for more details.       NEXT OPTIONS    Mirtazapine (Remeron).    Venlafaxine (Effexor)        MEDICAL/SURGICAL COMORBIDITIES     All relevant medical comorbidities noted and managed by primary care physician and medical care team.          HEALTHY LIFESTYLE AND PREVENTATIVE CARE    The patient to adhere to the age-appropriate health maintenance guidelines including screening tests and vaccinations. The patient to adhere to  healthy  lifestyle, optimal weight, exercise, healthy diet, good sleep hygiene and avoiding drugs including smoking, alcohol and recreational drugs.    I spent a total of 68 minutes on the day of the visit.This includes face to face time and non-face to face time preparing to see the patient (eg, review of tests), obtaining and/or reviewing separately obtained history, documenting clinical information in the electronic or other health record, independently interpreting results and communicating results to the patient/family/caregiver, or care coordinator.       Please do not hesitate to contact me with any updates, questions or concerns.    No follow-ups on file.    Ted Bianchi, MSN, FNP-C    General Neurology

## 2024-11-21 NOTE — PROGRESS NOTES
"[facility]   Physical Therapy Treatment Note      Name: Ivone Monroy  Clinic Number: [id number]    Therapy Diagnosis:   Encounter Diagnosis   Name Primary?    Decreased range of motion of neck Yes     Physician: Nehal Puente MD    Visit Date: 11/21/2024    Physician Orders: PT Eval and Treat   Medical Diagnosis from Referral: Numbness and tingling in left arm [R20.0, R20.2]   Evaluation Date: 10/29/2024  Authorization Period Expiration: 12/31/24  Plan of Care Expiration: 12/27/24  Visit # / Visits authorized: 7 / 10 (+1 evaluation)  FOTO: 1/3  PTA Visit #: 0/5     Time In: 1:30 pm  Time Out: 2:30 pm  Total Billable Time: 56 minutes     Subjective     Patient reports: little pressure in suboccipitals but no head and minimal discomfort.     He/She was compliant with home exercise program.  Response to previous treatment: mild soreness  Functional change: able to lie on L side to sleep     Pain: 3/10     Location: cervical    Objective      Objective Measures updated at progress report or POC update only unless otherwise noted.     Treatment     Ivone received the treatments listed below:     CPT Intervention Performed   Today Duration / Intensity   MT Soft tissue massage x Suboccipital release    Joint Mobilizations x Cervical distraction, upglides, side glides         TE UBE x 3'/3'    Pec Stretch x 3 x 30" corner    Cervical SNAGS x  x X25 Extensions  X20 rotation bilaterally    Rows  3 x 10 GTB    Foam Roll Series  Pec Stretch 2 minute  Bear Hugs 1 minutes  Field Goals 1 minute  Swimmers 1 minute  Shoulder Rolls clock wise 1 minute  Shoulder Rolls counter clockwise               NMR Scapular Retractions  20 x 5" holds    Chin Tucks x 3 x 10 with 5 sec holds    Bilateral External Rotation x 3 x 10 YTB    Prone Series   x  x   20 x 5" holds  I's  T's  W's    Shoulder ext  RTB 3 x10   TA                        PLAN        CPT Codes available for Billing:   (15) minutes of Manual " therapy (MT) to improve pain and ROM.  (25) minutes of Therapeutic Exercise (TE) to develop strength, endurance, range of motion, and flexibility.  (15) minutes of Neuromuscular Re-Education (NMR)  to improve: Balance, Coordination, Kinesthetic, Sense, Proprioception, and Posture.  (00) minutes of Therapeutic Activities (TA) to improve functional performance.  Vasopneumatic Device Therapy () for management of swelling/edema. (82584)  Unattended Electrical Stimulation (ES) for muscle performance or pain modulation.  BFR: Blood flow restriction applied during exercise    Patient Education and Home Exercises       Home Exercises Provided and Patient Education Provided     Education provided: time included in treatment time.   PURPOSE: Patient educated on the impairments noted above and the effects of physical therapy intervention to improve overall condition and QOL.   EXERCISE: Patient was educated on all the above exercise prior/during/after for proper posture, positioning, and execution for safe performance with home exercise program.   STRENGTH: Patient educated on the importance of improved core and extremity strength in order to improve alignment of the spine and extremities with static positions and dynamic movement.   POSTURE: Patient educated on postural awareness to reduce stress and maintain optimal alignment of the spine with static positions and dynamic movement   SLEEPING POSITIONS: Patient educated on the use of pillows to aid in neutral alignment of spine and extremities when sleeping in supine or side lying.  TRANSFERS & TRANSITIONS: Patient educated on proper technique for bed mobility, transitions and transfers to improve body mechanics and decrease risk of injury.   ERGONOMICS: Patient educated on proper ergonomics at the work station in order to maintain optimal alignment of the musculoskeletal system and improve efficiency in the work environment.    Written Home Exercises Provided:  yes.  Exercises were reviewed and Ivone was able to demonstrate them prior to the end of the session.  Ivone demonstrated good  understanding of the education provided. See EMR under Patient Instructions for exercises provided during therapy sessions.    Assessment     Patient tolerated session well. Reduction of tenderness to palpation following soft tissue massage to suboccipitals and bilateral upper traps. Continued queuing required for rotational SNAG set up but able to complete well with cueing.     Ivone is progressing well towards her goals.   Patient prognosis is Good.     Patient will continue to benefit from skilled outpatient physical therapy to address the deficits listed in the problem list box on initial evaluation, provide pt/family education and to maximize patient's level of independence in the home and community environment.     Patient's spiritual, cultural and educational needs considered and pt agreeable to plan of care and goals.    Goals:  Short Term Goals: 4 weeks   Pt will be IND with HEP.     Long Term Goals: 8 weeks   Pt will improve cervical extension to 75% without pain.  Pt will improve FOTO score to 66% or greater.  Pt will report decreases in headache frequency to 1-2x/week or less.    Plan     Continue Plan of Care (POC) and progress per patient tolerance. See treatment section for details on planned progressions next session.    Rancho Mendoza, PT

## 2024-11-22 ENCOUNTER — OFFICE VISIT (OUTPATIENT)
Dept: NEUROLOGY | Facility: CLINIC | Age: 64
End: 2024-11-22
Payer: MEDICARE

## 2024-11-22 VITALS
WEIGHT: 108 LBS | RESPIRATION RATE: 16 BRPM | DIASTOLIC BLOOD PRESSURE: 83 MMHG | BODY MASS INDEX: 20.39 KG/M2 | HEART RATE: 60 BPM | OXYGEN SATURATION: 99 % | SYSTOLIC BLOOD PRESSURE: 161 MMHG | HEIGHT: 61 IN

## 2024-11-22 DIAGNOSIS — R51.9 ACUTE NONINTRACTABLE HEADACHE, UNSPECIFIED HEADACHE TYPE: Primary | ICD-10-CM

## 2024-11-22 DIAGNOSIS — R51.9 PRESSURE IN HEAD: ICD-10-CM

## 2024-11-22 DIAGNOSIS — G89.29 CHRONIC LEFT SHOULDER PAIN: ICD-10-CM

## 2024-11-22 DIAGNOSIS — R09.82 POSTNASAL DRIP: ICD-10-CM

## 2024-11-22 DIAGNOSIS — R06.83 SNORING: ICD-10-CM

## 2024-11-22 DIAGNOSIS — F43.9 STRESS AT HOME: ICD-10-CM

## 2024-11-22 DIAGNOSIS — R53.83 FATIGUE, UNSPECIFIED TYPE: ICD-10-CM

## 2024-11-22 DIAGNOSIS — D18.00 CAVERNOMA: ICD-10-CM

## 2024-11-22 DIAGNOSIS — G47.30 SLEEP APNEA, UNSPECIFIED TYPE: ICD-10-CM

## 2024-11-22 DIAGNOSIS — M25.512 CHRONIC LEFT SHOULDER PAIN: ICD-10-CM

## 2024-11-22 PROCEDURE — 99215 OFFICE O/P EST HI 40 MIN: CPT | Mod: PBBFAC

## 2024-11-22 PROCEDURE — 99999 PR PBB SHADOW E&M-EST. PATIENT-LVL V: CPT | Mod: PBBFAC,,,

## 2024-11-23 ENCOUNTER — HOSPITAL ENCOUNTER (OUTPATIENT)
Dept: SLEEP MEDICINE | Facility: HOSPITAL | Age: 64
Discharge: HOME | End: 2024-11-23
Attending: FAMILY MEDICINE
Payer: MEDICARE

## 2024-11-23 DIAGNOSIS — G47.30 SLEEP APNEA, UNSPECIFIED TYPE: ICD-10-CM

## 2024-11-25 ENCOUNTER — CLINICAL SUPPORT (OUTPATIENT)
Dept: REHABILITATION | Facility: HOSPITAL | Age: 64
End: 2024-11-25
Payer: MEDICARE

## 2024-11-25 DIAGNOSIS — R29.898 DECREASED RANGE OF MOTION OF NECK: Primary | ICD-10-CM

## 2024-11-25 PROCEDURE — 97112 NEUROMUSCULAR REEDUCATION: CPT | Mod: KX

## 2024-11-25 PROCEDURE — 97140 MANUAL THERAPY 1/> REGIONS: CPT | Mod: KX

## 2024-11-25 NOTE — PROGRESS NOTES
"[facility]   Physical Therapy Treatment Note      Name: Ivone Monroy  Clinic Number: [id number]    Therapy Diagnosis:   Encounter Diagnosis   Name Primary?    Decreased range of motion of neck Yes     Physician: Nehal Puente MD    Visit Date: 11/25/2024    Physician Orders: PT Eval and Treat   Medical Diagnosis from Referral: Numbness and tingling in left arm [R20.0, R20.2]   Evaluation Date: 10/29/2024  Authorization Period Expiration: 12/31/24  Plan of Care Expiration: 12/27/24  Visit # / Visits authorized: 8 / 10 (+1 evaluation)  FOTO: 1/3  PTA Visit #: 0/5     Time In: 1:36 pm  Time Out: 2:35 pm  Total Billable Time: 28 minutes     Subjective     Patient reports: left sided trap stiffness as well sinus press. Notes these symptoms seem to correlate.     He/She was compliant with home exercise program.  Response to previous treatment: mild soreness  Functional change: able to lie on L side to sleep     Pain: 3/10     Location: cervical    Objective      Objective Measures updated at progress report or POC update only unless otherwise noted.     Treatment     Ivone received the treatments listed below:     CPT Intervention Performed   Today Duration / Intensity   MT Soft tissue massage x Suboccipital release    Joint Mobilizations x Cervical distraction, upglides, side glides         TE UBE X - NB 3'/3'    Pec Stretch X - NB 3 x 30" corner    Cervical SNAGS X - NB  X - NB X25 Extensions  X20 rotation bilaterally    Rows  3 x 10 GTB    Foam Roll Series  Pec Stretch 2 minute  Bear Hugs 1 minutes  Field Goals 1 minute  Swimmers 1 minute  Shoulder Rolls clock wise 1 minute  Shoulder Rolls counter clockwise               NMR Scapular Retractions  20 x 5" holds    Chin Tucks x 3 x 10 with 5 sec holds    Bilateral External Rotation x 3 x 10 YTB    Prone Series   X - NB  X - NB   20 x 5" holds  I's  T's  W's    Shoulder ext x RTB 3 x10   TA                        PLAN        CPT Codes " available for Billing:   (14) minutes of Manual therapy (MT) to improve pain and ROM.  (00) minutes of Therapeutic Exercise (TE) to develop strength, endurance, range of motion, and flexibility.  (14) minutes of Neuromuscular Re-Education (NMR)  to improve: Balance, Coordination, Kinesthetic, Sense, Proprioception, and Posture.  (00) minutes of Therapeutic Activities (TA) to improve functional performance.  Vasopneumatic Device Therapy () for management of swelling/edema. (89685)  Unattended Electrical Stimulation (ES) for muscle performance or pain modulation.  BFR: Blood flow restriction applied during exercise    Patient Education and Home Exercises       Home Exercises Provided and Patient Education Provided     Education provided: time included in treatment time.   PURPOSE: Patient educated on the impairments noted above and the effects of physical therapy intervention to improve overall condition and QOL.   EXERCISE: Patient was educated on all the above exercise prior/during/after for proper posture, positioning, and execution for safe performance with home exercise program.   STRENGTH: Patient educated on the importance of improved core and extremity strength in order to improve alignment of the spine and extremities with static positions and dynamic movement.   POSTURE: Patient educated on postural awareness to reduce stress and maintain optimal alignment of the spine with static positions and dynamic movement   SLEEPING POSITIONS: Patient educated on the use of pillows to aid in neutral alignment of spine and extremities when sleeping in supine or side lying.  TRANSFERS & TRANSITIONS: Patient educated on proper technique for bed mobility, transitions and transfers to improve body mechanics and decrease risk of injury.   ERGONOMICS: Patient educated on proper ergonomics at the work station in order to maintain optimal alignment of the musculoskeletal system and improve efficiency in the work  environment.    Written Home Exercises Provided: yes.  Exercises were reviewed and Ivone was able to demonstrate them prior to the end of the session.  Ivone demonstrated good  understanding of the education provided. See EMR under Patient Instructions for exercises provided during therapy sessions.    Assessment     Patient tolerated session well. Patient notes reduction of her sinus pressure following soft tissue massage to left upper trap. Patient able to set up and perform rotational cervical SNAG's with decreased cueing.     Ivone is progressing well towards her goals.   Patient prognosis is Good.     Patient will continue to benefit from skilled outpatient physical therapy to address the deficits listed in the problem list box on initial evaluation, provide pt/family education and to maximize patient's level of independence in the home and community environment.     Patient's spiritual, cultural and educational needs considered and pt agreeable to plan of care and goals.    Goals:  Short Term Goals: 4 weeks   Pt will be IND with HEP.     Long Term Goals: 8 weeks   Pt will improve cervical extension to 75% without pain.  Pt will improve FOTO score to 66% or greater.  Pt will report decreases in headache frequency to 1-2x/week or less.    Plan     Continue Plan of Care (POC) and progress per patient tolerance. See treatment section for details on planned progressions next session.    Rancho Mendoza, PT

## 2024-11-26 DIAGNOSIS — L63.9 ALOPECIA AREATA: ICD-10-CM

## 2024-11-27 ENCOUNTER — CLINICAL SUPPORT (OUTPATIENT)
Dept: REHABILITATION | Facility: HOSPITAL | Age: 64
End: 2024-11-27
Payer: MEDICARE

## 2024-11-27 DIAGNOSIS — R29.898 DECREASED RANGE OF MOTION OF NECK: Primary | ICD-10-CM

## 2024-11-27 PROCEDURE — 20560 NDL INSJ W/O NJX 1 OR 2 MUSC: CPT | Mod: KX,CG

## 2024-11-27 PROCEDURE — 97112 NEUROMUSCULAR REEDUCATION: CPT | Mod: KX

## 2024-11-27 PROCEDURE — 97140 MANUAL THERAPY 1/> REGIONS: CPT | Mod: KX,CG

## 2024-11-27 NOTE — PROGRESS NOTES
"[facility]   Physical Therapy Treatment Note      Name: Ivone Monroy  Clinic Number: [id number]    Therapy Diagnosis:   Encounter Diagnosis   Name Primary?    Decreased range of motion of neck Yes     Physician: Nehal Puente MD    Visit Date: 11/27/2024    Physician Orders: PT Eval and Treat   Medical Diagnosis from Referral: Numbness and tingling in left arm [R20.0, R20.2]   Evaluation Date: 10/29/2024  Authorization Period Expiration: 12/31/24  Plan of Care Expiration: 12/27/24  Visit # / Visits authorized: 9 / 10 (+1 evaluation)  FOTO: 1/3  PTA Visit #: 0/5     Time In: 1:59 pm  Time Out: 3:00 pm  Total Billable Time: 31 minutes     Subjective     Patient reports: feeling tightness in left sided traps and cervical paraspinals.     He/She was compliant with home exercise program.  Response to previous treatment: mild soreness  Functional change: able to lie on L side to sleep     Pain: 3/10     Location: cervical    Objective      Objective Measures updated at progress report or POC update only unless otherwise noted.     Treatment     Ivone received the treatments listed below:   See EMR under MEDIA for written consent provided.    Palpation Assessment to determine the necessity for Functional Dry Needling  of left upper trap.     Ivone received the following manual therapy techniques:   CPT Intervention Performed   Today Duration / Intensity   MT Soft tissue massage x Suboccipital release    Joint Mobilizations x Cervical distraction, upglides, side glides    Dry Needling x Left upper traps   TE UBE X - NB 3'/3'    Pec Stretch X - NB 3 x 30" corner    Cervical SNAGS X - NB  X - NB X25 Extensions  X20 rotation bilaterally    Trap Stretch X - NB 3 x 30" bilateral    Rows  3 x 10 GTB    Foam Roll Series  Pec Stretch 2 minute  Bear Hugs 1 minutes  Field Goals 1 minute  Swimmers 1 minute  Shoulder Rolls clock wise 1 minute  Shoulder Rolls counter clockwise               NMR " "Scapular Retractions x 20 x 5" holds    Chin Tucks x 3 x 10 with 5 sec holds    Bilateral External Rotation X - NB 3 x 10 YTB    Prone Series  20 x 5" holds  I's  T's  W's    Shoulder ext x RTB 3 x10    Shoulder Rolls x X20 CW/CCW         TA                        PLAN        CPT Codes available for Billing:   (16) minutes of Manual therapy (MT) to improve pain and ROM.  (00) minutes of Therapeutic Exercise (TE) to develop strength, endurance, range of motion, and flexibility.  (14) minutes of Neuromuscular Re-Education (NMR)  to improve: Balance, Coordination, Kinesthetic, Sense, Proprioception, and Posture.  (00) minutes of Therapeutic Activities (TA) to improve functional performance.  Vasopneumatic Device Therapy () for management of swelling/edema. (75093)  Unattended Electrical Stimulation (ES) for muscle performance or pain modulation.  BFR: Blood flow restriction applied during exercise    Patient Education and Home Exercises       Home Exercises Provided and Patient Education Provided     Education provided: time included in treatment time.   PURPOSE: Patient educated on the impairments noted above and the effects of physical therapy intervention to improve overall condition and QOL.   EXERCISE: Patient was educated on all the above exercise prior/during/after for proper posture, positioning, and execution for safe performance with home exercise program.   STRENGTH: Patient educated on the importance of improved core and extremity strength in order to improve alignment of the spine and extremities with static positions and dynamic movement.   POSTURE: Patient educated on postural awareness to reduce stress and maintain optimal alignment of the spine with static positions and dynamic movement   SLEEPING POSITIONS: Patient educated on the use of pillows to aid in neutral alignment of spine and extremities when sleeping in supine or side lying.  TRANSFERS & TRANSITIONS: Patient educated on proper technique " for bed mobility, transitions and transfers to improve body mechanics and decrease risk of injury.   ERGONOMICS: Patient educated on proper ergonomics at the work station in order to maintain optimal alignment of the musculoskeletal system and improve efficiency in the work environment.    Written Home Exercises Provided: yes.  Exercises were reviewed and Ivone was able to demonstrate them prior to the end of the session.  Ivone demonstrated good  understanding of the education provided. See EMR under Patient Instructions for exercises provided during therapy sessions.    Assessment     Patient tolerated session well. Patient demonstrated appropriate response to Functional Dry Needling. good  rhythmical contractions observed to treated muscle groups. Supported with trap stretching and shoulder rolls.     Ivone is progressing well towards her goals.   Patient prognosis is Good.     Patient will continue to benefit from skilled outpatient physical therapy to address the deficits listed in the problem list box on initial evaluation, provide pt/family education and to maximize patient's level of independence in the home and community environment.     Patient's spiritual, cultural and educational needs considered and pt agreeable to plan of care and goals.    Goals:  Short Term Goals: 4 weeks   Pt will be IND with HEP.     Long Term Goals: 8 weeks   Pt will improve cervical extension to 75% without pain.  Pt will improve FOTO score to 66% or greater.  Pt will report decreases in headache frequency to 1-2x/week or less.    Plan     Continue Plan of Care (POC) and progress per patient tolerance. See treatment section for details on planned progressions next session.    Rancho Mendoza, PT

## 2024-12-03 ENCOUNTER — OFFICE VISIT (OUTPATIENT)
Dept: OBSTETRICS AND GYNECOLOGY | Facility: CLINIC | Age: 64
End: 2024-12-03
Payer: MEDICARE

## 2024-12-03 VITALS
WEIGHT: 108.94 LBS | BODY MASS INDEX: 20.57 KG/M2 | SYSTOLIC BLOOD PRESSURE: 136 MMHG | HEIGHT: 61 IN | DIASTOLIC BLOOD PRESSURE: 70 MMHG

## 2024-12-03 DIAGNOSIS — Z91.89 GYN EXAM FOR HIGH-RISK MEDICARE PATIENT: Primary | ICD-10-CM

## 2024-12-03 DIAGNOSIS — Z78.0 POSTMENOPAUSAL: ICD-10-CM

## 2024-12-03 DIAGNOSIS — Z85.09 HISTORY OF CANCER OF GALL BLADDER: ICD-10-CM

## 2024-12-03 PROCEDURE — 99999 PR PBB SHADOW E&M-EST. PATIENT-LVL III: CPT | Mod: PBBFAC,,, | Performed by: NURSE PRACTITIONER

## 2024-12-03 PROCEDURE — 87624 HPV HI-RISK TYP POOLED RSLT: CPT | Performed by: NURSE PRACTITIONER

## 2024-12-03 PROCEDURE — 99213 OFFICE O/P EST LOW 20 MIN: CPT | Mod: PBBFAC | Performed by: NURSE PRACTITIONER

## 2024-12-03 PROCEDURE — 88175 CYTOPATH C/V AUTO FLUID REDO: CPT | Performed by: NURSE PRACTITIONER

## 2024-12-03 NOTE — PROGRESS NOTES
CC: Well woman exam    Ivone Monroy is a 64 y.o. female  presents for well woman exam.  LMP: No LMP recorded. Patient is postmenopausal..    Pap this year   negative paps for past few years  Will push to medicare guidelines and see every other year - continue pap every other year     Health Maintenance Topics with due status: Not Due       Topic Last Completion Date    Colorectal Cancer Screening 2020    HPV/Cotest 2022    DEXA Scan 2023    Lipid Panel 10/10/2024    Mammogram 2024    Aspirin/Antiplatelet Therapy 2024     Past Medical History:   Diagnosis Date    Anemia     Cancer of gallbladder 2015    chemo radiation - Dr Gatica     Diverticulitis     GERD (gastroesophageal reflux disease)      Past Surgical History:   Procedure Laterality Date    exploration of gallbladder twice for suspected malignancy with no resection      TUBAL LIGATION  1996     Social History     Socioeconomic History    Marital status:      Spouse name: Elba    Number of children: 3   Occupational History    Occupation: Public      Employer: [employer]     Employer: [employer]   Tobacco Use    Smoking status: Never    Smokeless tobacco: Never   Substance and Sexual Activity    Alcohol use: Yes     Alcohol/week: 2.0 standard drinks of alcohol     Types: 2 Glasses of wine per week     Comment: socially    Drug use: No    Sexual activity: Yes     Partners: Male     Birth control/protection: None     Comment:      Social Drivers of Health     Financial Resource Strain: Medium Risk (10/31/2023)    Overall Financial Resource Strain (CARDIA)     Difficulty of Paying Living Expenses: Somewhat hard   Food Insecurity: No Food Insecurity (2024)    Hunger Vital Sign     Worried About Running Out of Food in the Last Year: Never true     Ran Out of Food in the Last Year: Never true   Transportation Needs: No Transportation Needs (10/31/2023)     "PRAPARE - Transportation     Lack of Transportation (Medical): No     Lack of Transportation (Non-Medical): No   Physical Activity: Sufficiently Active (2024)    Exercise Vital Sign     Days of Exercise per Week: 4 days     Minutes of Exercise per Session: 40 min   Stress: No Stress Concern Present (2024)    [place] [employer] - Occupational Stress Questionnaire     Feeling of Stress : Only a little   Housing Stability: Low Risk  (10/31/2023)    Housing Stability Vital Sign     Unable to Pay for Housing in the Last Year: No     Number of Places Lived in the Last Year: 1     Unstable Housing in the Last Year: No     Family History   Problem Relation Name Age of Onset    Diabetes Mother Odeal         may have been related to prolonged prednisone use    Arthritis Mother Odeal         Rheumatoid    Heart disease Mother Odeal     Cancer Father Iry         prostate    Heart disease Father Iry     Colon cancer Cousin      Colon cancer Paternal Uncle       OB History          6    Para   6    Term   3            AB        Living   3         SAB        IAB        Ectopic        Multiple        Live Births   2                 /70 (Patient Position: Sitting)   Ht 5' 1" (1.549 m)   Wt 49.4 kg (108 lb 14.5 oz)   BMI 20.58 kg/m²       ROS:  Per hpi    PHYSICAL EXAM:  APPEARANCE: Well nourished, well developed, in no acute distress.  AFFECT: WNL, alert and oriented x 3  SKIN: No acne or hirsutism  NECK: Neck symmetric without masses or thyromegaly  NODES: No inguinal, cervical, axillary, or femoral lymph node enlargement  CHEST: Good respiratory effect  ABDOMEN: Soft.  No tenderness or masses.  No hepatosplenomegaly.  No hernias.  BREASTS: Symmetrical, no skin changes or visible lesions.  No palpable masses, nipple discharge bilaterally.  PELVIC: Normal external genitalia without lesions.  Normal hair distribution.  Adequate perineal body, normal urethral meatus.  Vagina " atrophic without lesions or discharge.  Cervix pink, without lesions, discharge or tenderness.  No significant cystocele or rectocele.  Bimanual exam shows uterus to be normal size, regular, mobile and nontender.  Adnexa without masses or tenderness.    EXTREMITIES: No edema.  Physical Exam    1. GYN exam for high-risk Medicare patient  Liquid-Based Pap Smear, Screening    HPV High Risk Genotypes, PCR      2. Postmenopausal        3. History of cancer of gall bladder  Liquid-Based Pap Smear, Screening    HPV High Risk Genotypes, PCR       AND PLAN:    Ivone was seen today for well woman.    Diagnoses and all orders for this visit:    GYN exam for high-risk Medicare patient  -     Liquid-Based Pap Smear, Screening  -     HPV High Risk Genotypes, PCR    Postmenopausal    History of cancer of gall bladder  -     Liquid-Based Pap Smear, Screening  -     HPV High Risk Genotypes, PCR         Patient was counseled today on A.C.S. Pap guidelines and recommendations for yearly pelvic exams, mammograms and monthly self breast exams; to see her PCP for other health maintenance.

## 2024-12-04 LAB
CLINICAL INFO: NORMAL
DATE OF PREVIOUS PAP: NORMAL
DATE PREVIOUS BX: NO
LMP START DATE: NORMAL
SPECIMEN SOURCE CVX/VAG CYTO: NORMAL

## 2024-12-04 RX ORDER — CLOBETASOL PROPIONATE 0.5 MG/ML
SOLUTION TOPICAL 2 TIMES DAILY
Qty: 50 ML | Refills: 0 | Status: SHIPPED | OUTPATIENT
Start: 2024-12-04

## 2024-12-06 ENCOUNTER — CLINICAL SUPPORT (OUTPATIENT)
Dept: REHABILITATION | Facility: HOSPITAL | Age: 64
End: 2024-12-06
Payer: MEDICARE

## 2024-12-06 DIAGNOSIS — R29.898 DECREASED RANGE OF MOTION OF NECK: Primary | ICD-10-CM

## 2024-12-06 PROCEDURE — 97140 MANUAL THERAPY 1/> REGIONS: CPT | Mod: KX

## 2024-12-06 PROCEDURE — 97112 NEUROMUSCULAR REEDUCATION: CPT | Mod: KX

## 2024-12-06 NOTE — PROGRESS NOTES
"[facility]   Physical Therapy Treatment Note      Name: Ivone Monroy  Clinic Number: [id number]    Therapy Diagnosis:   Encounter Diagnosis   Name Primary?    Decreased range of motion of neck Yes     Physician: Nehal Puente MD    Visit Date: 12/6/2024    Physician Orders: PT Eval and Treat   Medical Diagnosis from Referral: Numbness and tingling in left arm [R20.0, R20.2]   Evaluation Date: 10/29/2024  Authorization Period Expiration: 12/31/24  Plan of Care Expiration: 12/27/24  Visit # / Visits authorized: 9 / 10 (+1 evaluation)  FOTO: 1/3  PTA Visit #: 0/5     Time In: 9:33 am  Time Out: 10:30 am  Total Billable Time: 29 minutes     Subjective     Patient reports: no pain today but continued tightness in upper and mid cervical region.     He/She was compliant with home exercise program.  Response to previous treatment: mild soreness  Functional change: able to lie on L side to sleep     Pain: 3/10     Location: cervical    Objective      Objective Measures updated at progress report or POC update only unless otherwise noted.     Treatment     Ivone received the treatments listed below:   See EMR under MEDIA for written consent provided.    Palpation Assessment to determine the necessity for Functional Dry Needling  of left upper trap.     Ivone received the following manual therapy techniques:   CPT Intervention Performed   Today Duration / Intensity   MT Soft tissue massage x Suboccipital release    Joint Mobilizations x Cervical distraction, upglides, side glides    Dry Needling  Left upper traps   TE UBE X - NB 3'/3'    Pec Stretch  3 x 30" corner    Cervical SNAGS X - NB  X - NB X25 Extensions  X20 rotation bilaterally    Trap Stretch X - NB 3 x 30" bilateral    Rows  3 x 10 GTB    Foam Roll Series  Pec Stretch 2 minute  Bear Hugs 1 minutes  Field Goals 1 minute  Swimmers 1 minute  Shoulder Rolls clock wise 1 minute  Shoulder Rolls counter clockwise               NMR " "Scapular Retractions  20 x 5" holds    Chin Tucks x 3 x 10 with 5 sec holds    Bilateral External Rotation X - NB 3 x 10 YTB    Prone Series   x  x 20 x 5" holds  I's  T's  W's    Shoulder ext  RTB 3 x10    Shoulder Rolls x X20 CW/CCW         TA                        PLAN        CPT Codes available for Billing:   (15) minutes of Manual therapy (MT) to improve pain and ROM.  (00) minutes of Therapeutic Exercise (TE) to develop strength, endurance, range of motion, and flexibility.  (14) minutes of Neuromuscular Re-Education (NMR)  to improve: Balance, Coordination, Kinesthetic, Sense, Proprioception, and Posture.  (00) minutes of Therapeutic Activities (TA) to improve functional performance.  Vasopneumatic Device Therapy () for management of swelling/edema. (70768)  Unattended Electrical Stimulation (ES) for muscle performance or pain modulation.  BFR: Blood flow restriction applied during exercise    Patient Education and Home Exercises       Home Exercises Provided and Patient Education Provided     Education provided: time included in treatment time.   PURPOSE: Patient educated on the impairments noted above and the effects of physical therapy intervention to improve overall condition and QOL.   EXERCISE: Patient was educated on all the above exercise prior/during/after for proper posture, positioning, and execution for safe performance with home exercise program.   STRENGTH: Patient educated on the importance of improved core and extremity strength in order to improve alignment of the spine and extremities with static positions and dynamic movement.   POSTURE: Patient educated on postural awareness to reduce stress and maintain optimal alignment of the spine with static positions and dynamic movement   SLEEPING POSITIONS: Patient educated on the use of pillows to aid in neutral alignment of spine and extremities when sleeping in supine or side lying.  TRANSFERS & TRANSITIONS: Patient educated on proper " technique for bed mobility, transitions and transfers to improve body mechanics and decrease risk of injury.   ERGONOMICS: Patient educated on proper ergonomics at the work station in order to maintain optimal alignment of the musculoskeletal system and improve efficiency in the work environment.    Written Home Exercises Provided: yes.  Exercises were reviewed and Ivone was able to demonstrate them prior to the end of the session.  Ivone demonstrated good  understanding of the education provided. See EMR under Patient Instructions for exercises provided during therapy sessions.    Assessment     Patient tolerated session well. Patient tenderness to palpation reduced with soft tissue massage and cervical mobilization. Demonstrates improvement in parascapular activation with prone scapular series.     Ivone is progressing well towards her goals.   Patient prognosis is Good.     Patient will continue to benefit from skilled outpatient physical therapy to address the deficits listed in the problem list box on initial evaluation, provide pt/family education and to maximize patient's level of independence in the home and community environment.     Patient's spiritual, cultural and educational needs considered and pt agreeable to plan of care and goals.    Goals:  Short Term Goals: 4 weeks   Pt will be IND with HEP.     Long Term Goals: 8 weeks   Pt will improve cervical extension to 75% without pain.  Pt will improve FOTO score to 66% or greater.  Pt will report decreases in headache frequency to 1-2x/week or less.    Plan     Continue Plan of Care (POC) and progress per patient tolerance. See treatment section for details on planned progressions next session.    Rancho Mendoza, PT

## 2024-12-08 DIAGNOSIS — I10 PRIMARY HYPERTENSION: ICD-10-CM

## 2024-12-08 RX ORDER — LOSARTAN POTASSIUM 50 MG/1
50 TABLET ORAL
Qty: 90 TABLET | Refills: 3 | Status: SHIPPED | OUTPATIENT
Start: 2024-12-08

## 2024-12-08 NOTE — TELEPHONE ENCOUNTER
Refill Routing Note   Medication(s) are not appropriate for processing by [facility] for the following reason(s):        New or recently adjusted medication    [facility] action(s):  Defer             Appointments  past 12m or future 3m with PCP    Date Provider   Last Visit   10/10/2024 Nehal Puente MD   Next Visit   4/4/2025 Nehal Puente MD   ED visits in past 90 days: 0        Note composed:10:16 AM 12/08/2024

## 2024-12-08 NOTE — TELEPHONE ENCOUNTER
No care due was identified.  [facility] Embedded Care Due Messages. Reference number: [id number].   12/08/2024 7:15:53 AM CST

## 2024-12-09 PROBLEM — G47.30 SLEEP APNEA: Status: ACTIVE | Noted: 2024-12-09

## 2024-12-10 ENCOUNTER — CLINICAL SUPPORT (OUTPATIENT)
Dept: REHABILITATION | Facility: HOSPITAL | Age: 64
End: 2024-12-10
Payer: MEDICARE

## 2024-12-10 DIAGNOSIS — R29.898 DECREASED RANGE OF MOTION OF NECK: Primary | ICD-10-CM

## 2024-12-10 PROCEDURE — 97140 MANUAL THERAPY 1/> REGIONS: CPT | Mod: KX

## 2024-12-10 PROCEDURE — 97112 NEUROMUSCULAR REEDUCATION: CPT | Mod: KX

## 2024-12-10 NOTE — PROGRESS NOTES
"[facility]   Physical Therapy Treatment Note      Name: Ivone Monroy  Clinic Number: [id number]    Therapy Diagnosis:   Encounter Diagnosis   Name Primary?    Decreased range of motion of neck Yes     Physician: Nehal Puente MD    Visit Date: 12/10/2024    Physician Orders: PT Eval and Treat   Medical Diagnosis from Referral: Numbness and tingling in left arm [R20.0, R20.2]   Evaluation Date: 10/29/2024  Authorization Period Expiration: 12/31/24  Plan of Care Expiration: 12/27/24  Visit # / Visits authorized: 11 / 20 (+1 evaluation)  FOTO: 1/3  PTA Visit #: 0/5     Time In: 3:33 pm  Time Out: 4:35 pm  Total Billable Time: 40 minutes     Subjective     Patient reports: continued tightness radiating from traps. Feels improvement following sessions but returns over next days.     He/She was compliant with home exercise program.  Response to previous treatment: mild soreness  Functional change: able to lie on L side to sleep     Pain: 3/10     Location: cervical    Objective      Objective Measures updated at progress report or POC update only unless otherwise noted.     Treatment     Ivone received the treatments listed below:   See EMR under MEDIA for written consent provided.    Palpation Assessment to determine the necessity for Functional Dry Needling  of left upper trap.     Ivone received the following manual therapy techniques:   CPT Intervention Performed   Today Duration / Intensity   MT Soft tissue massage x Suboccipital release    Joint Mobilizations x Cervical distraction, upglides, side glides    Dry Needling  Left upper traps   TE UBE X - NB 3'/3'    Pec Stretch  3 x 30" corner    Cervical SNAGS X - NB  X - NB X25 Extensions  X20 rotation bilaterally    Trap Stretch  3 x 30" bilateral    Rows  3 x 10 GTB    Foam Roll Series  Pec Stretch 2 minute  Bear Hugs 1 minutes  Field Goals 1 minute  Swimmers 1 minute  Shoulder Rolls clock wise 1 minute  Shoulder Rolls counter " "clockwise               NMR Scapular Retractions  20 x 5" holds    Chin Tucks x 3 x 10 with 5 sec holds    Bilateral External Rotation x 3 x 10 YTB    Prone Series   x  x  x 20 x 5" holds  I's  T's  W's    Shoulder ext  RTB 3 x10    Shoulder Rolls x X20 CW/CCW         TA                        PLAN        CPT Codes available for Billing:   (16) minutes of Manual therapy (MT) to improve pain and ROM.  (00) minutes of Therapeutic Exercise (TE) to develop strength, endurance, range of motion, and flexibility.  (24) minutes of Neuromuscular Re-Education (NMR)  to improve: Balance, Coordination, Kinesthetic, Sense, Proprioception, and Posture.  (00) minutes of Therapeutic Activities (TA) to improve functional performance.  Vasopneumatic Device Therapy () for management of swelling/edema. (49903)  Unattended Electrical Stimulation (ES) for muscle performance or pain modulation.  BFR: Blood flow restriction applied during exercise    Patient Education and Home Exercises       Home Exercises Provided and Patient Education Provided     Education provided: time included in treatment time.   PURPOSE: Patient educated on the impairments noted above and the effects of physical therapy intervention to improve overall condition and QOL.   EXERCISE: Patient was educated on all the above exercise prior/during/after for proper posture, positioning, and execution for safe performance with home exercise program.   STRENGTH: Patient educated on the importance of improved core and extremity strength in order to improve alignment of the spine and extremities with static positions and dynamic movement.   POSTURE: Patient educated on postural awareness to reduce stress and maintain optimal alignment of the spine with static positions and dynamic movement   SLEEPING POSITIONS: Patient educated on the use of pillows to aid in neutral alignment of spine and extremities when sleeping in supine or side lying.  TRANSFERS & TRANSITIONS: Patient " educated on proper technique for bed mobility, transitions and transfers to improve body mechanics and decrease risk of injury.   ERGONOMICS: Patient educated on proper ergonomics at the work station in order to maintain optimal alignment of the musculoskeletal system and improve efficiency in the work environment.    Written Home Exercises Provided: yes.  Exercises were reviewed and Ivone was able to demonstrate them prior to the end of the session.  Ivone demonstrated good  understanding of the education provided. See EMR under Patient Instructions for exercises provided during therapy sessions.    Assessment     Patient tolerated session well. Reproduction of radiating symptoms into lateral skill with palpation of medial upper trap. Reduce with soft tissue massage. Demonstrates improvement with scapular activation with prone W's.     Ivone is progressing well towards her goals.   Patient prognosis is Good.     Patient will continue to benefit from skilled outpatient physical therapy to address the deficits listed in the problem list box on initial evaluation, provide pt/family education and to maximize patient's level of independence in the home and community environment.     Patient's spiritual, cultural and educational needs considered and pt agreeable to plan of care and goals.    Goals:  Short Term Goals: 4 weeks   Pt will be IND with HEP.     Long Term Goals: 8 weeks   Pt will improve cervical extension to 75% without pain.  Pt will improve FOTO score to 66% or greater.  Pt will report decreases in headache frequency to 1-2x/week or less.    Plan     Continue Plan of Care (POC) and progress per patient tolerance. See treatment section for details on planned progressions next session.    Rancho Mendoza, PT

## 2024-12-13 ENCOUNTER — CLINICAL SUPPORT (OUTPATIENT)
Dept: REHABILITATION | Facility: HOSPITAL | Age: 64
End: 2024-12-13
Payer: MEDICARE

## 2024-12-13 DIAGNOSIS — R29.898 DECREASED RANGE OF MOTION OF NECK: Primary | ICD-10-CM

## 2024-12-13 PROCEDURE — 97530 THERAPEUTIC ACTIVITIES: CPT | Mod: KX

## 2024-12-13 PROCEDURE — 97110 THERAPEUTIC EXERCISES: CPT | Mod: KX

## 2024-12-13 PROCEDURE — 97112 NEUROMUSCULAR REEDUCATION: CPT | Mod: KX

## 2024-12-13 NOTE — PROGRESS NOTES
[facility]   Physical Therapy Treatment Note and Progress Note     Name: Ivone Monroy  Clinic Number: [id number]    Therapy Diagnosis:   Encounter Diagnosis   Name Primary?    Decreased range of motion of neck Yes     Physician: Nehal Puente MD    Visit Date: 12/13/2024    Physician Orders: PT Eval and Treat   Medical Diagnosis from Referral: Numbness and tingling in left arm [R20.0, R20.2]   Evaluation Date: 10/29/2024  Authorization Period Expiration: 12/31/24  Plan of Care Expiration: 12/27/24  Progress Note Due: 12/27/2024  Visit # / Visits authorized: 12 / 20 (+1 evaluation)  FOTO: 1/3 (last taken 12/13/2024)  PTA Visit #: 0/5     Time In: 8:33 pm  Time Out: 9:33 pm  Total Billable Time: 40 minutes     Subjective     Patient reports: feels she is making good improvements but continues to have headaches about at the most every other day but less intensity and duration of headaches. Patient notes at times has heaviness feeling in left upper extremity.     He/She was compliant with home exercise program.  Response to previous treatment: mild soreness  Functional change: able to lie on L side to sleep     Pain: 3/10     Location: cervical    Objective      Objective Measures updated at progress report or POC update only unless otherwise noted.     Posture: fwd head, rounded shoulders  Palpation: mild but improved tenderness to palpation in upper traps, suboccipitals, cervical paraspinals     Range of Motion/Strength:   CERVICAL AROM Pain/Dysfunction with Movement   Flexion 100% (60*)   Extension 100% (70*)   Right side bending 75% (20*)   Left side bending 100% (30*)   Right rotation 90% (65*)   Left rotation 100% (75*)      Shoulder ROM WNL     U/E MMT Right Left Pain/Dysfunction with Movement   Shoulder Flexion 4+/5 4+/5     Shoulder Abduction 4+/5 4+/5    Shoulder IR 4+/5 4+/5     Shoulder ER  @ 0* Abduction 4/5 4/5     Elbow Flexion  4+/5 4+/5     Elbow Extension 4+/5 4+/5    "     Joint Mobility:   - Cervical: improved cervical mobility     FOTO Neck Survey     Therapist reviewed FOTO scores for Ivone Monroy on 12/13/2024.   FOTO documents entered into [facility] - see Media section.     Functional Score: 54%  Category: Mobility         Treatment     Ivone received the treatments listed below:   See EMR under MEDIA for written consent provided.    Palpation Assessment to determine the necessity for Functional Dry Needling  of left upper trap.     Ivone received the following manual therapy techniques:   CPT Intervention Performed   Today Duration / Intensity   MT Soft tissue massage  Suboccipital release    Joint Mobilizations  Cervical distraction, upglides, side glides    Dry Needling  Left upper traps   TE UBE X - NB 3'/3'    Pec Stretch  3 x 30" corner    Cervical SNAGS  X25 Extensions  X20 rotation bilaterally    Trap Stretch X - NB 3 x 30" bilateral    Foam Roll Series  Pec Stretch 2 minute  Bear Hugs 1 minutes  Field Goals 1 minute  Swimmers 1 minute  Shoulder Rolls clock wise 1 minute  Shoulder Rolls counter clockwise    Open Books X - NB X20 bilateral          Objective Testing x Measurements, FOTO, education         NMR Scapular Retractions  20 x 5" holds    Chin Tucks  3 x 10 with 5 sec holds    Bilateral External Rotation X - NB 3 x 10 YTB    Prone Series   x  x  x 20 x 5" holds  I's  T's  W's    Shoulder Rolls  X20 CW/CCW         TA Rows x 3 x 10 blue TB    Shoulder Extensions x 3 x 10 RTB    Scaption x 3 x 10 2#         PLAN        CPT Codes available for Billing:   (00) minutes of Manual therapy (MT) to improve pain and ROM.  (12) minutes of Therapeutic Exercise (TE) to develop strength, endurance, range of motion, and flexibility.  (12) minutes of Neuromuscular Re-Education (NMR)  to improve: Balance, Coordination, Kinesthetic, Sense, Proprioception, and Posture.  (14) minutes of Therapeutic Activities (TA) to improve functional performance.  Vasopneumatic Device " Therapy () for management of swelling/edema. (42818)  Unattended Electrical Stimulation (ES) for muscle performance or pain modulation.  BFR: Blood flow restriction applied during exercise    Patient Education and Home Exercises       Home Exercises Provided and Patient Education Provided     Education provided: time included in treatment time.   PURPOSE: Patient educated on the impairments noted above and the effects of physical therapy intervention to improve overall condition and QOL.   EXERCISE: Patient was educated on all the above exercise prior/during/after for proper posture, positioning, and execution for safe performance with home exercise program.   STRENGTH: Patient educated on the importance of improved core and extremity strength in order to improve alignment of the spine and extremities with static positions and dynamic movement.   POSTURE: Patient educated on postural awareness to reduce stress and maintain optimal alignment of the spine with static positions and dynamic movement   SLEEPING POSITIONS: Patient educated on the use of pillows to aid in neutral alignment of spine and extremities when sleeping in supine or side lying.  TRANSFERS & TRANSITIONS: Patient educated on proper technique for bed mobility, transitions and transfers to improve body mechanics and decrease risk of injury.   ERGONOMICS: Patient educated on proper ergonomics at the work station in order to maintain optimal alignment of the musculoskeletal system and improve efficiency in the work environment.    Written Home Exercises Provided: yes.  Exercises were reviewed and Ivone was able to demonstrate them prior to the end of the session.  Ivone demonstrated good  understanding of the education provided. See EMR under Patient Instructions for exercises provided during therapy sessions.    Assessment     Patient has progressed well with Physical therapy. Patient demonstrates good improvement in cervical active range of motion  with no noted pain, decreased overall reported pain, decreased tenderness to palpation, improved cervical joint mobility, decreased reported pain, and improved functional ability as demonstrated by FOTO. Patient continued to have headaches throughout the weak and feelings of heaviness in left upper extremity as well as mild upper extremity weakness. Patient would continue to benefit from skilled Physical therapy in order to address deficits and further progress towards goals.     Ivone is progressing well towards her goals.   Patient prognosis is Good.     Patient will continue to benefit from skilled outpatient physical therapy to address the deficits listed in the problem list box on initial evaluation, provide pt/family education and to maximize patient's level of independence in the home and community environment.     Patient's spiritual, cultural and educational needs considered and pt agreeable to plan of care and goals.    Goals:  Short Term Goals: 4 weeks   Pt will be IND with HEP. MET     Long Term Goals: 8 weeks   Pt will improve cervical extension to 75% without pain. MET  Pt will improve FOTO score to 66% or greater. MET  Pt will report decreases in headache frequency to 1-2x/week or less.    Plan     Continue Plan of Care (POC) and progress per patient tolerance. See treatment section for details on planned progressions next session.    Rancho Mendoza, PT

## 2024-12-13 NOTE — PLAN OF CARE
[facility]   Physical Therapy Treatment Note and Progress Note     Name: Ivone Monroy  Clinic Number: [id number]    Therapy Diagnosis:   Encounter Diagnosis   Name Primary?    Decreased range of motion of neck Yes     Physician: Nehal Puente MD    Visit Date: 12/13/2024    Physician Orders: PT Eval and Treat   Medical Diagnosis from Referral: Numbness and tingling in left arm [R20.0, R20.2]   Evaluation Date: 10/29/2024  Authorization Period Expiration: 12/31/24  Plan of Care Expiration: 12/27/24  Progress Note Due: 12/27/2024  Visit # / Visits authorized: 12 / 20 (+1 evaluation)  FOTO: 1/3 (last taken 12/13/2024)  PTA Visit #: 0/5     Time In: 8:33 pm  Time Out: 9:33 pm  Total Billable Time: 40 minutes     Subjective     Patient reports: feels she is making good improvements but continues to have headaches about at the most every other day but less intensity and duration of headaches. Patient notes at times has heaviness feeling in left upper extremity.     He/She was compliant with home exercise program.  Response to previous treatment: mild soreness  Functional change: able to lie on L side to sleep     Pain: 3/10     Location: cervical    Objective      Objective Measures updated at progress report or POC update only unless otherwise noted.     Posture: fwd head, rounded shoulders  Palpation: mild but improved tenderness to palpation in upper traps, suboccipitals, cervical paraspinals     Range of Motion/Strength:   CERVICAL AROM Pain/Dysfunction with Movement   Flexion 100% (60*)   Extension 100% (70*)   Right side bending 75% (20*)   Left side bending 100% (30*)   Right rotation 90% (65*)   Left rotation 100% (75*)      Shoulder ROM WNL     U/E MMT Right Left Pain/Dysfunction with Movement   Shoulder Flexion 4+/5 4+/5     Shoulder Abduction 4+/5 4+/5    Shoulder IR 4+/5 4+/5     Shoulder ER  @ 0* Abduction 4/5 4/5     Elbow Flexion  4+/5 4+/5     Elbow Extension 4+/5 4+/5    "     Joint Mobility:   - Cervical: improved cervical mobility     FOTO Neck Survey     Therapist reviewed FOTO scores for Ivone Monroy on 12/13/2024.   FOTO documents entered into [facility] - see Media section.     Functional Score: 54%  Category: Mobility         Treatment     Ivone received the treatments listed below:   See EMR under MEDIA for written consent provided.    Palpation Assessment to determine the necessity for Functional Dry Needling  of left upper trap.     Ivone received the following manual therapy techniques:   CPT Intervention Performed   Today Duration / Intensity   MT Soft tissue massage  Suboccipital release    Joint Mobilizations  Cervical distraction, upglides, side glides    Dry Needling  Left upper traps   TE UBE X - NB 3'/3'    Pec Stretch  3 x 30" corner    Cervical SNAGS  X25 Extensions  X20 rotation bilaterally    Trap Stretch X - NB 3 x 30" bilateral    Foam Roll Series  Pec Stretch 2 minute  Bear Hugs 1 minutes  Field Goals 1 minute  Swimmers 1 minute  Shoulder Rolls clock wise 1 minute  Shoulder Rolls counter clockwise    Open Books X - NB X20 bilateral          Objective Testing x Measurements, FOTO, education         NMR Scapular Retractions  20 x 5" holds    Chin Tucks  3 x 10 with 5 sec holds    Bilateral External Rotation X - NB 3 x 10 YTB    Prone Series   x  x  x 20 x 5" holds  I's  T's  W's    Shoulder Rolls  X20 CW/CCW         TA Rows x 3 x 10 blue TB    Shoulder Extensions x 3 x 10 RTB    Scaption x 3 x 10 2#         PLAN        CPT Codes available for Billing:   (00) minutes of Manual therapy (MT) to improve pain and ROM.  (12) minutes of Therapeutic Exercise (TE) to develop strength, endurance, range of motion, and flexibility.  (12) minutes of Neuromuscular Re-Education (NMR)  to improve: Balance, Coordination, Kinesthetic, Sense, Proprioception, and Posture.  (14) minutes of Therapeutic Activities (TA) to improve functional performance.  Vasopneumatic Device " Therapy () for management of swelling/edema. (12821)  Unattended Electrical Stimulation (ES) for muscle performance or pain modulation.  BFR: Blood flow restriction applied during exercise    Patient Education and Home Exercises       Home Exercises Provided and Patient Education Provided     Education provided: time included in treatment time.   PURPOSE: Patient educated on the impairments noted above and the effects of physical therapy intervention to improve overall condition and QOL.   EXERCISE: Patient was educated on all the above exercise prior/during/after for proper posture, positioning, and execution for safe performance with home exercise program.   STRENGTH: Patient educated on the importance of improved core and extremity strength in order to improve alignment of the spine and extremities with static positions and dynamic movement.   POSTURE: Patient educated on postural awareness to reduce stress and maintain optimal alignment of the spine with static positions and dynamic movement   SLEEPING POSITIONS: Patient educated on the use of pillows to aid in neutral alignment of spine and extremities when sleeping in supine or side lying.  TRANSFERS & TRANSITIONS: Patient educated on proper technique for bed mobility, transitions and transfers to improve body mechanics and decrease risk of injury.   ERGONOMICS: Patient educated on proper ergonomics at the work station in order to maintain optimal alignment of the musculoskeletal system and improve efficiency in the work environment.    Written Home Exercises Provided: yes.  Exercises were reviewed and Ivone was able to demonstrate them prior to the end of the session.  Ivone demonstrated good  understanding of the education provided. See EMR under Patient Instructions for exercises provided during therapy sessions.    Assessment     Patient has progressed well with Physical therapy. Patient demonstrates good improvement in cervical active range of motion  with no noted pain, decreased overall reported pain, decreased tenderness to palpation, improved cervical joint mobility, decreased reported pain, and improved functional ability as demonstrated by FOTO. Patient continued to have headaches throughout the weak and feelings of heaviness in left upper extremity as well as mild upper extremity weakness. Patient would continue to benefit from skilled Physical therapy in order to address deficits and further progress towards goals.     Ivone is progressing well towards her goals.   Patient prognosis is Good.     Patient will continue to benefit from skilled outpatient physical therapy to address the deficits listed in the problem list box on initial evaluation, provide pt/family education and to maximize patient's level of independence in the home and community environment.     Patient's spiritual, cultural and educational needs considered and pt agreeable to plan of care and goals.    Goals:  Short Term Goals: 4 weeks   Pt will be IND with HEP. MET     Long Term Goals: 8 weeks   Pt will improve cervical extension to 75% without pain. MET  Pt will improve FOTO score to 66% or greater. MET  Pt will report decreases in headache frequency to 1-2x/week or less.    Plan     Continue Plan of Care (POC) and progress per patient tolerance. See treatment section for details on planned progressions next session.    Rancho Mendoza, PT

## 2024-12-16 ENCOUNTER — CLINICAL SUPPORT (OUTPATIENT)
Dept: REHABILITATION | Facility: HOSPITAL | Age: 64
End: 2024-12-16
Payer: MEDICARE

## 2024-12-16 DIAGNOSIS — R29.898 DECREASED RANGE OF MOTION OF NECK: Primary | ICD-10-CM

## 2024-12-16 PROCEDURE — 97112 NEUROMUSCULAR REEDUCATION: CPT | Mod: KX

## 2024-12-16 PROCEDURE — 97530 THERAPEUTIC ACTIVITIES: CPT | Mod: KX

## 2024-12-16 PROCEDURE — 97140 MANUAL THERAPY 1/> REGIONS: CPT | Mod: KX
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
## 2024-12-17 ENCOUNTER — OFFICE VISIT (OUTPATIENT)
Dept: PAIN MEDICINE | Facility: CLINIC | Age: 64
End: 2024-12-17
Payer: MEDICARE

## 2024-12-17 VITALS — BODY MASS INDEX: 20.58 KG/M2 | HEIGHT: 61 IN

## 2024-12-17 DIAGNOSIS — G89.29 CHRONIC LEFT SHOULDER PAIN: ICD-10-CM

## 2024-12-17 DIAGNOSIS — M25.512 CHRONIC LEFT SHOULDER PAIN: ICD-10-CM

## 2024-12-17 DIAGNOSIS — R29.898 DECREASED RANGE OF MOTION OF NECK: ICD-10-CM

## 2024-12-17 DIAGNOSIS — M54.12 LEFT CERVICAL RADICULOPATHY: Primary | ICD-10-CM

## 2024-12-17 DIAGNOSIS — M47.812 CERVICAL SPONDYLOSIS: ICD-10-CM

## 2024-12-17 PROCEDURE — 99214 OFFICE O/P EST MOD 30 MIN: CPT | Mod: 95,,, | Performed by: PHYSICIAN ASSISTANT

## 2024-12-17 PROCEDURE — G2211 COMPLEX E/M VISIT ADD ON: HCPCS | Mod: 95,,, | Performed by: PHYSICIAN ASSISTANT

## 2024-12-17 NOTE — PATIENT INSTRUCTIONS
Here are some brief explanations of conditions on MRI:      Disc bulge - yours is not pressing on any nerves:      Facet joint syndrome is an arthritis-like condition of the spine that can be a significant source of back and neck pain. It is caused by degenerative changes to the joints between the spine bones. The cartilage inside the facet joint can break down and become inflamed, triggering pain signals in nearby nerve endings.          ______________________________________________________________________     Diagnostic Medial Branch Block - Patient Information -- insurance requires 2 of these before moving forward with RFA (radiofrequency ablation)    What are facet joints?  Bones called vertebrae make up your spine. Each vertebra has facets (flat surfaces) that touch where the vertebrae fit together. These form a structure called a facet joint on each side of the vertebrae.Back or neck pain may be caused by a problem with your facet joints. If these joints are blocked or numbed, they will not be able to transfer the painful sensation to the brain.   Therefore, this procedure is completed to see if your back pain is (solely or in part) caused by the facet joints.        How is the procedure performed?  The patient lies on his/her stomach. The skin of the back or neck is cleansed with antiseptic solution and a local anesthetic in injected to numb the area. A small needle is then guided using an X-ray to the targeted facet joints which are then numbed. An anesthetic is then injected into the joint.   The injection takes about 15 minutes to complete.    Where will your procedure be performed?  The treatment is done in a hospital or surgery center. Youll be asked to fill out some forms, including a consent form. You may also be examined prior to.         Will the injection hurt?  There is some discomfort with needle insertion which we minimize by numbing the skin over the joint with a local anesthetic. You may  "elect to have a small amount of sedating medication to help with discomfort and to help you relax.     How long does the effect last?  The pain relief will only last a few hours/ less than 12 hours. Pain relief in the first few hours after the injection is the most important as this tells us our diagnosis of facet joint mediated pain is correct. If the "test injection" provides pain relief, we can discuss other options available for extended pain relief, such a radiofrequency ablation (RFA).    What is the next step after the injection?  Our staff will call you the next day to document pain relief obtained after the procedure. If the pain relief is significant, our final plan would be to move forward with next part of the treatment: RFA (radiofrequency ablation), which can provide an extended pain relief from 6 months to 18 months. To note: many insurances require 2 diagnostic medial branch blocks prior to moving forward with RFA for the first time.    What are the risks and side effects?  Serious side effects and complications are rare. The most common problem after the injection is having pain in the area of the injection for a few days. The other complications are infection, bleeding and nerve injury. These complications are minimized by stopping blood thinners, using sterile technique, and fluoroscopy for xray needle guidance.    After the Procedure:  Most often, you can go home in about an hour. Our procedure center requires you to have an adult friend or relative drive you to and from the facility. The anesthetic wears off in a few hours. When it does, your back or neck may feel more sore than usual. This is normal. Take it easy for the rest of the day.     ______________________________________________________________________    If lumbar medial branch block is successful in pain relief (at least 80% pain relief short-term); for longer last relief, we can try:    Radiofrequency Ablation (RFA) - Cervical or " Lumbar:      What is a facet joint pain?  The spine is made of vertebrae, which makes up the spine. The vertebrae are connected to each other with facet joints, which allows the bending and rotational spine movements. As the joints become inflamed and irritated, there is a small medial branch nerve that transmits the pain signal from the joint to the brain. Furthermore, spine pain may worsen during the extension of spine.       How does cervical/ lumbar RFA bring pain relief?  The pain is produced due to inflammation thoracic facet joint which is transmitted via medial branch nerve to the central nervous system. By ablating the medial branch nerve via radiofrequency waves, this results in decreased neck/ back pain caused by the facet joints.     How is the cervical/ lumbar RFA performed?  After sterile preparation of the cervical/ lumbar region, the injection site is localized under X-ray. Following the local anesthetic applied to the injection site, which can help decrease the injection site pain, and then the special radiofrequency needle is guided toward the target cervical or lumbar facet joint area with the help of x-ray. After the target is reached, the area is stimulated with to rule out any cervical /  lumbar nerve root involvement. Thereafter, small amount of local anesthetic with steroid is injected for to minimize postablation procedure pain, and then the radiofrequency ablation is performed. Lastly, the needle is taken out at the end of the procedure.      What to expect after the cervical/ lumbar RFA?  This is an outpatient procedure. Patient should expect to receive full relief over 3-8 weeks. In some patients, pain may worsen slightly before improvement.    How long the relief from the cervical/ lumbar RFA would last for?  It varies from patient to patient. Usually, the relief can last from 6 months, up to 18 months.    Do the medial branch nerves ever grow back? And can the cervical/ lumbar RFA  procedure repeated?  Yes, the medial branch nerves do grow back. Yes, the RFA can be repeated in order to achieve the previous pain relief.

## 2024-12-17 NOTE — PROGRESS NOTES
"[facility]   Physical Therapy Treatment Note     Name: Ivone Monroy  Clinic Number: [id number]    Therapy Diagnosis:   Encounter Diagnosis   Name Primary?    Decreased range of motion of neck Yes     Physician: Nehal Puente MD    Visit Date: 12/16/2024    Physician Orders: PT Eval and Treat   Medical Diagnosis from Referral: Numbness and tingling in left arm [R20.0, R20.2]   Evaluation Date: 10/29/2024  Authorization Period Expiration: 12/31/24  Plan of Care Expiration: 12/27/24  Progress Note Due: 12/27/2024  Visit # / Visits authorized: 13 / 20 (+1 evaluation)  FOTO: 1/3 (last taken 12/13/2024)  PTA Visit #: 0/5     Time In: 3:47 pm  Time Out: 4:45 pm  Total Billable Time: 42 minutes     Subjective     Patient reports: she is doing okay today with no headache currently but tension on left side.    He/She was compliant with home exercise program.  Response to previous treatment: mild soreness  Functional change: able to lie on L side to sleep     Pain: 3/10     Location: cervical    Objective      Objective Measures updated at progress report or POC update only unless otherwise noted.      Treatment     Ivone received the treatments listed below:   See EMR under MEDIA for written consent provided.    Palpation Assessment to determine the necessity for Functional Dry Needling  of left upper trap.     Ivone received the following manual therapy techniques:   CPT Intervention Performed   Today Duration / Intensity   MT Soft tissue massage x Suboccipital release, bilateral upper traps    Joint Mobilizations  Cervical distraction, upglides, side glides    Dry Needling  Left upper traps   TE UBE X - NB 3'/3'    Pec Stretch  3 x 30" corner    Cervical SNAGS  X25 Extensions  X20 rotation bilaterally    Trap Stretch X - NB 3 x 30" bilateral    Foam Roll Series  Pec Stretch 2 minute  Bear Hugs 1 minutes  Field Goals 1 minute  Swimmers 1 minute  Shoulder Rolls clock wise 1 minute  Shoulder " "Rolls counter clockwise    Open Books X - NB X20 bilateral          Objective Testing  Measurements, FOTO, education         NMR Scapular Retractions x 20 x 5" holds    Chin Tucks x 3 x 10 with 5 sec holds    Bilateral External Rotation x 3 x 10 YTB    Prone Series   X - NB  X - NB  X - NB 20 x 5" holds  I's  T's  W's    Shoulder Rolls  X20 CW/CCW         TA Rows x 3 x 10 blue TB    Shoulder Extensions x 3 x 10 RTB    Scaption x 3 x 10 2#         PLAN        CPT Codes available for Billing:   (12) minutes of Manual therapy (MT) to improve pain and ROM.  (00) minutes of Therapeutic Exercise (TE) to develop strength, endurance, range of motion, and flexibility.  (14) minutes of Neuromuscular Re-Education (NMR)  to improve: Balance, Coordination, Kinesthetic, Sense, Proprioception, and Posture.  (14) minutes of Therapeutic Activities (TA) to improve functional performance.  Vasopneumatic Device Therapy () for management of swelling/edema. (23370)  Unattended Electrical Stimulation (ES) for muscle performance or pain modulation.  BFR: Blood flow restriction applied during exercise    Patient Education and Home Exercises       Home Exercises Provided and Patient Education Provided     Education provided: time included in treatment time.   PURPOSE: Patient educated on the impairments noted above and the effects of physical therapy intervention to improve overall condition and QOL.   EXERCISE: Patient was educated on all the above exercise prior/during/after for proper posture, positioning, and execution for safe performance with home exercise program.   STRENGTH: Patient educated on the importance of improved core and extremity strength in order to improve alignment of the spine and extremities with static positions and dynamic movement.   POSTURE: Patient educated on postural awareness to reduce stress and maintain optimal alignment of the spine with static positions and dynamic movement   SLEEPING POSITIONS: Patient " educated on the use of pillows to aid in neutral alignment of spine and extremities when sleeping in supine or side lying.  TRANSFERS & TRANSITIONS: Patient educated on proper technique for bed mobility, transitions and transfers to improve body mechanics and decrease risk of injury.   ERGONOMICS: Patient educated on proper ergonomics at the work station in order to maintain optimal alignment of the musculoskeletal system and improve efficiency in the work environment.    Written Home Exercises Provided: yes.  Exercises were reviewed and Ivone was able to demonstrate them prior to the end of the session.  Ivone demonstrated good  understanding of the education provided. See EMR under Patient Instructions for exercises provided during therapy sessions.    Assessment     Patient tolerated session well. Reduction of left sided tension with soft tissue massage of left upper trap. Cueing with scaption to maintain scapular plane but good functional strength demonstrated.     Ivone is progressing well towards her goals.   Patient prognosis is Good.     Patient will continue to benefit from skilled outpatient physical therapy to address the deficits listed in the problem list box on initial evaluation, provide pt/family education and to maximize patient's level of independence in the home and community environment.     Patient's spiritual, cultural and educational needs considered and pt agreeable to plan of care and goals.    Goals:  Short Term Goals: 4 weeks   Pt will be IND with HEP. MET     Long Term Goals: 8 weeks   Pt will improve cervical extension to 75% without pain. MET  Pt will improve FOTO score to 66% or greater. MET  Pt will report decreases in headache frequency to 1-2x/week or less.    Plan     Continue Plan of Care (POC) and progress per patient tolerance. See treatment section for details on planned progressions next session.    Rancho Mendoza, PT

## 2024-12-18 ENCOUNTER — CLINICAL SUPPORT (OUTPATIENT)
Dept: REHABILITATION | Facility: HOSPITAL | Age: 64
End: 2024-12-18
Payer: MEDICARE

## 2024-12-18 ENCOUNTER — OFFICE VISIT (OUTPATIENT)
Dept: NEUROSURGERY | Facility: CLINIC | Age: 64
End: 2024-12-18
Attending: STUDENT IN AN ORGANIZED HEALTH CARE EDUCATION/TRAINING PROGRAM
Payer: MEDICARE

## 2024-12-18 VITALS
SYSTOLIC BLOOD PRESSURE: 146 MMHG | WEIGHT: 108.44 LBS | BODY MASS INDEX: 20.49 KG/M2 | OXYGEN SATURATION: 100 % | DIASTOLIC BLOOD PRESSURE: 67 MMHG

## 2024-12-18 DIAGNOSIS — D18.00 CAVERNOMA: ICD-10-CM

## 2024-12-18 DIAGNOSIS — R29.898 DECREASED RANGE OF MOTION OF NECK: Primary | ICD-10-CM

## 2024-12-18 PROCEDURE — 97530 THERAPEUTIC ACTIVITIES: CPT | Mod: KX

## 2024-12-18 PROCEDURE — 97140 MANUAL THERAPY 1/> REGIONS: CPT | Mod: KX

## 2024-12-18 PROCEDURE — 99203 OFFICE O/P NEW LOW 30 MIN: CPT | Mod: S$GLB,,, | Performed by: STUDENT IN AN ORGANIZED HEALTH CARE EDUCATION/TRAINING PROGRAM

## 2024-12-18 NOTE — PROGRESS NOTES
"[facility]   Physical Therapy Treatment Note     Name: Ivone Monroy  Clinic Number: [id number]    Therapy Diagnosis:   Encounter Diagnosis   Name Primary?    Decreased range of motion of neck Yes     Physician: Nehal Puente MD    Visit Date: 12/18/2024    Physician Orders: PT Eval and Treat   Medical Diagnosis from Referral: Numbness and tingling in left arm [R20.0, R20.2]   Evaluation Date: 10/29/2024  Authorization Period Expiration: 12/31/24  Plan of Care Expiration: 12/27/24  Progress Note Due: 12/27/2024  Visit # / Visits authorized: 14 / 20 (+1 evaluation)  FOTO: 1/3 (last taken 12/13/2024)  PTA Visit #: 0/5     Time In: 2:07 pm  Time Out: 3:02 pm  Total Billable Time: 28 minutes     Subjective     Patient reports: no headaches today. Been more conscious of her posture with driving and when on her phone.     He/She was compliant with home exercise program.  Response to previous treatment: mild soreness  Functional change: able to lie on L side to sleep     Pain: 3/10     Location: cervical    Objective      Objective Measures updated at progress report or POC update only unless otherwise noted.      Treatment     Ivone received the treatments listed below:   See EMR under MEDIA for written consent provided.    Palpation Assessment to determine the necessity for Functional Dry Needling  of left upper trap.     Ivone received the following manual therapy techniques:   CPT Intervention Performed   Today Duration / Intensity   MT Soft tissue massage x Suboccipital release, bilateral upper traps    Joint Mobilizations x Cervical distraction, upglides, side glides    Dry Needling  Left upper traps   TE UBE X - NB 3'/3'    Pec Stretch  3 x 30" corner    Cervical SNAGS  X25 Extensions  X20 rotation bilaterally    Trap Stretch  3 x 30" bilateral    Foam Roll Series  Pec Stretch 2 minute  Bear Hugs 1 minutes  Field Goals 1 minute  Swimmers 1 minute  Shoulder Rolls clock wise 1 " "minute  Shoulder Rolls counter clockwise    Open Books X - NB X20 bilateral          Objective Testing  Measurements, FOTO, education         NMR Scapular Retractions X - NB 20 x 5" holds    Chin Tucks x 3 x 10 with 10 sec holds    Bilateral External Rotation  3 x 10 YTB    Prone Series   X - NB  X - NB  X - NB 20 x 5" holds  I's  T's  W's    Shoulder Rolls  X20 CW/CCW         TA Rows x 3 x 10 blue TB    Shoulder Extensions  3 x 10 RTB    Scaption x 3 x 10 2#         PLAN        CPT Codes available for Billing:   (14) minutes of Manual therapy (MT) to improve pain and ROM.  (00) minutes of Therapeutic Exercise (TE) to develop strength, endurance, range of motion, and flexibility.  (04) minutes of Neuromuscular Re-Education (NMR)  to improve: Balance, Coordination, Kinesthetic, Sense, Proprioception, and Posture.  (10) minutes of Therapeutic Activities (TA) to improve functional performance.  Vasopneumatic Device Therapy () for management of swelling/edema. ([id number])  Unattended Electrical Stimulation (ES) for muscle performance or pain modulation.  BFR: Blood flow restriction applied during exercise    Patient Education and Home Exercises       Home Exercises Provided and Patient Education Provided     Education provided: time included in treatment time.   PURPOSE: Patient educated on the impairments noted above and the effects of physical therapy intervention to improve overall condition and QOL.   EXERCISE: Patient was educated on all the above exercise prior/during/after for proper posture, positioning, and execution for safe performance with home exercise program.   STRENGTH: Patient educated on the importance of improved core and extremity strength in order to improve alignment of the spine and extremities with static positions and dynamic movement.   POSTURE: Patient educated on postural awareness to reduce stress and maintain optimal alignment of the spine with static positions and dynamic movement   SLEEPING " POSITIONS: Patient educated on the use of pillows to aid in neutral alignment of spine and extremities when sleeping in supine or side lying.  TRANSFERS & TRANSITIONS: Patient educated on proper technique for bed mobility, transitions and transfers to improve body mechanics and decrease risk of injury.   ERGONOMICS: Patient educated on proper ergonomics at the work station in order to maintain optimal alignment of the musculoskeletal system and improve efficiency in the work environment.    Written Home Exercises Provided: yes.  Exercises were reviewed and Ivone was able to demonstrate them prior to the end of the session.  Ivone demonstrated good  understanding of the education provided. See EMR under Patient Instructions for exercises provided during therapy sessions.    Assessment     Patient tolerated session well. Good form demonstrated with chin tucks. Improved postural endurance as evidenced by increased holds with chin tucks.     Ivone is progressing well towards her goals.   Patient prognosis is Good.     Patient will continue to benefit from skilled outpatient physical therapy to address the deficits listed in the problem list box on initial evaluation, provide pt/family education and to maximize patient's level of independence in the home and community environment.     Patient's spiritual, cultural and educational needs considered and pt agreeable to plan of care and goals.    Goals:  Short Term Goals: 4 weeks   Pt will be IND with HEP. MET     Long Term Goals: 8 weeks   Pt will improve cervical extension to 75% without pain. MET  Pt will improve FOTO score to 66% or greater. MET  Pt will report decreases in headache frequency to 1-2x/week or less.    Plan     Continue Plan of Care (POC) and progress per patient tolerance. See treatment section for details on planned progressions next session.    Rancho Mendoza, PT

## 2024-12-18 NOTE — PROGRESS NOTES
[facility]  Neurosurgery    SUBJECTIVE:     History of Present Illness:  Ivone Monroy is a 64 y.o. female who presents with incidental finding of cerebellar cavernoma.    Patient has been having issues of pressure in the head.  She saw Ted Bianchi with [facility] and MRI MRA was obtained.  MRI was negative.  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  Patient denies any balance issues or cerebellar dysfunction symptoms.  She has been in physical therapy and is doing better in terms of her headache issue.  She states she thinks this was most likely related to problems in her neck.  She is on Lyrica which helps.    She has been quite anxious about the cavernoma since learning about it.    (Not in a hospital admission)      Review of patient's allergies indicates:   Allergen Reactions    Sunscreen spf 8 [oxybenzone-padimate o] Swelling     Blisters to skin.   Blisters to skin.     Iodine and iodide containing products Swelling     Lips swell with sores    Shellfish containing products Swelling     Lips swell        Past Medical History:   Diagnosis Date    Anemia     Cancer of gallbladder 2015    chemo radiation - Dr Gatica     Diverticulitis     GERD (gastroesophageal reflux disease)      Past Surgical History:   Procedure Laterality Date    exploration of gallbladder twice for suspected malignancy with no resection      TUBAL LIGATION  8/24/1996     Family History   Problem Relation Name Age of Onset    Diabetes Mother Odeal         may have been related to prolonged prednisone use    Arthritis Mother Odeal         Rheumatoid    Heart disease Mother Odeal     Cancer Father Iry         prostate    Heart disease Father Iry     Colon cancer Cousin      Colon cancer Paternal Uncle       Social History     Tobacco Use    Smoking status: Never    Smokeless tobacco: Never   Substance Use Topics    Alcohol use: Not Currently    Drug use: No        Review of Systems:  As noted in  HPI    OBJECTIVE:     Vital Signs (Most Recent):  BP: (!) 146/67 (12/18/24 0937)  SpO2: 100 % (12/18/24 0937)    Physical Exam:  General: well developed, well nourished, no distress  Head: normocephalic, atraumatic  Neurologic: Alert and oriented. Thought content appropriate  Speech: Fluent  Cranial nerves: face symmetric   Eyes: pupils equal  Cerebellar:  No issues with rapid alternating movements  Pulmonary: normal respirations  Sensory: intact to light touch throughout  Motor Strength: Moves all extremities spontaneously with good tone.  Full strength upper and lower extremities. No abnormal movements seen.     DTR's - 2 + and symmetric   Holman: absent  Clonus: absent    Gait: normal  Tandem Gait: No difficulty         Diagnostic Results:  I have personally reviewed imaging. My impression is as follows.    Reviewed MRI of the brain which was completed on 11/1/24.  This shows a roughly 1 centimeter right-sided lesion in the cerebellar hemisphere.  This appears most consistent with a cavernoma, with a salt and pepper appearance on T2 and hemosiderin on SWI.    Also reviewed the MRI of her cervical spine was completed in November.  Showed spondylosis but no nerve compression    ASSESSMENT/PLAN:     Ivone Monroy is a 64 y.o. female who presents with right cerebellar cavernoma  -no intervention is indicated  -rupture is for this is quite low, likely about 1% per year  -I did go over the red flag symptoms with the patient regarding this if it were to hemorrhage, and counseled her to proceed to the emergency department if she did have sudden headache, nausea, vomiting, change in neurological status, severe and suddenly worse balance issues or dizziness  -I will obtain another MRI in 6 months to establish stability.  This will be done at [facility]  -virtual visit afterward      Please feel free to call with any further questions.      Time spent on this encounter: 30 minutes. This includes face-to-face  time and non-face to face time preparing to see the patient (eg, review of tests), obtaining and/or reviewing separately obtained history, documenting clinical information in the electronic or other health record, independently interpreting results and communicating results to the patient/family/caregiver, or care coordinator.      Israel RICE [place]  [facility]  Department of Neurosurgery  [phone]    Disclaimer: This note was dictated by speech recognition. Minor errors in transcription may be present.  Please call with any questions.

## 2024-12-23 ENCOUNTER — CLINICAL SUPPORT (OUTPATIENT)
Dept: REHABILITATION | Facility: HOSPITAL | Age: 64
End: 2024-12-23
Payer: MEDICARE

## 2024-12-23 DIAGNOSIS — R29.898 DECREASED RANGE OF MOTION OF NECK: Primary | ICD-10-CM

## 2024-12-23 PROCEDURE — 97140 MANUAL THERAPY 1/> REGIONS: CPT | Mod: KX

## 2024-12-23 PROCEDURE — 97110 THERAPEUTIC EXERCISES: CPT | Mod: KX

## 2024-12-23 PROCEDURE — 97112 NEUROMUSCULAR REEDUCATION: CPT | Mod: KX

## 2024-12-23 NOTE — PROGRESS NOTES
"[facility]   Physical Therapy Treatment Note     Name: Ivone Monroy  Clinic Number: [id number]    Therapy Diagnosis:   Encounter Diagnosis   Name Primary?    Decreased range of motion of neck Yes     Physician: Nehal Puente MD    Visit Date: 12/23/2024    Physician Orders: PT Eval and Treat   Medical Diagnosis from Referral: Numbness and tingling in left arm [R20.0, R20.2]   Evaluation Date: 10/29/2024  Authorization Period Expiration: 12/31/24  Plan of Care Expiration: 12/27/24  Progress Note Due: 12/27/2024  Visit # / Visits authorized: 15 / 20 (+1 evaluation)  FOTO: 1/3 (last taken 12/13/2024)  PTA Visit #: 0/5     Time In: 1:40 pm  Time Out: 2:35 pm  Total Billable Time: 45 minutes     Subjective     Patient reports: primary complaint is left sinus pressure that is brought on my neck movements or postural positions.     He/She was compliant with home exercise program.  Response to previous treatment: mild soreness  Functional change: able to lie on L side to sleep     Pain: 3/10     Location: cervical    Objective      Objective Measures updated at progress report or POC update only unless otherwise noted.      Treatment     Ivone received the treatments listed below:   See EMR under MEDIA for written consent provided.    Palpation Assessment to determine the necessity for Functional Dry Needling  of left upper trap.     Ivone received the following manual therapy techniques:   CPT Intervention Performed   Today Duration / Intensity   MT Soft tissue massage x Suboccipital release, bilateral upper traps    Joint Mobilizations x Cervical distraction, upglides, side glides    Dry Needling  Left upper traps   TE UBE x 3'/3'    Pec Stretch  3 x 30" corner    Cervical SNAGS  X25 Extensions  X20 rotation bilaterally    Trap Stretch x 3 x 30" bilateral    Foam Roll Series  Pec Stretch 2 minute  Bear Hugs 1 minutes  Field Goals 1 minute  Swimmers 1 minute  Shoulder Rolls clock wise 1 " "minute  Shoulder Rolls counter clockwise    Open Books x X20 bilateral          Objective Testing  Measurements, FOTO, education         NMR Scapular Retractions  20 x 5" holds    Chin Tucks  3 x 10 with 10 sec holds    Bilateral External Rotation  3 x 10 YTB    Prone Series   x  x  x 20 x 5" holds  I's  T's  W's    Shoulder Rolls  X20 CW/CCW         TA Rows X - NB 3 x 10 blue TB    Shoulder Extensions X - NB 3 x 10 RTB    Scaption X - NB 3 x 10 2#         PLAN        CPT Codes available for Billing:   (15) minutes of Manual therapy (MT) to improve pain and ROM.  (15) minutes of Therapeutic Exercise (TE) to develop strength, endurance, range of motion, and flexibility.  (15) minutes of Neuromuscular Re-Education (NMR)  to improve: Balance, Coordination, Kinesthetic, Sense, Proprioception, and Posture.  (00) minutes of Therapeutic Activities (TA) to improve functional performance.  Vasopneumatic Device Therapy () for management of swelling/edema. (66907)  Unattended Electrical Stimulation (ES) for muscle performance or pain modulation.  BFR: Blood flow restriction applied during exercise    Patient Education and Home Exercises       Home Exercises Provided and Patient Education Provided     Education provided: time included in treatment time.   PURPOSE: Patient educated on the impairments noted above and the effects of physical therapy intervention to improve overall condition and QOL.   EXERCISE: Patient was educated on all the above exercise prior/during/after for proper posture, positioning, and execution for safe performance with home exercise program.   STRENGTH: Patient educated on the importance of improved core and extremity strength in order to improve alignment of the spine and extremities with static positions and dynamic movement.   POSTURE: Patient educated on postural awareness to reduce stress and maintain optimal alignment of the spine with static positions and dynamic movement   SLEEPING POSITIONS: " Patient educated on the use of pillows to aid in neutral alignment of spine and extremities when sleeping in supine or side lying.  TRANSFERS & TRANSITIONS: Patient educated on proper technique for bed mobility, transitions and transfers to improve body mechanics and decrease risk of injury.   ERGONOMICS: Patient educated on proper ergonomics at the work station in order to maintain optimal alignment of the musculoskeletal system and improve efficiency in the work environment.    Written Home Exercises Provided: yes.  Exercises were reviewed and Ivone was able to demonstrate them prior to the end of the session.  Ivone demonstrated good  understanding of the education provided. See EMR under Patient Instructions for exercises provided during therapy sessions.    Assessment     Patient tolerated session well. Continues tenderness to palpation causing radiating symptoms in upper left trap but reduced with soft tissue massage.     Ivone is progressing well towards her goals.   Patient prognosis is Good.     Patient will continue to benefit from skilled outpatient physical therapy to address the deficits listed in the problem list box on initial evaluation, provide pt/family education and to maximize patient's level of independence in the home and community environment.     Patient's spiritual, cultural and educational needs considered and pt agreeable to plan of care and goals.    Goals:  Short Term Goals: 4 weeks   Pt will be IND with HEP. MET     Long Term Goals: 8 weeks   Pt will improve cervical extension to 75% without pain. MET  Pt will improve FOTO score to 66% or greater. MET  Pt will report decreases in headache frequency to 1-2x/week or less.    Plan     Continue Plan of Care (POC) and progress per patient tolerance. See treatment section for details on planned progressions next session.    Rancho Mendoza, PT

## 2024-12-26 ENCOUNTER — CLINICAL SUPPORT (OUTPATIENT)
Dept: REHABILITATION | Facility: HOSPITAL | Age: 64
End: 2024-12-26
Payer: MEDICARE

## 2024-12-26 DIAGNOSIS — R29.898 DECREASED RANGE OF MOTION OF NECK: Primary | ICD-10-CM

## 2024-12-26 PROCEDURE — 97110 THERAPEUTIC EXERCISES: CPT | Mod: KX

## 2024-12-26 PROCEDURE — 97530 THERAPEUTIC ACTIVITIES: CPT | Mod: KX

## 2024-12-26 PROCEDURE — 97112 NEUROMUSCULAR REEDUCATION: CPT | Mod: KX

## 2024-12-26 PROCEDURE — 97140 MANUAL THERAPY 1/> REGIONS: CPT | Mod: KX

## 2024-12-26 RX ORDER — HEPARIN 100 UNIT/ML
500 SYRINGE INTRAVENOUS
OUTPATIENT
Start: 2024-12-26

## 2024-12-26 RX ORDER — ACETAMINOPHEN 325 MG/1
650 TABLET ORAL
OUTPATIENT
Start: 2024-12-26

## 2024-12-26 RX ORDER — ZOLEDRONIC ACID 5 MG/100ML
5 INJECTION, SOLUTION INTRAVENOUS
OUTPATIENT
Start: 2024-12-26

## 2024-12-26 RX ORDER — SODIUM CHLORIDE 0.9 % (FLUSH) 0.9 %
10 SYRINGE (ML) INJECTION
OUTPATIENT
Start: 2024-12-26

## 2024-12-26 NOTE — PROGRESS NOTES
"[facility]   Physical Therapy Treatment Note     Name: Ivone Monroy  Clinic Number: [id number]    Therapy Diagnosis:   Encounter Diagnosis   Name Primary?    Decreased range of motion of neck Yes     Physician: Nehal Puente MD    Visit Date: 12/26/2024    Physician Orders: PT Eval and Treat   Medical Diagnosis from Referral: Numbness and tingling in left arm [R20.0, R20.2]   Evaluation Date: 10/29/2024  Authorization Period Expiration: 12/31/24  Plan of Care Expiration: 12/27/24  Progress Note Due: 12/27/2024  Visit # / Visits authorized: 16 / 20 (+1 evaluation)  FOTO: 1/3 (last taken 12/13/2024)  PTA Visit #: 0/5     Time In: 2:30 pm  Time Out: 3:31 pm  Total Billable Time: 58 minutes     Subjective     Patient reports: she does feel like she is making good improvement but sinus pressure that feels like it may relate to muscular tension continues to be largest complaint.     He/She was compliant with home exercise program.  Response to previous treatment: mild soreness  Functional change: able to lie on L side to sleep     Pain: 3/10     Location: cervical    Objective      Objective Measures updated at progress report or POC update only unless otherwise noted.      Treatment     Ivone received the treatments listed below:   See EMR under MEDIA for written consent provided.    Palpation Assessment to determine the necessity for Functional Dry Needling  of left upper trap.     Ivone received the following manual therapy techniques:   CPT Intervention Performed   Today Duration / Intensity   MT Soft tissue massage x Suboccipital release, bilateral upper traps    Joint Mobilizations x Cervical distraction, upglides, side glides    Dry Needling  Left upper traps   TE UBE x 3'/3'    Pec Stretch x 3 x 30" corner    Cervical SNAGS  X25 Extensions  X20 rotation bilaterally    Trap Stretch  3 x 30" bilateral    Foam Roll Series  Pec Stretch 2 minute  Bear Hugs 1 minutes  Field Goals 1 " "minute  Swimmers 1 minute  Shoulder Rolls clock wise 1 minute  Shoulder Rolls counter clockwise    Open Books x X20 bilateral          Objective Testing  Measurements, FOTO, education         NMR Scapular Retractions  20 x 5" holds    Chin Tucks x 3 x 10 with 10 sec holds    Bilateral External Rotation x 3 x 10 YTB    Prone Series   x  x  x 20 x 5" holds  I's  T's  W's    Shoulder Rolls  X20 CW/CCW         TA Rows x 3 x 10 blue TB    Shoulder Extensions  3 x 10 RTB    Scaption x 3 x 10 3#         PLAN        CPT Codes available for Billing:   (14) minutes of Manual therapy (MT) to improve pain and ROM.  (15) minutes of Therapeutic Exercise (TE) to develop strength, endurance, range of motion, and flexibility.  (18) minutes of Neuromuscular Re-Education (NMR)  to improve: Balance, Coordination, Kinesthetic, Sense, Proprioception, and Posture.  (09) minutes of Therapeutic Activities (TA) to improve functional performance.  Vasopneumatic Device Therapy () for management of swelling/edema. (44591)  Unattended Electrical Stimulation (ES) for muscle performance or pain modulation.  BFR: Blood flow restriction applied during exercise    Patient Education and Home Exercises       Home Exercises Provided and Patient Education Provided     Education provided: time included in treatment time.   PURPOSE: Patient educated on the impairments noted above and the effects of physical therapy intervention to improve overall condition and QOL.   EXERCISE: Patient was educated on all the above exercise prior/during/after for proper posture, positioning, and execution for safe performance with home exercise program.   STRENGTH: Patient educated on the importance of improved core and extremity strength in order to improve alignment of the spine and extremities with static positions and dynamic movement.   POSTURE: Patient educated on postural awareness to reduce stress and maintain optimal alignment of the spine with static positions " and dynamic movement   SLEEPING POSITIONS: Patient educated on the use of pillows to aid in neutral alignment of spine and extremities when sleeping in supine or side lying.  TRANSFERS & TRANSITIONS: Patient educated on proper technique for bed mobility, transitions and transfers to improve body mechanics and decrease risk of injury.   ERGONOMICS: Patient educated on proper ergonomics at the work station in order to maintain optimal alignment of the musculoskeletal system and improve efficiency in the work environment.    Written Home Exercises Provided: yes.  Exercises were reviewed and Ivone was able to demonstrate them prior to the end of the session.  Ivone demonstrated good  understanding of the education provided. See EMR under Patient Instructions for exercises provided during therapy sessions.    Assessment     Patient tolerated session well. Continued reduction of tenderness to palpation with soft tissue massage. Progressed with increased resistance with scaption and performed well.     Ivone is progressing well towards her goals.   Patient prognosis is Good.     Patient will continue to benefit from skilled outpatient physical therapy to address the deficits listed in the problem list box on initial evaluation, provide pt/family education and to maximize patient's level of independence in the home and community environment.     Patient's spiritual, cultural and educational needs considered and pt agreeable to plan of care and goals.    Goals:  Short Term Goals: 4 weeks   Pt will be IND with HEP. MET     Long Term Goals: 8 weeks   Pt will improve cervical extension to 75% without pain. MET  Pt will improve FOTO score to 66% or greater. MET  Pt will report decreases in headache frequency to 1-2x/week or less.    Plan     Continue Plan of Care (POC) and progress per patient tolerance. See treatment section for details on planned progressions next session.    Rancho Mendoza, PT

## 2024-12-30 ENCOUNTER — CLINICAL SUPPORT (OUTPATIENT)
Dept: REHABILITATION | Facility: HOSPITAL | Age: 64
End: 2024-12-30
Payer: COMMERCIAL

## 2024-12-30 DIAGNOSIS — R29.898 DECREASED RANGE OF MOTION OF NECK: Primary | ICD-10-CM

## 2024-12-30 PROCEDURE — 97112 NEUROMUSCULAR REEDUCATION: CPT | Mod: KX

## 2024-12-30 PROCEDURE — 97110 THERAPEUTIC EXERCISES: CPT | Mod: KX

## 2024-12-30 PROCEDURE — 97530 THERAPEUTIC ACTIVITIES: CPT | Mod: KX

## 2024-12-30 PROCEDURE — 97140 MANUAL THERAPY 1/> REGIONS: CPT | Mod: KX

## 2024-12-30 NOTE — PROGRESS NOTES
[facility]   Physical Therapy Treatment Note and Plan of Care Update     Name: Ivone Monroy  Clinic Number: [id number]    Therapy Diagnosis:   Encounter Diagnosis   Name Primary?    Decreased range of motion of neck Yes     Physician: Nehal Puente MD    Visit Date: 12/30/2024    Physician Orders: PT Eval and Treat   Medical Diagnosis from Referral: Numbness and tingling in left arm [R20.0, R20.2]   Evaluation Date: 10/29/2024  Authorization Period Expiration: 12/31/24  Plan of Care Expiration: 02/10/25  Progress Note Due: 01/29/2025  Visit # / Visits authorized: 16 / 20 (+1 evaluation)  FOTO: 1/3 (last taken 12/13/2024)  PTA Visit #: 0/5     Time In: 2:30 pm  Time Out: 3:31 pm  Total Billable Time: 58 minutes     Subjective     Patient reports: she does feel like she is making good improvement but sinus pressure that feels like it may relate to muscular tension continues to be largest complaint.     He/She was compliant with home exercise program.  Response to previous treatment: mild soreness  Functional change: able to lie on L side to sleep     Pain: 3/10     Location: cervical    Objective      Objective Measures updated at progress report or POC update only unless otherwise noted.    Update:   Posture: improved posture with cueing but deficits when fatigued or sitting for extended periods  Palpation: mild but improved tenderness to palpation in upper traps, suboccipitals, cervical paraspinals on left side but no tenderness to palpation on right     Range of Motion/Strength:   CERVICAL AROM Pain/Dysfunction with Movement   Flexion 100% (60*)   Extension 100% (70*)   Right side bending 90% (25*)   Left side bending 100% (30*)   Right rotation 100% (70*)   Left rotation 100% (75*)   Range of motion Goal: 100% AROM     Shoulder ROM WNL     U/E MMT Right Left Pain/Dysfunction with Movement   Shoulder Flexion 4+/5 4+/5     Shoulder Abduction 4+/5 4+/5     Shoulder IR 4+/5 4+/5    "  Shoulder ER  @ 0* Abduction 4/5 4/5     Elbow Flexion  4+/5 4+/5     Elbow Extension 4+/5 4+/5     Strength Goal: 4+/5 grossly     Joint Mobility:   - Cervical: improved cervical mobility     FOTO Neck Survey     Therapist reviewed FOTO scores for Ivone Monroy on 12/13/2024.   FOTO documents entered into [facility] - see Media section.     Functional Score: 67%  Category: Mobility        Treatment     Ivone received the treatments listed below:   See EMR under MEDIA for written consent provided.    Palpation Assessment to determine the necessity for Functional Dry Needling  of left upper trap.     Ivone received the following manual therapy techniques:   CPT Intervention Performed   Today Duration / Intensity   MT Soft tissue massage x Suboccipital release, bilateral upper traps    Joint Mobilizations x Cervical distraction, upglides, side glides    Dry Needling  Left upper traps   TE UBE x 3'/3'    Pec Stretch  3 x 30" corner    Cervical SNAGS  X25 Extensions  X20 rotation bilaterally    Trap Stretch  3 x 30" bilateral    Foam Roll Series  Pec Stretch 2 minute  Bear Hugs 1 minutes  Field Goals 1 minute  Swimmers 1 minute  Shoulder Rolls clock wise 1 minute  Shoulder Rolls counter clockwise    Open Books x X20 bilateral          Objective Testing x Measurements, FOTO, education         NMR Scapular Retractions  20 x 5" holds    Chin Tucks x 2 x 10 supine with 5" lift    Bilateral External Rotation  3 x 10 YTB    Prone Series   x  x  x 20 x 5" holds  I's  T's  W's    Shoulder Rolls  X20 CW/CCW         TA Rows  3 x 10 blue TB    Shoulder Extensions  3 x 10 RTB    Scaption x 3 x 10 3#    Overhead Press x 3 x 10 3#   PLAN        CPT Codes available for Billing:   (09) minutes of Manual therapy (MT) to improve pain and ROM.  (20) minutes of Therapeutic Exercise (TE) to develop strength, endurance, range of motion, and flexibility.  (18) minutes of Neuromuscular Re-Education (NMR)  to improve: Balance, Coordination, " Kinesthetic, Sense, Proprioception, and Posture.  (10) minutes of Therapeutic Activities (TA) to improve functional performance.  Vasopneumatic Device Therapy () for management of swelling/edema. (46834)  Unattended Electrical Stimulation (ES) for muscle performance or pain modulation.  BFR: Blood flow restriction applied during exercise    Patient Education and Home Exercises       Home Exercises Provided and Patient Education Provided     Education provided: time included in treatment time.   PURPOSE: Patient educated on the impairments noted above and the effects of physical therapy intervention to improve overall condition and QOL.   EXERCISE: Patient was educated on all the above exercise prior/during/after for proper posture, positioning, and execution for safe performance with home exercise program.   STRENGTH: Patient educated on the importance of improved core and extremity strength in order to improve alignment of the spine and extremities with static positions and dynamic movement.   POSTURE: Patient educated on postural awareness to reduce stress and maintain optimal alignment of the spine with static positions and dynamic movement   SLEEPING POSITIONS: Patient educated on the use of pillows to aid in neutral alignment of spine and extremities when sleeping in supine or side lying.  TRANSFERS & TRANSITIONS: Patient educated on proper technique for bed mobility, transitions and transfers to improve body mechanics and decrease risk of injury.   ERGONOMICS: Patient educated on proper ergonomics at the work station in order to maintain optimal alignment of the musculoskeletal system and improve efficiency in the work environment.    Written Home Exercises Provided: yes.  Exercises were reviewed and Ivone was able to demonstrate them prior to the end of the session.  Ivone demonstrated good  understanding of the education provided. See EMR under Patient Instructions for exercises provided during therapy  sessions.    Assessment     Patient requires plan of care update due to  current plan of care. Patient has progressed well with Physical therapy but mild symptoms remain as patient continues to make progress. Patient shows objective improvements with testing including cervical active range of motion, improved upper extremity strength, reduced reported pain, and improved functional ability. Patient continues to have feeling of stiffness and tension on right side of cervical spine and into upper trap. Patient has made improvements in posture and continues to work on consistently of maintaining these changes. Patient would continue to benefit from skilled Physical therapy in order to address deficits and progress towards goals.     Ivone is progressing well towards her goals.   Patient prognosis is Good.     Patient will continue to benefit from skilled outpatient physical therapy to address the deficits listed in the problem list box on initial evaluation, provide pt/family education and to maximize patient's level of independence in the home and community environment.     Patient's spiritual, cultural and educational needs considered and pt agreeable to plan of care and goals.    GOALS  Short Term Goals: 3 weeks  - Patient will report pain of less than or equal to 2/10.  - Patient will continue to report compliance with home exercise plan.      Long Term Goals: 6 weeks  - Patient will report pain of less than or equal to 1/10.  - Patient will report decrease in headache frequency of 1-2x/week or less.  - Patient will demonstrate goal strength as listed in objective section.  - Patient will demonstrate goal cervical range of motion as listed in objective section.     Plan     Continue Plan of Care (POC) and progress per patient tolerance. See treatment section for details on planned progressions next session.    Rancho Mendoza, PT

## 2024-12-30 NOTE — PLAN OF CARE
[facility]  Physical Therapy Plan of Care Note     Name: Ivone Monroy  Clinic Number: [id number]    Therapy Diagnosis:   Encounter Diagnosis   Name Primary?    Decreased range of motion of neck Yes     Physician: Nehal Puente MD    Visit Date: 12/30/2024    Physician Orders: PT Eval and Treat   Medical Diagnosis from Referral: Numbness and tingling in left arm [R20.0, R20.2]   Evaluation Date: 10/29/2024  Authorization Period Expiration: 12/31/24  Plan of Care Expiration: 02/10/25  Progress Note Due: 01/29/2025  Visit # / Visits authorized: 16 / 20 (+1 evaluation)  FOTO: 1/3 (last taken 12/13/2024)  PTA Visit #: 0/5     Precautions: Standard  Functional Level Prior to Evaluation:  see initial evaluation    Subjective     Patient reports: she does feel like she is making good improvement but sinus pressure that feels like it may relate to muscular tension continues to be largest complaint.      He/She was compliant with home exercise program.  Response to previous treatment: mild soreness  Functional change: able to lie on L side to sleep      Pain: 3/10     Location: cervical    Objective      Update:   Posture: improved posture with cueing but deficits when fatigued or sitting for extended periods  Palpation: mild but improved tenderness to palpation in upper traps, suboccipitals, cervical paraspinals on left side but no tenderness to palpation on right     Range of Motion/Strength:   CERVICAL AROM Pain/Dysfunction with Movement   Flexion 100% (60*)   Extension 100% (70*)   Right side bending 90% (25*)   Left side bending 100% (30*)   Right rotation 100% (70*)   Left rotation 100% (75*)   Range of motion Goal: 100% AROM    Shoulder ROM WNL     U/E MMT Right Left Pain/Dysfunction with Movement   Shoulder Flexion 4+/5 4+/5     Shoulder Abduction 4+/5 4+/5     Shoulder IR 4+/5 4+/5     Shoulder ER  @ 0* Abduction 4/5 4/5     Elbow Flexion  4+/5 4+/5     Elbow Extension 4+/5 4+/5      Strength Goal: 4+/5 grossly    Joint Mobility:   - Cervical: improved cervical mobility     FOTO Neck Survey     Therapist reviewed FOTO scores for Ivone Monroy on 2024.   FOTO documents entered into [employer] - see Media section.     Functional Score: 67%  Category: Mobility           Assessment     Update:   Patient requires plan of care update due to  current plan of care. Patient has progressed well with Physical therapy but mild symptoms remain as patient continues to make progress. Patient shows objective improvements with testing including cervical active range of motion, improved upper extremity strength, reduced reported pain, and improved functional ability. Patient continues to have feeling of stiffness and tension on right side of cervical spine and into upper trap. Patient has made improvements in posture and continues to work on consistently of maintaining these changes. Patient would continue to benefit from skilled Physical therapy in order to address deficits and progress towards goals.      Ivone is progressing well towards her goals.   Patient prognosis is Good.      Patient will continue to benefit from skilled outpatient physical therapy to address the deficits listed in the problem list box on initial evaluation, provide pt/family education and to maximize patient's level of independence in the home and community environment.     Previous Short Term Goals Status:     Short Term Goals: 4 weeks   Pt will be IND with HEP. MET  New Short Term Goals Status:     Short Term Goals: 3 weeks  - Patient will report pain of less than or equal to 2/10.  - Patient will continue to report compliance with home exercise plan.   Long Term Goal Status: modified  Reasons for Recertification of Therapy:  plan of care    GOALS  Short Term Goals: 3 weeks  - Patient will report pain of less than or equal to 2/10.  - Patient will continue to report compliance with home exercise plan.     Long  Term Goals: 6 weeks  - Patient will report pain of less than or equal to 1/10.  - Patient will report decrease in headache frequency of 1-2x/week or less.  - Patient will demonstrate goal strength as listed in objective section.  - Patient will demonstrate goal cervical range of motion as listed in objective section.     Plan     Updated Certification Period: 12/30/2024 to 02/10/2025  Recommended Treatment Plan: 2 times per week for 6 weeks:  Cervical/Lumbar Traction, Gait Training, Manual Therapy, Moist Heat/ Ice, Neuromuscular Re-ed, Patient Education, Therapeutic Activities, and Therapeutic Exercise  Other Recommendations: none.     Rancho Mendoza, PT

## 2024-12-31 ENCOUNTER — INFUSION (OUTPATIENT)
Dept: INFUSION THERAPY | Facility: HOSPITAL | Age: 64
End: 2024-12-31
Attending: INTERNAL MEDICINE
Payer: MEDICARE

## 2024-12-31 ENCOUNTER — LAB VISIT (OUTPATIENT)
Dept: LAB | Facility: HOSPITAL | Age: 64
End: 2024-12-31
Attending: PHYSICIAN ASSISTANT
Payer: MEDICARE

## 2024-12-31 VITALS
DIASTOLIC BLOOD PRESSURE: 76 MMHG | HEART RATE: 58 BPM | SYSTOLIC BLOOD PRESSURE: 127 MMHG | RESPIRATION RATE: 16 BRPM | TEMPERATURE: 96 F | OXYGEN SATURATION: 98 %

## 2024-12-31 DIAGNOSIS — M81.0 AGE-RELATED OSTEOPOROSIS WITHOUT CURRENT PATHOLOGICAL FRACTURE: Primary | ICD-10-CM

## 2024-12-31 DIAGNOSIS — M81.0 AGE-RELATED OSTEOPOROSIS WITHOUT CURRENT PATHOLOGICAL FRACTURE: ICD-10-CM

## 2024-12-31 LAB
ALBUMIN SERPL BCP-MCNC: 3.8 G/DL (ref 3.5–5.2)
ALP SERPL-CCNC: 57 U/L (ref 40–150)
ALT SERPL W/O P-5'-P-CCNC: 21 U/L (ref 10–44)
ANION GAP SERPL CALC-SCNC: 13 MMOL/L (ref 8–16)
AST SERPL-CCNC: 24 U/L (ref 10–40)
BILIRUB SERPL-MCNC: 0.3 MG/DL (ref 0.1–1)
BUN SERPL-MCNC: 22 MG/DL (ref 8–23)
CALCIUM SERPL-MCNC: 9.7 MG/DL (ref 8.7–10.5)
CHLORIDE SERPL-SCNC: 104 MMOL/L (ref 95–110)
CO2 SERPL-SCNC: 25 MMOL/L (ref 23–29)
CREAT SERPL-MCNC: 0.9 MG/DL (ref 0.5–1.4)
EST. GFR  (NO RACE VARIABLE): >60 ML/MIN/1.73 M^2
GLUCOSE SERPL-MCNC: 59 MG/DL (ref 70–110)
POTASSIUM SERPL-SCNC: 4.3 MMOL/L (ref 3.5–5.1)
PROT SERPL-MCNC: 7.2 G/DL (ref 6–8.4)
SODIUM SERPL-SCNC: 142 MMOL/L (ref 136–145)

## 2024-12-31 PROCEDURE — 63600175 PHARM REV CODE 636 W HCPCS: Performed by: INTERNAL MEDICINE

## 2024-12-31 PROCEDURE — 80053 COMPREHEN METABOLIC PANEL: CPT | Performed by: STUDENT IN AN ORGANIZED HEALTH CARE EDUCATION/TRAINING PROGRAM

## 2024-12-31 PROCEDURE — 36415 COLL VENOUS BLD VENIPUNCTURE: CPT | Performed by: STUDENT IN AN ORGANIZED HEALTH CARE EDUCATION/TRAINING PROGRAM

## 2024-12-31 PROCEDURE — 96374 THER/PROPH/DIAG INJ IV PUSH: CPT

## 2024-12-31 PROCEDURE — 25000003 PHARM REV CODE 250: Performed by: INTERNAL MEDICINE

## 2024-12-31 RX ORDER — ACETAMINOPHEN 325 MG/1
650 TABLET ORAL
Status: COMPLETED | OUTPATIENT
Start: 2024-12-31 | End: 2024-12-31

## 2024-12-31 RX ORDER — HEPARIN 100 UNIT/ML
500 SYRINGE INTRAVENOUS
OUTPATIENT
Start: 2024-12-31

## 2024-12-31 RX ORDER — ZOLEDRONIC ACID 5 MG/100ML
5 INJECTION, SOLUTION INTRAVENOUS
Status: COMPLETED | OUTPATIENT
Start: 2024-12-31 | End: 2024-12-31

## 2024-12-31 RX ORDER — SODIUM CHLORIDE 0.9 % (FLUSH) 0.9 %
10 SYRINGE (ML) INJECTION
OUTPATIENT
Start: 2024-12-31

## 2024-12-31 RX ORDER — ZOLEDRONIC ACID 5 MG/100ML
5 INJECTION, SOLUTION INTRAVENOUS
OUTPATIENT
Start: 2024-12-31

## 2024-12-31 RX ORDER — ACETAMINOPHEN 325 MG/1
650 TABLET ORAL
OUTPATIENT
Start: 2024-12-31

## 2024-12-31 RX ADMIN — ZOLEDRONIC ACID 5 MG: 5 INJECTION, SOLUTION INTRAVENOUS at 10:12

## 2024-12-31 RX ADMIN — ACETAMINOPHEN 650 MG: 325 TABLET ORAL at 10:12

## 2024-12-31 NOTE — DISCHARGE INSTRUCTIONS
FALL PREVENTION   Falls often occur due to slipping, tripping or losing your balance. Here are ways to reduce your risk of falling again.   Was there anything that caused your fall that can be fixed, removed or replaced?   Make your home safe by keeping walkways clear of objects you may trip over.   Use non-slip pads under rugs.   Do not walk in poorly lit areas.   Do not stand on chairs or wobbly ladders.   Use caution when reaching overhead or looking upward. This position can cause a loss of balance.   Be sure your shoes fit properly, have non-slip bottoms and are in good condition.   Be cautious when going up and down stairs, curbs, and when walking on uneven sidewalks.   If your balance is poor, consider using a cane or walker.   If your fall was related to alcohol use, stop or limit alcohol intake.   If your fall was related to use of sleeping medicines, talk to your doctor about this. You may need to reduce your dosage at bedtime if you awaken during the night to go to the bathroom.   To reduce the need for nighttime bathroom trips:   Avoid drinking fluids for several hours before going to bed   Empty your bladder before going to bed   Men can keep a urinal at the bedside   © 8379-2051 [facility], [address] [place] [address], [place] [address]. All rights reserved. This information is not intended as a substitute for professional medical care. Always follow your healthcare professional's instructions.

## 2024-12-31 NOTE — PLAN OF CARE
POC reviewed with patient. All needs and concerns addressed.   Problem: Adult Inpatient Plan of Care  Goal: Plan of Care Review  Outcome: Progressing  Flowsheets (Taken 12/31/2024 1428)  Plan of Care Reviewed With: patient  Goal: Patient-Specific Goal (Individualized)  Outcome: Progressing  Flowsheets (Taken 12/31/2024 1428)  Individualized Care Needs: In recliner with warm blanket  Anxieties, Fears or Concerns: None expressed today  Patient/Family-Specific Goals (Include Timeframe): Tolerate tx today  Goal: Optimal Comfort and Wellbeing  Outcome: Progressing  Intervention: Provide Person-Centered Care  Flowsheets (Taken 12/31/2024 1428)  Trust Relationship/Rapport:   care explained   questions encouraged   choices provided   reassurance provided   emotional support provided   empathic listening provided   questions answered   thoughts/feelings acknowledged

## 2025-01-02 ENCOUNTER — CLINICAL SUPPORT (OUTPATIENT)
Dept: REHABILITATION | Facility: HOSPITAL | Age: 65
End: 2025-01-02
Payer: COMMERCIAL

## 2025-01-02 DIAGNOSIS — R29.898 DECREASED RANGE OF MOTION OF NECK: Primary | ICD-10-CM

## 2025-01-02 PROCEDURE — 97530 THERAPEUTIC ACTIVITIES: CPT

## 2025-01-02 PROCEDURE — 97140 MANUAL THERAPY 1/> REGIONS: CPT

## 2025-01-02 PROCEDURE — 97110 THERAPEUTIC EXERCISES: CPT

## 2025-01-02 NOTE — PROGRESS NOTES
"[facility]   Physical Therapy Treatment Note     Name: Ivone Monroy  Clinic Number: [id number]    Therapy Diagnosis:   Encounter Diagnosis   Name Primary?    Decreased range of motion of neck Yes     Physician: Nehal Puente MD    Visit Date: 1/2/2025    Physician Orders: PT Eval and Treat   Medical Diagnosis from Referral: Numbness and tingling in left arm [R20.0, R20.2]   Evaluation Date: 10/29/2024  Authorization Period Expiration: 12/31/24  Plan of Care Expiration: 02/10/25  Progress Note Due: 01/29/2025  Visit # / Visits authorized: 1 / 25 (+18 previous)  FOTO: 1/3 (last taken 12/13/2024)  PTA Visit #: 0/5     Time In: 2:30 pm  Time Out: 3:31 pm  Total Billable Time: 58 minutes     Subjective     Patient reports: she does feel like she is making good improvement but sinus pressure that feels like it may relate to muscular tension continues to be largest complaint.     He/She was compliant with home exercise program.  Response to previous treatment: mild soreness  Functional change: able to lie on L side to sleep     Pain: 3/10     Location: cervical    Objective      Objective Measures updated at progress report or POC update only unless otherwise noted.      Treatment     Ivone received the treatments listed below:   See EMR under MEDIA for written consent provided.    Palpation Assessment to determine the necessity for Functional Dry Needling  of left upper trap.     Ivone received the following manual therapy techniques:   CPT Intervention Performed   Today Duration / Intensity   MT Soft tissue massage x Suboccipital release, bilateral upper traps    Joint Mobilizations x Cervical distraction, upglides, side glides    Dry Needling  Left upper traps   TE UBE x 3'/3'    Pec Stretch  3 x 30" corner    Cervical SNAGS  X25 Extensions  X20 rotation bilaterally    Trap Stretch  3 x 30" bilateral    Foam Roll Series  Pec Stretch 2 minute  Bear Hugs 1 minutes  Field Goals 1 " "minute  Swimmers 1 minute  Shoulder Rolls clock wise 1 minute  Shoulder Rolls counter clockwise    Open Books x X20 bilateral          Objective Testing  Measurements, FOTO, education         NMR Scapular Retractions  20 x 5" holds    Chin Tucks X - NB 2 x 10 supine with 5" lift    Bilateral External Rotation  3 x 10 YTB    Prone Series   X - NB  X - NB  X - NB 20 x 5" holds  I's  T's  W's    Shoulder Rolls  X20 CW/CCW         TA Rows  3 x 10 blue TB    Shoulder Extensions  3 x 10 RTB    Scaption x 3 x 10 3#    Overhead Press x 3 x 10 3#   PLAN        CPT Codes available for Billing:   (14) minutes of Manual therapy (MT) to improve pain and ROM.  (12) minutes of Therapeutic Exercise (TE) to develop strength, endurance, range of motion, and flexibility.  (00) minutes of Neuromuscular Re-Education (NMR)  to improve: Balance, Coordination, Kinesthetic, Sense, Proprioception, and Posture.  (12) minutes of Therapeutic Activities (TA) to improve functional performance.  Vasopneumatic Device Therapy () for management of swelling/edema. (93616)  Unattended Electrical Stimulation (ES) for muscle performance or pain modulation.  BFR: Blood flow restriction applied during exercise    Patient Education and Home Exercises       Home Exercises Provided and Patient Education Provided     Education provided: time included in treatment time.   PURPOSE: Patient educated on the impairments noted above and the effects of physical therapy intervention to improve overall condition and QOL.   EXERCISE: Patient was educated on all the above exercise prior/during/after for proper posture, positioning, and execution for safe performance with home exercise program.   STRENGTH: Patient educated on the importance of improved core and extremity strength in order to improve alignment of the spine and extremities with static positions and dynamic movement.   POSTURE: Patient educated on postural awareness to reduce stress and maintain optimal " alignment of the spine with static positions and dynamic movement   SLEEPING POSITIONS: Patient educated on the use of pillows to aid in neutral alignment of spine and extremities when sleeping in supine or side lying.  TRANSFERS & TRANSITIONS: Patient educated on proper technique for bed mobility, transitions and transfers to improve body mechanics and decrease risk of injury.   ERGONOMICS: Patient educated on proper ergonomics at the work station in order to maintain optimal alignment of the musculoskeletal system and improve efficiency in the work environment.    Written Home Exercises Provided: yes.  Exercises were reviewed and Ivone was able to demonstrate them prior to the end of the session.  Ivone demonstrated good  understanding of the education provided. See EMR under Patient Instructions for exercises provided during therapy sessions.    Assessment     Patient  tolerated session well. Patient presents with increased tenderness to palpation in left upper traps. Patient symptoms reduced with soft tissue massage. Patient able to demonstrate over head press well again with resistance.     Ivone is progressing well towards her goals.   Patient prognosis is Good.     Patient will continue to benefit from skilled outpatient physical therapy to address the deficits listed in the problem list box on initial evaluation, provide pt/family education and to maximize patient's level of independence in the home and community environment.     Patient's spiritual, cultural and educational needs considered and pt agreeable to plan of care and goals.    GOALS  Short Term Goals: 3 weeks  - Patient will report pain of less than or equal to 2/10.  - Patient will continue to report compliance with home exercise plan.      Long Term Goals: 6 weeks  - Patient will report pain of less than or equal to 1/10.  - Patient will report decrease in headache frequency of 1-2x/week or less.  - Patient will demonstrate goal strength as  listed in objective section.  - Patient will demonstrate goal cervical range of motion as listed in objective section.     Plan     Continue Plan of Care (POC) and progress per patient tolerance. See treatment section for details on planned progressions next session.    Rancho Mendoza, PT

## 2025-01-06 ENCOUNTER — CLINICAL SUPPORT (OUTPATIENT)
Dept: REHABILITATION | Facility: HOSPITAL | Age: 65
End: 2025-01-06
Payer: COMMERCIAL

## 2025-01-06 DIAGNOSIS — R29.898 DECREASED RANGE OF MOTION OF NECK: Primary | ICD-10-CM

## 2025-01-06 PROCEDURE — 97110 THERAPEUTIC EXERCISES: CPT

## 2025-01-06 PROCEDURE — 97140 MANUAL THERAPY 1/> REGIONS: CPT

## 2025-01-06 PROCEDURE — 97530 THERAPEUTIC ACTIVITIES: CPT

## 2025-01-06 PROCEDURE — 97112 NEUROMUSCULAR REEDUCATION: CPT

## 2025-01-06 NOTE — PROGRESS NOTES
"[facility]   Physical Therapy Treatment Note     Name: Ivone Monroy  Clinic Number: [id number]    Therapy Diagnosis:   Encounter Diagnosis   Name Primary?    Decreased range of motion of neck Yes     Physician: Nehal Puente MD    Visit Date: 1/6/2025    Physician Orders: PT Eval and Treat   Medical Diagnosis from Referral: Numbness and tingling in left arm [R20.0, R20.2]   Evaluation Date: 10/29/2024  Authorization Period Expiration: 12/31/24  Plan of Care Expiration: 02/10/25  Progress Note Due: 01/29/2025  Visit # / Visits authorized: 1 / 25 (+18 previous)  FOTO: 2/3 (last taken 12/13/2024)  PTA Visit #: 0/5     Time In: 1:30 pm  Time Out: 2:29 pm  Total Billable Time: 58 minutes     Subjective     Patient reports: she is doing pretty good today. Mild sinus pressure but headaches have been improved.     He/She was compliant with home exercise program.  Response to previous treatment: mild soreness  Functional change: able to lie on L side to sleep     Pain: 3/10     Location: cervical    Objective      Objective Measures updated at progress report or POC update only unless otherwise noted.     Treatment     Ivone received the treatments listed below:   See EMR under MEDIA for written consent provided.    Palpation Assessment to determine the necessity for Functional Dry Needling  of left upper trap.     Ivone received the following manual therapy techniques:   CPT Intervention Performed   Today Duration / Intensity   MT Soft tissue massage x Suboccipital release, bilateral upper traps    Joint Mobilizations  Cervical distraction, upglides, side glides    Dry Needling  Left upper traps   TE UBE x 3'/3'    Pec Stretch x 3 x 30" corner    Cervical SNAGS    X25 Extensions  X20 rotation bilaterally    Trap Stretch  3 x 30" bilateral    Foam Roll Series  Pec Stretch 2 minute  Bear Hugs 1 minutes  Field Goals 1 minute  Swimmers 1 minute  Shoulder Rolls clock wise 1 minute  Shoulder " "Rolls counter clockwise    Open Books x X20 bilateral          Objective Testing  Measurements, FOTO, education         NMR Scapular Retractions  20 x 5" holds    Chin Tucks x 2 x 10 supine with 5" lift    Bilateral External Rotation x 3 x 10 RTB    Prone Series   x  x  x 20 x 5" holds  I's  T's  W's    Shoulder Rolls  X20 CW/CCW         TA Rows  3 x 10 blue TB    Shoulder Extensions  3 x 10 RTB    Scaption x 3 x 10 3#    Overhead Press x 3 x 10 3#   PLAN        CPT Codes available for Billing:   (12) minutes of Manual therapy (MT) to improve pain and ROM.  (15) minutes of Therapeutic Exercise (TE) to develop strength, endurance, range of motion, and flexibility.  (20) minutes of Neuromuscular Re-Education (NMR)  to improve: Balance, Coordination, Kinesthetic, Sense, Proprioception, and Posture.  (10) minutes of Therapeutic Activities (TA) to improve functional performance.  Vasopneumatic Device Therapy () for management of swelling/edema. (45944)  Unattended Electrical Stimulation (ES) for muscle performance or pain modulation.  BFR: Blood flow restriction applied during exercise    Patient Education and Home Exercises       Home Exercises Provided and Patient Education Provided     Education provided: time included in treatment time.   PURPOSE: Patient educated on the impairments noted above and the effects of physical therapy intervention to improve overall condition and QOL.   EXERCISE: Patient was educated on all the above exercise prior/during/after for proper posture, positioning, and execution for safe performance with home exercise program.   STRENGTH: Patient educated on the importance of improved core and extremity strength in order to improve alignment of the spine and extremities with static positions and dynamic movement.   POSTURE: Patient educated on postural awareness to reduce stress and maintain optimal alignment of the spine with static positions and dynamic movement   SLEEPING POSITIONS: " Patient educated on the use of pillows to aid in neutral alignment of spine and extremities when sleeping in supine or side lying.  TRANSFERS & TRANSITIONS: Patient educated on proper technique for bed mobility, transitions and transfers to improve body mechanics and decrease risk of injury.   ERGONOMICS: Patient educated on proper ergonomics at the work station in order to maintain optimal alignment of the musculoskeletal system and improve efficiency in the work environment.    Written Home Exercises Provided: yes.  Exercises were reviewed and Ivone was able to demonstrate them prior to the end of the session.  Ivone demonstrated good  understanding of the education provided. See EMR under Patient Instructions for exercises provided during therapy sessions.    Assessment     Patient tolerated session well. Patient notes reduction of tenderness to palpation noted in previous sessions. Progressed with increased resistance with bilateral external rotation while maintaining good form.     Ivone is progressing well towards her goals.   Patient prognosis is Good.     Patient will continue to benefit from skilled outpatient physical therapy to address the deficits listed in the problem list box on initial evaluation, provide pt/family education and to maximize patient's level of independence in the home and community environment.     Patient's spiritual, cultural and educational needs considered and pt agreeable to plan of care and goals.    GOALS  Short Term Goals: 3 weeks  - Patient will report pain of less than or equal to 2/10.  - Patient will continue to report compliance with home exercise plan.      Long Term Goals: 6 weeks  - Patient will report pain of less than or equal to 1/10.  - Patient will report decrease in headache frequency of 1-2x/week or less.  - Patient will demonstrate goal strength as listed in objective section.  - Patient will demonstrate goal cervical range of motion as listed in objective  section.     Plan     Continue Plan of Care (POC) and progress per patient tolerance. See treatment section for details on planned progressions next session.    Rancho Mendoza, PT

## 2025-01-15 ENCOUNTER — CLINICAL SUPPORT (OUTPATIENT)
Dept: REHABILITATION | Facility: HOSPITAL | Age: 65
End: 2025-01-15
Payer: COMMERCIAL

## 2025-01-15 DIAGNOSIS — R29.898 DECREASED RANGE OF MOTION OF NECK: Primary | ICD-10-CM

## 2025-01-15 PROCEDURE — 97110 THERAPEUTIC EXERCISES: CPT

## 2025-01-15 PROCEDURE — 97112 NEUROMUSCULAR REEDUCATION: CPT

## 2025-01-15 PROCEDURE — 97530 THERAPEUTIC ACTIVITIES: CPT

## 2025-01-15 PROCEDURE — 97140 MANUAL THERAPY 1/> REGIONS: CPT

## 2025-01-15 NOTE — PROGRESS NOTES
"[facility]   Physical Therapy Treatment Note     Name: Ivone Monroy  Clinic Number: [id number]    Therapy Diagnosis:   Encounter Diagnosis   Name Primary?    Decreased range of motion of neck Yes     Physician: Nehal Puente MD    Visit Date: 1/15/2025    Physician Orders: PT Eval and Treat   Medical Diagnosis from Referral: Numbness and tingling in left arm [R20.0, R20.2]   Evaluation Date: 10/29/2024  Authorization Period Expiration: 12/31/24  Plan of Care Expiration: 02/10/25  Progress Note Due: 01/29/2025  Visit # / Visits authorized: 1 / 25 (+18 previous)  FOTO: 2/3 (last taken 12/13/2024)  PTA Visit #: 0/5     Time In: 1:30 pm  Time Out: 2:29 pm  Total Billable Time: 58 minutes     Subjective     Patient reports: has been having trouble with shoulder and left trap that is correlating with her sinus pressure. Patient notes she is feeling better when consistent with home exercise plan.     He/She was compliant with home exercise program.  Response to previous treatment: mild soreness  Functional change: able to lie on L side to sleep     Pain: 3/10     Location: cervical    Objective      Objective Measures updated at progress report or POC update only unless otherwise noted.     Treatment     Ivone received the treatments listed below:   See EMR under MEDIA for written consent provided.    Palpation Assessment to determine the necessity for Functional Dry Needling  of left upper trap.     CPT Intervention Performed   Today Duration / Intensity   MT Soft tissue massage x Suboccipital release, bilateral upper traps    Joint Mobilizations  Cervical distraction, upglides, side glides    Dry Needling  Left upper traps   TE UBE x 3'/3'    Pec Stretch  3 x 30" corner    Cervical SNAGS    X25 Extensions  X20 rotation bilaterally    Trap Stretch x 3 x 30" bilateral    Foam Roll Series  Pec Stretch 2 minute  Bear Hugs 1 minutes  Field Goals 1 minute  Swimmers 1 minute  Shoulder Rolls " "clock wise 1 minute  Shoulder Rolls counter clockwise    Open Books x X20 bilateral          Objective Testing  Measurements, FOTO, education         NMR Scapular Retractions  20 x 5" holds    Chin Tucks x 2 x 10 supine with 5" lift    Bilateral External Rotation x 3 x 10 RTB    Prone Series   x  x  x 20 x 5" holds  I's with1#  T's with 1#  W's with 1#    Shoulder Rolls x X20 CW/CCW         TA Rows  3 x 10 blue TB    Shoulder Extensions  3 x 10 RTB    Scaption x 3 x 10 3#    Overhead Press x 3 x 10 3#   PLAN        CPT Codes available for Billing:   (14) minutes of Manual therapy (MT) to improve pain and ROM.  (14) minutes of Therapeutic Exercise (TE) to develop strength, endurance, range of motion, and flexibility.  (18) minutes of Neuromuscular Re-Education (NMR)  to improve: Balance, Coordination, Kinesthetic, Sense, Proprioception, and Posture.  (10) minutes of Therapeutic Activities (TA) to improve functional performance.  Vasopneumatic Device Therapy () for management of swelling/edema. (21612)  Unattended Electrical Stimulation (ES) for muscle performance or pain modulation.  BFR: Blood flow restriction applied during exercise    Patient Education and Home Exercises       Home Exercises Provided and Patient Education Provided     Education provided: time included in treatment time.   PURPOSE: Patient educated on the impairments noted above and the effects of physical therapy intervention to improve overall condition and QOL.   EXERCISE: Patient was educated on all the above exercise prior/during/after for proper posture, positioning, and execution for safe performance with home exercise program.   STRENGTH: Patient educated on the importance of improved core and extremity strength in order to improve alignment of the spine and extremities with static positions and dynamic movement.   POSTURE: Patient educated on postural awareness to reduce stress and maintain optimal alignment of the spine with static " positions and dynamic movement   SLEEPING POSITIONS: Patient educated on the use of pillows to aid in neutral alignment of spine and extremities when sleeping in supine or side lying.  TRANSFERS & TRANSITIONS: Patient educated on proper technique for bed mobility, transitions and transfers to improve body mechanics and decrease risk of injury.   ERGONOMICS: Patient educated on proper ergonomics at the work station in order to maintain optimal alignment of the musculoskeletal system and improve efficiency in the work environment.    Written Home Exercises Provided: yes.  Exercises were reviewed and Ivone was able to demonstrate them prior to the end of the session.  Ivone demonstrated good  understanding of the education provided. See EMR under Patient Instructions for exercises provided during therapy sessions.    Assessment     Patient tolerated session well. Reduction of symptoms with soft tissue massage of left trap and paraspinals. Able to progress prone series with resistance for increases parascapular muscle activation.     Ivone is progressing well towards her goals.   Patient prognosis is Good.     Patient will continue to benefit from skilled outpatient physical therapy to address the deficits listed in the problem list box on initial evaluation, provide pt/family education and to maximize patient's level of independence in the home and community environment.     Patient's spiritual, cultural and educational needs considered and pt agreeable to plan of care and goals.    GOALS  Short Term Goals: 3 weeks  - Patient will report pain of less than or equal to 2/10.  - Patient will continue to report compliance with home exercise plan.      Long Term Goals: 6 weeks  - Patient will report pain of less than or equal to 1/10.  - Patient will report decrease in headache frequency of 1-2x/week or less.  - Patient will demonstrate goal strength as listed in objective section.  - Patient will demonstrate goal cervical  range of motion as listed in objective section.     Plan     Continue Plan of Care (POC) and progress per patient tolerance. See treatment section for details on planned progressions next session.    Rancho Mendoza, PT

## 2025-01-27 ENCOUNTER — CLINICAL SUPPORT (OUTPATIENT)
Dept: REHABILITATION | Facility: HOSPITAL | Age: 65
End: 2025-01-27
Payer: COMMERCIAL

## 2025-01-27 DIAGNOSIS — R29.898 DECREASED RANGE OF MOTION OF NECK: Primary | ICD-10-CM

## 2025-01-27 PROCEDURE — 97140 MANUAL THERAPY 1/> REGIONS: CPT

## 2025-01-27 PROCEDURE — 97110 THERAPEUTIC EXERCISES: CPT

## 2025-01-27 PROCEDURE — 97112 NEUROMUSCULAR REEDUCATION: CPT

## 2025-01-27 NOTE — PROGRESS NOTES
"[facility]   Physical Therapy Treatment Note     Name: Ivone Monroy  Clinic Number: [id number]    Therapy Diagnosis:   Encounter Diagnosis   Name Primary?    Decreased range of motion of neck Yes     Physician: Nehal Puente MD    Visit Date: 1/27/2025    Physician Orders: PT Eval and Treat   Medical Diagnosis from Referral: Numbness and tingling in left arm [R20.0, R20.2]   Evaluation Date: 10/29/2024  Authorization Period Expiration: 12/31/24  Plan of Care Expiration: 02/10/25  Progress Note Due: 01/29/2025  Visit # / Visits authorized: 4 / 25 (+18 previous)  FOTO: 2/3 (last taken 12/13/2024)  PTA Visit #: 0/5     Time In: 1:45 pm  Time Out: 2:42 pm  Total Billable Time: 45 minutes     Subjective     Patient reports: has been feeling much better over last week. Did not have work last week due to the weather. Also had long car ride to [place] that she expected to had increase in symptoms but did not.     He/She was compliant with home exercise program.  Response to previous treatment: mild soreness  Functional change: able to lie on L side to sleep     Pain: 3/10     Location: cervical    Objective      Objective Measures updated at progress report or POC update only unless otherwise noted.     Treatment     Ivone received the treatments listed below:   See EMR under MEDIA for written consent provided.    Palpation Assessment to determine the necessity for Functional Dry Needling  of left upper trap.     CPT Intervention Performed   Today Duration / Intensity   MT Soft tissue massage x Suboccipital release, bilateral upper traps    Joint Mobilizations  Cervical distraction, upglides, side glides    Dry Needling  Left upper traps   TE UBE x 3'/3'    Pec Stretch  3 x 30" corner    Cervical SNAGS    X25 Extensions  X20 rotation bilaterally    Trap Stretch  3 x 30" bilateral    Foam Roll Series  Pec Stretch 2 minute  Bear Hugs 1 minutes  Field Goals 1 minute  Swimmers 1 minute  Shoulder " "Rolls clock wise 1 minute  Shoulder Rolls counter clockwise    Open Books x X20 bilateral          Objective Testing  Measurements, FOTO, education         NMR Scapular Retractions  20 x 5" holds    Chin Tucks x 2 x 10 supine with 5" lift    Bilateral External Rotation x 3 x 10 RTB    Prone Series   x  x  x 20 x 5" holds  I's with1#  T's with 1#  W's with 1#    Shoulder Rolls x X20 CW/CCW         TA Rows  3 x 10 blue TB    Shoulder Extensions  3 x 10 RTB    Scaption x 3 x 10 3#    Overhead Press x 3 x 10 3#   PLAN        CPT Codes available for Billing:   (12) minutes of Manual therapy (MT) to improve pain and ROM.  (12) minutes of Therapeutic Exercise (TE) to develop strength, endurance, range of motion, and flexibility.  (18) minutes of Neuromuscular Re-Education (NMR)  to improve: Balance, Coordination, Kinesthetic, Sense, Proprioception, and Posture.  (00) minutes of Therapeutic Activities (TA) to improve functional performance.  Vasopneumatic Device Therapy () for management of swelling/edema. (95454)  Unattended Electrical Stimulation (ES) for muscle performance or pain modulation.  BFR: Blood flow restriction applied during exercise    Patient Education and Home Exercises       Home Exercises Provided and Patient Education Provided     Education provided: time included in treatment time.   PURPOSE: Patient educated on the impairments noted above and the effects of physical therapy intervention to improve overall condition and QOL.   EXERCISE: Patient was educated on all the above exercise prior/during/after for proper posture, positioning, and execution for safe performance with home exercise program.   STRENGTH: Patient educated on the importance of improved core and extremity strength in order to improve alignment of the spine and extremities with static positions and dynamic movement.   POSTURE: Patient educated on postural awareness to reduce stress and maintain optimal alignment of the spine with " static positions and dynamic movement   SLEEPING POSITIONS: Patient educated on the use of pillows to aid in neutral alignment of spine and extremities when sleeping in supine or side lying.  TRANSFERS & TRANSITIONS: Patient educated on proper technique for bed mobility, transitions and transfers to improve body mechanics and decrease risk of injury.   ERGONOMICS: Patient educated on proper ergonomics at the work station in order to maintain optimal alignment of the musculoskeletal system and improve efficiency in the work environment.    Written Home Exercises Provided: yes.  Exercises were reviewed and Ivone was able to demonstrate them prior to the end of the session.  Ivone demonstrated good  understanding of the education provided. See EMR under Patient Instructions for exercises provided during therapy sessions.    Assessment     Patient tolerated session well. Continues with tenderness to palpation in left upper trap but appears to be less. Further reduced with soft tissue massage to the region. Good fatigue and activation noted with prone scapular series with resistance today.     Ivone is progressing well towards her goals.   Patient prognosis is Good.     Patient will continue to benefit from skilled outpatient physical therapy to address the deficits listed in the problem list box on initial evaluation, provide pt/family education and to maximize patient's level of independence in the home and community environment.     Patient's spiritual, cultural and educational needs considered and pt agreeable to plan of care and goals.    GOALS  Short Term Goals: 3 weeks  - Patient will report pain of less than or equal to 2/10.  - Patient will continue to report compliance with home exercise plan.      Long Term Goals: 6 weeks  - Patient will report pain of less than or equal to 1/10.  - Patient will report decrease in headache frequency of 1-2x/week or less.  - Patient will demonstrate goal strength as listed in  objective section.  - Patient will demonstrate goal cervical range of motion as listed in objective section.     Plan     Continue Plan of Care (POC) and progress per patient tolerance. See treatment section for details on planned progressions next session.    Rancho Mendoza, PT

## 2025-01-28 DIAGNOSIS — M50.30 DDD (DEGENERATIVE DISC DISEASE), CERVICAL: ICD-10-CM

## 2025-01-28 DIAGNOSIS — M54.2 CERVICAL PAIN: ICD-10-CM

## 2025-01-28 DIAGNOSIS — M47.812 CERVICAL SPONDYLOSIS: ICD-10-CM

## 2025-01-28 DIAGNOSIS — G89.29 CHRONIC LEFT SHOULDER PAIN: ICD-10-CM

## 2025-01-28 DIAGNOSIS — M54.2 CERVICALGIA: ICD-10-CM

## 2025-01-28 DIAGNOSIS — M25.512 CHRONIC LEFT SHOULDER PAIN: ICD-10-CM

## 2025-01-28 DIAGNOSIS — M54.12 CERVICAL RADICULOPATHY: ICD-10-CM

## 2025-01-29 RX ORDER — PREGABALIN 50 MG/1
50 CAPSULE ORAL NIGHTLY
Qty: 30 CAPSULE | Refills: 1 | Status: SHIPPED | OUTPATIENT
Start: 2025-01-29

## 2025-01-30 ENCOUNTER — CLINICAL SUPPORT (OUTPATIENT)
Dept: REHABILITATION | Facility: HOSPITAL | Age: 65
End: 2025-01-30
Payer: COMMERCIAL

## 2025-01-30 DIAGNOSIS — R29.898 DECREASED RANGE OF MOTION OF NECK: Primary | ICD-10-CM

## 2025-01-30 PROCEDURE — 97530 THERAPEUTIC ACTIVITIES: CPT

## 2025-01-30 PROCEDURE — 97110 THERAPEUTIC EXERCISES: CPT

## 2025-01-30 PROCEDURE — 97112 NEUROMUSCULAR REEDUCATION: CPT

## 2025-01-30 NOTE — PROGRESS NOTES
"[facility]   Physical Therapy Treatment Note     Name: Ivone Monroy  Clinic Number: [id number]    Therapy Diagnosis:   Encounter Diagnosis   Name Primary?    Decreased range of motion of neck Yes     Physician: Nehal Puente MD    Visit Date: 1/30/2025    Physician Orders: PT Eval and Treat   Medical Diagnosis from Referral: Numbness and tingling in left arm [R20.0, R20.2]   Evaluation Date: 10/29/2024  Authorization Period Expiration: 12/31/24  Plan of Care Expiration: 02/10/25  Progress Note Due: 01/29/2025  Visit # / Visits authorized: 5 / 25 (+18 previous)  FOTO: 2/3 (last taken 12/13/2024)  PTA Visit #: 0/5     Time In: 1:33 pm  Time Out: 2:32 pm  Total Billable Time: 58 minutes     Subjective     Patient reports: feeling pretty good today. Returned to work this week. Continued reduction of sinus pressure and headaches lately.     He/She was compliant with home exercise program.  Response to previous treatment: mild soreness  Functional change: able to lie on L side to sleep     Pain: 3/10     Location: cervical    Objective      Objective Measures updated at progress report or POC update only unless otherwise noted.     Treatment     Ivone received the treatments listed below:   See EMR under MEDIA for written consent provided.    Palpation Assessment to determine the necessity for Functional Dry Needling  of left upper trap.     CPT Intervention Performed   Today Duration / Intensity   MT Soft tissue massage  Suboccipital release, bilateral upper traps    Joint Mobilizations  Cervical distraction, upglides, side glides    Dry Needling  Left upper traps   TE UBE x 3'/3'    Pec Stretch  3 x 30" corner    Cervical SNAGS    X25 Extensions  X20 rotation bilaterally    Trap Stretch  3 x 30" bilateral    Foam Roll Series  Pec Stretch 2 minute  Bear Hugs 1 minutes  Field Goals 1 minute  Swimmers 1 minute  Shoulder Rolls clock wise 1 minute  Shoulder Rolls counter clockwise    Open " "Books x X20 bilateral          Objective Testing  Measurements, FOTO, education         NMR Scapular Retractions  20 x 5" holds    Chin Tucks x 2 x 10 supine with 5" lift    Bilateral External Rotation x 3 x 10 RTB    Prone Series   x  x  x 20 x 5" holds  I's with1#  T's with 1#  W's with 1#    Shoulder Rolls x X20 CW/CCW         TA Rows x 3 x 10 blue TB    Shoulder Extensions x 3 x 10 RTB    Scaption x 3 x 10 3#    Overhead Press x 3 x 10 3#   PLAN        CPT Codes available for Billing:   (00) minutes of Manual therapy (MT) to improve pain and ROM.  (12) minutes of Therapeutic Exercise (TE) to develop strength, endurance, range of motion, and flexibility.  (25) minutes of Neuromuscular Re-Education (NMR)  to improve: Balance, Coordination, Kinesthetic, Sense, Proprioception, and Posture.  (18) minutes of Therapeutic Activities (TA) to improve functional performance.  Vasopneumatic Device Therapy () for management of swelling/edema. (28844)  Unattended Electrical Stimulation (ES) for muscle performance or pain modulation.  BFR: Blood flow restriction applied during exercise    Patient Education and Home Exercises       Home Exercises Provided and Patient Education Provided     Education provided: time included in treatment time.   PURPOSE: Patient educated on the impairments noted above and the effects of physical therapy intervention to improve overall condition and QOL.   EXERCISE: Patient was educated on all the above exercise prior/during/after for proper posture, positioning, and execution for safe performance with home exercise program.   STRENGTH: Patient educated on the importance of improved core and extremity strength in order to improve alignment of the spine and extremities with static positions and dynamic movement.   POSTURE: Patient educated on postural awareness to reduce stress and maintain optimal alignment of the spine with static positions and dynamic movement   SLEEPING POSITIONS: Patient " educated on the use of pillows to aid in neutral alignment of spine and extremities when sleeping in supine or side lying.  TRANSFERS & TRANSITIONS: Patient educated on proper technique for bed mobility, transitions and transfers to improve body mechanics and decrease risk of injury.   ERGONOMICS: Patient educated on proper ergonomics at the work station in order to maintain optimal alignment of the musculoskeletal system and improve efficiency in the work environment.    Written Home Exercises Provided: yes.  Exercises were reviewed and Ivone was able to demonstrate them prior to the end of the session.  Ivone demonstrated good  understanding of the education provided. See EMR under Patient Instructions for exercises provided during therapy sessions.    Assessment     Patient tolerated session well. Increased time spent with functional activities today and tolerated well with fatigue noted. Good parascapular muscle activation with resisted prone series.     Ivone is progressing well towards her goals.   Patient prognosis is Good.     Patient will continue to benefit from skilled outpatient physical therapy to address the deficits listed in the problem list box on initial evaluation, provide pt/family education and to maximize patient's level of independence in the home and community environment.     Patient's spiritual, cultural and educational needs considered and pt agreeable to plan of care and goals.    GOALS  Short Term Goals: 3 weeks  - Patient will report pain of less than or equal to 2/10.  - Patient will continue to report compliance with home exercise plan.      Long Term Goals: 6 weeks  - Patient will report pain of less than or equal to 1/10.  - Patient will report decrease in headache frequency of 1-2x/week or less.  - Patient will demonstrate goal strength as listed in objective section.  - Patient will demonstrate goal cervical range of motion as listed in objective section.     Plan     Continue  Plan of Care (POC) and progress per patient tolerance. See treatment section for details on planned progressions next session.    Rancho Mendoza, PT

## 2025-02-03 ENCOUNTER — CLINICAL SUPPORT (OUTPATIENT)
Dept: REHABILITATION | Facility: HOSPITAL | Age: 65
End: 2025-02-03
Payer: MEDICARE

## 2025-02-03 DIAGNOSIS — R29.898 DECREASED RANGE OF MOTION OF NECK: Primary | ICD-10-CM

## 2025-02-03 PROCEDURE — 97112 NEUROMUSCULAR REEDUCATION: CPT

## 2025-02-03 PROCEDURE — 97110 THERAPEUTIC EXERCISES: CPT

## 2025-02-03 PROCEDURE — 97530 THERAPEUTIC ACTIVITIES: CPT

## 2025-02-03 PROCEDURE — 97140 MANUAL THERAPY 1/> REGIONS: CPT

## 2025-02-03 NOTE — PROGRESS NOTES
"[facility]   Physical Therapy Treatment Note     Name: Ivone Monroy  Clinic Number: [id number]    Therapy Diagnosis:   Encounter Diagnosis   Name Primary?    Decreased range of motion of neck Yes       Physician: Nehla Puente MD    Visit Date: 2/3/2025    Physician Orders: PT Eval and Treat   Medical Diagnosis from Referral: Numbness and tingling in left arm [R20.0, R20.2]   Evaluation Date: 10/29/2024  Authorization Period Expiration: 12/31/24  Plan of Care Expiration: 02/10/25  Progress Note Due: 01/29/2025  Visit # / Visits authorized: 5 / 25 (+18 previous)  FOTO: 2/3 (last taken 12/13/2024)  PTA Visit #: 0/5     Time In: 1:33 pm  Time Out: 2:31 pm  Total Billable Time: 58 minutes     Subjective     Patient reports: has had pretty good day at work today. Mild symptoms but overall has been much better over last 2 weeks.     He/She was compliant with home exercise program.  Response to previous treatment: mild soreness  Functional change: able to lie on L side to sleep     Pain: 3/10     Location: cervical    Objective      Objective Measures updated at progress report or POC update only unless otherwise noted.     Treatment     Ivone received the treatments listed below:   See EMR under MEDIA for written consent provided.    Palpation Assessment to determine the necessity for Functional Dry Needling  of left upper trap.     CPT Intervention Performed   Today Duration / Intensity   MT Soft tissue massage x Suboccipital release, bilateral upper traps    Joint Mobilizations x Cervical distraction, upglides, side glides    Dry Needling  Left upper traps   TE UBE x 3'/3'    Pec Stretch  3 x 30" corner    Cervical SNAGS    X25 Extensions  X20 rotation bilaterally    Trap Stretch  3 x 30" bilateral    Foam Roll Series  Pec Stretch 2 minute  Bear Hugs 1 minutes  Field Goals 1 minute  Swimmers 1 minute  Shoulder Rolls clock wise 1 minute  Shoulder Rolls counter clockwise    Open Books x " "X20 bilateral          Objective Testing  Measurements, FOTO, education         NMR Scapular Retractions  20 x 5" holds    Chin Tucks  2 x 10 supine with 5" lift    Bilateral External Rotation x 3 x 10 RTB    Prone Series   x  x  x 20 x 5" holds  I's with1#  T's with 1#  W's with 1#    Shoulder Rolls  X20 CW/CCW         TA Rows x 3 x 10 blue TB    Shoulder Extensions x 3 x 10 RTB    Scaption x 3 x 10 3#    Overhead Press x 3 x 10 3#   PLAN        CPT Codes available for Billing:   (10) minutes of Manual therapy (MT) to improve pain and ROM.  (12) minutes of Therapeutic Exercise (TE) to develop strength, endurance, range of motion, and flexibility.  (16) minutes of Neuromuscular Re-Education (NMR)  to improve: Balance, Coordination, Kinesthetic, Sense, Proprioception, and Posture.  (18) minutes of Therapeutic Activities (TA) to improve functional performance.  Vasopneumatic Device Therapy () for management of swelling/edema. (43890)  Unattended Electrical Stimulation (ES) for muscle performance or pain modulation.  BFR: Blood flow restriction applied during exercise    Patient Education and Home Exercises       Home Exercises Provided and Patient Education Provided     Education provided: time included in treatment time.   PURPOSE: Patient educated on the impairments noted above and the effects of physical therapy intervention to improve overall condition and QOL.   EXERCISE: Patient was educated on all the above exercise prior/during/after for proper posture, positioning, and execution for safe performance with home exercise program.   STRENGTH: Patient educated on the importance of improved core and extremity strength in order to improve alignment of the spine and extremities with static positions and dynamic movement.   POSTURE: Patient educated on postural awareness to reduce stress and maintain optimal alignment of the spine with static positions and dynamic movement   SLEEPING POSITIONS: Patient educated on " the use of pillows to aid in neutral alignment of spine and extremities when sleeping in supine or side lying.  TRANSFERS & TRANSITIONS: Patient educated on proper technique for bed mobility, transitions and transfers to improve body mechanics and decrease risk of injury.   ERGONOMICS: Patient educated on proper ergonomics at the work station in order to maintain optimal alignment of the musculoskeletal system and improve efficiency in the work environment.    Written Home Exercises Provided: yes.  Exercises were reviewed and Ivone was able to demonstrate them prior to the end of the session.  Ivone demonstrated good  understanding of the education provided. See EMR under Patient Instructions for exercises provided during therapy sessions.    Assessment     Patient tolerated session well. Patient required cueing with over head press to prevent lumbar extension to compensate. Able to make correction but increased difficulty noted.     Ivone is progressing well towards her goals.   Patient prognosis is Good.     Patient will continue to benefit from skilled outpatient physical therapy to address the deficits listed in the problem list box on initial evaluation, provide pt/family education and to maximize patient's level of independence in the home and community environment.     Patient's spiritual, cultural and educational needs considered and pt agreeable to plan of care and goals.    GOALS  Short Term Goals: 3 weeks  - Patient will report pain of less than or equal to 2/10.  - Patient will continue to report compliance with home exercise plan.      Long Term Goals: 6 weeks  - Patient will report pain of less than or equal to 1/10.  - Patient will report decrease in headache frequency of 1-2x/week or less.  - Patient will demonstrate goal strength as listed in objective section.  - Patient will demonstrate goal cervical range of motion as listed in objective section.     Plan     Continue Plan of Care (POC) and  progress per patient tolerance. See treatment section for details on planned progressions next session.    Rancho Mendoza, PT

## 2025-02-06 ENCOUNTER — CLINICAL SUPPORT (OUTPATIENT)
Dept: REHABILITATION | Facility: HOSPITAL | Age: 65
End: 2025-02-06
Payer: MEDICARE

## 2025-02-06 DIAGNOSIS — R29.898 DECREASED RANGE OF MOTION OF NECK: Primary | ICD-10-CM

## 2025-02-06 PROCEDURE — 97112 NEUROMUSCULAR REEDUCATION: CPT

## 2025-02-06 PROCEDURE — 97140 MANUAL THERAPY 1/> REGIONS: CPT

## 2025-02-06 PROCEDURE — 97110 THERAPEUTIC EXERCISES: CPT

## 2025-02-06 NOTE — PROGRESS NOTES
"[facility]   Physical Therapy Treatment Note     Name: Ivone Monroy  Clinic Number: [id number]    Therapy Diagnosis:   Encounter Diagnosis   Name Primary?    Decreased range of motion of neck Yes     Physician: Nehal Puente MD    Visit Date: 2/6/2025    Physician Orders: PT Eval and Treat   Medical Diagnosis from Referral: Numbness and tingling in left arm [R20.0, R20.2]   Evaluation Date: 10/29/2024  Authorization Period Expiration: 12/31/24  Plan of Care Expiration: 02/10/25  Progress Note Due: 01/29/2025  Visit # / Visits authorized: 5 / 25 (+18 previous)  FOTO: 2/3 (last taken 12/13/2024)  PTA Visit #: 0/5     Time In: 1:33 pm  Time Out: 2:32 pm  Total Billable Time: 58 minutes     Subjective     Patient reports: she was having increased symptoms on left side earlier this week. No clear correlation of symptoms but reduced with Tylenol and feeling better today.     He/She was compliant with home exercise program.  Response to previous treatment: mild soreness  Functional change: able to lie on L side to sleep     Pain: 3/10     Location: cervical    Objective      Objective Measures updated at progress report or POC update only unless otherwise noted.     Treatment     Ivone received the treatments listed below:   See EMR under MEDIA for written consent provided.    Palpation Assessment to determine the necessity for Functional Dry Needling  of left upper trap.     CPT Intervention Performed   Today Duration / Intensity   MT Soft tissue massage x Suboccipital release, bilateral upper traps    Joint Mobilizations x Cervical distraction, upglides, side glides    Dry Needling  Left upper traps   TE UBE x 3'/3'    Pec Stretch x 3 x 30" corner    Cervical SNAGS   x X25 Extensions  X20 rotation bilaterally    Trap Stretch  3 x 30" bilateral    Foam Roll Series  Pec Stretch 2 minute  Bear Hugs 1 minutes  Field Goals 1 minute  Swimmers 1 minute  Shoulder Rolls clock wise 1 " "minute  Shoulder Rolls counter clockwise    Open Books x X20 bilateral          Objective Testing  Measurements, FOTO, education         NMR Scapular Retractions  20 x 5" holds    Chin Tucks x x25 supine with 5" lift    Bilateral External Rotation x 3 x 10 RTB    Prone Series   x  x  x 20 x 5" holds  I's with1#  T's with 1#  W's with 1#    Shoulder Rolls  X20 CW/CCW         TA Rows  3 x 10 blue TB    Shoulder Extensions  3 x 10 RTB    Scaption  3 x 10 3#    Overhead Press  3 x 10 3#   PLAN        CPT Codes available for Billing:   (12) minutes of Manual therapy (MT) to improve pain and ROM.  (20) minutes of Therapeutic Exercise (TE) to develop strength, endurance, range of motion, and flexibility.  (25) minutes of Neuromuscular Re-Education (NMR)  to improve: Balance, Coordination, Kinesthetic, Sense, Proprioception, and Posture.  (00) minutes of Therapeutic Activities (TA) to improve functional performance.  Vasopneumatic Device Therapy () for management of swelling/edema. (08725)  Unattended Electrical Stimulation (ES) for muscle performance or pain modulation.  BFR: Blood flow restriction applied during exercise    Patient Education and Home Exercises       Home Exercises Provided and Patient Education Provided     Education provided: time included in treatment time.   PURPOSE: Patient educated on the impairments noted above and the effects of physical therapy intervention to improve overall condition and QOL.   EXERCISE: Patient was educated on all the above exercise prior/during/after for proper posture, positioning, and execution for safe performance with home exercise program.   STRENGTH: Patient educated on the importance of improved core and extremity strength in order to improve alignment of the spine and extremities with static positions and dynamic movement.   POSTURE: Patient educated on postural awareness to reduce stress and maintain optimal alignment of the spine with static positions and dynamic " movement   SLEEPING POSITIONS: Patient educated on the use of pillows to aid in neutral alignment of spine and extremities when sleeping in supine or side lying.  TRANSFERS & TRANSITIONS: Patient educated on proper technique for bed mobility, transitions and transfers to improve body mechanics and decrease risk of injury.   ERGONOMICS: Patient educated on proper ergonomics at the work station in order to maintain optimal alignment of the musculoskeletal system and improve efficiency in the work environment.    Written Home Exercises Provided: yes.  Exercises were reviewed and Ivone was able to demonstrate them prior to the end of the session.  Ivone demonstrated good  understanding of the education provided. See EMR under Patient Instructions for exercises provided during therapy sessions.    Assessment     Patient tolerated session well. Patient progressed with increased repetitions of cervical retractions with a lift to demonstrate increased muscular endurance and control.     Ivone is progressing well towards her goals.   Patient prognosis is Good.     Patient will continue to benefit from skilled outpatient physical therapy to address the deficits listed in the problem list box on initial evaluation, provide pt/family education and to maximize patient's level of independence in the home and community environment.     Patient's spiritual, cultural and educational needs considered and pt agreeable to plan of care and goals.    GOALS  Short Term Goals: 3 weeks  - Patient will report pain of less than or equal to 2/10.  - Patient will continue to report compliance with home exercise plan.      Long Term Goals: 6 weeks  - Patient will report pain of less than or equal to 1/10.  - Patient will report decrease in headache frequency of 1-2x/week or less.  - Patient will demonstrate goal strength as listed in objective section.  - Patient will demonstrate goal cervical range of motion as listed in objective section.      Plan     Continue Plan of Care (POC) and progress per patient tolerance. See treatment section for details on planned progressions next session.    Rancho Mendoza, PT

## 2025-02-10 ENCOUNTER — OFFICE VISIT (OUTPATIENT)
Dept: DERMATOLOGY | Facility: CLINIC | Age: 65
End: 2025-02-10
Payer: COMMERCIAL

## 2025-02-10 ENCOUNTER — CLINICAL SUPPORT (OUTPATIENT)
Dept: REHABILITATION | Facility: HOSPITAL | Age: 65
End: 2025-02-10
Payer: COMMERCIAL

## 2025-02-10 DIAGNOSIS — L71.0 PERIORAL DERMATITIS: ICD-10-CM

## 2025-02-10 DIAGNOSIS — R29.898 DECREASED RANGE OF MOTION OF NECK: Primary | ICD-10-CM

## 2025-02-10 DIAGNOSIS — L80 VITILIGO: Primary | ICD-10-CM

## 2025-02-10 DIAGNOSIS — L63.9 ALOPECIA AREATA: ICD-10-CM

## 2025-02-10 PROCEDURE — 4010F ACE/ARB THERAPY RXD/TAKEN: CPT | Mod: CPTII,S$GLB,, | Performed by: PHYSICIAN ASSISTANT

## 2025-02-10 PROCEDURE — 99214 OFFICE O/P EST MOD 30 MIN: CPT | Mod: S$GLB,,, | Performed by: PHYSICIAN ASSISTANT

## 2025-02-10 PROCEDURE — 1101F PT FALLS ASSESS-DOCD LE1/YR: CPT | Mod: CPTII,S$GLB,, | Performed by: PHYSICIAN ASSISTANT

## 2025-02-10 PROCEDURE — 1160F RVW MEDS BY RX/DR IN RCRD: CPT | Mod: CPTII,S$GLB,, | Performed by: PHYSICIAN ASSISTANT

## 2025-02-10 PROCEDURE — 99999 PR PBB SHADOW E&M-EST. PATIENT-LVL III: CPT | Mod: PBBFAC,,, | Performed by: PHYSICIAN ASSISTANT

## 2025-02-10 PROCEDURE — 3288F FALL RISK ASSESSMENT DOCD: CPT | Mod: CPTII,S$GLB,, | Performed by: PHYSICIAN ASSISTANT

## 2025-02-10 PROCEDURE — 1159F MED LIST DOCD IN RCRD: CPT | Mod: CPTII,S$GLB,, | Performed by: PHYSICIAN ASSISTANT

## 2025-02-10 PROCEDURE — G2211 COMPLEX E/M VISIT ADD ON: HCPCS | Mod: S$GLB,,, | Performed by: PHYSICIAN ASSISTANT

## 2025-02-10 PROCEDURE — 97110 THERAPEUTIC EXERCISES: CPT

## 2025-02-10 PROCEDURE — 97112 NEUROMUSCULAR REEDUCATION: CPT

## 2025-02-10 PROCEDURE — 97140 MANUAL THERAPY 1/> REGIONS: CPT

## 2025-02-10 PROCEDURE — 1126F AMNT PAIN NOTED NONE PRSNT: CPT | Mod: CPTII,S$GLB,, | Performed by: PHYSICIAN ASSISTANT

## 2025-02-10 RX ORDER — METRONIDAZOLE 10 MG/G
GEL TOPICAL DAILY
Qty: 60 G | Refills: 0 | Status: SHIPPED | OUTPATIENT
Start: 2025-02-10 | End: 2026-02-10

## 2025-02-10 NOTE — PROGRESS NOTES
Subjective:      Patient ID:  Ivone Monroy is a 64 y.o. female who presents for   Chief Complaint   Patient presents with    Follow-up     faCE    Spot     SCALP     Hx of AA, potential early vitiligo changes of eyebrows/ face, last seen 9/5/24. Here for f/u and believes new growth of the scalp. Denies new patches of hair loss, itching, or loss of body hair. Current tx: clobetasol solution bid. She declined ILK.  +Improved. Denies new patches of hair loss.    For vitiligo, trial of desonide cream recommended initially (s/p about 4-6 weeks), trial of elidel initially, then changed to protopic at last visit.  Thyroid antibodies were negative (3/4/24). Current tx: alternates protopic bid x 4-6 weeks and then desonide cream. Denies worsening or spreading. Notes intermittent irritation (mild). Hasn't been able to be consistent w/ ambient sun exposures due to some other health issues.             Review of Systems   Constitutional:  Negative for fever and chills.   Gastrointestinal:  Negative for nausea and vomiting.   Skin:  Positive for daily sunscreen use and activity-related sunscreen use. Negative for itching, rash, dry skin, sun sensitivity, recent sunburn and dry lips.   Hematologic/Lymphatic: Does not bruise/bleed easily.       Objective:   Physical Exam   Constitutional: She appears well-developed and well-nourished. No distress.   Neurological: She is alert and oriented to person, place, and time. She is not disoriented.   Psychiatric: She has a normal mood and affect.   Skin:   Areas Examined (abnormalities noted in diagram):   Scalp / Hair Palpated and Inspected  Head / Face Inspection Performed  Neck Inspection Performed  Chest / Axilla Inspection Performed  Back Inspection Performed  RUE Inspected  LUE Inspection Performed            Diagram Legend     Erythematous scaling macule/papule c/w actinic keratosis       Vascular papule c/w angioma      Pigmented verrucoid papule/plaque c/w seborrheic  keratosis      Yellow umbilicated papule c/w sebaceous hyperplasia      Irregularly shaped tan macule c/w lentigo     1-2 mm smooth white papules consistent with Milia      Movable subcutaneous cyst with punctum c/w epidermal inclusion cyst      Subcutaneous movable cyst c/w pilar cyst      Firm pink to brown papule c/w dermatofibroma      Pedunculated fleshy papule(s) c/w skin tag(s)      Evenly pigmented macule c/w junctional nevus     Mildly variegated pigmented, slightly irregular-bordered macule c/w mildly atypical nevus      Flesh colored to evenly pigmented papule c/w intradermal nevus       Pink pearly papule/plaque c/w basal cell carcinoma      Erythematous hyperkeratotic cursted plaque c/w SCC      Surgical scar with no sign of skin cancer recurrence      Open and closed comedones      Inflammatory papules and pustules      Verrucoid papule consistent consistent with wart     Erythematous eczematous patches and plaques     Dystrophic onycholytic nail with subungual debris c/w onychomycosis     Umbilicated papule    Erythematous-base heme-crusted tan verrucoid plaque consistent with inflamed seborrheic keratosis     Erythematous Silvery Scaling Plaque c/w Psoriasis     See annotation      Assessment / Plan:        Vitiligo  -     metronidazole 1% (METROGEL) 1 % Gel; Apply topically once daily. For Perioral Dermatitis (bumps).  Dispense: 60 g; Refill: 0  Not at goal. Advised d/c of desonide cream and protopic oint for now due to concern for likely perioral dermatitis. Encouraged ambient sun exposure daily (~ 40 min). Would reconsider Opzelura cautiously in future given increased risk of cardiac side effects and events (pt has h/o HTN).     Alopecia areata  Improved. Reassurance.     Perioral dermatitis  Looks clear today, but clinically suspect by her subjective h/o intermittent bumps in clusters / irritation of perioral, periocular, glabella areas. Warned of flaring off TCS / TCI. Start above rx for flares.  If intense may limit protopic oint BID for up to 2 weeks, but advised of goal to wean. D/c any sensitive toothpaste.            No follow-ups on file.

## 2025-02-10 NOTE — PROGRESS NOTES
"[facility]   Physical Therapy Treatment Note and Discharge Summary     Name: Ivone Monroy  Clinic Number: [id number]    Therapy Diagnosis:   Encounter Diagnosis   Name Primary?    Decreased range of motion of neck Yes     Physician: Nehal Puente MD    Visit Date: 2/10/2025    Physician Orders: PT Eval and Treat   Medical Diagnosis from Referral: Numbness and tingling in left arm [R20.0, R20.2]   Evaluation Date: 10/29/2024  Authorization Period Expiration: 12/31/24  Plan of Care Expiration: 02/10/25  Progress Note Due: 01/29/2025  Visit # / Visits authorized: 5 / 25 (+18 previous)  FOTO: 2/3 (last taken 12/13/2024)  PTA Visit #: 0/5     Time In: 1:45 pm  Time Out: 2:38 pm  Total Billable Time: 52 minutes     Subjective     Patient reports: she is feeling pretty good today. Minimal to no symptoms other than mild tightness.     He/She was compliant with home exercise program.  Response to previous treatment: mild soreness  Functional change: able to lie on L side to sleep     Pain: 3/10     Location: cervical    Objective      Objective Measures updated at progress report or POC update only unless otherwise noted.     Treatment     Ivone received the treatments listed below:   See EMR under MEDIA for written consent provided.    Palpation Assessment to determine the necessity for Functional Dry Needling  of left upper trap.     CPT Intervention Performed   Today Duration / Intensity   MT Soft tissue massage x Suboccipital release, bilateral upper traps    Joint Mobilizations x Cervical distraction, upglides, side glides    Dry Needling  Left upper traps   TE UBE x 3'/3'    Pec Stretch x 3 x 30" corner    Cervical SNAGS   x X25 Extensions  X20 rotation bilaterally    Trap Stretch  3 x 30" bilateral    Foam Roll Series  Pec Stretch 2 minute  Bear Hugs 1 minutes  Field Goals 1 minute  Swimmers 1 minute  Shoulder Rolls clock wise 1 minute  Shoulder Rolls counter clockwise    Open Books " "x X20 bilateral          Objective Testing  Measurements, FOTO, education         NMR Scapular Retractions  20 x 5" holds    Chin Tucks x x25 supine with 5" lift    Bilateral External Rotation  3 x 10 RTB    Prone Series   x  x  x 20 x 5" holds  I's with1#  T's with 1#  W's with 1#    Shoulder Rolls  X20 CW/CCW         TA Rows  3 x 10 blue TB    Shoulder Extensions  3 x 10 RTB    Scaption  3 x 10 3#    Overhead Press  3 x 10 3#   PLAN        CPT Codes available for Billing:   (15) minutes of Manual therapy (MT) to improve pain and ROM.  (18) minutes of Therapeutic Exercise (TE) to develop strength, endurance, range of motion, and flexibility.  (18) minutes of Neuromuscular Re-Education (NMR)  to improve: Balance, Coordination, Kinesthetic, Sense, Proprioception, and Posture.  (00) minutes of Therapeutic Activities (TA) to improve functional performance.  Vasopneumatic Device Therapy () for management of swelling/edema. (88200)  Unattended Electrical Stimulation (ES) for muscle performance or pain modulation.  BFR: Blood flow restriction applied during exercise    Patient Education and Home Exercises       Home Exercises Provided and Patient Education Provided     Education provided: time included in treatment time.   PURPOSE: Patient educated on the impairments noted above and the effects of physical therapy intervention to improve overall condition and QOL.   EXERCISE: Patient was educated on all the above exercise prior/during/after for proper posture, positioning, and execution for safe performance with home exercise program.   STRENGTH: Patient educated on the importance of improved core and extremity strength in order to improve alignment of the spine and extremities with static positions and dynamic movement.   POSTURE: Patient educated on postural awareness to reduce stress and maintain optimal alignment of the spine with static positions and dynamic movement   SLEEPING POSITIONS: Patient educated on the " use of pillows to aid in neutral alignment of spine and extremities when sleeping in supine or side lying.  TRANSFERS & TRANSITIONS: Patient educated on proper technique for bed mobility, transitions and transfers to improve body mechanics and decrease risk of injury.   ERGONOMICS: Patient educated on proper ergonomics at the work station in order to maintain optimal alignment of the musculoskeletal system and improve efficiency in the work environment.    Written Home Exercises Provided: yes.  Exercises were reviewed and Ivone was able to demonstrate them prior to the end of the session.  Ivone demonstrated good  understanding of the education provided. See EMR under Patient Instructions for exercises provided during therapy sessions.    Assessment     Patient tolerated session well. Patient cued with chin tuck with lift to press tongue onto top of mat to help reduce platysma activation. Patient able to demonstrate home exercise plan exercises well and will continue home exercise plan independently.     Ivone is progressing well towards her goals.   Patient prognosis is Good.     Patient will continue to benefit from skilled outpatient physical therapy to address the deficits listed in the problem list box on initial evaluation, provide pt/family education and to maximize patient's level of independence in the home and community environment.     Patient's spiritual, cultural and educational needs considered and pt agreeable to plan of care and goals.    GOALS  Short Term Goals: 3 weeks  - Patient will report pain of less than or equal to 2/10.  - Patient will continue to report compliance with home exercise plan.     Long Term Goals: 6 weeks  - Patient will report pain of less than or equal to 1/10.  - Patient will report decrease in headache frequency of 1-2x/week or less.  - Patient will demonstrate goal strength as listed in objective section.  - Patient will demonstrate goal cervical range of motion as listed  in objective section.     Plan     Continue Plan of Care (POC) and progress per patient tolerance. See treatment section for details on planned progressions next session.    Rancho Mendoza, PT

## 2025-02-22 DIAGNOSIS — Z00.00 ENCOUNTER FOR MEDICARE ANNUAL WELLNESS EXAM: ICD-10-CM

## 2025-03-11 ENCOUNTER — OFFICE VISIT (OUTPATIENT)
Dept: SLEEP MEDICINE | Facility: CLINIC | Age: 65
End: 2025-03-11
Payer: COMMERCIAL

## 2025-03-11 VITALS
HEIGHT: 61 IN | WEIGHT: 109.56 LBS | RESPIRATION RATE: 18 BRPM | HEART RATE: 62 BPM | DIASTOLIC BLOOD PRESSURE: 60 MMHG | BODY MASS INDEX: 20.69 KG/M2 | OXYGEN SATURATION: 99 % | SYSTOLIC BLOOD PRESSURE: 118 MMHG

## 2025-03-11 DIAGNOSIS — G47.33 OSA (OBSTRUCTIVE SLEEP APNEA): Primary | ICD-10-CM

## 2025-03-11 DIAGNOSIS — R51.9 ACUTE NONINTRACTABLE HEADACHE, UNSPECIFIED HEADACHE TYPE: ICD-10-CM

## 2025-03-11 DIAGNOSIS — R06.83 SNORING: ICD-10-CM

## 2025-03-11 DIAGNOSIS — F51.02 ADJUSTMENT INSOMNIA: ICD-10-CM

## 2025-03-11 PROCEDURE — 1159F MED LIST DOCD IN RCRD: CPT | Mod: CPTII,S$GLB,, | Performed by: NURSE PRACTITIONER

## 2025-03-11 PROCEDURE — 99214 OFFICE O/P EST MOD 30 MIN: CPT | Mod: S$GLB,,, | Performed by: NURSE PRACTITIONER

## 2025-03-11 PROCEDURE — 3288F FALL RISK ASSESSMENT DOCD: CPT | Mod: CPTII,S$GLB,, | Performed by: NURSE PRACTITIONER

## 2025-03-11 PROCEDURE — 1126F AMNT PAIN NOTED NONE PRSNT: CPT | Mod: CPTII,S$GLB,, | Performed by: NURSE PRACTITIONER

## 2025-03-11 PROCEDURE — 3078F DIAST BP <80 MM HG: CPT | Mod: CPTII,S$GLB,, | Performed by: NURSE PRACTITIONER

## 2025-03-11 PROCEDURE — 3008F BODY MASS INDEX DOCD: CPT | Mod: CPTII,S$GLB,, | Performed by: NURSE PRACTITIONER

## 2025-03-11 PROCEDURE — 99999 PR PBB SHADOW E&M-EST. PATIENT-LVL IV: CPT | Mod: PBBFAC,,, | Performed by: NURSE PRACTITIONER

## 2025-03-11 PROCEDURE — 1101F PT FALLS ASSESS-DOCD LE1/YR: CPT | Mod: CPTII,S$GLB,, | Performed by: NURSE PRACTITIONER

## 2025-03-11 PROCEDURE — 1160F RVW MEDS BY RX/DR IN RCRD: CPT | Mod: CPTII,S$GLB,, | Performed by: NURSE PRACTITIONER

## 2025-03-11 PROCEDURE — 4010F ACE/ARB THERAPY RXD/TAKEN: CPT | Mod: CPTII,S$GLB,, | Performed by: NURSE PRACTITIONER

## 2025-03-11 PROCEDURE — 3074F SYST BP LT 130 MM HG: CPT | Mod: CPTII,S$GLB,, | Performed by: NURSE PRACTITIONER

## 2025-03-11 NOTE — PROGRESS NOTES
Subjective:      Patient ID: Ivone Monroy is a 64 y.o. female.    Chief Complaint: Sleep Apnea    HPI  Patient presents today for evaluation of sleep apnea. Referred by neurology with headache/pressure.Patient with snoring. Unsure of apnea events. Patient not having problems falling asleep, but wakes up frequently throughout the night.  Patient does not wake up feeling refreshed in the morning.  Patient with daytime hypersomnolence. Comorbidities include HTN, NSTEMI, sinus Bradycardia.  Bedtime: 10-10:30PM  Wake time: 7AM  She had polysomnogram. She had a hard time falling asleep due to anxiety.  STOP - BANG Questionnaire:      1. Snoring : Do you snore loudly ?    Yes     2. Tired : Do you often feel tired, fatigued, or sleepy during daytime?   Yes     3. Observed: Has anyone observed you stop breathing during your sleep?   No     4. Blood pressure : Do you have or are you being treated for high blood pressure?   Yes     5. BMI :BMI more than 35 kg/m2?   No     6. Age : Age over 50 yr old?   Yes     7. Neck circumference: Neck circumference greater than 40 cm?   No     8. Gender: Gender male?   No     High risk of SUSSY: Yes 5 - 8  Intermediate risk of SUSSY: Yes 3 - 4  Low risk of SUSSY: Yes 0 - 2        References:   STOP Questionnaire   A Tool to Screen Patients for Obstructive Sleep Apnea: IVY JimR.C.P.C., VERNON Mathias.B.B.S., Charlie Dallas M.D.,Stephanie Plunkett, Ph.D., Ghada Rodrigues M.B.B.S.,_ VERNON Potts.Sc.,_ Pasha Joshua M.D., Jorge Luis Rod F.R.C.P.C.; Anesthesiology 2008; 108:812-21 Copyright © 2008, the American Society of Anesthesiologists, Inc. [employer]        [place] Questionnaire (validated SUSSY screening questionnaire)    Yes -- Snoring/apnea    Yes -- Fatigue    Body mass index is Body mass index is 20.24 kg/m²..  (>25 is overweight, >30 is obese)    Blood Pressure = Hypertension  (PreHTN 120-139/80-89, Stg1 140-159/90-99, Stg2  ">160/>100)  Effie = three of three SUSSY categories are positive (high risk is 2-3 positive categories)      Problem List[1]  /60   Pulse 62   Resp 18   Ht 5' 1" (1.549 m)   Wt 49.7 kg (109 lb 9.1 oz)   SpO2 99%   BMI 20.70 kg/m²   Body mass index is 20.7 kg/m².    Review of Systems   Constitutional:  Positive for fatigue.   HENT: Negative.     Respiratory:  Positive for snoring.    Cardiovascular: Negative.    Musculoskeletal: Negative.    Gastrointestinal: Negative.    Neurological:  Positive for headaches.   Psychiatric/Behavioral:  Positive for sleep disturbance.      Objective:      Physical Exam  Constitutional:       Appearance: Normal appearance. She is well-developed.   HENT:      Head: Normocephalic and atraumatic.      Nose: Nose normal.   Cardiovascular:      Rate and Rhythm: Normal rate and regular rhythm.      Heart sounds: No murmur heard.     No gallop.   Pulmonary:      Effort: Pulmonary effort is normal.      Breath sounds: Normal breath sounds.   Abdominal:      Palpations: Abdomen is soft.      Tenderness: There is no abdominal tenderness.   Musculoskeletal:         General: Normal range of motion.      Cervical back: Normal range of motion and neck supple.   Skin:     General: Skin is warm and dry.   Neurological:      Mental Status: She is alert and oriented to person, place, and time.   Psychiatric:         Mood and Affect: Mood normal.         Behavior: Behavior normal.       Personal Diagnostic Review      3/11/2025     9:32 AM   EPWORTH SLEEPINESS SCALE   Sitting and reading 1   Watching TV 2   Sitting, inactive in a public place (e.g. a theatre or a meeting) 0   As a passenger in a car for an hour without a break 0   Lying down to rest in the afternoon when circumstances permit 0   Sitting and talking to someone 0   Sitting quietly after a lunch without alcohol 1   In a car, while stopped for a few minutes in traffic 0   Total score 4      Procedure Notes    Author Status Last " " Updated Created   Lev Day MD Signed Lev Day MD 12/9/2024  8:46 PM 12/9/2024  8:46 PM          Assoc. Orders Procedures   POLYSOMNOGRAM POLYSOMNOGRAM       Pre-Op Dx Post-Op Dx   Sleep apnea, unspecified type None         Patient Name: Ivone Monroy Study Date: 11/23/2024   YOB: 1960 Study Type: PSG   Age: 64 year Patient #: [id number]   Sex: Female Billing ID: -   Height: 5' 1" Interpreting Physician: Lev Day MD   Weight: 105.0 lbs Recording Tech: Tamiko Metzger   BMI: 20.1 Scoring Tech: Brenda Wheeler   [place]: 3 Neck Circumference: 15      Indications for Polysomnography  The patient is a 64 year-old Female who is 5' 1" and weighs 105.0 lbs. Her BMI equals 20.1.  A full night polysomnogram was performed to evaluate for Obstructive Sleep Apnea.     Referring provider: Elizabeth Lejeune NP     Medical History: Patient with snoring. Unsure of apnea events. Patient not having problems falling asleep, but wakes up frequently throughout the night.  Patient does not wake up feeling refreshed in the morning.  Patient with daytime hypersomnolence. Comorbidities include HTN, NSTEMI, sinus Bradycardia.  Bedtime: 10-10:30PM  Wake time: 7AM, sleep latency 15 minutes.  Denies denies restless legs or sleep attacks , no signs related to narcolepsy      STOP - BANG Questionnaire: 4     [place] = three of three SUSSY categories are positive (high risk is 2-3 positive categories)      [place] 8        Medication   LYRICA 50 MG capsule   TOPROL-XL 25 MG 24 hr tablet   ECOTRIN 81 MG EC tablet   ATIVAN 0.5 MG tablet   COZAAR 50 MG tablet   DESOWEN 0.05 % cream   PROTOPIC 0.1 % ointment   TEMOVATE 0.05 % external solution   PROTONIX 40 MG tablet   THERAGRAN per tablet      Test Description  Patient was connected to a full set of leads with a sleep technician in attendance.  Parameters used were EEG derivations (F4-A1, C4-A1, O2-A1), EOG derivations (LOC-A2, BUDDY-A2), EKG, EMG - " extremity (leg) & chin, oxygen saturation, effort parameters + abdominal/thoracic (RIP), and airflow parameters - nasal pressure/thermal.  Body position was visually monitored and noted.  Audio & video monitoring was performed during study.     Scoring is done in accordance with the American Academy of Sleep Medicine Manual for the Scoring of Sleep and Associated Events version 2.6.  Hypopneas are scored using the 4% desaturation rule.     Sleep Architecture  The total recording time of the polysomnogram was 442.1 minutes.  The total sleep time was 188.0 minutes.  The patient spent 13.8% of total sleep time in Stage N1, 59.3% in Stage N2, 18.6% in Stages N3, and 8.2% in REM.  Sleep latency was 164.6 minutes.  REM latency was 262.0 minutes.  Sleep Efficiency was 42.5%.  Wake after Sleep Onset time was 89.5 minutes.     Respiratory Events  The polysomnogram revealed a presence of 8 obstructive, 8 central, and - mixed apneas resulting in an Apnea index of 5.1 events per hour.  There were - hypopneas (>=3% desat and/or Ar.) resulting in an Apnea\Hypopnea Index (AHI >=3% desat and/or Ar.) of 5.1 events per hour.  There were - hypopneas (>=4% desat) resulting in an Apnea\Hypopnea Index (AHI >=4% desat) of 5.1 events per hour.  There were - Respiratory Effort Related Arousals resulting in a RERA index of - events per hour. The Respiratory Disturbance Index is 5.1 events per hour.      Mean oxygen saturation was 99.5%.  The lowest oxygen saturation during sleep was 96.0%.  Time spent <=88% oxygen saturation was 0.2 minutes (0.1%).     Limb Activity  There were 12 total limb movements recorded, of this total, 12 were classified as PLMs.  PLM index was 3.8 per hour and PLM associated with Arousals index was 1.3 per hour.     Cardiac Summary  The average pulse rate was 51.9 bpm.  The minimum pulse rate was 45.0 bpm while the maximum pulse rate was 76.0 bpm.  Cardiac rhythm was normal      Observation: Cough and bruxism   Awake  9:42 p.m. to 1238 a.m., 12:50 a.m. to 1:32 a.m., 3:07 a.m. to 3:31 a.m..          Conclusion:   Overall AHI was 5.1 events per hour with 16 events.    Eight obstructive apneas, 8 central apneas.  No supine sleep was recorded.    Very poor sleep efficiency.  Delayed sleep latency.  Delayed REM latencies.    PLM index was 1.3 events per hour   Bruxism was observed   REM RDI was 31.0 events per hour.     Diagnosis:      Adjustment Insomnia / 307.41              Obstructive Sleep Apnea / 327.23              Sleep Related Bruxism / 327.53               PLMD     Recommendations:      Evaluation by dental medicine for bruxism   Sleep hygiene sleep diary.    Consider ambulatory home sleep test to see whether with a labral sleep pattern the maybe significant sleep disordered breathing obstructive sleep apnea that requires treatment   Clinical correlation for restless leg          Assessment:       1. SUSSY (obstructive sleep apnea)    2. Adjustment insomnia    3. Acute nonintractable headache, unspecified headache type    4. Snoring        Encounter Medications[2]  Orders Placed This Encounter   Procedures    Home Sleep Study     Standing Status:   Future     Expiration Date:   3/11/2026     Scheduling Instructions:      2 night protocol     Plan:   Under 30 total events. We really need more sleep time. She had anxiety and slept for 2hrs during polysomnogram.   Home sleep study ordered for more sleep time- two nights.    1. SUSSY (obstructive sleep apnea)  -     Home Sleep Study; Future    2. Adjustment insomnia    3. Acute nonintractable headache, unspecified headache type    4. Snoring                        Elizabeth LeJeune, ACNP, ANP         [1]   Patient Active Problem List  Diagnosis    GERD (gastroesophageal reflux disease)    History of diverticulitis    History of cancer of gall bladder    Age-related osteoporosis without current pathological fracture    Primary hypertension    Sinus bradycardia    Acute non-ST  elevation myocardial infarction (NSTEMI)    Decreased range of motion of neck    Sleep apnea   [2]   Outpatient Encounter Medications as of 3/11/2025   Medication Sig Dispense Refill    aspirin (ECOTRIN) 81 MG EC tablet Take 1 tablet (81 mg total) by mouth once daily. 90 tablet 3    clobetasoL (TEMOVATE) 0.05 % external solution Apply topically 2 (two) times daily as needed for alopecia. Strong steroid- do NOT use on face. 50 mL 0    desonide (DESOWEN) 0.05 % cream Apply to the affected area once to twice daily for vitiligo. May use for up to 4 weeks. 60 g 0    dicyclomine (BENTYL) 10 MG capsule Take 10 mg by mouth 2 (two) times daily.  11    LORazepam (ATIVAN) 0.5 MG tablet Take 1 tablet (0.5 mg total) by mouth every 12 (twelve) hours as needed for Anxiety. 15 tablet 0    losartan (COZAAR) 50 MG tablet TAKE 1 TABLET BY MOUTH EVERY DAY 90 tablet 3    metoprolol succinate (TOPROL-XL) 25 MG 24 hr tablet Take 1 tablet (25 mg total) by mouth once daily. 90 tablet 1    metronidazole 1% (METROGEL) 1 % Gel Apply topically once daily. For Perioral Dermatitis (bumps). 60 g 0    multivitamin (THERAGRAN) per tablet Take 1 tablet by mouth once daily.      pantoprazole (PROTONIX) 40 MG tablet Take 40 mg by mouth once daily.      pregabalin (LYRICA) 50 MG capsule TAKE 1 CAPSULE (50 MG TOTAL) BY MOUTH EVERY EVENING. 30 capsule 1    tacrolimus (PROTOPIC) 0.1 % ointment apply to affected area twice daily of vitiligo. Non-steroid med. 30 g 1     No facility-administered encounter medications on file as of 3/11/2025.

## 2025-03-12 ENCOUNTER — TELEPHONE (OUTPATIENT)
Dept: SLEEP MEDICINE | Facility: CLINIC | Age: 65
End: 2025-03-12
Payer: COMMERCIAL
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
## 2025-03-20 ENCOUNTER — OFFICE VISIT (OUTPATIENT)
Dept: PAIN MEDICINE | Facility: CLINIC | Age: 65
End: 2025-03-20
Payer: COMMERCIAL

## 2025-03-20 VITALS — HEIGHT: 61 IN | BODY MASS INDEX: 20.7 KG/M2

## 2025-03-20 DIAGNOSIS — M47.812 CERVICAL SPONDYLOSIS: ICD-10-CM

## 2025-03-20 DIAGNOSIS — M54.12 LEFT CERVICAL RADICULOPATHY: ICD-10-CM

## 2025-03-20 DIAGNOSIS — R29.898 DECREASED RANGE OF MOTION OF NECK: ICD-10-CM

## 2025-03-20 DIAGNOSIS — G89.29 CHRONIC LEFT SHOULDER PAIN: ICD-10-CM

## 2025-03-20 DIAGNOSIS — M25.512 CHRONIC LEFT SHOULDER PAIN: ICD-10-CM

## 2025-03-20 DIAGNOSIS — M54.2 CERVICAL PAIN: ICD-10-CM

## 2025-03-20 DIAGNOSIS — M50.30 DDD (DEGENERATIVE DISC DISEASE), CERVICAL: Primary | ICD-10-CM

## 2025-03-20 PROCEDURE — 1160F RVW MEDS BY RX/DR IN RCRD: CPT | Mod: CPTII,95,, | Performed by: PHYSICIAN ASSISTANT

## 2025-03-20 PROCEDURE — 1159F MED LIST DOCD IN RCRD: CPT | Mod: CPTII,95,, | Performed by: PHYSICIAN ASSISTANT

## 2025-03-20 PROCEDURE — 4010F ACE/ARB THERAPY RXD/TAKEN: CPT | Mod: CPTII,95,, | Performed by: PHYSICIAN ASSISTANT

## 2025-03-20 PROCEDURE — 98006 SYNCH AUDIO-VIDEO EST MOD 30: CPT | Mod: 95,,, | Performed by: PHYSICIAN ASSISTANT

## 2025-03-28 ENCOUNTER — CLINICAL SUPPORT (OUTPATIENT)
Dept: REHABILITATION | Facility: HOSPITAL | Age: 65
End: 2025-03-28
Payer: COMMERCIAL

## 2025-03-28 DIAGNOSIS — G89.29 CHRONIC LEFT SHOULDER PAIN: ICD-10-CM

## 2025-03-28 DIAGNOSIS — M54.2 CERVICALGIA: Primary | ICD-10-CM

## 2025-03-28 DIAGNOSIS — R29.898 DECREASED RANGE OF MOTION OF NECK: ICD-10-CM

## 2025-03-28 DIAGNOSIS — M25.512 CHRONIC LEFT SHOULDER PAIN: ICD-10-CM

## 2025-03-28 DIAGNOSIS — M54.12 LEFT CERVICAL RADICULOPATHY: ICD-10-CM

## 2025-03-28 PROCEDURE — 97140 MANUAL THERAPY 1/> REGIONS: CPT

## 2025-03-28 PROCEDURE — 97112 NEUROMUSCULAR REEDUCATION: CPT

## 2025-03-28 PROCEDURE — 97162 PT EVAL MOD COMPLEX 30 MIN: CPT

## 2025-03-28 NOTE — PROGRESS NOTES
Outpatient Rehab    Physical Therapy Evaluation    Patient Name: Ivone Monroy  MRN: [id number]  YOB: 1960  Encounter Date: 3/28/2025    Therapy Diagnosis:   Encounter Diagnoses   Name Primary?    Chronic left shoulder pain     Decreased range of motion of neck     Left cervical radiculopathy     Cervicalgia Yes     Physician: Aurora Morelos PA*    Physician Orders: Eval and Treat  Medical Diagnosis: Chronic left shoulder pain  Decreased range of motion of neck  Left cervical radiculopathy    Visit # / Visits Authorized:  1 / 1  Insurance Authorization Period: 3/20/2025 to 3/20/2026  Date of Evaluation: 3/28/2025  Plan of Care Certification: 3/28/2025 to 05/23/2025     Time In: 1401   Time Out: 1500  Total Time: 59   Total Billable Time:  58    Intake Outcome Measure for FOTO Survey    Therapist reviewed FOTO scores for Ivone Monroy on 3/28/2025.   FOTO report - see Media section or FOTO account episode details.     Intake Score: 65%         Subjective   History of Present Illness  Ivone is a 64 y.o. female                  History of Present Condition/Illness: Patient reports about a month ago she woke up with significant pain in the next anytime she turned to the left or tilted head to the left. Patient thinks she sleep incorrectly and caused these symptoms to return. Has been getting better but still remains. Feels about 80% better. Still feels the symptoms with end range rotation or side bending to left. Patient notes she has been using old home exercise plan from previous Physical therapy sessions to attempt to work these symptoms out. Denies symptom into the left upper extremity. Performed Physical therapy for left sided cervical symptoms recently but admits she did not keep up with her home exercise plan and this may have contributed as well.    Pain     Patient reports a current pain level of 2/10. Pain at best is reported as 0/10. Pain at worst is reported as 4/10.    Location: left cervical  Clinical Progression (since onset): Improved  Pain Qualities: Tightness, Pulling  Pain-Relieving Factors: Heat, Medications - prescription, Physical therapy, Rest, Stretching  Pain-Aggravating Factors: Rotation, Computer work, Head movements           Past Medical History/Physical Systems Review:   Ivone Monroy  has a past medical history of Anemia, Cancer of gallbladder, Diverticulitis, and GERD (gastroesophageal reflux disease).    Ivone Monroy  has a past surgical history that includes exploration of gallbladder twice for suspected malignancy with no resection and Tubal ligation (8/24/1996).    Ivone has a current medication list which includes the following prescription(s): aspirin, clobetasol, desonide, dicyclomine, lorazepam, losartan, metoprolol succinate, metronidazole 1%, multivitamin, pantoprazole, pregabalin, and tacrolimus.    Review of patient's allergies indicates:   Allergen Reactions    Sunscreen spf 8 [oxybenzone-padimate o] Swelling     Blisters to skin.   Blisters to skin.     Iodine and iodide containing products Swelling     Lips swell with sores    Shellfish containing products Swelling     Lips swell         Objective           RANGE OF MOTION:   Cervical Right   (spine) Left    Pain/Dysfunction with Movement Goal   Cervical Flexion (60º) 50 ---  60   Cervical Extension (80º) 60 ---  80   Cervical Side Bending (45º) 30 35  35   Cervical Rotation (75º) 70 75  75      STRENGTH:   U/E MMT Right Left Pain/Dysfunction with Movement Goal   Shoulder Flexion 4/5 4/5  4+/5 B   Shoulder Extension 4/5 4/5  4+/5 B   Shoulder Abduction 4/5 4/5  4+/5 B   Shoulder IR 4/5 4/5  4+/5 B   Shoulder ER 4/5 4/5  4+/5 B   Middle Trapezius 4/5 4/5  4+/5 B     MUSCLE LENGTH:   Muscle Tested  Right Left  Limitation Goal   Upper Trapezius [x] Normal  [] Limited [] Normal  [x] Limited  Normal B   Levator Scapular  [x] Normal  [] Limited [] Normal  [x] Limited  Normal B    Scalenes [] Normal  [] Limited [] Normal  [] Limited  Normal B   Pectoralis Minor [] Normal  [x] Limited [] Normal  [x] Limited  Normal B   Pectoralis Major [] Normal  [x] Limited [] Normal  [x] Limited  Normal B     JOINT MOBILITY:   Joint Motion Right Mobility  (spine) Left Mobility Goal   Subcranial:  [] Hypo     [] Normal     [] Hyper  [] Hypo     [] Normal     [] Hyper  Normal    Cervical Upglides [x] Hypo     [] Normal     [] Hyper  [x] Hypo     [] Normal     [] Hyper  Normal    Cervical Downglides  [x] Hypo     [] Normal     [] Hyper  [x] Hypo     [] Normal     [] Hyper  Normal    1st Rib  [] Hypo     [] Normal     [] Hyper  [] Hypo     [] Normal     [] Hyper  Normal    Thoracic PA Gap [x] Hypo     [] Normal     [] Hyper  --- Normal    Costotrasnverse  [] Hypo     [] Normal     [] Hyper  [] Hypo     [] Normal     [] Hyper  Normal       SPECIAL TESTS:    Right  (spine) Left  Goal   Cervical Distraction [] Positive     [x] Negative [] Positive     [x] Negative Negative B    Cervical Compression  [] Positive     [] Negative [] Positive     [x] Negative Negative B      SENSATION  [x] Intact to Light Touch   [] Impaired:      PALPATION: Muscles: Increased tone and tenderness to palpation of: left suboccipitals, paraspinals, upper trapezius, levator scapulae .       POSTURE:  Pt presents with postural abnormalities which include:     [x] Forward Head                               [] Increased Lumbar Lordosis              [x] Rounded Shoulder                        [] Genu Recurvatum              [] Increased Thoracic Kyphosis        [] Genu Valgus              [] Trunk Deviated                              [] Pes Planus              [] Scapular Winging                          [] Other:     Function:     Intake Outcome Measure for FOTO Cervical Survey    Therapist reviewed FOTO scores for Ivone on 3/28/2025.   FOTO report - see Media section or FOTO account for episode details    Intake Score: 65%    "    Treatment:     CPT Intervention Performed   Today Duration / Intensity   MT Soft tissue massage  x Upper trap, suboccipitals         TE                    NMR Home exercise plan  x Demonstration, education, performance    Cervical AROM x x20    Scapular Retractions x 3 minutes with 5" holds    Chin Tucks x 3 minutes with 5" holds    UBE x 8 minutes                 TA                                                       PLAN         CPT Codes available for Billing:   (10) minutes of Manual therapy (MT) to improve pain and ROM.  (00) minutes of Therapeutic Exercise (TE) to develop strength, endurance, range of motion, and flexibility.  (20) minutes of Neuromuscular Re-Education (NMR)  to improve: Balance, Coordination, Kinesthetic, Sense, Proprioception, and Posture.  (00) minutes of Therapeutic Activities (TA) to improve functional performance.  Vasopneumatic Device Therapy () for management of swelling/edema. (23145)  Unattended Electrical Stimulation (ES) for muscle performance or pain modulation.  BFR: Blood flow restriction applied during exercise    Assessment & Plan   Assessment  Ivone presents with a condition of Moderate complexity.   Presentation of Symptoms: Evolving  Will Comorbidities Impact Care: Yes  Past Medical History: No date: Anemia 2015: Cancer of gallbladder     Comment:  chemo radiation - Dr Gatica  No date: Diverticulitis No date: GERD (gastroesophageal reflux disease)     Functional Limitations: Activity tolerance, Carrying objects, Disrupted sleep pattern, Driving, Range of motion, Participating in leisure activities  Impairments: Impaired physical strength, Lack of appropriate home exercise program, Pain with functional activity, Abnormal or restricted range of motion  Personal Factors Affecting Prognosis: Pain    Patient Goal for Therapy (PT): Pt reported goals are to decrease overall pain levels in order to return to prior functional level.  Prognosis: Good  Prognosis Details: " Anticipated Barriers for therapy: co-morbidities, sedentary lifestyle, chronicity of condition, lack of understanding of condition, adherence to treatment plan, and occupation   Assessment Details: Pt presents with impairments in the following categories: IMPAIRMENTS: ROM, strength, joint mobility, muscle length, posture, and functional movement patterns.  Pt will benefit from skilled outpatient Physical Therapy to address the deficits stated above, provide pt/family education, and to maximize pt's level of independence.     Plan  From a physical therapy perspective, the patient would benefit from: Skilled Rehab Services    Planned therapy interventions include: Therapeutic exercise, Therapeutic activities, Neuromuscular re-education, and Manual therapy.    Planned modalities to include: Cryotherapy (cold pack), Electrical stimulation - attended, Electrical stimulation - passive/unattended, and Thermotherapy (hot pack).        Visit Frequency: 2 times Per Week for 8 Weeks.       This plan was discussed with Patient.   Discussion participants: Agreed Upon Plan of Care  Plan details: Outpatient Physical Therapy to include any combination of the following interventions: virtual visits, dry needling, modalities, electrical stimulation (IFC, Pre-Mod, Attended with Functional Dry Needling), Aquatic Therapy, Cervical/Lumbar Traction, Electrical Stimulation, Gait Training, Manual Therapy, Moist Heat/ Ice, Neuromuscular Re-ed, Patient Education, Self Care, Therapeutic Exercise, and Therapeutic Activites           Patient's spiritual, cultural, and educational needs considered and patient agreeable to plan of care and goals.     Education  Education was done with Patient. The patient's learning style includes Demonstration. The patient Demonstrates understanding.         PURPOSE: Patient educated on the impairments noted above and the effects of physical therapy intervention to improve overall condition and QOL.  EXERCISE:  Patient was educated on all the above exercise prior/during/after for proper posture, positioning, and execution for safe performance with home exercise program.  STRENGTH: Patient educated on the importance of improved core and extremity strength in order to improve alignment of the spine and extremities with static positions and dynamic movement.  POSTURE: Patient educated on postural awareness to reduce stress and maintain optimal alignment of the spine with static positions and dynamic movement  SLEEPING POSITIONS: Patient educated on the use of pillows to aid in neutral alignment of spine and extremities when sleeping in supine or side lying. ERGONOMICS: Patient educated on proper ergonomics at the work station in order to maintain optimal alignment of the musculoskeletal system and improve efficiency in the work environment. POLICIES: Educated patient on scheduling, cancelation and no-show policy.        Goals:   Active       Functional outcome       Patient will show a significant change in FOTO patient-reported outcome tool to goal score to  demonstrate subjective improvement       Start:  03/28/25    Expected End:  05/23/25            Patient will demonstrate independence in home program for support of progression       Start:  03/28/25    Expected End:  04/25/25               Pain       Patient will report pain of 1/10 demonstrating a reduction of overall pain       Start:  03/28/25    Expected End:  05/23/25            Patient will report a 2 point reduction in pain.       Start:  03/28/25    Expected End:  04/25/25               Range of Motion       Patient will achieve cervical extension ROM 60 degrees       Start:  03/28/25    Expected End:  05/23/25               Strength       Patient will achieve bilateral shoulder abduction strength of 4+/5       Start:  03/28/25    Expected End:  05/23/25                Rancho Mendoza, PT

## 2025-03-29 DIAGNOSIS — G89.29 CHRONIC LEFT SHOULDER PAIN: ICD-10-CM

## 2025-03-29 DIAGNOSIS — M47.812 CERVICAL SPONDYLOSIS: ICD-10-CM

## 2025-03-29 DIAGNOSIS — M54.2 CERVICAL PAIN: ICD-10-CM

## 2025-03-29 DIAGNOSIS — M50.30 DDD (DEGENERATIVE DISC DISEASE), CERVICAL: ICD-10-CM

## 2025-03-29 DIAGNOSIS — M54.2 CERVICALGIA: ICD-10-CM

## 2025-03-29 DIAGNOSIS — M25.512 CHRONIC LEFT SHOULDER PAIN: ICD-10-CM

## 2025-03-29 DIAGNOSIS — M54.12 CERVICAL RADICULOPATHY: ICD-10-CM

## 2025-03-30 RX ORDER — PREGABALIN 50 MG/1
50 CAPSULE ORAL NIGHTLY
Qty: 30 CAPSULE | Refills: 1 | Status: SHIPPED | OUTPATIENT
Start: 2025-03-30

## 2025-03-31 ENCOUNTER — CLINICAL SUPPORT (OUTPATIENT)
Dept: REHABILITATION | Facility: HOSPITAL | Age: 65
End: 2025-03-31
Payer: COMMERCIAL

## 2025-03-31 DIAGNOSIS — R29.898 DECREASED RANGE OF MOTION OF NECK: Primary | ICD-10-CM

## 2025-03-31 DIAGNOSIS — M54.2 CERVICALGIA: ICD-10-CM

## 2025-03-31 PROCEDURE — 97140 MANUAL THERAPY 1/> REGIONS: CPT

## 2025-03-31 PROCEDURE — 97530 THERAPEUTIC ACTIVITIES: CPT

## 2025-03-31 PROCEDURE — 97112 NEUROMUSCULAR REEDUCATION: CPT

## 2025-03-31 PROCEDURE — 97110 THERAPEUTIC EXERCISES: CPT

## 2025-03-31 NOTE — PROGRESS NOTES
"  Outpatient Rehab    Physical Therapy Progress Note    Patient Name: Ivone Monroy  MRN: [id number]  YOB: 1960  Encounter Date: 3/31/2025    Therapy Diagnosis:   Encounter Diagnoses   Name Primary?    Decreased range of motion of neck Yes    Cervicalgia      Physician: Aurora Morelos PA*    Physician Orders: Eval and Treat  Medical Diagnosis: Chronic left shoulder pain  Decreased range of motion of neck  Left cervical radiculopathy    Visit # / Visits Authorized:  1 / 12  Insurance Authorization Period: 3/28/2025 to 12/31/2025  Date of Evaluation: 3/28/2025  Plan of Care Certification: 3/28/2025 to 05/23/2025      PT/PTA:     Number of PTA visits since last PT visit:   Time In: 1329   Time Out: 1431  Total Time: 62   Total Billable Time:  60    FOTO:  Intake Score:  %  Survey Score 1:  %  Survey Score 2:  %         Subjective   Patient reports she is feeling sore in traps and cervical musculature from work today. Has been keeping up with HEP well..  Pain reported as 2/10.      Objective         Treatment:     CPT Intervention Performed   Today Duration / Intensity   MT Soft tissue massage  x Upper trap, suboccipitals          TE UBE x 3'/3'    Cervical AROM x X20 all planes                       NMR Prone Series   x  x  x 20 x 5" holds   I's 2#  T's 2#  W's 2#     Scapular Retractions x 3 minutes with 5" holds     Chin Tucks x 3 minutes with 5" holds     Chin Tuck with Lift x 20 x 5" holds                 TA Scaption x 3 x 10 with 2#     Overhead Press x 3 x 10 with 2#                                             PLAN         CPT Codes available for Billing:   (10) minutes of Manual therapy (MT) to improve pain and ROM.  (14) minutes of Therapeutic Exercise (TE) to develop strength, endurance, range of motion, and flexibility.  (20) minutes of Neuromuscular Re-Education (NMR)  to improve: Balance, Coordination, Kinesthetic, Sense, Proprioception, and Posture.  (10) minutes of Therapeutic " Activities (TA) to improve functional performance.  Vasopneumatic Device Therapy () for management of swelling/edema. (09615)  Unattended Electrical Stimulation (ES) for muscle performance or pain modulation.  BFR: Blood flow restriction applied during exercise    Assessment & Plan   Assessment: Patient tolerated session well. Pt able to demonstrate HEP well with mild cueing for form. Pt progressed with variations of home exercise program for increased strengthening and muscle activation.       Patient will continue to benefit from skilled outpatient physical therapy to address the deficits listed in the problem list box on initial evaluation, provide pt/family education and to maximize pt's level of independence in the home and community environment.     Patient's spiritual, cultural, and educational needs considered and patient agreeable to plan of care and goals.           Plan: Continue Plan of Care (POC) and progress per patient tolerance. See treatment section for details on planned progressions next session.    Goals:   Active       Functional outcome       Patient will show a significant change in FOTO patient-reported outcome tool to goal score to  demonstrate subjective improvement       Start:  03/28/25    Expected End:  05/23/25            Patient will demonstrate independence in home program for support of progression       Start:  03/28/25    Expected End:  04/25/25               Pain       Patient will report pain of 1/10 demonstrating a reduction of overall pain       Start:  03/28/25    Expected End:  05/23/25            Patient will report a 2 point reduction in pain.       Start:  03/28/25    Expected End:  04/25/25               Range of Motion       Patient will achieve cervical extension ROM 60 degrees       Start:  03/28/25    Expected End:  05/23/25               Strength       Patient will achieve bilateral shoulder abduction strength of 4+/5       Start:  03/28/25    Expected End:   05/23/25                Rancho Mendoza, PT

## 2025-04-04 ENCOUNTER — CLINICAL SUPPORT (OUTPATIENT)
Dept: REHABILITATION | Facility: HOSPITAL | Age: 65
End: 2025-04-04
Payer: MEDICARE

## 2025-04-04 ENCOUNTER — OFFICE VISIT (OUTPATIENT)
Dept: INTERNAL MEDICINE | Facility: CLINIC | Age: 65
End: 2025-04-04
Payer: MEDICARE

## 2025-04-04 VITALS
SYSTOLIC BLOOD PRESSURE: 130 MMHG | TEMPERATURE: 98 F | WEIGHT: 106.94 LBS | HEIGHT: 61 IN | OXYGEN SATURATION: 98 % | DIASTOLIC BLOOD PRESSURE: 80 MMHG | HEART RATE: 49 BPM | BODY MASS INDEX: 20.19 KG/M2

## 2025-04-04 DIAGNOSIS — M47.22 OSTEOARTHRITIS OF SPINE WITH RADICULOPATHY, CERVICAL REGION: ICD-10-CM

## 2025-04-04 DIAGNOSIS — M81.0 AGE-RELATED OSTEOPOROSIS WITHOUT CURRENT PATHOLOGICAL FRACTURE: ICD-10-CM

## 2025-04-04 DIAGNOSIS — M54.2 CERVICALGIA: ICD-10-CM

## 2025-04-04 DIAGNOSIS — R29.898 DECREASED RANGE OF MOTION OF NECK: Primary | ICD-10-CM

## 2025-04-04 DIAGNOSIS — Z85.09 HISTORY OF CANCER OF GALL BLADDER: ICD-10-CM

## 2025-04-04 DIAGNOSIS — I10 PRIMARY HYPERTENSION: Primary | ICD-10-CM

## 2025-04-04 DIAGNOSIS — K21.9 GASTROESOPHAGEAL REFLUX DISEASE, UNSPECIFIED WHETHER ESOPHAGITIS PRESENT: ICD-10-CM

## 2025-04-04 LAB
ALBUMIN SERPL BCP-MCNC: 4 G/DL (ref 3.5–5.2)
ALP SERPL-CCNC: 52 UNIT/L (ref 40–150)
ALT SERPL W/O P-5'-P-CCNC: 17 UNIT/L (ref 10–44)
ANION GAP (OHS): 9 MMOL/L (ref 8–16)
AST SERPL-CCNC: 26 UNIT/L (ref 11–45)
BILIRUB SERPL-MCNC: 0.4 MG/DL (ref 0.1–1)
BUN SERPL-MCNC: 26 MG/DL (ref 8–23)
CALCIUM SERPL-MCNC: 10.2 MG/DL (ref 8.7–10.5)
CHLORIDE SERPL-SCNC: 103 MMOL/L (ref 95–110)
CHOLEST SERPL-MCNC: 186 MG/DL (ref 120–199)
CHOLEST/HDLC SERPL: 2.4 {RATIO} (ref 2–5)
CO2 SERPL-SCNC: 28 MMOL/L (ref 23–29)
CREAT SERPL-MCNC: 1 MG/DL (ref 0.5–1.4)
GFR SERPLBLD CREATININE-BSD FMLA CKD-EPI: >60 ML/MIN/1.73/M2
GLUCOSE SERPL-MCNC: 67 MG/DL (ref 70–110)
HDLC SERPL-MCNC: 77 MG/DL (ref 40–75)
HDLC SERPL: 41.4 % (ref 20–50)
LDLC SERPL CALC-MCNC: 94.4 MG/DL (ref 63–159)
NONHDLC SERPL-MCNC: 109 MG/DL
POTASSIUM SERPL-SCNC: 5.5 MMOL/L (ref 3.5–5.1)
PROT SERPL-MCNC: 7.4 GM/DL (ref 6–8.4)
SODIUM SERPL-SCNC: 140 MMOL/L (ref 136–145)
TRIGL SERPL-MCNC: 73 MG/DL (ref 30–150)

## 2025-04-04 PROCEDURE — 80061 LIPID PANEL: CPT | Performed by: FAMILY MEDICINE

## 2025-04-04 PROCEDURE — 80053 COMPREHEN METABOLIC PANEL: CPT | Performed by: FAMILY MEDICINE

## 2025-04-04 PROCEDURE — 97110 THERAPEUTIC EXERCISES: CPT

## 2025-04-04 PROCEDURE — 99999 PR PBB SHADOW E&M-EST. PATIENT-LVL III: CPT | Mod: PBBFAC,,, | Performed by: FAMILY MEDICINE

## 2025-04-04 PROCEDURE — 97530 THERAPEUTIC ACTIVITIES: CPT

## 2025-04-04 PROCEDURE — 97112 NEUROMUSCULAR REEDUCATION: CPT

## 2025-04-04 PROCEDURE — 97140 MANUAL THERAPY 1/> REGIONS: CPT

## 2025-04-04 PROCEDURE — 36415 COLL VENOUS BLD VENIPUNCTURE: CPT | Performed by: FAMILY MEDICINE

## 2025-04-04 PROCEDURE — 99213 OFFICE O/P EST LOW 20 MIN: CPT | Mod: PBBFAC | Performed by: FAMILY MEDICINE

## 2025-04-04 NOTE — PROGRESS NOTES
"  Outpatient Rehab    Physical Therapy Visit     Patient Name: Ivone Monroy  MRN: [id number]  YOB: 1960  Encounter Date: 4/4/2025    Therapy Diagnosis:   Encounter Diagnoses   Name Primary?    Decreased range of motion of neck Yes    Cervicalgia      Physician: Aurora Morelos PA*    Physician Orders: Eval and Treat  Medical Diagnosis: Chronic left shoulder pain  Decreased range of motion of neck  Left cervical radiculopathy  Visit # / Visits Authorized:  2 / 12  Insurance Authorization Period: 3/28/2025 to 12/31/2025  Date of Evaluation: 3/28/2025  Plan of Care Certification: 3/28/2025 to 05/23/2025      PT/PTA:     Number of PTA visits since last PT visit:   Time In: 1030   Time Out: 1134  Total Time: 64   Total Billable Time:  60    FOTO:  Intake Score:  %  Survey Score 1:  %  Survey Score 2:  %     Subjective   Patient reports neck has been feeling tight and sore. Cointnuing HEP consistently..  Pain reported as 2/10.    Objective         Treatment:     CPT Intervention Performed   Today Duration / Intensity   MT Soft tissue massage  x Upper trap, suboccipitals          TE UBE x 3'/3'    Cervical AROM x X20 all planes                       NMR Prone Series   x  x  x 20 x 5" holds   I's 2#  T's 2#  W's 2#     Scapular Retractions x 3 minutes with 5" holds     Chin Tucks  3 minutes with 5" holds     Chin Tuck with Lift  20 x 5" holds                 TA Scaption  3 x 10 with 2#     Overhead Press  3 x 10 with 2#     Rows x 3 x 10 with 7.5#     Shoulder Extension x 3 x 10 with 2.5#                               PLAN         CPT Codes available for Billing:   (12) minutes of Manual therapy (MT) to improve pain and ROM.  (12) minutes of Therapeutic Exercise (TE) to develop strength, endurance, range of motion, and flexibility.  (20) minutes of Neuromuscular Re-Education (NMR)  to improve: Balance, Coordination, Kinesthetic, Sense, Proprioception, and Posture.  (10) minutes of Therapeutic " Activities (TA) to improve functional performance.  Vasopneumatic Device Therapy () for management of swelling/edema. (37700)  Unattended Electrical Stimulation (ES) for muscle performance or pain modulation.  BFR: Blood flow restriction applied during exercise    Assessment & Plan   Assessment: Patient tolerated session well. Pt progressed functionally with rows and shoulder extensions for improved functional strength into good posture.     Patient will continue to benefit from skilled outpatient physical therapy to address the deficits listed in the problem list box on initial evaluation, provide pt/family education and to maximize pt's level of independence in the home and community environment.     Patient's spiritual, cultural, and educational needs considered and patient agreeable to plan of care and goals.      Plan: Continue Plan of Care (POC) and progress per patient tolerance. See treatment section for details on planned progressions next session.    Goals:   Active       Functional outcome       Patient will show a significant change in FOTO patient-reported outcome tool to goal score to  demonstrate subjective improvement       Start:  03/28/25    Expected End:  05/23/25            Patient will demonstrate independence in home program for support of progression       Start:  03/28/25    Expected End:  04/25/25               Pain       Patient will report pain of 1/10 demonstrating a reduction of overall pain       Start:  03/28/25    Expected End:  05/23/25            Patient will report a 2 point reduction in pain.       Start:  03/28/25    Expected End:  04/25/25               Range of Motion       Patient will achieve cervical extension ROM 60 degrees       Start:  03/28/25    Expected End:  05/23/25               Strength       Patient will achieve bilateral shoulder abduction strength of 4+/5       Start:  03/28/25    Expected End:  05/23/25                Rancho Mendoza, PT

## 2025-04-04 NOTE — PROGRESS NOTES
"Subjective:       Patient ID: Ivone Monroy is a 64 y.o. female presents with Problem List[1]     Chief Complaint: Follow-up    64-year-old female patient with Patient Active Problem List:     GERD (gastroesophageal reflux disease)     History of diverticulitis     History of cancer of gall bladder     Age-related osteoporosis without current pathological fracture     Primary hypertension     Sinus bradycardia     Acute non-ST elevation myocardial infarction (NSTEMI)     Decreased range of motion of neck     Sleep apnea     Cervicalgia  Here for follow-up on chronic medical conditions and reports that she is doing physical therapy for her neck and has been feeling better  Does not need any refills at this time  Denies of any chest tightness or difficulty breathing with palpitations      Review of Systems   Constitutional:  Negative for fatigue.   Eyes:  Negative for visual disturbance.   Respiratory:  Negative for shortness of breath.    Cardiovascular:  Negative for chest pain and leg swelling.   Gastrointestinal:  Negative for abdominal pain, nausea and vomiting.   Musculoskeletal:  Positive for myalgias and neck pain.   Skin:  Negative for rash.   Neurological:  Positive for numbness. Negative for weakness, light-headedness and headaches.   Psychiatric/Behavioral:  Negative for sleep disturbance.          /80 (BP Location: Right arm, Patient Position: Sitting)   Pulse (!) 49   Temp 98.3 °F (36.8 °C) (Tympanic)   Ht 5' 1" (1.549 m)   Wt 48.5 kg (106 lb 14.8 oz)   SpO2 98%   BMI 20.20 kg/m²   Objective:      Physical Exam  Constitutional:       Appearance: She is well-developed.   HENT:      Head: Normocephalic and atraumatic.   Cardiovascular:      Rate and Rhythm: Normal rate and regular rhythm.      Heart sounds: Normal heart sounds. No murmur heard.  Pulmonary:      Effort: Pulmonary effort is normal.      Breath sounds: Normal breath sounds. No wheezing.   Abdominal:      General: Bowel " sounds are normal.      Palpations: Abdomen is soft.      Tenderness: There is no abdominal tenderness.   Musculoskeletal:         General: Tenderness present.      Comments: Positive for paraspinal cervical muscle tenderness in the midline   Skin:     General: Skin is warm and dry.      Findings: No rash.   Neurological:      Mental Status: She is alert and oriented to person, place, and time.   Psychiatric:         Mood and Affect: Mood normal.           Assessment/Plan:   1. Primary hypertension  -     Comprehensive Metabolic Panel  -     Lipid Panel  -     Hypertension Digital Medicine (HDMP) Enrollment Order  Blood pressure is stable currently on losartan 50 mg and metoprolol 25 mg  Will enroll in hypertension digital program    2. Gastroesophageal reflux disease, unspecified whether esophagitis present  Overview:  Dr Hartman  Stable on pantoprazole 40 mg daily    3. Age-related osteoporosis without current pathological fracture  Continue calcium with vitamin-D supplements over-the-counter    4. History of cancer of gall bladder  Overview:  S/p chemo and radiation By Dr rivera , last check  Dec 2017  Followed by specialist clinically doing well    5. Osteoarthritis of spine with radiculopathy, cervical region  Currently taking pregabalin 50 mg in the evening and continuing physical therapy    Encouraged to consider getting tetanus pneumonia and shingles vaccination if interested outside pharmacy    Visit today included increased complexity associated with the care of the episodic problem  addressed and managing the longitudinal care of the patient due to the serious and/or complex managed problem(s) .                 [1]   Patient Active Problem List  Diagnosis    GERD (gastroesophageal reflux disease)    History of diverticulitis    History of cancer of gall bladder    Age-related osteoporosis without current pathological fracture    Primary hypertension    Sinus bradycardia    Acute non-ST elevation  myocardial infarction (NSTEMI)    Decreased range of motion of neck    Sleep apnea    Cervicalgia

## 2025-04-05 ENCOUNTER — RESULTS FOLLOW-UP (OUTPATIENT)
Dept: INTERNAL MEDICINE | Facility: CLINIC | Age: 65
End: 2025-04-05

## 2025-04-07 ENCOUNTER — CLINICAL SUPPORT (OUTPATIENT)
Dept: REHABILITATION | Facility: HOSPITAL | Age: 65
End: 2025-04-07
Payer: MEDICARE

## 2025-04-07 DIAGNOSIS — R29.898 DECREASED RANGE OF MOTION OF NECK: Primary | ICD-10-CM

## 2025-04-07 DIAGNOSIS — M54.2 CERVICALGIA: ICD-10-CM

## 2025-04-07 PROCEDURE — 97112 NEUROMUSCULAR REEDUCATION: CPT

## 2025-04-07 PROCEDURE — 97530 THERAPEUTIC ACTIVITIES: CPT

## 2025-04-07 PROCEDURE — 97140 MANUAL THERAPY 1/> REGIONS: CPT

## 2025-04-08 NOTE — PROGRESS NOTES
"  Outpatient Rehab    Physical Therapy Visit     Patient Name: Ivone Monroy  MRN: [id number]  YOB: 1960  Encounter Date: 4/7/2025    Therapy Diagnosis:   Encounter Diagnoses   Name Primary?    Decreased range of motion of neck Yes    Cervicalgia      Physician: Aurora Morelos PA*    Physician Orders: Eval and Treat  Medical Diagnosis: Chronic left shoulder pain  Decreased range of motion of neck  Left cervical radiculopathy  Visit # / Visits Authorized:  3 / 12  Insurance Authorization Period: 3/28/2025 to 12/31/2025  Date of Evaluation: 3/28/2025  Plan of Care Certification: 3/28/2025 to 05/23/2025      PT/PTA:     Number of PTA visits since last PT visit:   Time In: 1730   Time Out: 1829  Total Time: 59   Total Billable Time:  45    FOTO:  Intake Score:  %  Survey Score 1:  %  Survey Score 2:  %     Subjective   Patient reports she is stiff but feeling better with HEP..  Pain reported as 2/10. Cervical  Objective         Treatment:     CPT Intervention Performed   Today Duration / Intensity   MT Soft tissue massage  x Upper trap, suboccipitals          TE UBE x 3'/3'    Cervical AROM x X20 all planes    Open Books x X20 bilateral                 NMR Prone Series   x  x  x 20 x 5" holds   I's 2#  T's 2#  W's 2#     Scapular Retractions  3 minutes with 5" holds     Chin Tucks  3 minutes with 5" holds     Chin Tuck with Lift x 20 x 5" holds                 TA Scaption x 3 x 10 with 2#     Overhead Press  3 x 10 with 2#     Rows x 3 x 10 with 7.5#     Shoulder Extension x 3 x 10 with 2.5#                               PLAN         CPT Codes available for Billing:   (10) minutes of Manual therapy (MT) to improve pain and ROM.  (12) minutes of Therapeutic Exercise (TE) to develop strength, endurance, range of motion, and flexibility.  (10) minutes of Neuromuscular Re-Education (NMR)  to improve: Balance, Coordination, Kinesthetic, Sense, Proprioception, and Posture.  (12) minutes of Therapeutic " Activities (TA) to improve functional performance.  Vasopneumatic Device Therapy () for management of swelling/edema. (16516)  Unattended Electrical Stimulation (ES) for muscle performance or pain modulation.  BFR: Blood flow restriction applied during exercise    Assessment & Plan   Assessment: Patient tolerated session well. Open books added for improved mobility in thoracic and cervical spine. Responded well to soft tissue massage to cervical paraspinals, suboccipitals, and upper traps.     Patient will continue to benefit from skilled outpatient physical therapy to address the deficits listed in the problem list box on initial evaluation, provide pt/family education and to maximize pt's level of independence in the home and community environment.     Patient's spiritual, cultural, and educational needs considered and patient agreeable to plan of care and goals.      Plan: Continue Plan of Care (POC) and progress per patient tolerance. See treatment section for details on planned progressions next session.    Goals:   Active       Functional outcome       Patient will show a significant change in FOTO patient-reported outcome tool to goal score to  demonstrate subjective improvement       Start:  03/28/25    Expected End:  05/23/25            Patient will demonstrate independence in home program for support of progression       Start:  03/28/25    Expected End:  04/25/25               Pain       Patient will report pain of 1/10 demonstrating a reduction of overall pain       Start:  03/28/25    Expected End:  05/23/25            Patient will report a 2 point reduction in pain.       Start:  03/28/25    Expected End:  04/25/25               Range of Motion       Patient will achieve cervical extension ROM 60 degrees       Start:  03/28/25    Expected End:  05/23/25               Strength       Patient will achieve bilateral shoulder abduction strength of 4+/5       Start:  03/28/25    Expected End:  05/23/25                 Rancho Mendoza, PT

## 2025-04-09 ENCOUNTER — CLINICAL SUPPORT (OUTPATIENT)
Dept: REHABILITATION | Facility: HOSPITAL | Age: 65
End: 2025-04-09
Payer: MEDICARE

## 2025-04-09 DIAGNOSIS — R29.898 DECREASED RANGE OF MOTION OF NECK: Primary | ICD-10-CM

## 2025-04-09 DIAGNOSIS — M54.2 CERVICALGIA: ICD-10-CM

## 2025-04-09 PROCEDURE — 97110 THERAPEUTIC EXERCISES: CPT

## 2025-04-09 NOTE — PROGRESS NOTES
"  Outpatient Rehab    Physical Therapy Visit     Patient Name: Ivone Monroy  MRN: [id number]  YOB: 1960  Encounter Date: 4/9/2025    Therapy Diagnosis:   Encounter Diagnoses   Name Primary?    Decreased range of motion of neck Yes    Cervicalgia      Physician: Aurora Morelos PA*    Physician Orders: Eval and Treat  Medical Diagnosis: Chronic left shoulder pain  Decreased range of motion of neck  Left cervical radiculopathy  Visit # / Visits Authorized:  4 / 12  Insurance Authorization Period: 3/28/2025 to 12/31/2025  Date of Evaluation: 3/28/2025  Plan of Care Certification: 3/28/2025 to 05/23/2025      PT/PTA:     Number of PTA visits since last PT visit:   Time In: 1402   Time Out: 1501  Total Time: 59   Total Billable Time:  15    FOTO:  Intake Score:  %  Survey Score 1:  %  Survey Score 2:  %     Subjective   Patient reports she is a little sore today but overall feeling better..  Pain reported as 2/10. Cervical  Objective         Treatment:     CPT Intervention Performed   Today Duration / Intensity   MT Soft tissue massage   Upper trap, suboccipitals          TE UBE x 3'/3'    Cervical AROM x X20 all planes    Open Books x X20 bilateral     Foam Roll Series x  x  x  x  x 3 min pec stretch  Bear Hugs 1 min  [place] 1 min  Shoulder [place] 1 min each way  Swimmers 1 min          NMR Prone Series   x  x  x 20 x 5" holds   I's 2#  T's 2#  W's 2#     Scapular Retractions  3 minutes with 5" holds     Chin Tucks x 3 minutes with 5" holds     Chin Tuck with Lift x 20 x 5" holds                 TA Scaption x 3 x 10 with 2#     Overhead Press x 3 x 10 with 2#     Rows x 3 x 10 with 7.5#     Shoulder Extension x 3 x 10 with 2.5#                               PLAN         CPT Codes available for Billing:   (00) minutes of Manual therapy (MT) to improve pain and ROM.  (15) minutes of Therapeutic Exercise (TE) to develop strength, endurance, range of motion, and flexibility.  (00) minutes of " Neuromuscular Re-Education (NMR)  to improve: Balance, Coordination, Kinesthetic, Sense, Proprioception, and Posture.  (00) minutes of Therapeutic Activities (TA) to improve functional performance.  Vasopneumatic Device Therapy () for management of swelling/edema. (16484)  Unattended Electrical Stimulation (ES) for muscle performance or pain modulation.  BFR: Blood flow restriction applied during exercise    Assessment & Plan   Assessment: Patient tolerated session well. Foam roll series added today for improved thoracic mobility in thoracic and cervical spine.     Patient will continue to benefit from skilled outpatient physical therapy to address the deficits listed in the problem list box on initial evaluation, provide pt/family education and to maximize pt's level of independence in the home and community environment.     Patient's spiritual, cultural, and educational needs considered and patient agreeable to plan of care and goals.      Plan: Continue Plan of Care (POC) and progress per patient tolerance. See treatment section for details on planned progressions next session.    Goals:   Active       Functional outcome       Patient will show a significant change in FOTO patient-reported outcome tool to goal score to  demonstrate subjective improvement       Start:  03/28/25    Expected End:  05/23/25            Patient will demonstrate independence in home program for support of progression       Start:  03/28/25    Expected End:  04/25/25               Pain       Patient will report pain of 1/10 demonstrating a reduction of overall pain       Start:  03/28/25    Expected End:  05/23/25            Patient will report a 2 point reduction in pain.       Start:  03/28/25    Expected End:  04/25/25               Range of Motion       Patient will achieve cervical extension ROM 60 degrees       Start:  03/28/25    Expected End:  05/23/25               Strength       Patient will achieve bilateral shoulder  abduction strength of 4+/5       Start:  03/28/25    Expected End:  05/23/25                Rancho Mendoza, PT

## 2025-04-10 ENCOUNTER — HOSPITAL ENCOUNTER (OUTPATIENT)
Dept: SLEEP MEDICINE | Facility: HOSPITAL | Age: 65
Discharge: HOME | End: 2025-04-10
Attending: NURSE PRACTITIONER
Payer: MEDICARE

## 2025-04-10 DIAGNOSIS — G47.33 OSA (OBSTRUCTIVE SLEEP APNEA): ICD-10-CM

## 2025-04-11 PROBLEM — G47.33 OSA (OBSTRUCTIVE SLEEP APNEA): Status: ACTIVE | Noted: 2025-04-11

## 2025-04-11 NOTE — PROCEDURES
PHYSICIAN INTERPRETATION AND COMMENTS: Findings are consistent with very mild, positional obstructive sleep  apnea(SUSSY). Indications of cardiac dysrhythmia are recorded. cardiology evaluation.  CLINICAL HISTORY: 64 year old female presented with: 14 inch neck, BMI of 20, an [place] sleepiness score of 2, history of  hypertension and symptoms of nocturnal snoring and waking up choking. Based on the clinical history, the patient has a  high pre-test probability of having Mild SUSSY.  SLEEP STUDY FINDINGS: Patient underwent a 2 night Home Sleep Test and by behavioral criteria, slept for approximately  13.61 hours , with a sleep efficiency of 98%. The patient slept supine 5% of the night based on valid recording time of 13.56  hours and is 4.3 times as likely to have apneas/hypopneas when supine. Snoring occurs for 1.5% (30 dB) of the study. The  mean pulse rate is 48.9 BPM, with infrequent pulse rate variability (40 events with >= 6 BPM increase/decrease per hour).  TREATMENT CONSIDERATIONS: Based on this study, treatment is not required at this time for obstructive sleep  apnea/hypopnea. Weight gain, alcohol consumption, and time spent sleeping supine can affect the severity of SUSSY. Future  testing should be considered as the patient's risk factors change. Based on the SUSSY Supine data in the study, a Mandibular  Advancement Splint (MAS) will likely provide treatment benefit independent of SUSSY severity. The patient should avoid  sleeping supine given non-supine AHI is in the normal range.  DISEASE MANAGEMENT CONSIDERATIONS: Irregularity of the pulse rate signals indicates possible cardiac dysrhythmia. If  clinically appropriate, further cardiac evaluation is suggested.      [place], [place]  [address] [facility]  [place] [id number]/Nehal Puente MD         The sleep study that you ordered is complete.  You have ordered sleep LAB services to perform the sleep study for Ivone Monroy .      Please  "find Sleep Study result in  the "Media tab" of Chart Review menu.        You can look  for the report in the  Media by the document type "Sleep Study Documents". Alphabetizing  "Document type" column helps to find the SLEEP STUDY report  Faster.       As the ordering provider, you are responsible for reviewing the results and implementing a treatment plan with your patient.    If you need a Sleep Medicine provider to explain the sleep study findings and arrange treatment for the patient, please refer patient for consultation to our Sleep Clinic via [facility] with Ambulatory Consult Sleep.     To do that please place an order for an  "Ambulatory Consult Sleep" -  order , it will go to our clinic work queue for our staff  to contact the patient for an appointment.      For any questions, please contact our sleep lab  staff at [phone] to talk to clinical staff          Lev Day MD   "

## 2025-04-16 ENCOUNTER — CLINICAL SUPPORT (OUTPATIENT)
Dept: REHABILITATION | Facility: HOSPITAL | Age: 65
End: 2025-04-16
Payer: MEDICARE

## 2025-04-16 DIAGNOSIS — M54.2 CERVICALGIA: ICD-10-CM

## 2025-04-16 DIAGNOSIS — R29.898 DECREASED RANGE OF MOTION OF NECK: Primary | ICD-10-CM

## 2025-04-16 PROCEDURE — 97112 NEUROMUSCULAR REEDUCATION: CPT

## 2025-04-16 PROCEDURE — 97530 THERAPEUTIC ACTIVITIES: CPT

## 2025-04-16 PROCEDURE — 97110 THERAPEUTIC EXERCISES: CPT

## 2025-04-16 NOTE — PROGRESS NOTES
"  Outpatient Rehab    Physical Therapy Visit     Patient Name: Ivone Monroy  MRN: [id number]  YOB: 1960  Encounter Date: 4/16/2025    Therapy Diagnosis:   Encounter Diagnoses   Name Primary?    Decreased range of motion of neck Yes    Cervicalgia      Physician: Aurora Morelos PA*    Physician Orders: Eval and Treat  Medical Diagnosis: Chronic left shoulder pain  Decreased range of motion of neck  Left cervical radiculopathy  Visit # / Visits Authorized:  5 / 12  Insurance Authorization Period: 3/28/2025 to 12/31/2025  Date of Evaluation: 3/28/2025  Plan of Care Certification: 3/28/2025 to 05/23/2025      PT/PTA:     Number of PTA visits since last PT visit:   Time In: 1402   Time Out: 1508  Total Time: 66   Total Billable Time:  63    FOTO:  Intake Score:  %  Survey Score 1:  %  Survey Score 2:  %     Subjective   Patient reports she is feeling pretty good today in cervical region but has been having trouble with sciatica like symptoms lately down RLE..  Pain reported as 2/10. Cervical  Objective         Treatment:     CPT Intervention Performed   Today Duration / Intensity   MT Soft tissue massage  x Upper trap, suboccipitals          TE UBE x 3'/3'    Cervical AROM x X20 all planes    Open Books x X20 bilateral     Foam Roll Series x  x  x  x  x  x 3 min pec stretch  Bear Hugs 1 min  Field Goals 1 min  Shoulder [place] 1 min each way  Swimmers 1 min  Serratus Punches 1 min          NMR Prone Series   x  x  x 20 x 5" holds   I's 2#  T's 2#  W's 2#     Scapular Retractions  3 minutes with 5" holds     Chin Tucks  3 minutes with 5" holds     Chin Tuck with Lift  20 x 5" holds     Bilateral External Rotation x 3 x 12          TA Scaption x 3 x 10 with 2#     Overhead Press x 3 x 10 with 2#     Rows x 3 x 10 with 7.5#     Shoulder Extension x 3 x 10 with 2.5#                               PLAN         CPT Codes available for Billing:   (09) minutes of Manual therapy (MT) to improve pain and " ROM.  (25) minutes of Therapeutic Exercise (TE) to develop strength, endurance, range of motion, and flexibility.  (14) minutes of Neuromuscular Re-Education (NMR)  to improve: Balance, Coordination, Kinesthetic, Sense, Proprioception, and Posture.  (15) minutes of Therapeutic Activities (TA) to improve functional performance.  Vasopneumatic Device Therapy () for management of swelling/edema. (12355)  Unattended Electrical Stimulation (ES) for muscle performance or pain modulation.  BFR: Blood flow restriction applied during exercise    Assessment & Plan   Assessment: Patient tolerated session well. Pt progressed with performance of resisted bilateral external rotation with bias on scapular retraction and inhibited cervical musculature.     Patient will continue to benefit from skilled outpatient physical therapy to address the deficits listed in the problem list box on initial evaluation, provide pt/family education and to maximize pt's level of independence in the home and community environment.     Patient's spiritual, cultural, and educational needs considered and patient agreeable to plan of care and goals.      Plan: Continue Plan of Care (POC) and progress per patient tolerance. See treatment section for details on planned progressions next session.    Goals:   Active       Functional outcome       Patient will show a significant change in FOTO patient-reported outcome tool to goal score to  demonstrate subjective improvement       Start:  03/28/25    Expected End:  05/23/25            Patient will demonstrate independence in home program for support of progression       Start:  03/28/25    Expected End:  04/25/25               Pain       Patient will report pain of 1/10 demonstrating a reduction of overall pain       Start:  03/28/25    Expected End:  05/23/25            Patient will report a 2 point reduction in pain.       Start:  03/28/25    Expected End:  04/25/25               Range of Motion        Patient will achieve cervical extension ROM 60 degrees       Start:  03/28/25    Expected End:  05/23/25               Strength       Patient will achieve bilateral shoulder abduction strength of 4+/5       Start:  03/28/25    Expected End:  05/23/25                Rancho Mendoza PT

## 2025-04-21 ENCOUNTER — CLINICAL SUPPORT (OUTPATIENT)
Dept: REHABILITATION | Facility: HOSPITAL | Age: 65
End: 2025-04-21
Payer: MEDICARE

## 2025-04-21 ENCOUNTER — PATIENT MESSAGE (OUTPATIENT)
Dept: OBSTETRICS AND GYNECOLOGY | Facility: CLINIC | Age: 65
End: 2025-04-21
Payer: COMMERCIAL

## 2025-04-21 DIAGNOSIS — R29.898 DECREASED RANGE OF MOTION OF NECK: Primary | ICD-10-CM

## 2025-04-21 DIAGNOSIS — M54.2 CERVICALGIA: ICD-10-CM

## 2025-04-21 PROCEDURE — 97110 THERAPEUTIC EXERCISES: CPT

## 2025-04-21 PROCEDURE — 97112 NEUROMUSCULAR REEDUCATION: CPT

## 2025-04-21 PROCEDURE — 97530 THERAPEUTIC ACTIVITIES: CPT

## 2025-04-21 NOTE — PROGRESS NOTES
"  Outpatient Rehab    Physical Therapy Visit     Patient Name: Ivone Monroy  MRN: [id number]  YOB: 1960  Encounter Date: 4/21/2025    Therapy Diagnosis:   Encounter Diagnoses   Name Primary?    Decreased range of motion of neck Yes    Cervicalgia      Physician: Aurora Morelos PA*    Physician Orders: Eval and Treat  Medical Diagnosis: Chronic left shoulder pain  Decreased range of motion of neck  Left cervical radiculopathy  Visit # / Visits Authorized:  6 / 12  Insurance Authorization Period: 3/28/2025 to 12/31/2025  Date of Evaluation: 3/28/2025  Plan of Care Certification: 3/28/2025 to 05/23/2025      PT/PTA:     Number of PTA visits since last PT visit:   Time In: 1344   Time Out: 1434  Total Time: 50   Total Billable Time:  50    FOTO:  Intake Score:  %  Survey Score 1:  %  Survey Score 2:  %     Subjective   Patient reports neck has been feeling great but low back has been feeling continued pain and radiating symptoms..  Pain reported as 2/10. Cervical  Objective         Treatment:     CPT Intervention Performed   Today Duration / Intensity   MT Soft tissue massage   Upper trap, suboccipitals          TE UBE x 3'/3'    Cervical AROM x X20 all planes    Open Books x X20 bilateral     Foam Roll Series x  x  x  x  x  x 3 min pec stretch  Bear Hugs 1 min  Field [place] 1 min  Shoulder [place] 1 min each way  Swimmers 1 min  Serratus Punches 1 min          NMR Prone Series   x  x  x 20 x 5" holds   I's 2#  T's 2#  W's 2#     Scapular Retractions  3 minutes with 5" holds     Chin Tucks  3 minutes with 5" holds     Chin Tuck with Lift  20 x 5" holds     Bilateral External Rotation  3 x 12 YTB          TA Scaption x 3 x 10 with 2#     Overhead Press x 3 x 10 with 2#     Rows x 3 x 10 with 7.5#     Shoulder Extension x 3 x 10 with 2.5#                               PLAN         CPT Codes available for Billing:   (00) minutes of Manual therapy (MT) to improve pain and ROM.  (25) minutes of " Therapeutic Exercise (TE) to develop strength, endurance, range of motion, and flexibility.  (14) minutes of Neuromuscular Re-Education (NMR)  to improve: Balance, Coordination, Kinesthetic, Sense, Proprioception, and Posture.  (10) minutes of Therapeutic Activities (TA) to improve functional performance.  Vasopneumatic Device Therapy () for management of swelling/edema. (32307)  Unattended Electrical Stimulation (ES) for muscle performance or pain modulation.  BFR: Blood flow restriction applied during exercise    Assessment & Plan   Assessment: Patient tolerated session well. Patient able to demonstrate improve scapular retractions against resistance today without need cue cueing for inhibition of upper trapezius muscles.     Patient will continue to benefit from skilled outpatient physical therapy to address the deficits listed in the problem list box on initial evaluation, provide pt/family education and to maximize pt's level of independence in the home and community environment.     Patient's spiritual, cultural, and educational needs considered and patient agreeable to plan of care and goals.      Plan: Continue Plan of Care (POC) and progress per patient tolerance. See treatment section for details on planned progressions next session.    Goals:   Active       Functional outcome       Patient will show a significant change in FOTO patient-reported outcome tool to goal score to  demonstrate subjective improvement       Start:  03/28/25    Expected End:  05/23/25            Patient will demonstrate independence in home program for support of progression       Start:  03/28/25    Expected End:  04/25/25               Pain       Patient will report pain of 1/10 demonstrating a reduction of overall pain       Start:  03/28/25    Expected End:  05/23/25            Patient will report a 2 point reduction in pain.       Start:  03/28/25    Expected End:  04/25/25               Range of Motion       Patient will  achieve cervical extension ROM 60 degrees       Start:  03/28/25    Expected End:  05/23/25               Strength       Patient will achieve bilateral shoulder abduction strength of 4+/5       Start:  03/28/25    Expected End:  05/23/25              Rancho Mendoza PT

## 2025-04-23 ENCOUNTER — RESULTS FOLLOW-UP (OUTPATIENT)
Dept: INTERNAL MEDICINE | Facility: CLINIC | Age: 65
End: 2025-04-23

## 2025-04-23 ENCOUNTER — HOSPITAL ENCOUNTER (OUTPATIENT)
Dept: RADIOLOGY | Facility: HOSPITAL | Age: 65
Discharge: HOME | End: 2025-04-23
Attending: FAMILY MEDICINE
Payer: MEDICARE

## 2025-04-23 ENCOUNTER — CLINICAL SUPPORT (OUTPATIENT)
Dept: REHABILITATION | Facility: HOSPITAL | Age: 65
End: 2025-04-23
Payer: MEDICARE

## 2025-04-23 ENCOUNTER — OFFICE VISIT (OUTPATIENT)
Dept: INTERNAL MEDICINE | Facility: CLINIC | Age: 65
End: 2025-04-23
Payer: MEDICARE

## 2025-04-23 VITALS
BODY MASS INDEX: 20.35 KG/M2 | OXYGEN SATURATION: 98 % | RESPIRATION RATE: 18 BRPM | SYSTOLIC BLOOD PRESSURE: 136 MMHG | HEIGHT: 61 IN | WEIGHT: 107.81 LBS | DIASTOLIC BLOOD PRESSURE: 70 MMHG | HEART RATE: 56 BPM

## 2025-04-23 DIAGNOSIS — M54.41 ACUTE RIGHT-SIDED LOW BACK PAIN WITH RIGHT-SIDED SCIATICA: ICD-10-CM

## 2025-04-23 DIAGNOSIS — M54.41 ACUTE RIGHT-SIDED LOW BACK PAIN WITH RIGHT-SIDED SCIATICA: Primary | ICD-10-CM

## 2025-04-23 DIAGNOSIS — M54.2 CERVICALGIA: ICD-10-CM

## 2025-04-23 DIAGNOSIS — M47.22 OSTEOARTHRITIS OF SPINE WITH RADICULOPATHY, CERVICAL REGION: ICD-10-CM

## 2025-04-23 DIAGNOSIS — M81.0 AGE-RELATED OSTEOPOROSIS WITHOUT CURRENT PATHOLOGICAL FRACTURE: ICD-10-CM

## 2025-04-23 DIAGNOSIS — I10 PRIMARY HYPERTENSION: ICD-10-CM

## 2025-04-23 DIAGNOSIS — R29.898 DECREASED RANGE OF MOTION OF NECK: Primary | ICD-10-CM

## 2025-04-23 DIAGNOSIS — K21.9 GASTROESOPHAGEAL REFLUX DISEASE, UNSPECIFIED WHETHER ESOPHAGITIS PRESENT: ICD-10-CM

## 2025-04-23 PROCEDURE — 97110 THERAPEUTIC EXERCISES: CPT

## 2025-04-23 PROCEDURE — 97530 THERAPEUTIC ACTIVITIES: CPT

## 2025-04-23 PROCEDURE — 99214 OFFICE O/P EST MOD 30 MIN: CPT | Mod: PBBFAC | Performed by: FAMILY MEDICINE

## 2025-04-23 PROCEDURE — 72110 X-RAY EXAM L-2 SPINE 4/>VWS: CPT | Mod: TC

## 2025-04-23 PROCEDURE — 97112 NEUROMUSCULAR REEDUCATION: CPT

## 2025-04-23 PROCEDURE — 99214 OFFICE O/P EST MOD 30 MIN: CPT | Mod: S$PBB,,, | Performed by: FAMILY MEDICINE

## 2025-04-23 PROCEDURE — 99999 PR PBB SHADOW E&M-EST. PATIENT-LVL IV: CPT | Mod: PBBFAC,,, | Performed by: FAMILY MEDICINE

## 2025-04-23 PROCEDURE — 72110 X-RAY EXAM L-2 SPINE 4/>VWS: CPT | Mod: 26,,, | Performed by: RADIOLOGY

## 2025-04-23 RX ORDER — MELOXICAM 15 MG/1
15 TABLET ORAL DAILY PRN
Qty: 30 TABLET | Refills: 0 | Status: SHIPPED | OUTPATIENT
Start: 2025-04-23

## 2025-04-23 NOTE — PROGRESS NOTES
"  Outpatient Rehab    Physical Therapy Visit     Patient Name: Ivone Monroy  MRN: [id number]  YOB: 1960  Encounter Date: 4/23/2025    Therapy Diagnosis:   Encounter Diagnoses   Name Primary?    Decreased range of motion of neck Yes    Cervicalgia      Physician: Aurora Morelos PA*    Physician Orders: Eval and Treat  Medical Diagnosis: Chronic left shoulder pain  Decreased range of motion of neck  Left cervical radiculopathy  Visit # / Visits Authorized:  7 / 12  Insurance Authorization Period: 3/28/2025 to 12/31/2025  Date of Evaluation: 3/28/2025  Plan of Care Certification: 3/28/2025 to 05/23/2025      PT/PTA:     Number of PTA visits since last PT visit:   Time In: 1402   Time Out: 1459  Total Time: 57   Total Billable Time:  50    FOTO:  Intake Score:  %  Survey Score 1:  %  Survey Score 2:  %     Subjective   Patient reports neck has had mild stiffness but overall doing well. Seeing her doctor today about low back symptoms..  Pain reported as 2/10. Cervical  Objective         Treatment:     CPT Intervention Performed   Today Duration / Intensity   MT Soft tissue massage   Upper trap, suboccipitals          TE UBE x 3'/3'    Cervical AROM  X20 all planes    Open Books x X20 bilateral     Foam Roll Series x  x  x  x  x  x 3 min pec stretch  Bear Hugs 1 min  Field Goals 1 min  Shoulder [place] 1 min each way  Swimmers 1 min  Serratus Punches 1 min          NMR Prone Series   x  x  x 20 x 5" holds   I's 3#  T's 3#  W's 3#     Scapular Retractions  3 minutes with 5" holds     Chin Tucks x 3 minutes with 5" holds     Chin Tuck with Lift x 20 x 5" holds     Bilateral External Rotation x 3 x 12 YTB          TA Scaption x 3 x 10 with 3#     Overhead Press x 3 x 10 with 3#     Rows x 3 x 10 with 7.5#     Shoulder Extension x 3 x 10 with 2.5#                               PLAN         CPT Codes available for Billing:   (00) minutes of Manual therapy (MT) to improve pain and ROM.  (18) minutes of " Therapeutic Exercise (TE) to develop strength, endurance, range of motion, and flexibility.  (16) minutes of Neuromuscular Re-Education (NMR)  to improve: Balance, Coordination, Kinesthetic, Sense, Proprioception, and Posture.  (16) minutes of Therapeutic Activities (TA) to improve functional performance.  Vasopneumatic Device Therapy () for management of swelling/edema. (83871)  Unattended Electrical Stimulation (ES) for muscle performance or pain modulation.  BFR: Blood flow restriction applied during exercise    Assessment & Plan   Assessment: Patient tolerated session well. Patient progressed with increased resistance with prone series for improved scapular activation and increased with scaption and overhead press for improved functional strength.     Patient will continue to benefit from skilled outpatient physical therapy to address the deficits listed in the problem list box on initial evaluation, provide pt/family education and to maximize pt's level of independence in the home and community environment.     Patient's spiritual, cultural, and educational needs considered and patient agreeable to plan of care and goals.      Plan: Continue Plan of Care (POC) and progress per patient tolerance. See treatment section for details on planned progressions next session.    Goals:   Active       Functional outcome       Patient will show a significant change in FOTO patient-reported outcome tool to goal score to  demonstrate subjective improvement       Start:  03/28/25    Expected End:  05/23/25            Patient will demonstrate independence in home program for support of progression       Start:  03/28/25    Expected End:  04/25/25               Pain       Patient will report pain of 1/10 demonstrating a reduction of overall pain       Start:  03/28/25    Expected End:  05/23/25            Patient will report a 2 point reduction in pain.       Start:  03/28/25    Expected End:  04/25/25               Range of  Motion       Patient will achieve cervical extension ROM 60 degrees       Start:  03/28/25    Expected End:  05/23/25               Strength       Patient will achieve bilateral shoulder abduction strength of 4+/5       Start:  03/28/25    Expected End:  05/23/25              Rancho Mendoza PT

## 2025-04-23 NOTE — PROGRESS NOTES
"Subjective:       Patient ID: Ivone Monroy is a 64 y.o. female presents with Problem List[1]     Chief Complaint: Leg Pain (Right)    64-year-old  female patient with Patient Active Problem List:     GERD (gastroesophageal reflux disease)     History of diverticulitis     History of cancer of gall bladder     Age-related osteoporosis without current pathological fracture     Primary hypertension     Sinus bradycardia     Acute non-ST elevation myocardial infarction (NSTEMI)     Decreased range of motion of neck     Sleep apnea     Cervicalgia     SUSSY (obstructive sleep apnea)     Osteoarthritis of spine with radiculopathy, cervical region  Here with complaint of low back pain in the right side radiating to the right lower leg up to 7 to 8/10 for the past 2-3 weeks, patient denies of any similar pain in the past but noted to the left lower leg several years ago.   Denies of any trouble with bowel movements or urine  Currently getting physical therapy for her neck  Denies of any chest tightness or difficulty breathing with palpitations      Review of Systems   Constitutional:  Negative for fatigue.   Eyes:  Negative for visual disturbance.   Respiratory:  Negative for shortness of breath.    Cardiovascular:  Negative for chest pain and leg swelling.   Gastrointestinal:  Negative for abdominal pain, nausea and vomiting.   Musculoskeletal:  Positive for back pain, myalgias and neck pain.   Skin:  Negative for rash.   Neurological:  Negative for weakness, light-headedness, numbness and headaches.   Psychiatric/Behavioral:  Negative for sleep disturbance.          /70 (BP Location: Right arm, Patient Position: Sitting)   Pulse (!) 56   Resp 18   Ht 5' 1" (1.549 m)   Wt 48.9 kg (107 lb 12.9 oz)   SpO2 98%   BMI 20.37 kg/m²   Objective:      Physical Exam  Constitutional:       Appearance: She is well-developed.   HENT:      Head: Normocephalic and atraumatic.   Cardiovascular:      Rate " and Rhythm: Normal rate and regular rhythm.      Heart sounds: Normal heart sounds. No murmur heard.  Pulmonary:      Effort: Pulmonary effort is normal.      Breath sounds: Normal breath sounds. No wheezing.   Abdominal:      General: Bowel sounds are normal.      Palpations: Abdomen is soft.      Tenderness: There is no abdominal tenderness.   Musculoskeletal:         General: No tenderness.      Comments: No significant tenderness noted to the lumbar spine, straight leg raise test negative bilaterally   Skin:     General: Skin is warm and dry.      Findings: No rash.   Neurological:      Mental Status: She is alert and oriented to person, place, and time.   Psychiatric:         Mood and Affect: Mood normal.           Assessment/Plan:   1. Acute right-sided low back pain with right-sided sciatica  -     X-Ray Lumbar Spine 5 View; Future; Expected date: 04/23/2025  -     meloxicam (MOBIC) 15 MG tablet; Take 1 tablet (15 mg total) by mouth daily as needed.  Dispense: 30 tablet; Refill: 0  Will get x-ray of the lumbar spine for further evaluation meloxicam prescribed today for symptomatic relief  It is okay to alternate meloxicam with Tylenol    2. Primary hypertension  Blood pressure is stable currently on losartan 50 mg and metoprolol 25 mg    3. Gastroesophageal reflux disease, unspecified whether esophagitis present  Overview:  Dr Hartman  Stable on pantoprazole 40 mg daily    4. Age-related osteoporosis without current pathological fracture  Continue Prolia    5. Osteoarthritis of spine with radiculopathy, cervical region  Currently doing physical therapy and taking Lyrica 50 mg in the evening                    [1]   Patient Active Problem List  Diagnosis    GERD (gastroesophageal reflux disease)    History of diverticulitis    History of cancer of gall bladder    Age-related osteoporosis without current pathological fracture    Primary hypertension    Sinus bradycardia    Acute non-ST elevation myocardial  infarction (NSTEMI)    Decreased range of motion of neck    Sleep apnea    Cervicalgia    SUSSY (obstructive sleep apnea)    Osteoarthritis of spine with radiculopathy, cervical region

## 2025-04-28 ENCOUNTER — OFFICE VISIT (OUTPATIENT)
Dept: OBSTETRICS AND GYNECOLOGY | Facility: CLINIC | Age: 65
End: 2025-04-28
Payer: MEDICARE

## 2025-04-28 ENCOUNTER — CLINICAL SUPPORT (OUTPATIENT)
Dept: REHABILITATION | Facility: HOSPITAL | Age: 65
End: 2025-04-28
Payer: MEDICARE

## 2025-04-28 VITALS
DIASTOLIC BLOOD PRESSURE: 60 MMHG | BODY MASS INDEX: 20.54 KG/M2 | SYSTOLIC BLOOD PRESSURE: 100 MMHG | WEIGHT: 108.69 LBS

## 2025-04-28 DIAGNOSIS — M54.2 CERVICALGIA: ICD-10-CM

## 2025-04-28 DIAGNOSIS — L08.9 SKIN INFECTION: Primary | ICD-10-CM

## 2025-04-28 DIAGNOSIS — R29.898 DECREASED RANGE OF MOTION OF NECK: Primary | ICD-10-CM

## 2025-04-28 PROCEDURE — 97110 THERAPEUTIC EXERCISES: CPT

## 2025-04-28 PROCEDURE — 99999 PR PBB SHADOW E&M-EST. PATIENT-LVL III: CPT | Mod: PBBFAC,,, | Performed by: NURSE PRACTITIONER

## 2025-04-28 PROCEDURE — 99213 OFFICE O/P EST LOW 20 MIN: CPT | Mod: S$PBB,,, | Performed by: NURSE PRACTITIONER

## 2025-04-28 PROCEDURE — 97112 NEUROMUSCULAR REEDUCATION: CPT

## 2025-04-28 PROCEDURE — 99213 OFFICE O/P EST LOW 20 MIN: CPT | Mod: PBBFAC | Performed by: NURSE PRACTITIONER

## 2025-04-28 PROCEDURE — 97530 THERAPEUTIC ACTIVITIES: CPT

## 2025-04-28 NOTE — PROGRESS NOTES
"  Outpatient Rehab    Physical Therapy Visit     Patient Name: Ivone Monroy  MRN: [id number]  YOB: 1960  Encounter Date: 4/28/2025    Therapy Diagnosis:   Encounter Diagnoses   Name Primary?    Decreased range of motion of neck Yes    Cervicalgia      Physician: Aurora Morelos PA*    Physician Orders: Eval and Treat  Medical Diagnosis: Chronic left shoulder pain  Decreased range of motion of neck  Left cervical radiculopathy  Visit # / Visits Authorized:  8 / 12  Insurance Authorization Period: 3/28/2025 to 12/31/2025  Date of Evaluation: 3/28/2025  Plan of Care Certification: 3/28/2025 to 05/23/2025     PT/PTA:     Number of PTA visits since last PT visit:   Time In: 1331   Time Out: 1429  Total Time: 58   Total Billable Time:  46    FOTO:  Intake Score:  %  Survey Score 1:  %  Survey Score 2:  %     Subjective   Patient reports she had follow up regarding low back and has started taking Meloxicam. May begin PT soon as she is having significant difficulty with sleep. Cervical spine has mild stiffness on left side today..  Pain reported as 2/10.    Objective       Treatment:     CPT Intervention Performed   Today Duration / Intensity   MT Soft tissue massage   Upper trap, suboccipitals          TE UBE x 3'/3'    Cervical AROM  X20 all planes    Open Books x X20 bilateral     Foam Roll Series x  x  x  x  x  x 3 min pec stretch  Bear Hugs 1 min  Field Goals 1 min  Shoulder [place] 1 min each way  Swimmers 1 min  Serratus Punches 1 min          NMR Prone Series   x  x  x 20 x 5" holds   I's 3#  T's 3#  W's 3#     Scapular Retractions  3 minutes with 5" holds     Chin Tucks x 3 minutes with 5" holds     Chin Tuck with Lift x 20 x 5" holds     Bilateral External Rotation x 3 x 12 YTB          TA Scaption x 3 x 10 with 3#     Overhead Press x 3 x 10 with 3#     Rows x 3 x 10 with 7.5#     Shoulder Extension x 3 x 10 with 2.5#                               PLAN         CPT Codes available for " Billing:   (00) minutes of Manual therapy (MT) to improve pain and ROM.  (15) minutes of Therapeutic Exercise (TE) to develop strength, endurance, range of motion, and flexibility.  (15) minutes of Neuromuscular Re-Education (NMR)  to improve: Balance, Coordination, Kinesthetic, Sense, Proprioception, and Posture.  (15) minutes of Therapeutic Activities (TA) to improve functional performance.  Vasopneumatic Device Therapy () for management of swelling/edema. (81959)  Unattended Electrical Stimulation (ES) for muscle performance or pain modulation.  BFR: Blood flow restriction applied during exercise    Assessment & Plan   Assessment: Patient tolerated session well. Good tolerance to previously increased resistance with exercises. Pt notes no increased in lumbar symptoms with cervical exercises performed.     Patient will continue to benefit from skilled outpatient physical therapy to address the deficits listed in the problem list box on initial evaluation, provide pt/family education and to maximize pt's level of independence in the home and community environment.     Patient's spiritual, cultural, and educational needs considered and patient agreeable to plan of care and goals.      Plan: Continue Plan of Care (POC) and progress per patient tolerance. See treatment section for details on planned progressions next session.    Goals:   Active       Functional outcome       Patient will show a significant change in FOTO patient-reported outcome tool to goal score to  demonstrate subjective improvement       Start:  03/28/25    Expected End:  05/23/25            Patient will demonstrate independence in home program for support of progression       Start:  03/28/25    Expected End:  04/25/25               Pain       Patient will report pain of 1/10 demonstrating a reduction of overall pain       Start:  03/28/25    Expected End:  05/23/25            Patient will report a 2 point reduction in pain.       Start:   03/28/25    Expected End:  04/25/25               Range of Motion       Patient will achieve cervical extension ROM 60 degrees       Start:  03/28/25    Expected End:  05/23/25               Strength       Patient will achieve bilateral shoulder abduction strength of 4+/5       Start:  03/28/25    Expected End:  05/23/25              Rancho Mendoza PT

## 2025-04-28 NOTE — PROGRESS NOTES
Ivone Monroy is a 64 y.o. female  presents for check of left breast skin infection that started around 23  Pt had messaged about the issues and was instructed on warm compresses and antibiotic ointment to area   As of today has resolved.       LMP: No LMP recorded. Patient is postmenopausal..      Health Maintenance Topics with due status: Not Due       Topic Last Completion Date    Colorectal Cancer Screening 2020    DEXA Scan 2023    Mammogram 2024    HPV/Cotest 2024    Lipid Panel 2025    Aspirin/Antiplatelet Therapy 2025     Past Medical History:   Diagnosis Date    Anemia     Cancer of gallbladder     chemo radiation - Dr Gatica     Diverticulitis     GERD (gastroesophageal reflux disease)      Past Surgical History:   Procedure Laterality Date    exploration of gallbladder twice for suspected malignancy with no resection      TUBAL LIGATION  1996     Social History[1]  Family History   Problem Relation Name Age of Onset    Diabetes Mother Odeal         may have been related to prolonged prednisone use    Arthritis Mother Odeal         Rheumatoid    Heart disease Mother Odeal     Cancer Father Iry         prostate    Heart disease Father Iry     Colon cancer Cousin      Colon cancer Paternal Uncle       OB History          6    Para   6    Term   3            AB        Living   3         SAB        IAB        Ectopic        Multiple        Live Births   2                 /60   Wt 49.3 kg (108 lb 11 oz)   BMI 20.54 kg/m²       ROS:  Per hpi    PHYSICAL EXAM:  APPEARANCE: Well nourished, well developed, in no acute distress.  AFFECT: WNL, alert and oriented x 3  BREASTS: Symmetrical, no skin changes or visible lesions.  No palpable masses, nipple discharge bilaterally.  Skin on left breat shows an area of healing superficial skin infection, dry area with pin point scab noted  Physical Exam    1. Skin infection         AND  PLAN:    Ivone was seen today for bump on breast.    Diagnoses and all orders for this visit:    Skin infection     Reassured skin issue not breast issue - keep clean and dry                       [1]   Social History  Socioeconomic History    Marital status:      Spouse name: Elba    Number of children: 3   Occupational History    Occupation: Public      Employer: [employer]     Employer: [employer]   Tobacco Use    Smoking status: Never    Smokeless tobacco: Never   Substance and Sexual Activity    Alcohol use: Not Currently    Drug use: No    Sexual activity: Yes     Partners: Male     Birth control/protection: None     Comment:      Social Drivers of Health     Financial Resource Strain: Medium Risk (10/31/2023)    Overall Financial Resource Strain (CARDIA)     Difficulty of Paying Living Expenses: Somewhat hard   Food Insecurity: No Food Insecurity (7/7/2024)    Hunger Vital Sign     Worried About Running Out of Food in the Last Year: Never true     Ran Out of Food in the Last Year: Never true   Transportation Needs: No Transportation Needs (10/31/2023)    PRAPARE - Transportation     Lack of Transportation (Medical): No     Lack of Transportation (Non-Medical): No   Physical Activity: Sufficiently Active (7/7/2024)    Exercise Vital Sign     Days of Exercise per Week: 4 days     Minutes of Exercise per Session: 40 min   Stress: No Stress Concern Present (7/7/2024)    Citizen of [place] [employer] - Occupational Stress Questionnaire     Feeling of Stress : Only a little   Housing Stability: Low Risk  (10/31/2023)    Housing Stability Vital Sign     Unable to Pay for Housing in the Last Year: No     Number of Places Lived in the Last Year: 1     Unstable Housing in the Last Year: No

## 2025-04-30 ENCOUNTER — CLINICAL SUPPORT (OUTPATIENT)
Dept: REHABILITATION | Facility: HOSPITAL | Age: 65
End: 2025-04-30
Payer: MEDICARE

## 2025-04-30 DIAGNOSIS — M54.2 CERVICALGIA: ICD-10-CM

## 2025-04-30 DIAGNOSIS — R29.898 DECREASED RANGE OF MOTION OF NECK: Primary | ICD-10-CM

## 2025-04-30 PROCEDURE — 97112 NEUROMUSCULAR REEDUCATION: CPT

## 2025-04-30 PROCEDURE — 97530 THERAPEUTIC ACTIVITIES: CPT

## 2025-04-30 NOTE — PROGRESS NOTES
"  Outpatient Rehab    Physical Therapy Visit     Patient Name: Ivone Monroy  MRN: [id number]  YOB: 1960  Encounter Date: 4/30/2025    Therapy Diagnosis:   Encounter Diagnoses   Name Primary?    Decreased range of motion of neck Yes    Cervicalgia      Physician: Aurora Morelos PA*    Physician Orders: Eval and Treat  Medical Diagnosis: Chronic left shoulder pain  Decreased range of motion of neck  Left cervical radiculopathy  Acute right-sided low back pain with right-sided sciatica  Visit # / Visits Authorized:  9 / 12  Insurance Authorization Period: 3/28/2025 to 12/31/2025  Date of Evaluation: 3/28/2025  Plan of Care Certification: 3/28/2025 to 05/23/2025     PT/PTA:     Number of PTA visits since last PT visit:   Time In:     Time Out:    Total Time:     Total Billable Time:  25    FOTO:  Intake Score:  %  Survey Score 1:  %  Survey Score 2:  %     Subjective   Patient reports she is feeling great in her cervical spine. Would like to have low back evaluated and shift focus of sessions mostly to lumbar symptoms..  Pain reported as 1/10. Cervical  Objective       Treatment:     CPT Intervention Performed   Today Duration / Intensity   MT Soft tissue massage   Upper trap, suboccipitals          TE UBE x 3'/3'    Cervical AROM  X20 all planes    Open Books x X20 bilateral     Foam Roll Series x  x  x  x  x  x 3 min pec stretch  Bear Hugs 1 min  Field Goals 1 min  Shoulder [place] 1 min each way  Swimmers 1 min  Serratus Punches 1 min          NMR Prone Series   x  x  x 20 x 5" holds   I's 3#  T's 3#  W's 3#     Scapular Retractions  3 minutes with 5" holds     Chin Tucks x 3 minutes with 5" holds     Chin Tuck with Lift x 20 x 5" holds     Bilateral External Rotation x 3 x 12 YTB          TA Scaption x 3 x 10 with 3#     Overhead Press x 3 x 10 with 3#     Rows x 3 x 10 with 7.5#     Shoulder Extension x 3 x 10 with 2.5#                               PLAN         CPT Codes available for " Billing:   (00) minutes of Manual therapy (MT) to improve pain and ROM.  (00) minutes of Therapeutic Exercise (TE) to develop strength, endurance, range of motion, and flexibility.  (10) minutes of Neuromuscular Re-Education (NMR)  to improve: Balance, Coordination, Kinesthetic, Sense, Proprioception, and Posture.  (15) minutes of Therapeutic Activities (TA) to improve functional performance.  Vasopneumatic Device Therapy () for management of swelling/edema. (26904)  Unattended Electrical Stimulation (ES) for muscle performance or pain modulation.  BFR: Blood flow restriction applied during exercise    Assessment & Plan   Assessment: Patient tolerated session excellent. Mild cueing to prevent lumbar extension when performing overhead press to prevent pressure being put onto already symptomatic region.     Patient will continue to benefit from skilled outpatient physical therapy to address the deficits listed in the problem list box on initial evaluation, provide pt/family education and to maximize pt's level of independence in the home and community environment.     Patient's spiritual, cultural, and educational needs considered and patient agreeable to plan of care and goals.      Plan: Continue Plan of Care (POC) and progress per patient tolerance. See treatment section for details on planned progressions next session.    Goals:   Active       Functional outcome       Patient will show a significant change in FOTO patient-reported outcome tool to goal score to  demonstrate subjective improvement       Start:  03/28/25    Expected End:  05/23/25            Patient will demonstrate independence in home program for support of progression       Start:  03/28/25    Expected End:  04/25/25               Pain       Patient will report pain of 1/10 demonstrating a reduction of overall pain       Start:  03/28/25    Expected End:  05/23/25            Patient will report a 2 point reduction in pain.       Start:  03/28/25     Expected End:  04/25/25               Range of Motion       Patient will achieve cervical extension ROM 60 degrees       Start:  03/28/25    Expected End:  05/23/25               Strength       Patient will achieve bilateral shoulder abduction strength of 4+/5       Start:  03/28/25    Expected End:  05/23/25              Rancho Mendoza, PT

## 2025-05-03 RX ORDER — METOPROLOL SUCCINATE 25 MG/1
25 TABLET, EXTENDED RELEASE ORAL
Qty: 90 TABLET | Refills: 3 | Status: SHIPPED | OUTPATIENT
Start: 2025-05-03

## 2025-05-03 NOTE — TELEPHONE ENCOUNTER
No care due was identified.  [facility] Embedded Care Due Messages. Reference number: [id number].   5/03/2025 7:07:13 AM CDT

## 2025-05-04 NOTE — TELEPHONE ENCOUNTER
Refill Decision Note   Ivone Riosigne  is requesting a refill authorization.  Brief Assessment and Rationale for Refill:  Approve     Medication Therapy Plan:        Comments:     Note composed:10:01 PM 05/03/2025

## 2025-05-05 ENCOUNTER — OFFICE VISIT (OUTPATIENT)
Dept: CARDIOLOGY | Facility: CLINIC | Age: 65
End: 2025-05-05
Payer: MEDICARE

## 2025-05-05 ENCOUNTER — CLINICAL SUPPORT (OUTPATIENT)
Dept: REHABILITATION | Facility: HOSPITAL | Age: 65
End: 2025-05-05
Payer: MEDICARE

## 2025-05-05 VITALS
BODY MASS INDEX: 20.44 KG/M2 | WEIGHT: 108.25 LBS | DIASTOLIC BLOOD PRESSURE: 68 MMHG | SYSTOLIC BLOOD PRESSURE: 122 MMHG | HEIGHT: 61 IN | HEART RATE: 56 BPM

## 2025-05-05 DIAGNOSIS — I21.4 ACUTE NON-ST ELEVATION MYOCARDIAL INFARCTION (NSTEMI): ICD-10-CM

## 2025-05-05 DIAGNOSIS — I10 PRIMARY HYPERTENSION: Primary | ICD-10-CM

## 2025-05-05 DIAGNOSIS — R00.1 SINUS BRADYCARDIA: ICD-10-CM

## 2025-05-05 DIAGNOSIS — R29.898 DECREASED RANGE OF MOTION OF NECK: Primary | ICD-10-CM

## 2025-05-05 DIAGNOSIS — G47.33 OSA (OBSTRUCTIVE SLEEP APNEA): ICD-10-CM

## 2025-05-05 DIAGNOSIS — M54.2 CERVICALGIA: ICD-10-CM

## 2025-05-05 PROCEDURE — 99213 OFFICE O/P EST LOW 20 MIN: CPT | Mod: PBBFAC

## 2025-05-05 PROCEDURE — 99999 PR PBB SHADOW E&M-EST. PATIENT-LVL III: CPT | Mod: PBBFAC,,,

## 2025-05-05 PROCEDURE — 97110 THERAPEUTIC EXERCISES: CPT

## 2025-05-05 PROCEDURE — 97112 NEUROMUSCULAR REEDUCATION: CPT

## 2025-05-05 NOTE — PROGRESS NOTES
Outpatient Rehab    Physical Therapy Progress Note : Updated Plan of Care          Patient Name: Ivone Monroy  MRN: [id number]  YOB: 1960  Encounter Date: 5/5/2025    Therapy Diagnosis:   Encounter Diagnoses   Name Primary?    Decreased range of motion of neck Yes    Cervicalgia      Physician: Aurora Morelos PA*          Nehal Puente MD    Physician Orders: Eval and Treat  Medical Diagnosis: Chronic left shoulder pain  Decreased range of motion of neck  Left cervical radiculopathy  Visit # / Visits Authorized:  10 / 12  Insurance Authorization Period: 3/28/2025 to 12/31/2025  Date of Evaluation: 3/28/2025  Plan of Care Certification: 3/28/2025 to 05/23/2025     PT/PTA:     Number of PTA visits since last PT visit:   Time In: 1339   Time Out: 1433  Total Time: 54   Total Billable Time:  54    FOTO:  Intake Score:  %  Survey Score 1:  %  Survey Score 2:  %     Subjective   Patient reports she has been having right sided low back pain for the past several weeks with no clear TAI but onset over time. Pt notes symptoms origionated in right posterior hip and weould radiate down lateral leg to right foot. No longer getting symptoms outside of the right hip and feels things are improving but not where she wants to be yet.States right leg is most painful when lying down on her back Has gotten much better though in this regard. Right leg feels weaker than left leg. Has been taking prescription medication for her pain and thinks it is helping reduce her symptoms..  Pain reported as 5/10. Lumbar - 5/10  Cervical - 1/10  Objective      Lumbar ROM Right  (spine) Left   Pain/Dysfunction with Movement Goal   Lumbar Flexion (60º) 75% ---  100%   Lumbar Extension (30º) 100% ---     Lumbar Side Bending (25º) 75% 75%     Lumbar Rotation 100% 80%          L/E MMT Right  (spine) Left Pain/Dysfunction with Movement Goal   Modified (90/90) Abdominal Strength  fair ---  good   Hip Flexion  4+/5 4+/5  4+/5 B  "  Hip Extension  4/5 4/5  4+/5 B   Hip Abduction  4-/5 4-/5  4+/5 B   Knee Extension 4+/5 4+/5  5/5 B   Knee Flexion 5/5 5/5  5/5 B   Hip IR 4+/5 4+/5  4+/5 B   Hip ER 4/5 4/5  4+/5 B   Ankle DF 4+/5 5/5  5/5 B   Ankle PF 4+/5 5/5  5/5 B   Ankle Inversion 4+/5 5/5  5/5 B   Ankle Eversion 4+/5 5/5  5/5 B        Joint Motion  Right Mobility  (spine) Left Mobility Goal   Thoracic PA  [x] Hypo     [] Normal     [] Hyper --- Normal   Lumbar PA [x] Hypo     [] Normal     [] Hyper --- Normal   Lumbar Unilat. PA [x] Hypo     [] Normal     [] Hyper [x] Hypo     [] Normal     [] Hyper Normal        Muscle Tested  Right Left  Limitation Goal   Hip Flexors [] Normal  [] Limited [] Normal  [] Limited  Normal B   ITB / TFL [] Normal  [] Limited [] Normal  [] Limited  Normal B   Quadriceps [] Normal  [x] Limited [] Normal  [x] Limited  Normal B   Hamstrings  [] Normal  [x] Limited [] Normal  [x] Limited  Normal B   Piriformis  [] Normal  [x] Limited [x] Normal  [] Limited  Normal B   Gastrocnemius  [] Normal  [] Limited [] Normal  [] Limited  Normal B   Soleus  [] Normal  [] Limited [] Normal  [] Limited  Normal B      Treatment:     CPT Intervention Performed   Today Duration / Intensity   MT Soft tissue massage   Upper trap, suboccipitals          TE Bike      Hamstring Stretch x 3 x 30" bilateral    Piriformis Stretch x 3 x 30"    Open Books             Objective Testing x Measurements, FOTO, education          NMR Posterior Pelvic Tilt  x 3 minutes with 5-10 sec holds     Posterior Pelvic Tilt with March x 2 x 10     90/90 Nerve Glide x X15 bilateral                        TA                           PLAN        CPT Intervention Performed   Today Duration / Intensity   MT Soft tissue massage   Upper trap, suboccipitals          TE UBE x 3'/3'    Cervical AROM  X20 all planes    Open Books  X20 bilateral     Foam Roll Series  3 min pec stretch  Bear Hugs 1 min  Field Goals 1 min  Shoulder [place] 1 min each way  Swimmers 1 " "min  Serratus Punches 1 min          NMR Prone Series  20 x 5" holds   I's 3#  T's 3#  W's 3#     Scapular Retractions  3 minutes with 5" holds     Chin Tucks  3 minutes with 5" holds     Chin Tuck with Lift  20 x 5" holds     Bilateral External Rotation  3 x 12 YTB          TA Scaption  3 x 10 with 3#     Overhead Press  3 x 10 with 3#     Rows  3 x 10 with 7.5#     Shoulder Extension  3 x 10 with 2.5#                               PLAN         CPT Codes available for Billing:   (00) minutes of Manual therapy (MT) to improve pain and ROM.  (40) minutes of Therapeutic Exercise (TE) to develop strength, endurance, range of motion, and flexibility.  (14) minutes of Neuromuscular Re-Education (NMR)  to improve: Balance, Coordination, Kinesthetic, Sense, Proprioception, and Posture.  (00) minutes of Therapeutic Activities (TA) to improve functional performance.  Vasopneumatic Device Therapy () for management of swelling/edema. (81653)  Unattended Electrical Stimulation (ES) for muscle performance or pain modulation.  BFR: Blood flow restriction applied during exercise    Assessment & Plan   Assessment:     Ivone presents with a condition of Moderate complexity.   Presentation of Symptoms: Evolving  Will Comorbidities Impact Care: Yes  Past Medical History: No date: Anemia 2015: Cancer of gallbladder     Comment:  chemo radiation - Dr Gatica  No date: Diverticulitis No date: GERD (gastroesophageal reflux disease)      Functional Limitations: Carrying objects, Disrupted sleep pattern, Driving, Range of motion, Participating in leisure activities  Impairments: Impaired physical strength, Lack of appropriate home exercise program, Pain with functional activity, Abnormal or restricted range of motion  Personal Factors Affecting Prognosis: Pain     Patient Goal for Therapy (PT): Pt reported goals are to decrease overall pain levels in order to return to prior functional level.  Prognosis: Good  Prognosis Details: " Anticipated Barriers for therapy: co-morbidities, sedentary lifestyle, chronicity of condition, lack of understanding of condition, adherence to treatment plan, and occupation   Assessment Details: Pt presents with impairments in the following categories: IMPAIRMENTS: ROM, strength, joint mobility, muscle length, posture, and functional movement patterns.  Pt will benefit from skilled outpatient Physical Therapy to address the deficits stated above, provide pt/family education, and to maximize pt's level of independence.     Patient will continue to benefit from skilled outpatient physical therapy to address the deficits listed in the problem list box on initial evaluation, provide pt/family education and to maximize pt's level of independence in the home and community environment.     Patient's spiritual, cultural, and educational needs considered and patient agreeable to plan of care and goals.      Plan:  From a physical therapy perspective, the patient would benefit from: Skilled Rehab Services     Planned therapy interventions include: Therapeutic exercise, Therapeutic activities, Neuromuscular re-education, and Manual therapy.    Planned modalities to include: Cryotherapy (cold pack), Electrical stimulation - attended, Electrical stimulation - passive/unattended, and Thermotherapy (hot pack).         Visit Frequency: 2 times Per Week for 8 Weeks.     This plan was discussed with Patient. Discussion participants: Agreed Upon Plan of Care  Plan details: Outpatient Physical Therapy to include any combination of the following interventions: virtual visits, dry needling, modalities, electrical stimulation (IFC, Pre-Mod, Attended with Functional Dry Needling), Aquatic Therapy, Cervical/Lumbar Traction, Electrical Stimulation, Gait Training, Manual Therapy, Moist Heat/ Ice, Neuromuscular Re-ed, Patient Education, Self Care, Therapeutic Exercise, and Therapeutic Activites        Goals:   Neck/ LUE Problems       Neck/  LUE Problems (Active)       Functional outcome       Patient will show a significant change in FOTO patient-reported outcome tool to goal score to demonstrate subjective improvement       Start:  05/05/25    Expected End:  06/30/25            Patient will demonstrate independence in home program for support of progression       Start:  05/05/25    Expected End:  06/02/25               Pain       Patient will report pain of 1/10 demonstrating a reduction of overall pain       Start:  05/05/25    Expected End:  06/30/25            Patient will report a 2 point reduction in pain.       Start:  05/05/25    Expected End:  06/02/25               Range of Motion       Patient will achieve lumbar flexion to within normal limits.       Start:  05/05/25    Expected End:  06/30/25               Strength       Patient will achieve bilateral hip abduction strength of 4+/5       Start:  05/05/25    Expected End:  06/30/25                 Cervicalgia and Low Back Pain Problems       Cervicalgia and Low Back Pain Problems (Active)       Functional outcome       Patient will show a significant change in FOTO patient-reported outcome tool to goal score to  demonstrate subjective improvement       Start:  03/28/25    Expected End:  05/23/25            Patient will demonstrate independence in home program for support of progression       Start:  03/28/25    Expected End:  04/25/25               Pain       Patient will report pain of 1/10 demonstrating a reduction of overall pain       Start:  03/28/25    Expected End:  05/23/25            Patient will report a 2 point reduction in pain.       Start:  03/28/25    Expected End:  04/25/25               Range of Motion       Patient will achieve cervical extension ROM 60 degrees       Start:  03/28/25    Expected End:  05/23/25               Strength       Patient will achieve bilateral shoulder abduction strength of 4+/5       Start:  03/28/25    Expected End:  05/23/25                  Rancho Mendoza, PT

## 2025-05-05 NOTE — PROGRESS NOTES
Subjective:   Patient ID:  Ivone Monroy is a 64 y.o. female who presents for evaluation of No chief complaint on file.      HPI 64-year-old female whose current medical conditions include HTN, sinus bradycardia previously followed in cardiology clinic by Dr. Addison here today for CV follow-up denies any chest pain or CHF symptoms at this time.  Reports compliance with medications.  Blood pressure stable in clinic today heart rate sinus Jefry, asymptomatic.  Tries to walk daily however has chronic back pains as well as arthritis in her left arm.  Patient reports admission at [facility] in October last year for NSTEMI (per patient had normal LHC)      Tobacco none  ETOH none  FH mother RA and CAD  Exercise walks daily    Lab Results   Component Value Date    HGBA1C 5.1 10/10/2024       Past Medical History:   Diagnosis Date    Anemia     Cancer of gallbladder 2015    chemo radiation - Dr Gatica     Diverticulitis     GERD (gastroesophageal reflux disease)        Past Surgical History:   Procedure Laterality Date    exploration of gallbladder twice for suspected malignancy with no resection      TUBAL LIGATION  8/24/1996       Social History[1]    Family History   Problem Relation Name Age of Onset    Diabetes Mother Odeal         may have been related to prolonged prednisone use    Arthritis Mother Odeal         Rheumatoid    Heart disease Mother Odeal     Cancer Father Iry         prostate    Heart disease Father Iry     Colon cancer Cousin      Colon cancer Paternal Uncle         Medications Ordered Prior to Encounter[2]   Wt Readings from Last 3 Encounters:   05/05/25 49.1 kg (108 lb 3.9 oz)   04/28/25 49.3 kg (108 lb 11 oz)   04/23/25 48.9 kg (107 lb 12.9 oz)     Temp Readings from Last 3 Encounters:   04/04/25 98.3 °F (36.8 °C) (Tympanic)   12/31/24 96 °F (35.6 °C)   10/10/24 98.3 °F (36.8 °C) (Oral)     BP Readings from Last 3 Encounters:   05/05/25 122/68   04/28/25 100/60   04/23/25 136/70     Pulse  Readings from Last 3 Encounters:   05/05/25 (!) 56   04/23/25 (!) 56   04/04/25 (!) 49        Review of Systems   Constitutional: Negative.   HENT: Negative.     Eyes: Negative.    Cardiovascular: Negative.    Respiratory: Negative.     Skin: Negative.    Musculoskeletal: Negative.    Gastrointestinal: Negative.    Genitourinary: Negative.    Neurological: Negative.    Psychiatric/Behavioral: Negative.         Objective:   Physical Exam  Vitals and nursing note reviewed.   Constitutional:       Appearance: Normal appearance.   HENT:      Head: Normocephalic.   Eyes:      Pupils: Pupils are equal, round, and reactive to light.   Cardiovascular:      Rate and Rhythm: Normal rate and regular rhythm.      Heart sounds: Normal heart sounds, S1 normal and S2 normal. No murmur heard.     No S3 or S4 sounds.   Pulmonary:      Effort: Pulmonary effort is normal.      Breath sounds: Normal breath sounds.   Abdominal:      General: Bowel sounds are normal.      Palpations: Abdomen is soft.   Musculoskeletal:         General: Normal range of motion.      Cervical back: Normal range of motion.   Skin:     Capillary Refill: Capillary refill takes less than 2 seconds.   Neurological:      General: No focal deficit present.      Mental Status: She is alert and oriented to person, place, and time.   Psychiatric:         Mood and Affect: Mood normal.         Behavior: Behavior normal.         Thought Content: Thought content normal.         Lab Results   Component Value Date    CHOL 186 04/04/2025    CHOL 186 10/10/2024    CHOL 218 (H) 11/02/2023     Lab Results   Component Value Date    HDL 77 (H) 04/04/2025    HDL 79 (H) 10/10/2024     (H) 11/02/2023     Lab Results   Component Value Date    LDLCALC 94.4 04/04/2025    LDLCALC 79.8 10/10/2024    LDLCALC 103.8 11/02/2023     Lab Results   Component Value Date    TRIG 73 04/04/2025    TRIG 136 10/10/2024    TRIG 66 11/02/2023     Lab Results   Component Value Date    CHOLHDL  "41.4 04/04/2025    CHOLHDL 42.5 10/10/2024    CHOLHDL 46.3 11/02/2023       Chemistry        Component Value Date/Time     04/04/2025 0908     12/31/2024 0911    K 5.5 (H) 04/04/2025 0908    K 4.3 12/31/2024 0911     04/04/2025 0908     12/31/2024 0911    CO2 28 04/04/2025 0908    CO2 25 12/31/2024 0911    BUN 26 (H) 04/04/2025 0908    CREATININE 1.0 04/04/2025 0908    GLU 67 (L) 04/04/2025 0908    GLU 59 (L) 12/31/2024 0911        Component Value Date/Time    CALCIUM 10.2 04/04/2025 0908    CALCIUM 9.7 12/31/2024 0911    ALKPHOS 52 04/04/2025 0908    ALKPHOS 57 12/31/2024 0911    AST 26 04/04/2025 0908    AST 24 12/31/2024 0911    ALT 17 04/04/2025 0908    ALT 21 12/31/2024 0911    BILITOT 0.4 04/04/2025 0908    BILITOT 0.3 12/31/2024 0911    ESTGFRAFRICA >60.0 08/22/2020 0915    EGFRNONAA >60.0 08/22/2020 0915          Lab Results   Component Value Date    TSH 2.860 10/10/2024     No results found for: "INR", "PROTIME"  @RESUFAST(WBC,HGB,HCT,MCV,PLT)  @LABRCNTIP(BNP,BNPTRIAGEBLO)@  CrCl cannot be calculated (Patient's most recent lab result is older than the maximum 7 days allowed.).     Results for orders placed during the hospital encounter of 10/11/24    Echo    Interpretation Summary    Left Ventricle: The left ventricle is normal in size. Normal wall thickness. There is normal systolic function with a visually estimated ejection fraction of 60 - 65%. There is normal diastolic function.    Right Ventricle: Normal right ventricular cavity size. Wall thickness is normal. Systolic function is normal.    Pulmonary Artery: The estimated pulmonary artery systolic pressure is 26 mmHg.     No results found for this or any previous visit.     Assessment:      1. Primary hypertension    2. Acute non-ST elevation myocardial infarction (NSTEMI)    3. Sinus bradycardia    4. SSUSY (obstructive sleep apnea)        Plan:   Primary hypertension    Acute non-ST elevation myocardial infarction " (NSTEMI)    Sinus bradycardia    SUSSY (obstructive sleep apnea)      Aspirin, requested [facility] record- Oct NSTEMI  Metoprolol, losartan, low-sodium diet, profile blood pressure-HTN   Risk factor modifications   Daily exercise as tolerated   Low-sodium, low-fat diet     Return to clinic in 6 months or sooner if needed    Courtney Guillot, FNP-C [facility], Cardiology         [1]   Social History  Tobacco Use    Smoking status: Never    Smokeless tobacco: Never   Substance Use Topics    Alcohol use: Not Currently    Drug use: No   [2]   Current Outpatient Medications on File Prior to Visit   Medication Sig Dispense Refill    aspirin (ECOTRIN) 81 MG EC tablet Take 1 tablet (81 mg total) by mouth once daily. 90 tablet 3    dicyclomine (BENTYL) 10 MG capsule Take 10 mg by mouth 2 (two) times daily.  11    LORazepam (ATIVAN) 0.5 MG tablet Take 1 tablet (0.5 mg total) by mouth every 12 (twelve) hours as needed for Anxiety. (Patient taking differently: Take 0.5 mg by mouth as needed for Anxiety.) 15 tablet 0    losartan (COZAAR) 50 MG tablet TAKE 1 TABLET BY MOUTH EVERY DAY 90 tablet 3    meloxicam (MOBIC) 15 MG tablet Take 1 tablet (15 mg total) by mouth daily as needed. 30 tablet 0    metoprolol succinate (TOPROL-XL) 25 MG 24 hr tablet TAKE 1 TABLET BY MOUTH EVERY DAY 90 tablet 3    multivitamin (THERAGRAN) per tablet Take 1 tablet by mouth once daily.      pantoprazole (PROTONIX) 40 MG tablet Take 40 mg by mouth 2 (two) times daily.      pregabalin (LYRICA) 50 MG capsule TAKE 1 CAPSULE (50 MG TOTAL) BY MOUTH EVERY EVENING. 30 capsule 1    clobetasoL (TEMOVATE) 0.05 % external solution Apply topically 2 (two) times daily as needed for alopecia. Strong steroid- do NOT use on face. (Patient not taking: Reported on 5/5/2025) 50 mL 0    desonide (DESOWEN) 0.05 % cream Apply to the affected area once to twice daily for vitiligo. May use for up to 4 weeks. (Patient not taking: Reported on 5/5/2025) 60 g 0    metronidazole 1%  (METROGEL) 1 % Gel Apply topically once daily. For Perioral Dermatitis (bumps). (Patient not taking: Reported on 5/5/2025) 60 g 0    tacrolimus (PROTOPIC) 0.1 % ointment apply to affected area twice daily of vitiligo. Non-steroid med. (Patient not taking: Reported on 5/5/2025) 30 g 1     No current facility-administered medications on file prior to visit.

## 2025-05-07 ENCOUNTER — CLINICAL SUPPORT (OUTPATIENT)
Dept: REHABILITATION | Facility: HOSPITAL | Age: 65
End: 2025-05-07
Payer: MEDICARE

## 2025-05-07 DIAGNOSIS — R29.898 DECREASED RANGE OF MOTION OF NECK: Primary | ICD-10-CM

## 2025-05-07 DIAGNOSIS — M54.2 CERVICALGIA: ICD-10-CM

## 2025-05-07 PROCEDURE — 97110 THERAPEUTIC EXERCISES: CPT

## 2025-05-07 PROCEDURE — 97112 NEUROMUSCULAR REEDUCATION: CPT

## 2025-05-07 PROCEDURE — 97530 THERAPEUTIC ACTIVITIES: CPT

## 2025-05-07 NOTE — PROGRESS NOTES
"  Outpatient Rehab    Physical Therapy Visit      Patient Name: Ivone Monroy  MRN: [id number]  YOB: 1960  Encounter Date: 5/7/2025    Therapy Diagnosis:   Encounter Diagnoses   Name Primary?    Decreased range of motion of neck Yes    Cervicalgia      Physician: Aurora Morelos PA*          Nehal Puente MD  Physician Orders: Eval and Treat  Medical Diagnosis: Chronic left shoulder pain  Decreased range of motion of neck  Left cervical radiculopathy  Visit # / Visits Authorized:  11 / 24  Insurance Authorization Period: 3/28/2025 to 12/31/2025  Date of Evaluation: 3/28/2025  Plan of Care Certification: 3/28/2025 to 05/23/2025     PT/PTA:     Number of PTA visits since last PT visit:   Time In: 1356   Time Out: 1500  Total Time: 64   Total Billable Time:  58    FOTO:  Intake Score:  %  Survey Score 1:  %  Survey Score 2:  %     Subjective   Patient reports she is feeling better after evalaution of low back and performing HEP independently. Continuing to have symptoms but noted reduction..  Pain reported as 3/10. Lumbar - 3/10  Cervical - 1/10  Objective       Treatment:     CPT Intervention Performed   Today Duration / Intensity   MT Soft tissue massage             TE Bike x 7 minutes    Hamstring Stretch x 3 x 30" bilateral    Piriformis Stretch x 3 x 30"    Open Books x X15 bilateral           Objective Testing  Measurements, FOTO, education          NMR Posterior Pelvic Tilt  x 3 minutes with 5-10 sec holds     Posterior Pelvic Tilt with March x 2 x 10     90/90 Nerve Glide x X15 bilateral     Bridges x 3 x 10     Clamshells x 3 x 10 side lying YTB          TA Leg Press x 3 x 10 DL with 105#     Pallof Press x 20 x 5" holds with yellow band looped     Side Stepping x 5 laps in bars with red band looped          PLAN        CPT Intervention Performed   Today Duration / Intensity   MT Soft tissue massage   Upper trap, suboccipitals          TE UBE  3'/3'    Cervical AROM  X20 all planes    " "Open Books  X20 bilateral     Foam Roll Series  3 min pec stretch  Bear Hugs 1 min  Field Goals 1 min  Shoulder [place] 1 min each way  Swimmers 1 min  Serratus Punches 1 min          NMR Prone Series  20 x 5" holds   I's 3#  T's 3#  W's 3#     Scapular Retractions  3 minutes with 5" holds     Chin Tucks  3 minutes with 5" holds     Chin Tuck with Lift  20 x 5" holds     Bilateral External Rotation  3 x 12 YTB          TA Scaption  3 x 10 with 3#     Overhead Press  3 x 10 with 3#     Rows  3 x 10 with 7.5#     Shoulder Extension  3 x 10 with 2.5#                               PLAN         CPT Codes available for Billing:   (00) minutes of Manual therapy (MT) to improve pain and ROM.  (16) minutes of Therapeutic Exercise (TE) to develop strength, endurance, range of motion, and flexibility.  (25) minutes of Neuromuscular Re-Education (NMR)  to improve: Balance, Coordination, Kinesthetic, Sense, Proprioception, and Posture.  (14) minutes of Therapeutic Activities (TA) to improve functional performance.  Vasopneumatic Device Therapy () for management of swelling/edema. (30154)  Unattended Electrical Stimulation (ES) for muscle performance or pain modulation.  BFR: Blood flow restriction applied during exercise    Assessment & Plan   Assessment: Patient tolerated session well. Patient able to demonstrate HEP given with mild cueing for form. Patient progressed with further hip activation exercises and functional activities for improved functional ability.     Patient will continue to benefit from skilled outpatient physical therapy to address the deficits listed in the problem list box on initial evaluation, provide pt/family education and to maximize pt's level of independence in the home and community environment.      Patient's spiritual, cultural, and educational needs considered and patient agreeable to plan of care and goals.     Plan: Continue Plan of Care (POC) and progress per patient tolerance. See treatment " section for details on planned progressions next session.    Goals:   Neck/ LUE Problems       Neck/ LUE Problems (Active)       Functional outcome       Patient will show a significant change in FOTO patient-reported outcome tool to goal score to demonstrate subjective improvement       Start:  05/05/25    Expected End:  06/30/25            Patient will demonstrate independence in home program for support of progression       Start:  05/05/25    Expected End:  06/02/25               Pain       Patient will report pain of 1/10 demonstrating a reduction of overall pain       Start:  05/05/25    Expected End:  06/30/25            Patient will report a 2 point reduction in pain.       Start:  05/05/25    Expected End:  06/02/25               Range of Motion       Patient will achieve lumbar flexion to within normal limits.       Start:  05/05/25    Expected End:  06/30/25               Strength       Patient will achieve bilateral hip abduction strength of 4+/5       Start:  05/05/25    Expected End:  06/30/25                 Cervicalgia and Low Back Pain Problems       Cervicalgia and Low Back Pain Problems (Active)       Functional outcome       Patient will show a significant change in FOTO patient-reported outcome tool to goal score to  demonstrate subjective improvement       Start:  03/28/25    Expected End:  05/23/25            Patient will demonstrate independence in home program for support of progression       Start:  03/28/25    Expected End:  04/25/25               Pain       Patient will report pain of 1/10 demonstrating a reduction of overall pain       Start:  03/28/25    Expected End:  05/23/25            Patient will report a 2 point reduction in pain.       Start:  03/28/25    Expected End:  04/25/25               Range of Motion       Patient will achieve cervical extension ROM 60 degrees       Start:  03/28/25    Expected End:  05/23/25               Strength       Patient will achieve bilateral  shoulder abduction strength of 4+/5       Start:  03/28/25    Expected End:  05/23/25                 Rancho Mendoza, PT

## 2025-05-08 ENCOUNTER — OFFICE VISIT (OUTPATIENT)
Dept: PULMONOLOGY | Facility: CLINIC | Age: 65
End: 2025-05-08
Payer: MEDICARE

## 2025-05-08 VITALS
BODY MASS INDEX: 20.77 KG/M2 | HEART RATE: 50 BPM | SYSTOLIC BLOOD PRESSURE: 116 MMHG | HEIGHT: 61 IN | RESPIRATION RATE: 19 BRPM | OXYGEN SATURATION: 100 % | DIASTOLIC BLOOD PRESSURE: 72 MMHG | WEIGHT: 110 LBS

## 2025-05-08 DIAGNOSIS — G47.00 INSOMNIA, UNSPECIFIED TYPE: Primary | ICD-10-CM

## 2025-05-08 PROBLEM — G47.33 OSA (OBSTRUCTIVE SLEEP APNEA): Status: RESOLVED | Noted: 2025-04-11 | Resolved: 2025-05-08

## 2025-05-08 PROBLEM — G47.30 SLEEP APNEA: Status: RESOLVED | Noted: 2024-12-09 | Resolved: 2025-05-08

## 2025-05-08 PROCEDURE — 99999 PR PBB SHADOW E&M-EST. PATIENT-LVL IV: CPT | Mod: PBBFAC,,, | Performed by: NURSE PRACTITIONER

## 2025-05-08 PROCEDURE — 99213 OFFICE O/P EST LOW 20 MIN: CPT | Mod: S$PBB,,, | Performed by: NURSE PRACTITIONER

## 2025-05-08 PROCEDURE — 99214 OFFICE O/P EST MOD 30 MIN: CPT | Mod: PBBFAC,PO | Performed by: NURSE PRACTITIONER

## 2025-05-08 NOTE — PROGRESS NOTES
Subjective:      Patient ID: Ivone Monroy is a 64 y.o. female.    Chief Complaint: Sleep Apnea    HPI  05/08/25    History of Present Illness    HPI:  Ms. Monroy recently underwent a sleep study over two nights. She had difficulty falling asleep on the first night but managed to obtain some hours of rest. She reports similar difficulty falling asleep at home during the study period. Her sleep has improved since her last visit a few months ago. After the sleep study completion, she had an episode of sciatica that disrupted her sleep for 1.5 to 2 weeks. Dr. Puente evaluated her for this issue, prescribed Meloxicam, and ordered an X-ray of the back, which revealed arthritis and inflammation causing the pain. She describes the sciatic pain as stabbing in the buttock area, particularly bothersome when lying down to sleep.    Ms. Monroy initiated physical therapy for the right-sided sciatica this past Monday and reports improvement after a few days. She has resumed daily walking, which she had discontinued due to various health issues, including elevated blood pressure. She now sleeps longer during the night and wakes up less frequently, only occasionally needing to use the bathroom. She maintains a consistent sleep routine, typically going to bed between 10:30 PM and 11:00 PM, and occasionally falls asleep on the sofa while watching TV.      Ms. Monroy denies current difficulty falling asleep or staying asleep through the night. She denies having sleep apnea, as confirmed by the negative sleep study results.    SOCIAL HISTORY:  Occupation: Works part time        03/11/25  Patient presents today for evaluation of sleep apnea. Referred by neurology with headache/pressure.Patient with snoring. Unsure of apnea events. Patient not having problems falling asleep, but wakes up frequently throughout the night.  Patient does not wake up feeling refreshed in the morning.  Patient with daytime hypersomnolence.  "Comorbidities include HTN, NSTEMI, sinus Bradycardia.  Bedtime: 10-10:30PM  Wake time: 7AM  She had polysomnogram. She had a hard time falling asleep due to anxiety.  STOP - BANG Questionnaire:      1. Snoring : Do you snore loudly ?    Yes     2. Tired : Do you often feel tired, fatigued, or sleepy during daytime?   Yes     3. Observed: Has anyone observed you stop breathing during your sleep?   No     4. Blood pressure : Do you have or are you being treated for high blood pressure?   Yes     5. BMI :BMI more than 35 kg/m2?   No     6. Age : Age over 50 yr old?   Yes     7. Neck circumference: Neck circumference greater than 40 cm?   No     8. Gender: Gender male?   No     High risk of SUSSY: Yes 5 - 8  Intermediate risk of SUSSY: Yes 3 - 4  Low risk of SUSSY: Yes 0 - 2        References:   STOP Questionnaire   A Tool to Screen Patients for Obstructive Sleep Apnea: [facility] Jim.C.P.C., Aldair Marie M.B.B.S., Charlie Dallas M.D.,Stephanie Plunkett, Ph.D., Ghada Rodrigues M.B.B.S.,_ VERNON Potts.Sc.,_ Pasha Joshua M.D., Jorge Luis Rod, F.R.C.P.C.; Anesthesiology 2008; 108:812-21 Copyright © 2008, the American Society of Anesthesiologists, Inc. [employer]        [place] Questionnaire (validated SUSSY screening questionnaire)    Yes -- Snoring/apnea    Yes -- Fatigue    Body mass index is Body mass index is 20.24 kg/m²..  (>25 is overweight, >30 is obese)    Blood Pressure = Hypertension  (PreHTN 120-139/80-89, Stg1 140-159/90-99, Stg2 >160/>100)  [place] = three of three SUSSY categories are positive (high risk is 2-3 positive categories)      Problem List[1]  /72   Pulse (!) 50   Resp 19   Ht 5' 1" (1.549 m)   Wt 49.9 kg (110 lb 0.2 oz)   SpO2 100%   BMI 20.79 kg/m²   Body mass index is 20.79 kg/m².    Review of Systems   Constitutional:  Positive for fatigue.   HENT: Negative.     Respiratory:  Positive for snoring.    Cardiovascular: Negative.    Musculoskeletal: " Negative.    Gastrointestinal: Negative.    Neurological:  Positive for headaches.   Psychiatric/Behavioral:  Positive for sleep disturbance.      Objective:      Physical Exam  Constitutional:       Appearance: Normal appearance. She is well-developed.   HENT:      Head: Normocephalic and atraumatic.      Nose: Nose normal.   Cardiovascular:      Rate and Rhythm: Normal rate and regular rhythm.      Heart sounds: No murmur heard.     No gallop.   Pulmonary:      Effort: Pulmonary effort is normal.      Breath sounds: Normal breath sounds.   Abdominal:      Palpations: Abdomen is soft.      Tenderness: There is no abdominal tenderness.   Musculoskeletal:         General: Normal range of motion.      Cervical back: Normal range of motion and neck supple.   Skin:     General: Skin is warm and dry.   Neurological:      Mental Status: She is alert and oriented to person, place, and time.   Psychiatric:         Mood and Affect: Mood normal.         Behavior: Behavior normal.       Personal Diagnostic Review      5/8/2025   EPWORTH SLEEPINESS SCALE   Sitting and reading 1   Watching TV 1   Sitting, inactive in a public place (e.g. a theatre or a meeting) 0   As a passenger in a car for an hour without a break 0   Lying down to rest in the afternoon when circumstances permit 1   Sitting and talking to someone 0   Sitting quietly after a lunch without alcohol 0   In a car, while stopped for a few minutes in traffic 0   Total score 3          Procedure Notes  Procedure Notes    Author Status Last  Updated Created   Lev Day MD Signed Lev Day MD 4/11/2025  7:13 AM 4/11/2025  7:13 AM          Assoc. Orders Procedures   HOME SLEEP STUDIES HOME SLEEP STUDIES       Pre-Op Dx Post-Op Dx   SUSSY (obstructive sleep apnea) None         PHYSICIAN INTERPRETATION AND COMMENTS: Findings are consistent with very mild, positional obstructive sleep  apnea(SUSSY). Indications of cardiac dysrhythmia are recorded.  cardiology evaluation.  CLINICAL HISTORY: 64 year old female presented with: 14 inch neck, BMI of 20, an [place] sleepiness score of 2, history of  hypertension and symptoms of nocturnal snoring and waking up choking. Based on the clinical history, the patient has a  high pre-test probability of having Mild SUSSY.  SLEEP STUDY FINDINGS: Patient underwent a 2 night Home Sleep Test and by behavioral criteria, slept for approximately  13.61 hours , with a sleep efficiency of 98%. The patient slept supine 5% of the night based on valid recording time of 13.56  hours and is 4.3 times as likely to have apneas/hypopneas when supine. Snoring occurs for 1.5% (30 dB) of the study. The  mean pulse rate is 48.9 BPM, with infrequent pulse rate variability (40 events with >= 6 BPM increase/decrease per hour).  TREATMENT CONSIDERATIONS: Based on this study, treatment is not required at this time for obstructive sleep  apnea/hypopnea. Weight gain, alcohol consumption, and time spent sleeping supine can affect the severity of SUSSY. Future  testing should be considered as the patient's risk factors change. Based on the SUSSY Supine data in the study, a Mandibular  Advancement Splint (MAS) will likely provide treatment benefit independent of SUSSY severity. The patient should avoid  sleeping supine given non-supine AHI is in the normal range.  DISEASE MANAGEMENT CONSIDERATIONS: Irregularity of the pulse rate signals indicates possible cardiac dysrhythmia. If  clinically appropriate, further cardiac evaluation is suggested.                 Author Status Last  Updated Created   Lev Day MD Signed Lev Day MD 12/9/2024  8:46 PM 12/9/2024  8:46 PM          Assoc. Orders Procedures   POLYSOMNOGRAM POLYSOMNOGRAM       Pre-Op Dx Post-Op Dx   Sleep apnea, unspecified type None         Patient Name: Ivone Monroy Study Date: 11/23/2024   YOB: 1960 Study Type: PSG   Age: 64 year Patient #: [id number]   Sex:  "Female Billing ID: -   Height: 5' 1" Interpreting Physician: Lev Day MD   Weight: 105.0 lbs Recording Tech: Tamiko Metzger   BMI: 20.1 Scoring Tech: Brenda Wheeler   Simla: 3 Neck Circumference: 15      Indications for Polysomnography  The patient is a 64 year-old Female who is 5' 1" and weighs 105.0 lbs. Her BMI equals 20.1.  A full night polysomnogram was performed to evaluate for Obstructive Sleep Apnea.     Referring provider: Elizabeth Lejeune NP     Medical History: Patient with snoring. Unsure of apnea events. Patient not having problems falling asleep, but wakes up frequently throughout the night.  Patient does not wake up feeling refreshed in the morning.  Patient with daytime hypersomnolence. Comorbidities include HTN, NSTEMI, sinus Bradycardia.  Bedtime: 10-10:30PM  Wake time: 7AM, sleep latency 15 minutes.  Denies denies restless legs or sleep attacks , no signs related to narcolepsy      STOP - BANG Questionnaire: 4     [place] = three of three SUSSY categories are positive (high risk is 2-3 positive categories)      Simla 8        Medication   LYRICA 50 MG capsule   TOPROL-XL 25 MG 24 hr tablet   ECOTRIN 81 MG EC tablet   ATIVAN 0.5 MG tablet   COZAAR 50 MG tablet   DESOWEN 0.05 % cream   PROTOPIC 0.1 % ointment   TEMOVATE 0.05 % external solution   PROTONIX 40 MG tablet   THERAGRAN per tablet      Test Description  Patient was connected to a full set of leads with a sleep technician in attendance.  Parameters used were EEG derivations (F4-A1, C4-A1, O2-A1), EOG derivations (LOC-A2, BUDDY-A2), EKG, EMG - extremity (leg) & chin, oxygen saturation, effort parameters + abdominal/thoracic (RIP), and airflow parameters - nasal pressure/thermal.  Body position was visually monitored and noted.  Audio & video monitoring was performed during study.     Scoring is done in accordance with the American Academy of Sleep Medicine Manual for the Scoring of Sleep and Associated Events version 2.6.  " Hypopneas are scored using the 4% desaturation rule.     Sleep Architecture  The total recording time of the polysomnogram was 442.1 minutes.  The total sleep time was 188.0 minutes.  The patient spent 13.8% of total sleep time in Stage N1, 59.3% in Stage N2, 18.6% in Stages N3, and 8.2% in REM.  Sleep latency was 164.6 minutes.  REM latency was 262.0 minutes.  Sleep Efficiency was 42.5%.  Wake after Sleep Onset time was 89.5 minutes.     Respiratory Events  The polysomnogram revealed a presence of 8 obstructive, 8 central, and - mixed apneas resulting in an Apnea index of 5.1 events per hour.  There were - hypopneas (>=3% desat and/or Ar.) resulting in an Apnea\Hypopnea Index (AHI >=3% desat and/or Ar.) of 5.1 events per hour.  There were - hypopneas (>=4% desat) resulting in an Apnea\Hypopnea Index (AHI >=4% desat) of 5.1 events per hour.  There were - Respiratory Effort Related Arousals resulting in a RERA index of - events per hour. The Respiratory Disturbance Index is 5.1 events per hour.      Mean oxygen saturation was 99.5%.  The lowest oxygen saturation during sleep was 96.0%.  Time spent <=88% oxygen saturation was 0.2 minutes (0.1%).     Limb Activity  There were 12 total limb movements recorded, of this total, 12 were classified as PLMs.  PLM index was 3.8 per hour and PLM associated with Arousals index was 1.3 per hour.     Cardiac Summary  The average pulse rate was 51.9 bpm.  The minimum pulse rate was 45.0 bpm while the maximum pulse rate was 76.0 bpm.  Cardiac rhythm was normal      Observation: Cough and bruxism   Awake 9:42 p.m. to 1238 a.m., 12:50 a.m. to 1:32 a.m., 3:07 a.m. to 3:31 a.m..          Conclusion:   Overall AHI was 5.1 events per hour with 16 events.    Eight obstructive apneas, 8 central apneas.  No supine sleep was recorded.    Very poor sleep efficiency.  Delayed sleep latency.  Delayed REM latencies.    PLM index was 1.3 events per hour   Bruxism was observed   REM RDI was 31.0  events per hour.     Diagnosis:      Adjustment Insomnia / 307.41              Obstructive Sleep Apnea / 327.23              Sleep Related Bruxism / 327.53               PLMD     Recommendations:      Evaluation by dental medicine for bruxism   Sleep hygiene sleep diary.    Consider ambulatory home sleep test to see whether with a labral sleep pattern the maybe significant sleep disordered breathing obstructive sleep apnea that requires treatment   Clinical correlation for restless leg          Assessment:       1. Insomnia, unspecified type          Encounter Medications[2]  No orders of the defined types were placed in this encounter.    Plan:     1. Insomnia, unspecified type           Assessment & Plan    IMPRESSION:  - Sleep study results (polysomnogram with poor sleep, 2 night HST)negative for sleep apnea (AHI index under 5 both nights), eliminating need for CPAP.  - Improved sleep quality, duration, and reduced nighttime awakenings.  - Current sleep routine adequate.    SLEEP ISSUES: Insomnia controlled.  - Ms. Monroy to maintain current sleep routine.  - Recommend avoiding naps if possible, especially if experiencing difficulty falling asleep at night.  - Contact the office if sleep issues worsen or if unable to fall asleep at night.    SCIATICA AND ARTHRITIS:  - Ms. Monroy to continue with current physical therapy regimen for sciatica and arthritis.    FOLLOW-UP:  - Contact the office if sleep issues worsen or if unable to fall asleep at night.          This note was generated with the assistance of ambient listening technology. Verbal consent was obtained by the patient and accompanying visitor(s) for the recording of patient appointment to facilitate this note. I attest to having reviewed and edited the generated note for accuracy, though some syntax or spelling errors may persist. Please contact the author of this note for any clarification.                Elizabeth LeJeune, ACNP, ANP         [1]    Patient Active Problem List  Diagnosis    GERD (gastroesophageal reflux disease)    History of diverticulitis    History of cancer of gall bladder    Age-related osteoporosis without current pathological fracture    Primary hypertension    Sinus bradycardia    Acute non-ST elevation myocardial infarction (NSTEMI)    Decreased range of motion of neck    Cervicalgia    Osteoarthritis of spine with radiculopathy, cervical region   [2]   Outpatient Encounter Medications as of 5/8/2025   Medication Sig Dispense Refill    aspirin (ECOTRIN) 81 MG EC tablet Take 1 tablet (81 mg total) by mouth once daily. 90 tablet 3    clobetasoL (TEMOVATE) 0.05 % external solution Apply topically 2 (two) times daily as needed for alopecia. Strong steroid- do NOT use on face. (Patient not taking: Reported on 5/5/2025) 50 mL 0    desonide (DESOWEN) 0.05 % cream Apply to the affected area once to twice daily for vitiligo. May use for up to 4 weeks. (Patient not taking: Reported on 5/5/2025) 60 g 0    dicyclomine (BENTYL) 10 MG capsule Take 10 mg by mouth 2 (two) times daily.  11    LORazepam (ATIVAN) 0.5 MG tablet Take 1 tablet (0.5 mg total) by mouth every 12 (twelve) hours as needed for Anxiety. (Patient taking differently: Take 0.5 mg by mouth as needed for Anxiety.) 15 tablet 0    losartan (COZAAR) 50 MG tablet TAKE 1 TABLET BY MOUTH EVERY DAY 90 tablet 3    meloxicam (MOBIC) 15 MG tablet Take 1 tablet (15 mg total) by mouth daily as needed. 30 tablet 0    metoprolol succinate (TOPROL-XL) 25 MG 24 hr tablet TAKE 1 TABLET BY MOUTH EVERY DAY 90 tablet 3    metronidazole 1% (METROGEL) 1 % Gel Apply topically once daily. For Perioral Dermatitis (bumps). (Patient not taking: Reported on 5/5/2025) 60 g 0    multivitamin (THERAGRAN) per tablet Take 1 tablet by mouth once daily.      pantoprazole (PROTONIX) 40 MG tablet Take 40 mg by mouth 2 (two) times daily.      pregabalin (LYRICA) 50 MG capsule TAKE 1 CAPSULE (50 MG TOTAL) BY MOUTH EVERY  EVENING. 30 capsule 1    tacrolimus (PROTOPIC) 0.1 % ointment apply to affected area twice daily of vitiligo. Non-steroid med. (Patient not taking: Reported on 5/5/2025) 30 g 1    [DISCONTINUED] metoprolol succinate (TOPROL-XL) 25 MG 24 hr tablet Take 1 tablet (25 mg total) by mouth once daily. 90 tablet 1     No facility-administered encounter medications on file as of 5/8/2025.

## 2025-05-12 ENCOUNTER — CLINICAL SUPPORT (OUTPATIENT)
Dept: REHABILITATION | Facility: HOSPITAL | Age: 65
End: 2025-05-12
Payer: COMMERCIAL

## 2025-05-12 DIAGNOSIS — R29.898 DECREASED RANGE OF MOTION OF NECK: Primary | ICD-10-CM

## 2025-05-12 DIAGNOSIS — M54.2 CERVICALGIA: ICD-10-CM

## 2025-05-12 PROCEDURE — 97112 NEUROMUSCULAR REEDUCATION: CPT | Mod: KX

## 2025-05-12 PROCEDURE — 97530 THERAPEUTIC ACTIVITIES: CPT | Mod: KX

## 2025-05-13 NOTE — PROGRESS NOTES
"  Outpatient Rehab    Physical Therapy Visit      Patient Name: Ivone Monroy  MRN: [id number]  YOB: 1960  Encounter Date: 5/12/2025    Therapy Diagnosis:   Encounter Diagnoses   Name Primary?    Decreased range of motion of neck Yes    Cervicalgia      Physician: Aurora Morelos PA*          Nehal Puente MD  Physician Orders: Eval and Treat  Medical Diagnosis: Chronic left shoulder pain  Decreased range of motion of neck  Left cervical radiculopathy  Visit # / Visits Authorized:  12 / 24  Insurance Authorization Period: 3/28/2025 to 12/31/2025  Date of Evaluation: 3/28/2025  Plan of Care Certification: 3/28/2025 to 05/23/2025     PT/PTA:     Number of PTA visits since last PT visit:   Time In: 1332   Time Out: 1430  Total Time: 58   Total Billable Time:  30    FOTO:  Intake Score:  %  Survey Score 1:  %  Survey Score 2:  %     Subjective   Patient reports continued mild symptoms down LE..  Pain reported as 3/10. Lumbar - 3/10  Cervical - 1/10  Objective       Treatment:     CPT Intervention Performed   Today Duration / Intensity   MT Soft tissue massage             TE Bike x 7 minutes    Hamstring Stretch x 3 x 30" bilateral    Piriformis Stretch x 3 x 30"    Open Books x X15 bilateral           Objective Testing  Measurements, FOTO, education          NMR Posterior Pelvic Tilt  x 3 minutes with 5-10 sec holds     Posterior Pelvic Tilt with March x 2 x 10     90/90 Nerve Glide x X15 bilateral     Bridges x 3 x 10     Clamshells x 3 x 10 side lying YTB          TA Leg Press x 3 x 10 DL with 105#     Pallof Press x 20 x 5" holds with yellow band looped     Side Stepping x 5 laps in bars with red band looped          PLAN        CPT Intervention Performed   Today Duration / Intensity   MT Soft tissue massage   Upper trap, suboccipitals          TE UBE  3'/3'    Cervical AROM  X20 all planes    Open Books  X20 bilateral     Foam Roll Series  3 min pec stretch  Bear Hugs 1 min  Field Goals 1 " "min  Shoulder [place] 1 min each way  Swimmers 1 min  Serratus Punches 1 min          NMR Prone Series  20 x 5" holds   I's 3#  T's 3#  W's 3#     Scapular Retractions  3 minutes with 5" holds     Chin Tucks  3 minutes with 5" holds     Chin Tuck with Lift  20 x 5" holds     Bilateral External Rotation  3 x 12 YTB          TA Scaption  3 x 10 with 3#     Overhead Press  3 x 10 with 3#     Rows  3 x 10 with 7.5#     Shoulder Extension  3 x 10 with 2.5#                               PLAN         CPT Codes available for Billing:   (00) minutes of Manual therapy (MT) to improve pain and ROM.  (00) minutes of Therapeutic Exercise (TE) to develop strength, endurance, range of motion, and flexibility.  (16) minutes of Neuromuscular Re-Education (NMR)  to improve: Balance, Coordination, Kinesthetic, Sense, Proprioception, and Posture.  (14) minutes of Therapeutic Activities (TA) to improve functional performance.  Vasopneumatic Device Therapy () for management of swelling/edema. (13187)  Unattended Electrical Stimulation (ES) for muscle performance or pain modulation.  BFR: Blood flow restriction applied during exercise    Assessment & Plan   Assessment: Patient tolerated session well. Continued focus on nerve glides for radicular symptom reduction and functional strengthening for improved functional ability.     Patient will continue to benefit from skilled outpatient physical therapy to address the deficits listed in the problem list box on initial evaluation, provide pt/family education and to maximize pt's level of independence in the home and community environment.      Patient's spiritual, cultural, and educational needs considered and patient agreeable to plan of care and goals.     Plan: Continue Plan of Care (POC) and progress per patient tolerance. See treatment section for details on planned progressions next session.    Goals:   Neck/ LUE Problems       Neck/ LUE Problems (Active)       Functional outcome       " Patient will show a significant change in FOTO patient-reported outcome tool to goal score to demonstrate subjective improvement       Start:  05/05/25    Expected End:  06/30/25            Patient will demonstrate independence in home program for support of progression       Start:  05/05/25    Expected End:  06/02/25               Pain       Patient will report pain of 1/10 demonstrating a reduction of overall pain       Start:  05/05/25    Expected End:  06/30/25            Patient will report a 2 point reduction in pain.       Start:  05/05/25    Expected End:  06/02/25               Range of Motion       Patient will achieve lumbar flexion to within normal limits.       Start:  05/05/25    Expected End:  06/30/25               Strength       Patient will achieve bilateral hip abduction strength of 4+/5       Start:  05/05/25    Expected End:  06/30/25                 Cervicalgia and Low Back Pain Problems       Cervicalgia and Low Back Pain Problems (Active)       Functional outcome       Patient will show a significant change in FOTO patient-reported outcome tool to goal score to  demonstrate subjective improvement       Start:  03/28/25    Expected End:  05/23/25            Patient will demonstrate independence in home program for support of progression       Start:  03/28/25    Expected End:  04/25/25               Pain       Patient will report pain of 1/10 demonstrating a reduction of overall pain       Start:  03/28/25    Expected End:  05/23/25            Patient will report a 2 point reduction in pain.       Start:  03/28/25    Expected End:  04/25/25               Range of Motion       Patient will achieve cervical extension ROM 60 degrees       Start:  03/28/25    Expected End:  05/23/25               Strength       Patient will achieve bilateral shoulder abduction strength of 4+/5       Start:  03/28/25    Expected End:  05/23/25                 Rancho Mendoza, PT

## 2025-05-15 ENCOUNTER — CLINICAL SUPPORT (OUTPATIENT)
Dept: REHABILITATION | Facility: HOSPITAL | Age: 65
End: 2025-05-15
Payer: COMMERCIAL

## 2025-05-15 ENCOUNTER — OFFICE VISIT (OUTPATIENT)
Dept: RHEUMATOLOGY | Facility: CLINIC | Age: 65
End: 2025-05-15
Payer: COMMERCIAL

## 2025-05-15 VITALS
HEIGHT: 61 IN | SYSTOLIC BLOOD PRESSURE: 138 MMHG | BODY MASS INDEX: 20.79 KG/M2 | DIASTOLIC BLOOD PRESSURE: 74 MMHG | HEART RATE: 53 BPM

## 2025-05-15 DIAGNOSIS — M54.2 CERVICALGIA: ICD-10-CM

## 2025-05-15 DIAGNOSIS — M54.31 RIGHT SIDED SCIATICA: ICD-10-CM

## 2025-05-15 DIAGNOSIS — M47.22 OSTEOARTHRITIS OF SPINE WITH RADICULOPATHY, CERVICAL REGION: ICD-10-CM

## 2025-05-15 DIAGNOSIS — R29.898 DECREASED RANGE OF MOTION OF NECK: Primary | ICD-10-CM

## 2025-05-15 DIAGNOSIS — M81.0 AGE-RELATED OSTEOPOROSIS WITHOUT CURRENT PATHOLOGICAL FRACTURE: Primary | ICD-10-CM

## 2025-05-15 PROCEDURE — 97110 THERAPEUTIC EXERCISES: CPT | Mod: KX

## 2025-05-15 PROCEDURE — 97530 THERAPEUTIC ACTIVITIES: CPT | Mod: KX

## 2025-05-15 PROCEDURE — 97112 NEUROMUSCULAR REEDUCATION: CPT | Mod: KX

## 2025-05-15 PROCEDURE — 99999 PR PBB SHADOW E&M-EST. PATIENT-LVL IV: CPT | Mod: PBBFAC,,, | Performed by: INTERNAL MEDICINE

## 2025-05-15 NOTE — PROGRESS NOTES
RHEUMATOLOGY CLINIC FOLLOW UP VISIT  Chief complaints, HPI, ROS, EXAM, Assessment & Plans:-  Ivone Patel a 64 y.o. pleasant female comes in for follow-up visit.  She has been seen by my associates for osteoporosis.  On Reclast since December 2023.  Have received 2 infusion so far.  No significant adverse reactions.  No falls or fragility fractures since last visit.  Mild intermittent right-sided sciatica and cervical radiculopathy managed by interventional pain specialist.  Receives interventional therapies and physical therapy.  Rheumatological review of system negative otherwise.  Physical examination shows no synovitis of small joints.  No effusion of large joints.    1. Age-related osteoporosis without current pathological fracture    2. Osteoarthritis of spine with radiculopathy, cervical region    3. Right sided sciatica      Problem List Items Addressed This Visit       Age-related osteoporosis without current pathological fracture - Primary    Relevant Orders    DXA Bone Density Axial Skeleton 1 or more sites    Osteoarthritis of spine with radiculopathy, cervical region    Right sided sciatica       Labs reviewed today:-   Latest Reference Range & Units 04/04/25 09:08   Sodium 136 - 145 mmol/L 140   Potassium 3.5 - 5.1 mmol/L 5.5 (H)   Chloride 95 - 110 mmol/L 103   CO2 23 - 29 mmol/L 28   Anion Gap 8 - 16 mmol/L 9   BUN 8 - 23 mg/dL 26 (H)   Creatinine 0.5 - 1.4 mg/dL 1.0   eGFR >60 mL/min/1.73/m2 >60   Glucose 70 - 110 mg/dL 67 (L)   Calcium 8.7 - 10.5 mg/dL 10.2   ALP 40 - 150 unit/L 52   PROTEIN TOTAL 6.0 - 8.4 gm/dL 7.4   Albumin 3.5 - 5.2 g/dL 4.0   BILIRUBIN TOTAL 0.1 - 1.0 mg/dL 0.4   AST 11 - 45 unit/L 26   ALT 10 - 44 unit/L 17   Cholesterol Total 120 - 199 mg/dL 186   HDL 40 - 75 mg/dL 77 (H)   HDL/Cholesterol Ratio 20.0 - 50.0 % 41.4   Non-HDL Cholesterol mg/dL 109   Total Cholesterol/HDL Ratio 2.0 - 5.0  2.4   Triglycerides 30 - 150  mg/dL 73   LDL Cholesterol 63.0 - 159.0 mg/dL 94.4   (H): Data is abnormally high  (L): Data is abnormally low      Osteoporosis on Reclast.  Tolerating well.  Continue Reclast yearly.  Repeat DEXA scan next visit to monitor efficacy of Reclast.  If worsening, discontinue Reclast and start Prolia/Evenity.  Degenerative disc disease with facet arthropathy of cervical and lumbar spine with radiculopathy symptoms.  Continue physical therapy and follow-up with interventional pain specialist.  Advised to take arthritis strength extended-release Tylenol and turmeric supplements.    I have explained all of the above in detail and the patient understands all of the current recommendation(s). I have answered all questions to the best of my ability and to their complete satisfaction.       # Follow up in about 7 months (around 12/15/2025).      Disclaimer: This note was prepared using voice recognition system and is likely to have sound alike errors and is not proof read.  Please call me with any questions.

## 2025-05-15 NOTE — PATIENT INSTRUCTIONS
Take Arthritis strength extended release Tylenol 650 mg 1 tablet 3 times daily as needed for pain.  Start turmeric supplements 1000 mg 2 times daily for anti-inflammatory benefit.

## 2025-05-15 NOTE — PROGRESS NOTES
"  Outpatient Rehab    Physical Therapy Visit      Patient Name: Ivone Monroy  MRN: [id number]  YOB: 1960  Encounter Date: 5/15/2025    Therapy Diagnosis:   Encounter Diagnoses   Name Primary?    Decreased range of motion of neck Yes    Cervicalgia      Physician: Aurora Morelos PA*          Nehal Puente MD  Physician Orders: Eval and Treat  Medical Diagnosis: Chronic left shoulder pain  Decreased range of motion of neck  Left cervical radiculopathy  Visit # / Visits Authorized:  13 / 24  Insurance Authorization Period: 3/28/2025 to 12/31/2025  Date of Evaluation: 3/28/2025  Plan of Care Certification: 3/28/2025 to 05/23/2025     PT/PTA:     Number of PTA visits since last PT visit:   Time In: 1431   Time Out: 1532  Total Time: 61   Total Billable Time:  58    FOTO:  Intake Score:  %  Survey Score 1:  %  Survey Score 2:  %     Subjective   Patient reports no symptoms down LE today and has been feeling good in back. Biggest complaint today in cervical spine discomfort..  Pain reported as 0/10. Lumbar - 0/10  Cervical - 2/10  Objective       Treatment:     CPT Intervention Performed   Today Duration / Intensity   MT Soft tissue massage             TE Bike x 7 minutes    Hamstring Stretch x 3 x 30" bilateral    Piriformis Stretch x 3 x 30"    Open Books x X15 bilateral           Objective Testing  Measurements, FOTO, education          NMR Posterior Pelvic Tilt  x 3 minutes with 5-10 sec holds     Posterior Pelvic Tilt with Double March x 2 x 10     90/90 Nerve Glide x X15 bilateral     Bridges x 3 x 10     Clamshells x 3 x 10 side lying YTB          TA Leg Press x 3 x 10 DL with 105#     Pallof Press x 20 x 5" holds with yellow band looped     Side Stepping x 5 laps in bars with red band looped    Step ups x X20 bilateral with 6" step          PLAN        CPT Intervention Performed   Today Duration / Intensity   MT Soft tissue massage   Upper trap, suboccipitals          TE UBE  3'/3'    " "Cervical AROM  X20 all planes    Open Books  X20 bilateral     Foam Roll Series  3 min pec stretch  Bear Hugs 1 min  Field Goals 1 min  Shoulder [place] 1 min each way  Swimmers 1 min  Serratus Punches 1 min          NMR Prone Series  20 x 5" holds   I's 3#  T's 3#  W's 3#     Scapular Retractions  3 minutes with 5" holds     Chin Tucks  3 minutes with 5" holds     Chin Tuck with Lift  20 x 5" holds     Bilateral External Rotation  3 x 12 YTB          TA Scaption  3 x 10 with 3#     Overhead Press  3 x 10 with 3#     Rows  3 x 10 with 7.5#     Shoulder Extension  3 x 10 with 2.5#                               PLAN         CPT Codes available for Billing:   (00) minutes of Manual therapy (MT) to improve pain and ROM.  (16) minutes of Therapeutic Exercise (TE) to develop strength, endurance, range of motion, and flexibility.  (18) minutes of Neuromuscular Re-Education (NMR)  to improve: Balance, Coordination, Kinesthetic, Sense, Proprioception, and Posture.  (24) minutes of Therapeutic Activities (TA) to improve functional performance.  Vasopneumatic Device Therapy () for management of swelling/edema. (19613)  Unattended Electrical Stimulation (ES) for muscle performance or pain modulation.  BFR: Blood flow restriction applied during exercise    Assessment & Plan   Assessment: Patient tolerated session well. Patient progressed with further functional strengthening with step ups. Demonstrates good functional mobility and strength.     Patient will continue to benefit from skilled outpatient physical therapy to address the deficits listed in the problem list box on initial evaluation, provide pt/family education and to maximize pt's level of independence in the home and community environment.      Patient's spiritual, cultural, and educational needs considered and patient agreeable to plan of care and goals.     Plan: Continue Plan of Care (POC) and progress per patient tolerance. See treatment section for details on " planned progressions next session.    Goals:   Active       Functional outcome       Patient will show a significant change in FOTO patient-reported outcome tool to goal score to  demonstrate subjective improvement       Start:  03/28/25    Expected End:  05/23/25            Patient will demonstrate independence in home program for support of progression       Start:  03/28/25    Expected End:  04/25/25               Pain       Patient will report pain of 1/10 demonstrating a reduction of overall pain       Start:  03/28/25    Expected End:  05/23/25            Patient will report a 2 point reduction in pain.       Start:  03/28/25    Expected End:  04/25/25               Range of Motion       Patient will achieve cervical extension ROM 60 degrees       Start:  03/28/25    Expected End:  05/23/25               Strength       Patient will achieve bilateral shoulder abduction strength of 4+/5       Start:  03/28/25    Expected End:  05/23/25              Rancho Mendoza, PT

## 2025-05-19 ENCOUNTER — CLINICAL SUPPORT (OUTPATIENT)
Dept: REHABILITATION | Facility: HOSPITAL | Age: 65
End: 2025-05-19
Payer: COMMERCIAL

## 2025-05-19 DIAGNOSIS — R29.898 DECREASED RANGE OF MOTION OF NECK: Primary | ICD-10-CM

## 2025-05-19 DIAGNOSIS — M54.2 CERVICALGIA: ICD-10-CM

## 2025-05-19 PROCEDURE — 97110 THERAPEUTIC EXERCISES: CPT | Mod: KX

## 2025-05-19 PROCEDURE — 97112 NEUROMUSCULAR REEDUCATION: CPT | Mod: KX

## 2025-05-19 PROCEDURE — 97530 THERAPEUTIC ACTIVITIES: CPT | Mod: KX

## 2025-05-19 NOTE — PROGRESS NOTES
"  Outpatient Rehab    Physical Therapy Visit      Patient Name: Ivone Monroy  MRN: [id number]  YOB: 1960  Encounter Date: 5/19/2025    Therapy Diagnosis:   Encounter Diagnoses   Name Primary?    Decreased range of motion of neck Yes    Cervicalgia      Physician: Aurora Morelos PA*          Nehal Puente MD  Physician Orders: Eval and Treat  Medical Diagnosis: Chronic left shoulder pain  Decreased range of motion of neck  Left cervical radiculopathy  Visit # / Visits Authorized:  14 / 24  Insurance Authorization Period: 3/28/2025 to 12/31/2025  Date of Evaluation: 3/28/2025  Plan of Care Certification: 3/28/2025 to 05/23/2025     PT/PTA:     Number of PTA visits since last PT visit:   Time In: 1430   Time Out: 1533  Total Time: 63   Total Billable Time:  59    FOTO:  Intake Score:  %  Survey Score 1:  %  Survey Score 2:  %     Subjective   Patient reports low back and hip have been feeling much better with minimal to no symptoms. Pt notes neck has been bothersome more than low back lately with stiffness and some radicular tingling..  Pain reported as 0/10. Lumbar - 0/10  Cervical - 2/10  Objective       Treatment:     CPT Intervention Performed   Today Duration / Intensity   MT Soft tissue massage             TE Bike x 7 minutes    Hamstring Stretch x 3 x 30" bilateral    Piriformis Stretch x 3 x 30"    Open Books x X15 bilateral           Objective Testing  Measurements, FOTO, education          NMR Posterior Pelvic Tilt  x 2 minutes with 5-10 sec holds     Posterior Pelvic Tilt with heel taps x 3 x 5 bilateral     90/90 Nerve Glide x X15 bilateral     Bridges x 3 x 10 with 10# bag     Clamshells x 3 x 10 side lying YTB          TA Leg Press x 3 x 10 DL with 105#     Pallof Press x 20 x 5" holds with yellow band looped     Side Stepping x 5 laps in bars with red band looped    Step ups x X20 bilateral with 6" step          PLAN        CPT Intervention Performed   Today Duration / " "Intensity   MT Soft tissue massage   Upper trap, suboccipitals          TE UBE  3'/3'    Cervical AROM  X20 all planes    Open Books  X20 bilateral     Foam Roll Series  3 min pec stretch  Bear Hugs 1 min  Field Goals 1 min  Shoulder [place] 1 min each way  Swimmers 1 min  Serratus Punches 1 min          NMR Prone Series  20 x 5" holds   I's 3#  T's 3#  W's 3#     Scapular Retractions  3 minutes with 5" holds     Chin Tucks  3 minutes with 5" holds     Chin Tuck with Lift  20 x 5" holds     Bilateral External Rotation  3 x 12 YTB          TA Scaption  3 x 10 with 3#     Overhead Press  3 x 10 with 3#     Rows  3 x 10 with 7.5#     Shoulder Extension  3 x 10 with 2.5#                               PLAN         CPT Codes available for Billing:   (00) minutes of Manual therapy (MT) to improve pain and ROM.  (16) minutes of Therapeutic Exercise (TE) to develop strength, endurance, range of motion, and flexibility.  (18) minutes of Neuromuscular Re-Education (NMR)  to improve: Balance, Coordination, Kinesthetic, Sense, Proprioception, and Posture.  (24) minutes of Therapeutic Activities (TA) to improve functional performance.  Vasopneumatic Device Therapy () for management of swelling/edema. (35207)  Unattended Electrical Stimulation (ES) for muscle performance or pain modulation.  BFR: Blood flow restriction applied during exercise    Assessment & Plan   Assessment: Patient tolreated session well. Pt progressed with performance of dead bugs for improved core activation and strength. Resistance added to bridges today for improved glute activation and strength.     Patient will continue to benefit from skilled outpatient physical therapy to address the deficits listed in the problem list box on initial evaluation, provide pt/family education and to maximize pt's level of independence in the home and community environment.      Patient's spiritual, cultural, and educational needs considered and patient agreeable to plan " of care and goals.     Plan: Continue Plan of Care (POC) and progress per patient tolerance. See treatment section for details on planned progressions next session.    Goals:   Active       Functional outcome       Patient will show a significant change in FOTO patient-reported outcome tool to goal score to  demonstrate subjective improvement       Start:  03/28/25    Expected End:  05/23/25            Patient will demonstrate independence in home program for support of progression       Start:  03/28/25    Expected End:  04/25/25               Pain       Patient will report pain of 1/10 demonstrating a reduction of overall pain       Start:  03/28/25    Expected End:  05/23/25            Patient will report a 2 point reduction in pain.       Start:  03/28/25    Expected End:  04/25/25               Range of Motion       Patient will achieve cervical extension ROM 60 degrees       Start:  03/28/25    Expected End:  05/23/25               Strength       Patient will achieve bilateral shoulder abduction strength of 4+/5       Start:  03/28/25    Expected End:  05/23/25              Rancho Mendoza, PT

## 2025-05-20 ENCOUNTER — CLINICAL SUPPORT (OUTPATIENT)
Dept: REHABILITATION | Facility: HOSPITAL | Age: 65
End: 2025-05-20
Payer: COMMERCIAL

## 2025-05-20 DIAGNOSIS — M54.41 ACUTE RIGHT-SIDED LOW BACK PAIN WITH RIGHT-SIDED SCIATICA: ICD-10-CM

## 2025-05-20 DIAGNOSIS — R29.898 DECREASED RANGE OF MOTION OF NECK: Primary | ICD-10-CM

## 2025-05-20 DIAGNOSIS — M54.2 CERVICALGIA: ICD-10-CM

## 2025-05-20 PROCEDURE — 97530 THERAPEUTIC ACTIVITIES: CPT | Mod: KX

## 2025-05-20 PROCEDURE — 97112 NEUROMUSCULAR REEDUCATION: CPT | Mod: KX

## 2025-05-20 PROCEDURE — 97110 THERAPEUTIC EXERCISES: CPT | Mod: KX

## 2025-05-20 RX ORDER — MELOXICAM 15 MG/1
15 TABLET ORAL DAILY PRN
Qty: 30 TABLET | Refills: 0 | Status: SHIPPED | OUTPATIENT
Start: 2025-05-20

## 2025-05-20 NOTE — TELEPHONE ENCOUNTER
Refill Routing Note   Medication(s) are not appropriate for processing by [facility] for the following reason(s):        Outside of protocol    [facility] action(s):  Route             Appointments  past 12m or future 3m with PCP    Date Provider   Last Visit   4/23/2025 Nehal Puente MD   Next Visit   10/3/2025 Nehal Puente MD   ED visits in past 90 days: 0        Note composed:9:06 AM 05/20/2025

## 2025-05-20 NOTE — PROGRESS NOTES
"  Outpatient Rehab    Physical Therapy Visit      Patient Name: Ivone Monroy  MRN: [id number]  YOB: 1960  Encounter Date: 5/20/2025    Therapy Diagnosis:   Encounter Diagnoses   Name Primary?    Decreased range of motion of neck Yes    Cervicalgia      Physician: Aurora Morelos PA*          Nehal Puente MD  Physician Orders: Eval and Treat  Medical Diagnosis: Chronic left shoulder pain  Decreased range of motion of neck  Left cervical radiculopathy  Visit # / Visits Authorized:  15 / 24  Insurance Authorization Period: 3/28/2025 to 12/31/2025  Date of Evaluation: 3/28/2025  Plan of Care Certification: 5/6/2025 to 06/30/2025     PT/PTA:     Number of PTA visits since last PT visit:   Time In: 1429   Time Out: 1528  Total Time: 59   Total Billable Time:  58    FOTO:  Intake Score:  %  Survey Score 1:  %  Survey Score 2:  %     Subjective   Patient reports feeling pretty good today. No soreness or symptom increase following session yesterday..    Lumbar - 0/10  Cervical - 2/10  Objective       Treatment:     CPT Intervention Performed   Today Duration / Intensity   MT Soft tissue massage             TE Bike x 7 minutes    Hamstring Stretch x 3 x 30" bilateral    Piriformis Stretch x 3 x 30"    Open Books x X15 bilateral           Objective Testing  Measurements, FOTO, education          NMR Posterior Pelvic Tilt  x 2 minutes with 5-10 sec holds     Posterior Pelvic Tilt with heel taps x 3 x 5 bilateral     90/90 Nerve Glide x X15 bilateral     Bridges x 3 x 10 with 10# bag     Clamshells x 3 x 10 side lying YTB          TA Leg Press x 3 x 10 DL with 105#     Pallof Press x 20 x 5" holds with yellow band looped     Side Stepping x 5 laps in bars with red band looped    Step ups x X20 bilateral with 6" step          PLAN        CPT Intervention Performed   Today Duration / Intensity   MT Soft tissue massage   Upper trap, suboccipitals          TE UBE  3'/3'    Cervical AROM  X20 all planes    " "Open Books  X20 bilateral     Foam Roll Series  3 min pec stretch  Bear Hugs 1 min  Field Goals 1 min  Shoulder [place] 1 min each way  Swimmers 1 min  Serratus Punches 1 min          NMR Prone Series  20 x 5" holds   I's 3#  T's 3#  W's 3#     Scapular Retractions  3 minutes with 5" holds     Chin Tucks  3 minutes with 5" holds     Chin Tuck with Lift  20 x 5" holds     Bilateral External Rotation  3 x 12 YTB          TA Scaption  3 x 10 with 3#     Overhead Press  3 x 10 with 3#     Rows  3 x 10 with 7.5#     Shoulder Extension  3 x 10 with 2.5#                               PLAN         CPT Codes available for Billing:   (00) minutes of Manual therapy (MT) to improve pain and ROM.  (16) minutes of Therapeutic Exercise (TE) to develop strength, endurance, range of motion, and flexibility.  (19) minutes of Neuromuscular Re-Education (NMR)  to improve: Balance, Coordination, Kinesthetic, Sense, Proprioception, and Posture.  (23) minutes of Therapeutic Activities (TA) to improve functional performance.  Vasopneumatic Device Therapy () for management of swelling/edema. (80126)  Unattended Electrical Stimulation (ES) for muscle performance or pain modulation.  BFR: Blood flow restriction applied during exercise    Assessment & Plan   Assessment: Patient tolreated session well. Pt demonstrates improved core activation and breathing while performing posterior pelvic tilt with heel taps today. Good fatigue and no symptoms noted with functional activity performed today.     Patient will continue to benefit from skilled outpatient physical therapy to address the deficits listed in the problem list box on initial evaluation, provide pt/family education and to maximize pt's level of independence in the home and community environment.      Patient's spiritual, cultural, and educational needs considered and patient agreeable to plan of care and goals.     Plan: Continue Plan of Care (POC) and progress per patient tolerance. " See treatment section for details on planned progressions next session.    Goals:   Active       Functional outcome       Patient will show a significant change in FOTO patient-reported outcome tool to goal score to  demonstrate subjective improvement       Start:  03/28/25    Expected End:  05/23/25            Patient will demonstrate independence in home program for support of progression       Start:  03/28/25    Expected End:  04/25/25               Pain       Patient will report pain of 1/10 demonstrating a reduction of overall pain       Start:  03/28/25    Expected End:  05/23/25            Patient will report a 2 point reduction in pain.       Start:  03/28/25    Expected End:  04/25/25               Range of Motion       Patient will achieve cervical extension ROM 60 degrees       Start:  03/28/25    Expected End:  05/23/25               Strength       Patient will achieve bilateral shoulder abduction strength of 4+/5       Start:  03/28/25    Expected End:  05/23/25              Rancho Mendoza, PT

## 2025-05-26 ENCOUNTER — CLINICAL SUPPORT (OUTPATIENT)
Dept: REHABILITATION | Facility: HOSPITAL | Age: 65
End: 2025-05-26
Payer: COMMERCIAL

## 2025-05-26 DIAGNOSIS — M54.2 CERVICALGIA: ICD-10-CM

## 2025-05-26 DIAGNOSIS — R29.898 DECREASED RANGE OF MOTION OF NECK: Primary | ICD-10-CM

## 2025-05-26 PROCEDURE — 97110 THERAPEUTIC EXERCISES: CPT | Mod: KX

## 2025-05-26 PROCEDURE — 97530 THERAPEUTIC ACTIVITIES: CPT | Mod: KX

## 2025-05-26 PROCEDURE — 97112 NEUROMUSCULAR REEDUCATION: CPT | Mod: KX

## 2025-05-26 NOTE — PROGRESS NOTES
"  Outpatient Rehab    Physical Therapy Progress Note       Patient Name: Ivone Monroy  MRN: [id number]  YOB: 1960  Encounter Date: 5/26/2025    Therapy Diagnosis:   Encounter Diagnoses   Name Primary?    Decreased range of motion of neck Yes    Cervicalgia      Physician: Aurora Morelos PA*          Nehal Puente MD  Physician Orders: Eval and Treat  Medical Diagnosis: Chronic left shoulder pain  Decreased range of motion of neck  Left cervical radiculopathy  Visit # / Visits Authorized:  16 / 24  Insurance Authorization Period: 3/28/2025 to 12/31/2025  Date of Evaluation: 3/28/2025  Plan of Care Certification: 5/6/2025 to 06/30/2025     PT/PTA:     Number of PTA visits since last PT visit:   Time In: 1430   Time Out: 1531  Total Time: 61   Total Billable Time:  58    FOTO:  Intake Score:  %  Survey Score 1:  %  Survey Score 2:  %     Subjective   Patient reports feeling great in regards to low back still. Cervical pain has been bigger problem for her lately. Notes she has been keeping up with HEP well but pain has been returning since has not been as focused in PT sessions lately..    Lumbar - 0/10  Cervical - 2/10  Objective      Cervical Right   (spine) Left    Pain/Dysfunction with Movement Goal   Cervical Flexion (60º) 50 ---  60   Cervical Extension (80º) 60 ---  80   Cervical Side Bending (45º) 30 35  35   Cervical Rotation (75º) 70 75  75     STRENGTH:   U/E MMT Right Left Pain/Dysfunction with Movement Goal   Shoulder Flexion 4/5 4/5   4+/5 B   Shoulder Extension 4+/5 4+/5   4+/5 B   Shoulder Abduction 4/5 4/5   4+/5 B   Shoulder IR 4+/5 4+/5   4+/5 B   Shoulder ER 4/5 4/5   4+/5 B   Middle Trapezius 4/5 4/5   4+/5 B      Treatment:     CPT Intervention Performed   Today Duration / Intensity   MT Soft tissue massage             TE Bike  7 minutes    Hamstring Stretch  3 x 30" bilateral    Piriformis Stretch  3 x 30"    Open Books  X15 bilateral           Objective Testing x " "Measurements, FOTO, education          NMR Posterior Pelvic Tilt   2 minutes with 5-10 sec holds     Posterior Pelvic Tilt with heel taps  3 x 5 bilateral     90/90 Nerve Glide  X15 bilateral     Bridges  3 x 10 with 10# bag     Clamshells  3 x 10 side lying YTB          TA Leg Press  3 x 10 DL with 105#     Pallof Press  20 x 5" holds with yellow band looped     Side Stepping  5 laps in bars with red band looped    Step ups  X20 bilateral with 6" step          PLAN        CPT Intervention Performed   Today Duration / Intensity   MT Soft tissue massage   Upper trap, suboccipitals          TE UBE x 3'/3'    Cervical AROM  X20 all planes    Open Books x X20 bilateral     Foam Roll Series  3 min pec stretch  Bear Hugs 1 min  Field Goals 1 min  Shoulder [place] 1 min each way  Swimmers 1 min  Serratus Punches 1 min          NMR Prone Series   x  x  x 20 x 5" holds   I's 3#  T's 3#  W's 3#     Scapular Retractions  3 minutes with 5" holds     Chin Tucks x 3 minutes with 5" holds     Chin Tuck with Lift x 20 x 5" holds     Bilateral External Rotation x 3 x 12  red TB          TA Scaption x 3 x 10 with 3#     Overhead Press x 3 x 10 with 3#     Rows x 3 x 10 with 7.5#     Shoulder Extension x 3 x 10 with 2.5#                 PLAN         CPT Codes available for Billing:   (00) minutes of Manual therapy (MT) to improve pain and ROM.  (16) minutes of Therapeutic Exercise (TE) to develop strength, endurance, range of motion, and flexibility.  (25) minutes of Neuromuscular Re-Education (NMR)  to improve: Balance, Coordination, Kinesthetic, Sense, Proprioception, and Posture.  (16) minutes of Therapeutic Activities (TA) to improve functional performance.  Vasopneumatic Device Therapy () for management of swelling/edema. (04344)  Unattended Electrical Stimulation (ES) for muscle performance or pain modulation.  BFR: Blood flow restriction applied during exercise    Assessment & Plan   Assessment: Patient presents with similar " cervical AROM since previous measurement take. Pt cervical AROM minimally retricted. Pt does show moderate progress with UE strength since previous measurement. Pt continues to note cervical pain on left side. Pt cervical HEP reviewed today and tolerated these exercises well.     Patient will continue to benefit from skilled outpatient physical therapy to address the deficits listed in the problem list box on initial evaluation, provide pt/family education and to maximize pt's level of independence in the home and community environment.      Patient's spiritual, cultural, and educational needs considered and patient agreeable to plan of care and goals.     Plan: Continue Plan of Care (POC) and progress per patient tolerance. See treatment section for details on planned progressions next session.    Goals:   Active       Functional outcome       Patient will show a significant change in FOTO patient-reported outcome tool to goal score to  demonstrate subjective improvement       Start:  03/28/25    Expected End:  06/30/25            Patient will demonstrate independence in home program for support of progression (Met)       Start:  03/28/25    Expected End:  06/30/25    Resolved:  05/26/25            Pain       Patient will report pain of 1/10 demonstrating a reduction of overall pain       Start:  03/28/25    Expected End:  06/30/25            Patient will report a 2 point reduction in pain.       Start:  03/28/25    Expected End:  06/30/25               Strength       Patient will achieve bilateral shoulder abduction strength of 4+/5       Start:  03/28/25    Expected End:  06/30/25              Resolved       Range of Motion       Patient will achieve cervical extension ROM 60 degrees (Met)       Start:  03/28/25    Expected End:  05/23/25    Resolved:  05/26/25           Rancho Mendoza, PT

## 2025-05-29 ENCOUNTER — CLINICAL SUPPORT (OUTPATIENT)
Dept: REHABILITATION | Facility: HOSPITAL | Age: 65
End: 2025-05-29
Payer: COMMERCIAL

## 2025-05-29 DIAGNOSIS — M54.2 CERVICALGIA: ICD-10-CM

## 2025-05-29 DIAGNOSIS — R29.898 DECREASED RANGE OF MOTION OF NECK: Primary | ICD-10-CM

## 2025-05-29 PROCEDURE — 97112 NEUROMUSCULAR REEDUCATION: CPT | Mod: KX

## 2025-05-29 PROCEDURE — 97110 THERAPEUTIC EXERCISES: CPT | Mod: KX

## 2025-05-29 PROCEDURE — 97530 THERAPEUTIC ACTIVITIES: CPT | Mod: KX

## 2025-05-29 PROCEDURE — 97140 MANUAL THERAPY 1/> REGIONS: CPT | Mod: KX

## 2025-05-29 NOTE — PROGRESS NOTES
"  Outpatient Rehab    Physical Therapy Visit        Patient Name: Ivone Monroy  MRN: [id number]  YOB: 1960  Encounter Date: 5/29/2025    Therapy Diagnosis:   Encounter Diagnoses   Name Primary?    Decreased range of motion of neck Yes    Cervicalgia      Physician: Aurora Morelos PA*          Nehal Puente MD  Physician Orders: Eval and Treat  Medical Diagnosis: Chronic left shoulder pain  Decreased range of motion of neck  Left cervical radiculopathy  Visit # / Visits Authorized:  17 / 24  Insurance Authorization Period: 3/28/2025 to 12/31/2025  Date of Evaluation: 3/28/2025  Plan of Care Certification: 5/6/2025 to 06/30/2025     PT/PTA:     Number of PTA visits since last PT visit:   Time In: 1428   Time Out: 1532  Total Time: 64   Total Billable Time:  60    FOTO:  Intake Score:  %  Survey Score 1:  %  Survey Score 2:  %     Subjective   Patient reports neck huritng primarily with left side bending on left side but mainly tightness other than that..    Lumbar - 0/10  Cervical - 2/10  Objective     Treatment:     CPT Intervention Performed   Today Duration / Intensity   MT Soft tissue massage                   TE Bike  7 minutes    Hamstring Stretch  3 x 30" bilateral    Piriformis Stretch  3 x 30"    Open Books  X15 bilateral           Objective Testing  Measurements, FOTO, education          NMR Posterior Pelvic Tilt   2 minutes with 5-10 sec holds     Posterior Pelvic Tilt with heel taps  3 x 5 bilateral     90/90 Nerve Glide  X15 bilateral     Bridges  3 x 10 with 10# bag     Clamshells  3 x 10 side lying YTB          TA Leg Press  3 x 10 DL with 105#     Pallof Press  20 x 5" holds with yellow band looped     Side Stepping  5 laps in bars with red band looped    Step ups  X20 bilateral with 6" step          PLAN        CPT Intervention Performed   Today Duration / Intensity   MT Soft tissue massage  x Upper trap, suboccipitals    Joint Mobilizations x Cervical traction, side glides, " "up glides, UPA's bilaterally          TE UBE x 3'/3'    Cervical AROM  X20 all planes    Rotational SNAG's x X20 bilateral    Open Books x X20 bilateral     Foam Roll Series  3 min pec stretch  Bear Hugs 1 min  Field Goals 1 min  Shoulder [place] 1 min each way  Swimmers 1 min  Serratus Punches 1 min          NMR Prone Series   x  x  x 20 x 5" holds   I's 3#  T's 3#  W's 3#     Scapular Retractions  3 minutes with 5" holds     Chin Tucks x 2 minutes with 5" holds     Chin Tuck with Lift x 25 x 5" holds     Bilateral External Rotation  3 x 12  red TB          TA Scaption x 3 x 10 with 3#     Overhead Press x 3 x 10 with 3#     Rows  3 x 10 with 7.5#     Shoulder Extension  3 x 10 with 2.5#                 PLAN         CPT Codes available for Billing:   (12) minutes of Manual therapy (MT) to improve pain and ROM.  (14) minutes of Therapeutic Exercise (TE) to develop strength, endurance, range of motion, and flexibility.  (24) minutes of Neuromuscular Re-Education (NMR)  to improve: Balance, Coordination, Kinesthetic, Sense, Proprioception, and Posture.  (10) minutes of Therapeutic Activities (TA) to improve functional performance.  Vasopneumatic Device Therapy () for management of swelling/edema. (42777)  Unattended Electrical Stimulation (ES) for muscle performance or pain modulation.  BFR: Blood flow restriction applied during exercise    Assessment & Plan   Assessment: Patient tolerated session well. Pt performed SNAG's today with rotation for improve joint mobility and ROM. Decreased tension noted with sof tissue massage and cervical joint mobilzations.     Patient will continue to benefit from skilled outpatient physical therapy to address the deficits listed in the problem list box on initial evaluation, provide pt/family education and to maximize pt's level of independence in the home and community environment.      Patient's spiritual, cultural, and educational needs considered and patient agreeable to plan " of care and goals.     Plan: Continue Plan of Care (POC) and progress per patient tolerance. See treatment section for details on planned progressions next session.    Goals:   Active       Functional outcome       Patient will show a significant change in FOTO patient-reported outcome tool to goal score to  demonstrate subjective improvement       Start:  03/28/25    Expected End:  06/30/25            Patient will demonstrate independence in home program for support of progression (Met)       Start:  03/28/25    Expected End:  06/30/25    Resolved:  05/26/25            Pain       Patient will report pain of 1/10 demonstrating a reduction of overall pain       Start:  03/28/25    Expected End:  06/30/25            Patient will report a 2 point reduction in pain.       Start:  03/28/25    Expected End:  06/30/25               Strength       Patient will achieve bilateral shoulder abduction strength of 4+/5       Start:  03/28/25    Expected End:  06/30/25              Resolved       Range of Motion       Patient will achieve cervical extension ROM 60 degrees (Met)       Start:  03/28/25    Expected End:  05/23/25    Resolved:  05/26/25           Rancho Mendoza, PT

## 2025-06-02 ENCOUNTER — CLINICAL SUPPORT (OUTPATIENT)
Dept: REHABILITATION | Facility: HOSPITAL | Age: 65
End: 2025-06-02
Payer: COMMERCIAL

## 2025-06-02 DIAGNOSIS — R29.898 DECREASED RANGE OF MOTION OF NECK: Primary | ICD-10-CM

## 2025-06-02 DIAGNOSIS — M54.2 CERVICALGIA: ICD-10-CM

## 2025-06-02 PROCEDURE — 97112 NEUROMUSCULAR REEDUCATION: CPT | Mod: KX

## 2025-06-02 PROCEDURE — 97530 THERAPEUTIC ACTIVITIES: CPT | Mod: KX

## 2025-06-02 PROCEDURE — 97140 MANUAL THERAPY 1/> REGIONS: CPT | Mod: KX

## 2025-06-02 PROCEDURE — 97110 THERAPEUTIC EXERCISES: CPT | Mod: KX

## 2025-06-04 ENCOUNTER — CLINICAL SUPPORT (OUTPATIENT)
Dept: REHABILITATION | Facility: HOSPITAL | Age: 65
End: 2025-06-04
Payer: COMMERCIAL

## 2025-06-04 DIAGNOSIS — M54.2 CERVICALGIA: ICD-10-CM

## 2025-06-04 DIAGNOSIS — R29.898 DECREASED RANGE OF MOTION OF NECK: Primary | ICD-10-CM

## 2025-06-04 PROCEDURE — 97140 MANUAL THERAPY 1/> REGIONS: CPT

## 2025-06-04 PROCEDURE — 97112 NEUROMUSCULAR REEDUCATION: CPT

## 2025-06-04 PROCEDURE — 97530 THERAPEUTIC ACTIVITIES: CPT

## 2025-06-04 PROCEDURE — 97110 THERAPEUTIC EXERCISES: CPT

## 2025-06-09 ENCOUNTER — CLINICAL SUPPORT (OUTPATIENT)
Dept: REHABILITATION | Facility: HOSPITAL | Age: 65
End: 2025-06-09
Payer: COMMERCIAL

## 2025-06-09 DIAGNOSIS — R29.898 DECREASED RANGE OF MOTION OF NECK: Primary | ICD-10-CM

## 2025-06-09 DIAGNOSIS — M54.2 CERVICALGIA: ICD-10-CM

## 2025-06-09 PROCEDURE — 97112 NEUROMUSCULAR REEDUCATION: CPT | Mod: KX

## 2025-06-09 PROCEDURE — 97110 THERAPEUTIC EXERCISES: CPT | Mod: KX

## 2025-06-09 PROCEDURE — 97140 MANUAL THERAPY 1/> REGIONS: CPT | Mod: KX

## 2025-06-09 PROCEDURE — 97530 THERAPEUTIC ACTIVITIES: CPT | Mod: KX

## 2025-06-09 NOTE — PROGRESS NOTES
"  Outpatient Rehab    Physical Therapy Visit        Patient Name: Ivone Monroy  MRN: [id number]  YOB: 1960  Encounter Date: 6/9/2025    Therapy Diagnosis:   Encounter Diagnoses   Name Primary?    Decreased range of motion of neck Yes    Cervicalgia      Physician: Aurora Morelos PA*          Nehal Peunte MD  Physician Orders: Eval and Treat  Medical Diagnosis: Chronic left shoulder pain  Decreased range of motion of neck  Left cervical radiculopathy  Visit # / Visits Authorized:  20 / 24  Insurance Authorization Period: 3/28/2025 to 12/31/2025  Date of Evaluation: 3/28/2025  Plan of Care Certification: 5/6/2025 to 06/30/2025     PT/PTA:     Number of PTA visits since last PT visit:   Time In: 1430   Time Out: 1535  Total Time: 65   Total Billable Time:  64    FOTO:  Intake Score:  %  Survey Score 1:  %  Survey Score 2:  %     Subjective   Patient reports feeling much better with side bending today. No pain. Mild pain in suboccipitals today..  Pain reported as 2/10. Lumbar - 0/10  Cervical - 2/10  Objective       Treatment:     CPT Intervention Performed   Today Duration / Intensity   MT Soft tissue massage                   TE Bike  7 minutes    Hamstring Stretch  3 x 30" bilateral    Piriformis Stretch  3 x 30"    Open Books  X15 bilateral           Objective Testing  Measurements, FOTO, education          NMR Posterior Pelvic Tilt   2 minutes with 5-10 sec holds     Posterior Pelvic Tilt with heel taps  3 x 5 bilateral     90/90 Nerve Glide  X15 bilateral     Bridges  3 x 10 with 10# bag     Clamshells  3 x 10 side lying YTB          TA Leg Press  3 x 10 DL with 105#     Pallof Press  20 x 5" holds with yellow band looped     Side Stepping  5 laps in bars with red band looped    Step ups  X20 bilateral with 6" step          PLAN        CPT Intervention Performed   Today Duration / Intensity   MT Soft tissue massage  x Upper trap, suboccipitals    Joint Mobilizations  Cervical traction, " "side glides, up glides, UPA's bilaterally          TE UBE x 3'/3'    Cervical AROM  X20 all planes    Rotational SNAG's  X20 bilateral    Open Books x X20 bilateral     Foam Roll Series x  x  x  x  x  x 2 min pec stretch  Bear Hugs 1 min  Field Goals 1 min  Shoulder [place] 1 min each way  Swimmers 1 min  Serratus Punches 1 min          NMR Prone Series   x  x  x 20 x 5" holds   I's 3#  T's 3#  W's 3#     Scapular Retractions  3 minutes with 5" holds     Chin Tucks x 2 minutes with 5" holds     Chin Tuck with Lift x 25 x 5" holds     Bilateral External Rotation x 3 x 12  green TB          TA Scaption x 3 x 10 with 3#     Overhead Press x 3 x 10 with 3#     Rows  3 x 10 with 7.5#     Shoulder Extension  3 x 10 with 2.5#                 PLAN         CPT Codes available for Billing:   (10) minutes of Manual therapy (MT) to improve pain and ROM.  (20) minutes of Therapeutic Exercise (TE) to develop strength, endurance, range of motion, and flexibility.  (25) minutes of Neuromuscular Re-Education (NMR)  to improve: Balance, Coordination, Kinesthetic, Sense, Proprioception, and Posture.  (09) minutes of Therapeutic Activities (TA) to improve functional performance.  Vasopneumatic Device Therapy () for management of swelling/edema. (88152)  Unattended Electrical Stimulation (ES) for muscle performance or pain modulation.  BFR: Blood flow restriction applied during exercise    Assessment & Plan   Assessment: Patient tolerated session well. Pt able to perform exercises today with good fatigue noted but no reported increase in pain. Soft tissue massage performed for suboccipitals today to reduce tension and pain.     Patient will continue to benefit from skilled outpatient physical therapy to address the deficits listed in the problem list box on initial evaluation, provide pt/family education and to maximize pt's level of independence in the home and community environment.      Patient's spiritual, cultural, and educational " needs considered and patient agreeable to plan of care and goals.     Plan: Continue Plan of Care (POC) and progress per patient tolerance. See treatment section for details on planned progressions next session.    Goals:   Active       Functional outcome       Patient will show a significant change in FOTO patient-reported outcome tool to goal score to  demonstrate subjective improvement       Start:  03/28/25    Expected End:  06/30/25            Patient will demonstrate independence in home program for support of progression (Met)       Start:  03/28/25    Expected End:  06/30/25    Resolved:  05/26/25            Pain       Patient will report pain of 1/10 demonstrating a reduction of overall pain       Start:  03/28/25    Expected End:  06/30/25            Patient will report a 2 point reduction in pain.       Start:  03/28/25    Expected End:  06/30/25               Strength       Patient will achieve bilateral shoulder abduction strength of 4+/5       Start:  03/28/25    Expected End:  06/30/25              Resolved       Range of Motion       Patient will achieve cervical extension ROM 60 degrees (Met)       Start:  03/28/25    Expected End:  05/23/25    Resolved:  05/26/25           Rancho eMndoza, PT

## 2025-06-10 ENCOUNTER — OFFICE VISIT (OUTPATIENT)
Dept: DERMATOLOGY | Facility: CLINIC | Age: 65
End: 2025-06-10
Payer: COMMERCIAL

## 2025-06-10 DIAGNOSIS — L63.9 ALOPECIA AREATA: ICD-10-CM

## 2025-06-10 DIAGNOSIS — L81.9 DYSCHROMIA: ICD-10-CM

## 2025-06-10 DIAGNOSIS — L80 VITILIGO: Primary | ICD-10-CM

## 2025-06-10 DIAGNOSIS — L71.0 PERIORAL DERMATITIS: ICD-10-CM

## 2025-06-10 PROCEDURE — 1126F AMNT PAIN NOTED NONE PRSNT: CPT | Mod: CPTII,S$GLB,, | Performed by: PHYSICIAN ASSISTANT

## 2025-06-10 PROCEDURE — 99999 PR PBB SHADOW E&M-EST. PATIENT-LVL III: CPT | Mod: PBBFAC,,, | Performed by: PHYSICIAN ASSISTANT

## 2025-06-10 PROCEDURE — 1159F MED LIST DOCD IN RCRD: CPT | Mod: CPTII,S$GLB,, | Performed by: PHYSICIAN ASSISTANT

## 2025-06-10 PROCEDURE — 99213 OFFICE O/P EST LOW 20 MIN: CPT | Mod: S$GLB,,, | Performed by: PHYSICIAN ASSISTANT

## 2025-06-10 PROCEDURE — 4010F ACE/ARB THERAPY RXD/TAKEN: CPT | Mod: CPTII,S$GLB,, | Performed by: PHYSICIAN ASSISTANT

## 2025-06-10 PROCEDURE — G2211 COMPLEX E/M VISIT ADD ON: HCPCS | Mod: S$GLB,,, | Performed by: PHYSICIAN ASSISTANT

## 2025-06-10 PROCEDURE — 1160F RVW MEDS BY RX/DR IN RCRD: CPT | Mod: CPTII,S$GLB,, | Performed by: PHYSICIAN ASSISTANT

## 2025-06-10 PROCEDURE — 3288F FALL RISK ASSESSMENT DOCD: CPT | Mod: CPTII,S$GLB,, | Performed by: PHYSICIAN ASSISTANT

## 2025-06-10 PROCEDURE — 1101F PT FALLS ASSESS-DOCD LE1/YR: CPT | Mod: CPTII,S$GLB,, | Performed by: PHYSICIAN ASSISTANT

## 2025-06-10 NOTE — PROGRESS NOTES
Subjective:      Patient ID:  Ivone Monroy is a 64 y.o. female who presents for   Chief Complaint   Patient presents with    Skin Discoloration     Fu for Vitiligo, pt reports she was treating with Metronidazole gel as prescribed with no improvements. She states the gel would dry out her skin. She is currently applying Vaseline.      Hx of vitiligo, AA (improved), perioral dermatitis, last seen 2/10/25.  Here for f/u. Advised to hold TCS or TCI at last visit and elected for trial metrogel 1% which caused some dryness of mid face, and she decreased frequency of rx and that helped.  She believes the bumps have overall improved, too.  No current tx other than Dove Sensitive Skin bar (likes). Denies other areas of discoloration or rashes.     Previous tx: elidel, protopic, desonide cream    C/o dark spot of right arm which she believes was an insect bite. It was really itchy, now less so, but now has residual dark discoloration after scratching at it.         Review of Systems   Constitutional:  Negative for fever and chills.   Gastrointestinal:  Negative for nausea and vomiting.   Skin:  Positive for itching and activity-related sunscreen use. Negative for rash, dry skin, sun sensitivity, daily sunscreen use, recent sunburn and dry lips.   Hematologic/Lymphatic: Does not bruise/bleed easily.       Objective:   Physical Exam   Constitutional: She appears well-developed and well-nourished. No distress.   Neurological: She is alert and oriented to person, place, and time. She is not disoriented.   Psychiatric: She has a normal mood and affect.   Skin:   Areas Examined (abnormalities noted in diagram):   Head / Face Inspection Performed  Neck Inspection Performed  Chest / Axilla Inspection Performed  Back Inspection Performed  RUE Inspected  LUE Inspection Performed            Diagram Legend     Erythematous scaling macule/papule c/w actinic keratosis       Vascular papule c/w angioma      Pigmented verrucoid  papule/plaque c/w seborrheic keratosis      Yellow umbilicated papule c/w sebaceous hyperplasia      Irregularly shaped tan macule c/w lentigo     1-2 mm smooth white papules consistent with Milia      Movable subcutaneous cyst with punctum c/w epidermal inclusion cyst      Subcutaneous movable cyst c/w pilar cyst      Firm pink to brown papule c/w dermatofibroma      Pedunculated fleshy papule(s) c/w skin tag(s)      Evenly pigmented macule c/w junctional nevus     Mildly variegated pigmented, slightly irregular-bordered macule c/w mildly atypical nevus      Flesh colored to evenly pigmented papule c/w intradermal nevus       Pink pearly papule/plaque c/w basal cell carcinoma      Erythematous hyperkeratotic cursted plaque c/w SCC      Surgical scar with no sign of skin cancer recurrence      Open and closed comedones      Inflammatory papules and pustules      Verrucoid papule consistent consistent with wart     Erythematous eczematous patches and plaques     Dystrophic onycholytic nail with subungual debris c/w onychomycosis     Umbilicated papule    Erythematous-base heme-crusted tan verrucoid plaque consistent with inflamed seborrheic keratosis     Erythematous Silvery Scaling Plaque c/w Psoriasis     See annotation      Assessment / Plan:        Vitiligo  Stable, continue ambient sun exposure as tolerated. If worsening or itching, may resume elidel cream prn.    Alopecia areata  Improved.     Perioral dermatitis  Improved.     Dyschromia  Secondary to insect bite vs. Other. Reassurance. Consider using TAC 0.025% cream prn insect bites in future.            Follow up if symptoms worsen or fail to improve.

## 2025-06-11 ENCOUNTER — CLINICAL SUPPORT (OUTPATIENT)
Dept: REHABILITATION | Facility: HOSPITAL | Age: 65
End: 2025-06-11
Payer: COMMERCIAL

## 2025-06-11 DIAGNOSIS — R29.898 DECREASED RANGE OF MOTION OF NECK: Primary | ICD-10-CM

## 2025-06-11 DIAGNOSIS — M54.2 CERVICALGIA: ICD-10-CM

## 2025-06-11 PROCEDURE — 97140 MANUAL THERAPY 1/> REGIONS: CPT | Mod: KX

## 2025-06-11 PROCEDURE — 97530 THERAPEUTIC ACTIVITIES: CPT | Mod: KX

## 2025-06-11 PROCEDURE — 97110 THERAPEUTIC EXERCISES: CPT | Mod: KX

## 2025-06-11 PROCEDURE — 97112 NEUROMUSCULAR REEDUCATION: CPT | Mod: KX

## 2025-06-11 NOTE — PROGRESS NOTES
"  Outpatient Rehab    Physical Therapy Visit        Patient Name: Ivone Monroy  MRN: [id number]  YOB: 1960  Encounter Date: 6/11/2025    Therapy Diagnosis:   Encounter Diagnoses   Name Primary?    Decreased range of motion of neck Yes    Cervicalgia      Physician: Aurora Morelos PA*          Nehal Puente MD  Physician Orders: Eval and Treat  Medical Diagnosis: Chronic left shoulder pain  Decreased range of motion of neck  Left cervical radiculopathy  Visit # / Visits Authorized:  21 / 24  Insurance Authorization Period: 3/28/2025 to 12/31/2025  Date of Evaluation: 3/28/2025  Plan of Care Certification: 5/6/2025 to 06/30/2025     PT/PTA:     Number of PTA visits since last PT visit:   Time In: 1402   Time Out: 1506  Total Time: 64   Total Billable Time:  64    FOTO:  Intake Score:  %  Survey Score 1:  %  Survey Score 2:  %     Subjective   Patient reports feeling pretty good today but some continued tension on the back of her head..    Lumbar - 0/10  Cervical - 2/10  Objective       Treatment:     CPT Intervention Performed   Today Duration / Intensity   MT Soft tissue massage                   TE Bike  7 minutes    Hamstring Stretch  3 x 30" bilateral    Piriformis Stretch  3 x 30"    Open Books  X15 bilateral           Objective Testing  Measurements, FOTO, education          NMR Posterior Pelvic Tilt   2 minutes with 5-10 sec holds     Posterior Pelvic Tilt with heel taps  3 x 5 bilateral     90/90 Nerve Glide  X15 bilateral     Bridges  3 x 10 with 10# bag     Clamshells  3 x 10 side lying YTB          TA Leg Press  3 x 10 DL with 105#     Pallof Press  20 x 5" holds with yellow band looped     Side Stepping  5 laps in bars with red band looped    Step ups  X20 bilateral with 6" step          PLAN        CPT Intervention Performed   Today Duration / Intensity   MT Soft tissue massage  x Upper trap, suboccipitals    Joint Mobilizations x Cervical traction, side glides, up glides, UPA's " "bilaterally          TE UBE x 3'/3'    Cervical AROM  X20 all planes    Rotational SNAG's  X20 bilateral    Open Books x X20 bilateral     Foam Roll Series x  x  x  x  x  x 2 min pec stretch  Bear Hugs 1 min  Field Goals 1 min  Shoulder [place] 1 min each way  Swimmers 1 min  Serratus Punches 1 min          NMR Prone Series   x  x  x 20 x 5" holds   I's 3#  T's 3#  W's 3#     Scapular Retractions  3 minutes with 5" holds     Chin Tucks x 2 minutes with 5" holds     Chin Tuck with Lift x 25 x 5" holds     Bilateral External Rotation x 3 x 12  green TB          TA Scaption x 3 x 10 with 3#     Overhead Press x 3 x 10 with 3#     Rows  3 x 10 with 7.5#     Shoulder Extension  3 x 10 with 2.5#                 PLAN         CPT Codes available for Billing:   (10) minutes of Manual therapy (MT) to improve pain and ROM.  (20) minutes of Therapeutic Exercise (TE) to develop strength, endurance, range of motion, and flexibility.  (25) minutes of Neuromuscular Re-Education (NMR)  to improve: Balance, Coordination, Kinesthetic, Sense, Proprioception, and Posture.  (09) minutes of Therapeutic Activities (TA) to improve functional performance.  Vasopneumatic Device Therapy () for management of swelling/edema. (73041)  Unattended Electrical Stimulation (ES) for muscle performance or pain modulation.  BFR: Blood flow restriction applied during exercise    Assessment & Plan   Assessment: Patient tolerated session well. Soft tissue massage and cervical traction performed today for decreased tension and tenderness. Pt able to perform strengthening today with good fatigue noted but no reported increase in symptoms.     Patient will continue to benefit from skilled outpatient physical therapy to address the deficits listed in the problem list box on initial evaluation, provide pt/family education and to maximize pt's level of independence in the home and community environment.      Patient's spiritual, cultural, and educational needs " considered and patient agreeable to plan of care and goals.     Plan: Continue Plan of Care (POC) and progress per patient tolerance. See treatment section for details on planned progressions next session.    Goals:   Active       Functional outcome       Patient will show a significant change in FOTO patient-reported outcome tool to goal score to  demonstrate subjective improvement       Start:  03/28/25    Expected End:  06/30/25            Patient will demonstrate independence in home program for support of progression (Met)       Start:  03/28/25    Expected End:  06/30/25    Resolved:  05/26/25            Pain       Patient will report pain of 1/10 demonstrating a reduction of overall pain       Start:  03/28/25    Expected End:  06/30/25            Patient will report a 2 point reduction in pain.       Start:  03/28/25    Expected End:  06/30/25               Strength       Patient will achieve bilateral shoulder abduction strength of 4+/5       Start:  03/28/25    Expected End:  06/30/25              Resolved       Range of Motion       Patient will achieve cervical extension ROM 60 degrees (Met)       Start:  03/28/25    Expected End:  05/23/25    Resolved:  05/26/25           Rancho Mendoza, PT

## 2025-06-12 ENCOUNTER — OFFICE VISIT (OUTPATIENT)
Dept: PAIN MEDICINE | Facility: CLINIC | Age: 65
End: 2025-06-12
Payer: COMMERCIAL

## 2025-06-12 ENCOUNTER — PATIENT MESSAGE (OUTPATIENT)
Dept: PAIN MEDICINE | Facility: CLINIC | Age: 65
End: 2025-06-12

## 2025-06-12 VITALS — BODY MASS INDEX: 20.79 KG/M2 | HEIGHT: 61 IN

## 2025-06-12 DIAGNOSIS — M47.812 CERVICAL SPONDYLOSIS: ICD-10-CM

## 2025-06-12 DIAGNOSIS — M54.2 CERVICAL PAIN: ICD-10-CM

## 2025-06-12 DIAGNOSIS — M50.30 DDD (DEGENERATIVE DISC DISEASE), CERVICAL: ICD-10-CM

## 2025-06-12 DIAGNOSIS — M54.12 CERVICAL RADICULOPATHY: ICD-10-CM

## 2025-06-12 DIAGNOSIS — M54.2 CERVICALGIA: ICD-10-CM

## 2025-06-12 DIAGNOSIS — M54.2 CERVICAL PAIN: Primary | ICD-10-CM

## 2025-06-12 DIAGNOSIS — G89.29 CHRONIC LEFT SHOULDER PAIN: ICD-10-CM

## 2025-06-12 DIAGNOSIS — M25.512 CHRONIC LEFT SHOULDER PAIN: ICD-10-CM

## 2025-06-12 DIAGNOSIS — R51.9 OCCIPITAL PAIN: ICD-10-CM

## 2025-06-12 PROCEDURE — 4010F ACE/ARB THERAPY RXD/TAKEN: CPT | Mod: CPTII,95,, | Performed by: PHYSICIAN ASSISTANT

## 2025-06-12 PROCEDURE — 98006 SYNCH AUDIO-VIDEO EST MOD 30: CPT | Mod: 95,,, | Performed by: PHYSICIAN ASSISTANT

## 2025-06-12 PROCEDURE — 1159F MED LIST DOCD IN RCRD: CPT | Mod: CPTII,95,, | Performed by: PHYSICIAN ASSISTANT

## 2025-06-12 PROCEDURE — 1160F RVW MEDS BY RX/DR IN RCRD: CPT | Mod: CPTII,95,, | Performed by: PHYSICIAN ASSISTANT
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
## 2025-06-13 RX ORDER — PREGABALIN 50 MG/1
50 CAPSULE ORAL
Qty: 30 CAPSULE | Refills: 1 | Status: SHIPPED | OUTPATIENT
Start: 2025-06-13

## 2025-06-16 ENCOUNTER — CLINICAL SUPPORT (OUTPATIENT)
Dept: REHABILITATION | Facility: HOSPITAL | Age: 65
End: 2025-06-16
Payer: COMMERCIAL

## 2025-06-16 DIAGNOSIS — R29.898 DECREASED RANGE OF MOTION OF NECK: Primary | ICD-10-CM

## 2025-06-16 DIAGNOSIS — M54.2 CERVICALGIA: ICD-10-CM

## 2025-06-16 PROCEDURE — 97530 THERAPEUTIC ACTIVITIES: CPT | Mod: KX

## 2025-06-16 PROCEDURE — 97110 THERAPEUTIC EXERCISES: CPT | Mod: KX

## 2025-06-16 PROCEDURE — 97112 NEUROMUSCULAR REEDUCATION: CPT | Mod: KX

## 2025-06-16 PROCEDURE — 97140 MANUAL THERAPY 1/> REGIONS: CPT | Mod: KX

## 2025-06-16 NOTE — PROGRESS NOTES
"  Outpatient Rehab    Physical Therapy Visit        Patient Name: Ivone Monroy  MRN: [id number]  YOB: 1960  Encounter Date: 6/16/2025    Therapy Diagnosis:   Encounter Diagnoses   Name Primary?    Decreased range of motion of neck Yes    Cervicalgia      Physician: Aurora Morelos PA*          Nehal Puente MD  Physician Orders: Eval and Treat  Medical Diagnosis: Chronic left shoulder pain  Decreased range of motion of neck  Left cervical radiculopathy  Visit # / Visits Authorized:  22 / 24  Insurance Authorization Period: 3/28/2025 to 12/31/2025  Date of Evaluation: 3/28/2025  Plan of Care Certification: 5/6/2025 to 06/30/2025     PT/PTA:     Number of PTA visits since last PT visit:   Time In: 1332   Time Out: 1443  Total Time: 71   Total Billable Time:  68    FOTO:  Intake Score:  %  Survey Score 1:  %  Survey Score 2:  %     Subjective   Patient reports she is feeling good but having pain in suboccipitals with driving lately..  Pain reported as 2/10. Lumbar - 0/10  Cervical - 2/10  Objective       Treatment:     CPT Intervention Performed   Today Duration / Intensity   MT Soft tissue massage                   TE Bike  7 minutes    Hamstring Stretch  3 x 30" bilateral    Piriformis Stretch  3 x 30"    Open Books  X15 bilateral           Objective Testing  Measurements, FOTO, education          NMR Posterior Pelvic Tilt   2 minutes with 5-10 sec holds     Posterior Pelvic Tilt with heel taps  3 x 5 bilateral     90/90 Nerve Glide  X15 bilateral     Bridges  3 x 10 with 10# bag     Clamshells  3 x 10 side lying YTB          TA Leg Press  3 x 10 DL with 105#     Pallof Press  20 x 5" holds with yellow band looped     Side Stepping  5 laps in bars with red band looped    Step ups  X20 bilateral with 6" step          PLAN        CPT Intervention Performed   Today Duration / Intensity   MT Soft tissue massage  x Upper trap, suboccipitals    Joint Mobilizations  Cervical traction, side glides, " "up glides, UPA's bilaterally          TE UBE x 3'/3'    Cervical AROM  X20 all planes    Rotational SNAG's  X20 bilateral    Open Books x X20 bilateral     Foam Roll Series x  x  x  x  x  x 2 min pec stretch  Bear Hugs 1 min  Field Goals 1 min  Shoulder [place] 1 min each way  Swimmers 1 min  Serratus Punches 1 min          NMR Prone Series   x  x  x 20 x 5" holds   I's 3#  T's 3#  W's 3#     Scapular Retractions  3 minutes with 5" holds     Chin Tucks x 2 minutes with 5" holds    Quadruped Chin Tuck x 25 x 5" holds     Chin Tuck with Lift  25 x 5" holds     Bilateral External Rotation  3 x 12  green TB          TA Scaption x 3 x 10 with 3#     Overhead Press x 3 x 10 with 3#     Rows x 3 x 10 with 10#     Shoulder Extension  3 x 10 with 2.5#     Lat Pull Downs x 3 x 10 with 10#          PLAN         CPT Codes available for Billing:   (10) minutes of Manual therapy (MT) to improve pain and ROM.  (18) minutes of Therapeutic Exercise (TE) to develop strength, endurance, range of motion, and flexibility.  (24) minutes of Neuromuscular Re-Education (NMR)  to improve: Balance, Coordination, Kinesthetic, Sense, Proprioception, and Posture.  (16) minutes of Therapeutic Activities (TA) to improve functional performance.  Vasopneumatic Device Therapy () for management of swelling/edema. (82791)  Unattended Electrical Stimulation (ES) for muscle performance or pain modulation.  BFR: Blood flow restriction applied during exercise    Assessment & Plan   Assessment: Patient tolerated session well. Patient progressed with performance of chin tucks in quadruped to resist against gravity. Patient also progressed functionally with performance of lat pull downs for improved upper extremity and scapular functional strength.      Patient will continue to benefit from skilled outpatient physical therapy to address the deficits listed in the problem list box on initial evaluation, provide pt/family education and to maximize pt's level " of independence in the home and community environment.      Patient's spiritual, cultural, and educational needs considered and patient agreeable to plan of care and goals.     Plan: Continue Plan of Care (POC) and progress per patient tolerance. See treatment section for details on planned progressions next session.    Goals:   Active       Functional outcome       Patient will show a significant change in FOTO patient-reported outcome tool to goal score to  demonstrate subjective improvement       Start:  03/28/25    Expected End:  06/30/25            Patient will demonstrate independence in home program for support of progression (Met)       Start:  03/28/25    Expected End:  06/30/25    Resolved:  05/26/25            Pain       Patient will report pain of 1/10 demonstrating a reduction of overall pain       Start:  03/28/25    Expected End:  06/30/25            Patient will report a 2 point reduction in pain.       Start:  03/28/25    Expected End:  06/30/25               Strength       Patient will achieve bilateral shoulder abduction strength of 4+/5       Start:  03/28/25    Expected End:  06/30/25              Resolved       Range of Motion       Patient will achieve cervical extension ROM 60 degrees (Met)       Start:  03/28/25    Expected End:  05/23/25    Resolved:  05/26/25           Rancho Mendoza, PT

## 2025-06-20 ENCOUNTER — CLINICAL SUPPORT (OUTPATIENT)
Dept: REHABILITATION | Facility: HOSPITAL | Age: 65
End: 2025-06-20
Payer: COMMERCIAL

## 2025-06-20 DIAGNOSIS — M54.2 CERVICALGIA: ICD-10-CM

## 2025-06-20 DIAGNOSIS — R29.898 DECREASED RANGE OF MOTION OF NECK: Primary | ICD-10-CM

## 2025-06-20 PROCEDURE — 97112 NEUROMUSCULAR REEDUCATION: CPT | Mod: KX

## 2025-06-20 PROCEDURE — 97140 MANUAL THERAPY 1/> REGIONS: CPT | Mod: KX

## 2025-06-20 PROCEDURE — 97530 THERAPEUTIC ACTIVITIES: CPT | Mod: KX

## 2025-06-20 PROCEDURE — 97110 THERAPEUTIC EXERCISES: CPT | Mod: KX

## 2025-06-20 NOTE — PROGRESS NOTES
Outpatient Rehab    Physical Therapy Discharge    Patient Name: Ivone Monroy  MRN: [id number]  YOB: 1960  Encounter Date: 6/20/2025    Therapy Diagnosis:   Encounter Diagnoses   Name Primary?    Decreased range of motion of neck Yes    Cervicalgia      Physician: Aurora Morelos PA*          Nehal Puente MD  Physician Orders: Eval and Treat  Medical Diagnosis: Chronic left shoulder pain  Decreased range of motion of neck  Left cervical radiculopathy  Visit # / Visits Authorized:  23 / 36  Insurance Authorization Period: 3/28/2025 to 12/31/2025  Date of Evaluation: 3/28/2025  Plan of Care Certification: 5/6/2025 to 06/30/2025     PT/PTA:     Number of PTA visits since last PT visit:   Time In: 1026   Time Out: 1130  Total Time: 64   Total Billable Time:  63    FOTO:  Intake Score:  %  Survey Score 1:  %  Survey Score 2:  %     Subjective   Patient reports she is feeling good with mild cervical pain. Pt confident in discharge today to continue HEP independently..  Pain reported as 1/10. Lumbar - 0/10  Cervical - 1/10  Objective       Cervical Right   (spine) Left     Pain/Dysfunction with Movement Goal   Cervical Flexion (60º) 60 ---   60   Cervical Extension (80º) 68 ---   80   Cervical Side Bending (45º) 33 35   35   Cervical Rotation (75º) 75 75   75      STRENGTH:   U/E MMT Right Left Pain/Dysfunction with Movement Goal   Shoulder Flexion 4+/5 4+/5   4+/5 B   Shoulder Extension 5/5 5/5   4+/5 B   Shoulder Abduction 4+/5 4+/5   4+/5 B   Shoulder IR 4+/5 4+/5   4+/5 B   Shoulder ER 4+/5 4+/5   4+/5 B   Middle Trapezius 4+/5 4+/5   4+/5 B     Lumbar ROM Right  (spine) Left    Pain/Dysfunction with Movement Goal   Lumbar Flexion (60º) 100% ---   100%   Lumbar Extension (30º) 100% ---       Lumbar Side Bending (25º) 75% 75%       Lumbar Rotation 100% 80%             L/E MMT Right  (spine) Left Pain/Dysfunction with Movement Goal   Modified (90/90) Abdominal Strength  good ---   good   Hip  "Flexion  4+/5 4+/5   4+/5 B   Hip Extension  4/5 4/5   4+/5 B   Hip Abduction  4/5 4/5   4+/5 B   Knee Extension 4+/5 4+/5   5/5 B   Knee Flexion 5/5 5/5   5/5 B   Hip IR 4+/5 4+/5   4+/5 B   Hip ER 4+/5 4+/5   4+/5 B   Ankle DF 5/5 5/5   5/5 B   Ankle PF 5/5 5/5   5/5 B   Ankle Inversion 5/5 5/5   5/5 B   Ankle Eversion 5/5 5/5   5/5 B     Treatment:     CPT Intervention Performed   Today Duration / Intensity   MT Soft tissue massage                   TE Bike  7 minutes    Hamstring Stretch  3 x 30" bilateral    Piriformis Stretch  3 x 30"    Open Books  X15 bilateral           Objective Testing  Measurements, FOTO, education          NMR Posterior Pelvic Tilt   2 minutes with 5-10 sec holds     Posterior Pelvic Tilt with heel taps  3 x 5 bilateral     90/90 Nerve Glide  X15 bilateral     Bridges  3 x 10 with 10# bag     Clamshells  3 x 10 side lying YTB          TA Leg Press  3 x 10 DL with 105#     Pallof Press  20 x 5" holds with yellow band looped     Side Stepping  5 laps in bars with red band looped    Step ups  X20 bilateral with 6" step          PLAN        CPT Intervention Performed   Today Duration / Intensity   MT Soft tissue massage  x Upper trap, suboccipitals    Joint Mobilizations x Cervical traction, side glides, up glides, UPA's bilaterally          TE UBE x 3'/3'    Cervical AROM  X20 all planes    Rotational SNAG's  X20 bilateral    Open Books x X20 bilateral     Foam Roll Series x  x  x  x  x  x 2 min pec stretch  Bear Hugs 1 min  Field Goals 1 min  Shoulder [place] 1 min each way  Swimmers 1 min  Serratus Punches 1 min          NMR Prone Series   x  x  x 20 x 5" holds   I's 3#  T's 3#  W's 3#     Scapular Retractions  3 minutes with 5" holds     Chin Tucks x 2 minutes with 5" holds    Quadruped Chin Tuck x 25 x 5" holds     Chin Tuck with Lift  25 x 5" holds     Bilateral External Rotation  3 x 12  green TB          TA Scaption x 3 x 10 with 3#     Overhead Press x 3 x 10 with 3#     Rows  3 x " 10 with 10#     Shoulder Extension  3 x 10 with 2.5#     Lat Pull Downs  3 x 10 with 10#          PLAN         CPT Codes available for Billing:   (10) minutes of Manual therapy (MT) to improve pain and ROM.  (18) minutes of Therapeutic Exercise (TE) to develop strength, endurance, range of motion, and flexibility.  (24) minutes of Neuromuscular Re-Education (NMR)  to improve: Balance, Coordination, Kinesthetic, Sense, Proprioception, and Posture.  (09) minutes of Therapeutic Activities (TA) to improve functional performance.  Vasopneumatic Device Therapy () for management of swelling/edema. (12159)  Unattended Electrical Stimulation (ES) for muscle performance or pain modulation.  BFR: Blood flow restriction applied during exercise    Assessment & Plan   Assessment: Patient to discharge today due to progress made and independence with home exercise plan. Patient has progressed well with Physical therapy for cervical and lumbar symptoms. Patient demonstrates improve cervical and lumbar active range of motion as well and increased upper extremity and lower extremity strength. Patient functional ability shows progression with FOTO score. Patient able to demonstrate or verbalize home exercise plan well and to continue independently to maintain and further progress.      Patient will continue to benefit from skilled outpatient physical therapy to address the deficits listed in the problem list box on initial evaluation, provide pt/family education and to maximize pt's level of independence in the home and community environment.      Patient's spiritual, cultural, and educational needs considered and patient agreeable to plan of care and goals.     Plan: Discharge to continue home exercise plan independently.    Goals:   Active       Strength       Patient will achieve bilateral shoulder abduction strength of 4+/5 (Met)       Start:  03/28/25    Expected End:  06/30/25    Resolved:  06/20/25           Resolved        Functional outcome       Patient will show a significant change in FOTO patient-reported outcome tool to goal score to  demonstrate subjective improvement (Met)       Start:  03/28/25    Expected End:  06/30/25    Resolved:  06/20/25         Patient will demonstrate independence in home program for support of progression (Met)       Start:  03/28/25    Expected End:  06/30/25    Resolved:  05/26/25            Pain       Patient will report pain of 1/10 demonstrating a reduction of overall pain (Met)       Start:  03/28/25    Expected End:  06/30/25    Resolved:  06/20/25         Patient will report a 2 point reduction in pain. (Met)       Start:  03/28/25    Expected End:  06/30/25    Resolved:  06/20/25            Range of Motion       Patient will achieve cervical extension ROM 60 degrees (Met)       Start:  03/28/25    Expected End:  05/23/25    Resolved:  05/26/25           Rancho Mendoza, PT

## 2025-06-24 ENCOUNTER — HOSPITAL ENCOUNTER (OUTPATIENT)
Dept: RADIOLOGY | Facility: HOSPITAL | Age: 65
Discharge: HOME | End: 2025-06-24
Attending: STUDENT IN AN ORGANIZED HEALTH CARE EDUCATION/TRAINING PROGRAM
Payer: COMMERCIAL

## 2025-06-24 DIAGNOSIS — D18.00 CAVERNOMA: ICD-10-CM

## 2025-06-24 PROCEDURE — 25500020 PHARM REV CODE 255: Performed by: STUDENT IN AN ORGANIZED HEALTH CARE EDUCATION/TRAINING PROGRAM

## 2025-06-24 PROCEDURE — A9585 GADOBUTROL INJECTION: HCPCS | Performed by: STUDENT IN AN ORGANIZED HEALTH CARE EDUCATION/TRAINING PROGRAM

## 2025-06-24 PROCEDURE — 70553 MRI BRAIN STEM W/O & W/DYE: CPT | Mod: TC

## 2025-06-24 PROCEDURE — 70553 MRI BRAIN STEM W/O & W/DYE: CPT | Mod: 26,,, | Performed by: RADIOLOGY

## 2025-06-24 RX ORDER — GADOBUTROL 604.72 MG/ML
5 INJECTION INTRAVENOUS
Status: COMPLETED | OUTPATIENT
Start: 2025-06-24 | End: 2025-06-24

## 2025-06-24 RX ADMIN — GADOBUTROL 5 ML: 604.72 INJECTION INTRAVENOUS at 02:06

## 2025-07-24 ENCOUNTER — OFFICE VISIT (OUTPATIENT)
Dept: INTERNAL MEDICINE | Facility: CLINIC | Age: 65
End: 2025-07-24
Payer: COMMERCIAL

## 2025-07-24 ENCOUNTER — LAB VISIT (OUTPATIENT)
Dept: LAB | Facility: HOSPITAL | Age: 65
End: 2025-07-24
Attending: FAMILY MEDICINE
Payer: MEDICARE

## 2025-07-24 VITALS
DIASTOLIC BLOOD PRESSURE: 80 MMHG | HEART RATE: 61 BPM | WEIGHT: 107.38 LBS | SYSTOLIC BLOOD PRESSURE: 136 MMHG | HEIGHT: 61 IN | OXYGEN SATURATION: 99 % | RESPIRATION RATE: 18 BRPM | BODY MASS INDEX: 20.27 KG/M2

## 2025-07-24 DIAGNOSIS — I10 PRIMARY HYPERTENSION: Primary | ICD-10-CM

## 2025-07-24 DIAGNOSIS — I10 PRIMARY HYPERTENSION: ICD-10-CM

## 2025-07-24 LAB
ANION GAP (OHS): 8 MMOL/L (ref 8–16)
BUN SERPL-MCNC: 23 MG/DL (ref 8–23)
CALCIUM SERPL-MCNC: 10.1 MG/DL (ref 8.7–10.5)
CHLORIDE SERPL-SCNC: 103 MMOL/L (ref 95–110)
CO2 SERPL-SCNC: 29 MMOL/L (ref 23–29)
CREAT SERPL-MCNC: 1 MG/DL (ref 0.5–1.4)
GFR SERPLBLD CREATININE-BSD FMLA CKD-EPI: >60 ML/MIN/1.73/M2
GLUCOSE SERPL-MCNC: 88 MG/DL (ref 70–110)
POTASSIUM SERPL-SCNC: 4.5 MMOL/L (ref 3.5–5.1)
SODIUM SERPL-SCNC: 140 MMOL/L (ref 136–145)

## 2025-07-24 PROCEDURE — 99213 OFFICE O/P EST LOW 20 MIN: CPT | Mod: S$GLB,,, | Performed by: FAMILY MEDICINE

## 2025-07-24 PROCEDURE — 3008F BODY MASS INDEX DOCD: CPT | Mod: CPTII,S$GLB,, | Performed by: FAMILY MEDICINE

## 2025-07-24 PROCEDURE — 3079F DIAST BP 80-89 MM HG: CPT | Mod: CPTII,S$GLB,, | Performed by: FAMILY MEDICINE

## 2025-07-24 PROCEDURE — 3288F FALL RISK ASSESSMENT DOCD: CPT | Mod: CPTII,S$GLB,, | Performed by: FAMILY MEDICINE

## 2025-07-24 PROCEDURE — 1101F PT FALLS ASSESS-DOCD LE1/YR: CPT | Mod: CPTII,S$GLB,, | Performed by: FAMILY MEDICINE

## 2025-07-24 PROCEDURE — 3075F SYST BP GE 130 - 139MM HG: CPT | Mod: CPTII,S$GLB,, | Performed by: FAMILY MEDICINE

## 2025-07-24 PROCEDURE — 4010F ACE/ARB THERAPY RXD/TAKEN: CPT | Mod: CPTII,S$GLB,, | Performed by: FAMILY MEDICINE

## 2025-07-24 PROCEDURE — 1159F MED LIST DOCD IN RCRD: CPT | Mod: CPTII,S$GLB,, | Performed by: FAMILY MEDICINE

## 2025-07-24 PROCEDURE — 36415 COLL VENOUS BLD VENIPUNCTURE: CPT

## 2025-07-24 PROCEDURE — 99999 PR PBB SHADOW E&M-EST. PATIENT-LVL IV: CPT | Mod: PBBFAC,,, | Performed by: FAMILY MEDICINE

## 2025-07-24 PROCEDURE — 80048 BASIC METABOLIC PNL TOTAL CA: CPT

## 2025-07-24 RX ORDER — MULTIVIT-MINERALS/FOLIC ACID 200 MCG
TABLET,CHEWABLE ORAL
COMMUNITY
Start: 2007-04-10

## 2025-07-24 NOTE — PROGRESS NOTES
Subjective:       Patient ID: Ivone Monroy is a 65 y.o. female presents with Problem List[1]     Chief Complaint: Follow-up and Hypertension    History of Present Illness    Ivone presents today for follow up She reports home blood pressure monitoring with recent readings: July 12th 113/66, July 14th 104/70, July 21st 141, and July 24th 153 at home with 136 in office. She typically experiences blood pressure readings in the 120s-130s range. She admits to inconsistent monitoring, stating she was taking readings every day or every other day but occasionally forgot due to work schedule. She notes blood pressure increased after July 21st, likely correlating with emotional upset. She expresses concern about the recent spike in blood pressure readings, which deviate from her usual range. She currently takes Losartan 50 mg and Metoprolol 25 mg daily for blood pressure management. She reports these medications are well-tolerated. High potassium was noted in April labs. She reports prior excessive banana consumption, which she believes may have contributed to elevated potassium levels. Rheumatologist performed labs in June, which included kidney function testing. She expresses concern about potassium levels and is awaiting further evaluation.      ROS:  General: -fever, -chills, -fatigue, -weight gain, -weight loss, -loss of appetite  Eyes: -vision changes, -blurry vision, -eye pain, -eye discharge  ENT: -ear pain, -hearing loss, -tinnitus, -nasal congestion, -sore throat  Cardiovascular: -chest pain, -palpitations, -lower extremity edema  Respiratory: -cough, -shortness of breath, -wheezing, -sputum production  Endocrine: -polyuria, -polydipsia, -heat intolerance, -cold intolerance  Gastrointestinal: -abdominal pain, -heartburn, -nausea, -vomiting, -diarrhea, -constipation, -blood in stool  Genitourinary: -dysuria, -urgency, -frequency, -hematuria, -nocturia, -incontinence  Heme & Lymphatic: -easy or excessive  "bleeding, -easy bruising, -swollen lymph nodes  Musculoskeletal: -muscle pain, -back pain, -joint pain, -joint swelling  Skin: -rash, -lesion, -itching, -skin texture changes, -skin color changes  Neurological: -headache, -dizziness, -numbness, -tingling, -seizure activity, -speech difficulty, -memory loss, -confusion  Psychiatric: +anxiety, -depression, -sleep difficulty, +nervousness                /80   Pulse 61   Resp 18   Ht 5' 1" (1.549 m)   Wt 48.7 kg (107 lb 5.8 oz)   SpO2 99%   BMI 20.29 kg/m²   Objective:      Physical Exam  Constitutional:       Appearance: She is well-developed.   HENT:      Head: Normocephalic and atraumatic.   Cardiovascular:      Rate and Rhythm: Normal rate and regular rhythm.      Heart sounds: Normal heart sounds. No murmur heard.  Pulmonary:      Effort: Pulmonary effort is normal.      Breath sounds: Normal breath sounds. No wheezing.   Abdominal:      General: Bowel sounds are normal.      Palpations: Abdomen is soft.      Tenderness: There is no abdominal tenderness.   Skin:     General: Skin is warm and dry.      Findings: No rash.   Neurological:      Mental Status: She is alert and oriented to person, place, and time.   Psychiatric:         Mood and Affect: Mood normal.           Assessment/Plan:   1. Primary hypertension  -     Basic Metabolic Panel; Future; Expected date: 07/24/2025  Blood pressure is stable today currently taking losartan 50 mg and metoprolol 25 mg  Patient was advised to check with 0 bar and get back into the digital medicine program to monitor blood pressures  Restrict salt intake and encouraged avoid stress    Patient verbalized understanding           This note was generated with the assistance of ambient listening technology. Verbal consent was obtained by the patient and accompanying visitor(s) for the recording of patient appointment to facilitate this note. I attest to having reviewed and edited the generated note for accuracy, though " some syntax or spelling errors may persist. Please contact the author of this note for any clarification.            [1]   Patient Active Problem List  Diagnosis    GERD (gastroesophageal reflux disease)    History of diverticulitis    History of cancer of gall bladder    Age-related osteoporosis without current pathological fracture    Primary hypertension    Sinus bradycardia    Acute non-ST elevation myocardial infarction (NSTEMI)    Osteoarthritis of spine with radiculopathy, cervical region    Right sided sciatica

## 2025-07-25 ENCOUNTER — PATIENT MESSAGE (OUTPATIENT)
Dept: PAIN MEDICINE | Facility: CLINIC | Age: 65
End: 2025-07-25
Payer: MEDICARE

## 2025-07-25 DIAGNOSIS — G89.29 CHRONIC LEFT SHOULDER PAIN: ICD-10-CM

## 2025-07-25 DIAGNOSIS — M25.512 CHRONIC LEFT SHOULDER PAIN: ICD-10-CM

## 2025-07-25 DIAGNOSIS — R51.9 OCCIPITAL PAIN: ICD-10-CM

## 2025-07-25 DIAGNOSIS — M54.12 CERVICAL RADICULOPATHY: Primary | ICD-10-CM

## 2025-08-04 ENCOUNTER — CLINICAL SUPPORT (OUTPATIENT)
Dept: REHABILITATION | Facility: HOSPITAL | Age: 65
End: 2025-08-04
Payer: MEDICARE

## 2025-08-04 VITALS — SYSTOLIC BLOOD PRESSURE: 154 MMHG | DIASTOLIC BLOOD PRESSURE: 90 MMHG

## 2025-08-04 DIAGNOSIS — G89.29 CHRONIC LEFT SHOULDER PAIN: ICD-10-CM

## 2025-08-04 DIAGNOSIS — M43.6 STIFFNESS OF CERVICAL SPINE: ICD-10-CM

## 2025-08-04 DIAGNOSIS — M54.12 CERVICAL RADICULOPATHY: ICD-10-CM

## 2025-08-04 DIAGNOSIS — R51.9 OCCIPITAL PAIN: ICD-10-CM

## 2025-08-04 DIAGNOSIS — R29.898 UPPER EXTREMITY WEAKNESS: Primary | ICD-10-CM

## 2025-08-04 DIAGNOSIS — M54.2 CERVICALGIA: ICD-10-CM

## 2025-08-04 DIAGNOSIS — M25.512 CHRONIC LEFT SHOULDER PAIN: ICD-10-CM

## 2025-08-04 PROCEDURE — 97112 NEUROMUSCULAR REEDUCATION: CPT | Mod: KX

## 2025-08-04 PROCEDURE — 97162 PT EVAL MOD COMPLEX 30 MIN: CPT | Mod: KX

## 2025-08-04 NOTE — PROGRESS NOTES
Outpatient Rehab    Physical Therapy Evaluation    Patient Name: Ivone Monroy  MRN: [id number]  YOB: 1960  Encounter Date: 8/4/2025    Therapy Diagnosis:   Encounter Diagnoses   Name Primary?    Cervical radiculopathy     Chronic left shoulder pain     Occipital pain     Upper extremity weakness Yes    Stiffness of cervical spine     Cervicalgia      Physician: Aurora Morelos PA*    Physician Orders: Eval and Treat  Medical Diagnosis: Cervical radiculopathy  Chronic left shoulder pain  Occipital pain  Surgical Diagnosis: Not applicable for this Episode   Surgical Date: Not applicable for this Episode  Days Since Last Surgery: Not applicable for this Episode  Visit # / Visits Authorized:  1 / 1  Insurance Authorization Period: 7/28/2025 to 7/28/2026  Date of Evaluation: 8/4/2025  Plan of Care Certification: 8/4/2025 to 9/29/2025     Time In: 1350   Time Out: 1448  Total Time (in minutes): 58   Total Billable Time (in minutes):  58    Precautions: Blood pressure     Subjective   History of Present Illness  Ivone is a 65 y.o. female                            History of Present Condition/Illness: Patient reports chronic history of cervical pain. Pain on L>R and states the pain wraps around anteriorly to her face. Feels the symptoms in her face are primarily sinus related. Patient notes concerns about her blood pressure as it has been in the 150's for her systolic when taking it at home. Patient states the pain and anxiety with the neck symptoms likely cause her blood pressure to be up as well. Patient notes her cervical symptoms have returned to levels she was having prior to performing Physical therapy earlier in the year. Patient notes her symptoms are highest when driving. Notes she is sleeping well. States she is having mild radiating numbness/tingling down left upper extremity but not as severe as she has had int he past. Will have left shoulder pain with lifting heavy objects but no  problems with regular daily tasks. Patient reports she gets headaches time to time that last around a minute.          Pain     Patient reports a current pain level of 2/10. Pain at best is reported as 1/10. Pain at worst is reported as 5/10.   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       Past Medical History/Physical Systems Review:   Ivone Monroy  has a past medical history of Anemia, Cancer of gallbladder, Diverticulitis, and GERD (gastroesophageal reflux disease).    Ivone Monroy  has a past surgical history that includes exploration of gallbladder twice for suspected malignancy with no resection and Tubal ligation (8/24/1996).    Ivone has a current medication list which includes the following prescription(s): aspirin, dicyclomine, probiotic, lorazepam, losartan, meloxicam, metoprolol succinate, multivitamin, pantoprazole, and pregabalin.    Review of patient's allergies indicates:   Allergen Reactions    Sunscreen spf 8 [oxybenzone-padimate o] Swelling     Blisters to skin.   Blisters to skin.     Iodine and iodide containing products Swelling     Lips swell with sores    Shellfish containing products Swelling     Lips swell         Objective   Vital Signs  BP (!) 154/90   BP Location: Left arm  BP Position: Sitting  BP Cuff Size: Adult             RANGE OF MOTION:   Cervical Right   (spine) Left    Pain/Dysfunction with Movement Goal   Cervical Flexion (60º) 50 ---  60   Cervical Extension (80º) 55 ---  70   Cervical Side Bending (45º) 20 30  30   Cervical Rotation (75º) 65 70  70     STRENGTH:   U/E MMT  Right Left Pain/Dysfunction with Movement Goal   Shoulder Flexion 4/5 4/5  4+/5 B   Shoulder Extension 4/5 4/5  4+/5 B   Shoulder Abduction 4/5 4/5  4+/5 B   Shoulder IR 4+/5 4+/5  4+/5 B   Shoulder ER 4/5 4/5  4+/5 B   Middle Trapezius 4/5 4/5  4+/5 B   Lower Trapezius 4-/5 4-/5  4+/5 B     MUSCLE LENGTH:   Muscle Tested  Right Left  Limitation Goal   Upper Trapezius [] Normal  [x] Limited [] Normal  [x] Limited  Normal B   Levator Scapular  [] Normal  [x] Limited [] Normal  [x] Limited  Normal B   Pectoralis Minor [] Normal  [x] Limited [] Normal  [x] Limited  Normal B   Pectoralis Major [] Normal  [x] Limited [] Normal  [x] Limited  Normal B     JOINT MOBILITY:   Joint Motion Right Mobility  (spine) Left Mobility Goal   Subcranial:  [x] Hypo     [] Normal     [] Hyper  [x] Hypo     [] Normal     [] Hyper  Normal    Cervical Upglides [x] Hypo     [] Normal     [] Hyper  [x] Hypo     [] Normal     [] Hyper  Normal    Cervical Downglides  [x] Hypo     [] Normal     [] Hyper  [x] Hypo     [] Normal     [] Hyper  Normal    Thoracic PA Gap [x] Hypo     [] Normal     [] Hyper  --- Normal      SENSATION  [x] Intact to Light Touch   [] Impaired:    PALPATION: Muscles: Increased tone and tenderness to palpation of: bilateral suboccipitals, paraspinals, upper trapezius, levator scapulae .    POSTURE:  Pt presents with postural abnormalities which include:     [x] Forward Head                               [] Increased Lumbar Lordosis              [x] Rounded Shoulder                        [] Genu Recurvatum              [x] Increased Thoracic Kyphosis        [] Genu Valgus              [] Trunk Deviated                              [] Pes Planus              [] Scapular Winging                          [] Other:     Function:     Intake Outcome Measure for FOTO Cervical Survey    Therapist reviewed FOTO scores for Ivone on 8/4/2025.   FOTO report - see Media section or FOTO account for episode details    Intake Score: 69%  Goal: 66%       Treatment       CPT Intervention Performed   Today Duration / Intensity   MT Soft tissue massage       Joint Mobilizations           TE                  NMR Home exercise plan  x Demonstration, education, performance    Posture      Foam roll series      Prone Series      Cervical Retractions                       TA                        PLAN        CPT Codes available for Billing:   (00) minutes of Manual therapy (MT) to improve pain and ROM.  (00) minutes of Therapeutic Exercise (TE) to develop strength, endurance, range of motion, and flexibility.  (10) minutes of Neuromuscular Re-Education (NMR)  to improve: Balance, Coordination, Kinesthetic, Sense, Proprioception, and Posture.  (00) minutes of Therapeutic Activities (TA) to improve functional performance.  Vasopneumatic Device Therapy () for management of swelling/edema. (43335)  Unattended Electrical Stimulation (ES) for muscle performance or pain modulation.  BFR: Blood flow restriction applied during exercise    Assessment & Plan   Assessment  Ivone presents with a condition of Moderate complexity.   Presentation of Symptoms: Evolving  Will Comorbidities Impact Care: Yes  Past Medical History: No date: Anemia 2015: Cancer of gallbladder     Comment:  chemo radiation - Dr Gatica  No date: Diverticulitis No date: GERD (gastroesophageal reflux disease)     Functional Limitations: Activity tolerance, Carrying objects, Disrupted sleep pattern, Driving, Range of motion, Participating in leisure activities  Impairments: Impaired physical strength, Lack of appropriate home exercise program, Pain with functional activity, Abnormal or restricted range of motion  Personal Factors Affecting Prognosis: Pain    Patient Goal for Therapy (PT): Pt reported goals are to decrease overall pain levels in order to return to prior functional level.  Prognosis: Good  Prognosis Details: Anticipated Barriers for therapy: co-morbidities, sedentary lifestyle,  chronicity of condition, lack of understanding of condition, adherence to treatment plan, and occupation   Assessment Details: Pt presents with impairments in the following categories: IMPAIRMENTS: ROM, strength, joint mobility, muscle length, posture, and functional movement patterns.  Pt will benefit from skilled outpatient Physical Therapy to address the deficits stated above, provide pt/family education, and to maximize pt's level of independence.     Plan  From a physical therapy perspective, the patient would benefit from: Skilled Rehab Services    Planned therapy interventions include: Therapeutic exercise, Therapeutic activities, Neuromuscular re-education, Manual therapy, Aquatic therapy, and Gait training.    Planned modalities to include: Cryotherapy (cold pack), Electrical stimulation - attended, Electrical stimulation - passive/unattended, Mechanical traction, Thermotherapy (hot pack), Ultrasound, and Vasopneumatic pump.        Visit Frequency: 2 times Per Week for 8 Weeks.       This plan was discussed with Patient.   Discussion participants: Agreed Upon Plan of Care  Plan details: Outpatient Physical Therapy to include any combination of the following interventions: virtual visits, dry needling, modalities, electrical stimulation (IFC, Pre-Mod, Attended with Functional Dry Needling), Cervical/Lumbar Traction, Electrical Stimulation, Gait Training, Manual Therapy, Moist Heat/ Ice, Neuromuscular Re-ed, Patient Education, Self Care, Therapeutic Exercise, and Therapeutic Activites      The patient's spiritual, cultural, and educational needs were considered, and the patient is agreeable to the plan of care and goals.     Education  Education was done with Patient. The patient's learning style includes Demonstration and Listening. The patient Demonstrates understanding and Verbalizes understanding.         PURPOSE: Patient educated on the impairments noted above and the effects of physical therapy  intervention to improve overall condition and QOL.  EXERCISE: Patient was educated on all the above exercise prior/during/after for proper posture, positioning, and execution for safe performance with home exercise program.  STRENGTH: Patient educated on the importance of improved core and extremity strength in order to improve alignment of the spine and extremities with static positions and dynamic movement.  POSTURE: Patient educated on postural awareness to reduce stress and maintain optimal alignment of the spine with static positions and dynamic movement  ERGONOMICS: Patient educated on proper ergonomics at the work station in order to maintain optimal alignment of the musculoskeletal system and improve efficiency in the work environment.  POLICIES: Educated patient on scheduling, cancelation and no-show policy.       Goals:   Active       Functional outcome       Patient will show a significant change in FOTO patient-reported outcome tool to goal score to demonstrate subjective improvement       Start:  08/04/25    Expected End:  09/29/25            Patient will demonstrate independence in home program for support of progression       Start:  08/04/25    Expected End:  09/01/25               Pain       Patient will report pain of 1-2/10 demonstrating a reduction of overall pain       Start:  08/04/25    Expected End:  09/29/25            Patient will report a 2 point reduction in pain.       Start:  08/04/25    Expected End:  09/01/25               Range of Motion       Patient will achieve cervical extension ROM 70 degrees       Start:  08/04/25    Expected End:  09/29/25               Strength       Patient will achieve bilateral shoulder flexion strength of 4+/5       Start:  08/04/25    Expected End:  09/29/25                Rancho Mendoza, PT

## 2025-08-05 ENCOUNTER — OFFICE VISIT (OUTPATIENT)
Dept: NEUROSURGERY | Facility: CLINIC | Age: 65
End: 2025-08-05
Attending: STUDENT IN AN ORGANIZED HEALTH CARE EDUCATION/TRAINING PROGRAM
Payer: MEDICARE

## 2025-08-05 DIAGNOSIS — D18.00 CAVERNOMA: Primary | ICD-10-CM

## 2025-08-05 NOTE — PROGRESS NOTES
The patient location is: [place]  The chief complaint leading to consultation is:  Follow up cerebellar cavernous malformation    Visit type: audiovisual    Face to Face time with patient:  4 minutes  10 minutes of total time spent on the encounter, which includes face to face time and non-face to face time preparing to see the patient (eg, review of tests), Obtaining and/or reviewing separately obtained history, Documenting clinical information in the electronic or other health record, Independently interpreting results (not separately reported) and communicating results to the patient/family/caregiver, or Care coordination (not separately reported).         Each patient to whom he or she provides medical services by telemedicine is:  (1) informed of the relationship between the physician and patient and the respective role of any other health care provider with respect to management of the patient; and (2) notified that he or she may decline to receive medical services by telemedicine and may withdraw from such care at any time.    Notes:             [facility]  Neurosurgery    SUBJECTIVE:     Interval history 08/05/2025:  Patient returns via virtual visit to review a surveillance MRI which was obtained 06/24/2025 at the [facility] in [place].  Patient denies any symptoms.  We specifically discussed headaches, nausea, vomiting, balance issues.  She denies all these symptoms.  Overall states she is doing okay.    History of Present Illness:  Ivone Monroy is a 65 y.o. female who presents with incidental finding of cerebellar cavernoma.    Patient has been having issues of pressure in the head.  She saw Ted Bianchi with [facility] and MRI MRA was obtained.  MRI was negative.  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  Patient denies any balance issues or cerebellar dysfunction symptoms.  She has been in physical therapy and is doing better in terms of her headache issue.  She  states she thinks this was most likely related to problems in her neck.  She is on Lyrica which helps.    She has been quite anxious about the cavernoma since learning about it.    (Not in a hospital admission)      Review of patient's allergies indicates:   Allergen Reactions    Sunscreen spf 8 [oxybenzone-padimate o] Swelling     Blisters to skin.   Blisters to skin.     Iodine and iodide containing products Swelling     Lips swell with sores    Shellfish containing products Swelling     Lips swell        Past Medical History:   Diagnosis Date    Anemia     Cancer of gallbladder 2015    chemo radiation - Dr Gatica     Diverticulitis     GERD (gastroesophageal reflux disease)      Past Surgical History:   Procedure Laterality Date    exploration of gallbladder twice for suspected malignancy with no resection      TUBAL LIGATION  8/24/1996     Family History   Problem Relation Name Age of Onset    Diabetes Mother Odeal         may have been related to prolonged prednisone use    Arthritis Mother Odeal         Rheumatoid    Heart disease Mother Odeal     Cancer Father Iry         prostate    Heart disease Father Iry     Colon cancer Cousin      Colon cancer Paternal Uncle       Social History     Tobacco Use    Smoking status: Never    Smokeless tobacco: Never   Substance Use Topics    Alcohol use: Not Currently    Drug use: No        Review of Systems:  As noted in HPI    OBJECTIVE:     Physical Exam:  Limited due to virtual nature of the visit       Diagnostic Results:  I have personally reviewed imaging. My impression is as follows.    New MRI of the brain with and without contrast was obtained 06/24/2025.  There is a roughly 7-1/2 mm x 9 mm T2 hyperintense lesion surrounded by a rim of hypointensity in the right cerebellar hemisphere.  Postcontrast imaging reveals this is associated with a DVA.  Lesion is hypointense on GRE sequences.  Overall this is consistent with unruptured right cerebellar cavernous  malformation.    ASSESSMENT/PLAN:     Ivone Monroy is a 65 y.o. female who presents with right cerebellar cavernoma  -no intervention is indicated  -rupture is for this is quite low, likely about 1% per year  -I did go over the red flag symptoms with the patient regarding this if it were to hemorrhage, we specifically discussed sudden headache, nausea, vomiting, severe and suddenly worse balance issues or dizziness  -we will obtain MRI in 1 year for surveillance with virtual visit to follow      Please feel free to call with any further questions.      Time spent on this encounter: 10 minutes. This includes face-to-face time and non-face to face time preparing to see the patient (eg, review of tests), obtaining and/or reviewing separately obtained history, documenting clinical information in the electronic or other health record, independently interpreting results and communicating results to the patient/family/caregiver, or care coordinator.      Israel Roberts  [facility]  Department of Neurosurgery  [phone]    Disclaimer: This note was dictated by speech recognition. Minor errors in transcription may be present.  Please call with any questions.

## 2025-08-07 ENCOUNTER — CLINICAL SUPPORT (OUTPATIENT)
Dept: REHABILITATION | Facility: HOSPITAL | Age: 65
End: 2025-08-07
Payer: MEDICARE

## 2025-08-07 DIAGNOSIS — R29.898 UPPER EXTREMITY WEAKNESS: ICD-10-CM

## 2025-08-07 DIAGNOSIS — M54.2 CERVICALGIA: Primary | ICD-10-CM

## 2025-08-07 DIAGNOSIS — M43.6 STIFFNESS OF CERVICAL SPINE: ICD-10-CM

## 2025-08-07 PROCEDURE — 97140 MANUAL THERAPY 1/> REGIONS: CPT | Mod: KX | Performed by: PHYSICAL THERAPIST

## 2025-08-07 PROCEDURE — 97530 THERAPEUTIC ACTIVITIES: CPT | Mod: KX | Performed by: PHYSICAL THERAPIST

## 2025-08-07 PROCEDURE — 97112 NEUROMUSCULAR REEDUCATION: CPT | Mod: KX | Performed by: PHYSICAL THERAPIST

## 2025-08-07 NOTE — PROGRESS NOTES
"  Outpatient Rehab    Physical Therapy Visit    Patient Name: Ivone Monroy  MRN: [id number]  YOB: 1960  Encounter Date: 8/7/2025    Therapy Diagnosis:   Encounter Diagnoses   Name Primary?    Cervicalgia Yes    Upper extremity weakness     Stiffness of cervical spine      Physician: Aurora Morelos PA*    Physician Orders: Eval and Treat  Medical Diagnosis: Cervical radiculopathy  Chronic left shoulder pain  Occipital pain  Surgical Diagnosis: Not applicable for this Episode   Surgical Date: Not applicable for this Episode  Days Since Last Surgery: Not applicable for this Episode    Visit # / Visits Authorized:  1 / 20 (+eval)  Insurance Authorization Period: 8/4/2025 to 12/31/2025  Date of Evaluation: 8/4/2025  Plan of Care Certification: 8/5/2025 to 9/30/2025      PT/PTA:     Number of PTA visits since last PT visit:   Time In: 1535   Time Out: 1633  Total Time (in minutes): 58   Total Billable Time (in minutes):  42    FOTO:  Intake Score (%): Not applicable for this Episode  Survey Score 2 (%): Not applicable for this Episode  Survey Score 3 (%): Not applicable for this Episode    Precautions:         Subjective   Patient reports feeling about the same as her initial eval. No new complaints..    2-3/10 in left arm    Objective            Treatment:     CPT Intervention Performed   Today Duration / Intensity   MT Soft Tissue Mobilization x Bilateral, focus on left, upper trapezius, levator scapula, cervical paraspinals, and suboccipitals, periscapular release bilateral     Joint Mobilizations x Gentle cervical spine distraction    TE                             NMR UBE for postural strength and endurance x 3'/3'     Series 6 -1/2 black bolster x 1' each , 2' pec stretch     Supine Chin tucks x 2 x 10, 3" holds     Bilateral external rotation next 3 x 10, green theraband    TA Scapular Retractions  x 3 x 10, green theraband      Shoulder Extensions x 3 x 10, green theraband      Cervical " "Retractions - ball on wall x 2 x 10, 3" holds                       PLAN               CPT Codes available for Billing:   (12) minutes of Manual therapy (MT) to improve pain and ROM.  (00) minutes of Therapeutic Exercise (TE) to develop strength, endurance, range of motion, and flexibility.  (20) minutes of Neuromuscular Re-Education (NMR)  to improve: Balance, Coordination, Kinesthetic, Sense, Proprioception, and Posture.  (10) minutes of Therapeutic Activities (TA) to improve functional performance.       Assessment & Plan   Assessment: Patient tolerated treatment well. She completed exercises with only minimal difficulty and without increased complaint. Minimal cues required with exercises to maintain proper form, mainly with chin tucks today. She enjoyed exercises, reporting feeling good following, and she responded very well to manual therapy.        The patient will continue to benefit from skilled outpatient physical therapy in order to address the deficits listed in the problem list on the initial evaluation, provide patient and family education, and maximize the patients level of independence in the home and community environments.     The patient's spiritual, cultural, and educational needs were considered, and the patient is agreeable to the plan of care and goals.           Plan: Continue Plan of Care (POC) and progress per patient tolerance. See treatment section for details on planned progressions next session.    Goals:   Active       Functional outcome       Patient will show a significant change in FOTO patient-reported outcome tool to goal score to demonstrate subjective improvement       Start:  08/04/25    Expected End:  09/29/25            Patient will demonstrate independence in home program for support of progression       Start:  08/04/25    Expected End:  09/01/25               Pain       Patient will report pain of 1-2/10 demonstrating a reduction of overall pain       Start:  08/04/25    " Expected End:  09/29/25            Patient will report a 2 point reduction in pain.       Start:  08/04/25    Expected End:  09/01/25               Range of Motion       Patient will achieve cervical extension ROM 70 degrees       Start:  08/04/25    Expected End:  09/29/25               Strength       Patient will achieve bilateral shoulder flexion strength of 4+/5       Start:  08/04/25    Expected End:  09/29/25                Shauna Bonilla PT, DPT

## 2025-08-11 ENCOUNTER — CLINICAL SUPPORT (OUTPATIENT)
Dept: REHABILITATION | Facility: HOSPITAL | Age: 65
End: 2025-08-11
Payer: MEDICARE

## 2025-08-11 DIAGNOSIS — R29.898 UPPER EXTREMITY WEAKNESS: ICD-10-CM

## 2025-08-11 DIAGNOSIS — M54.2 CERVICALGIA: Primary | ICD-10-CM

## 2025-08-11 DIAGNOSIS — M43.6 STIFFNESS OF CERVICAL SPINE: ICD-10-CM

## 2025-08-11 PROCEDURE — 97140 MANUAL THERAPY 1/> REGIONS: CPT | Mod: KX

## 2025-08-11 PROCEDURE — 97112 NEUROMUSCULAR REEDUCATION: CPT | Mod: KX

## 2025-08-11 PROCEDURE — 97530 THERAPEUTIC ACTIVITIES: CPT | Mod: KX

## 2025-08-13 ENCOUNTER — CLINICAL SUPPORT (OUTPATIENT)
Dept: REHABILITATION | Facility: HOSPITAL | Age: 65
End: 2025-08-13
Payer: MEDICARE

## 2025-08-13 DIAGNOSIS — M54.2 CERVICALGIA: Primary | ICD-10-CM

## 2025-08-13 DIAGNOSIS — M43.6 STIFFNESS OF CERVICAL SPINE: ICD-10-CM

## 2025-08-13 DIAGNOSIS — R29.898 UPPER EXTREMITY WEAKNESS: ICD-10-CM

## 2025-08-13 PROCEDURE — 97530 THERAPEUTIC ACTIVITIES: CPT | Mod: KX | Performed by: PHYSICAL THERAPIST

## 2025-08-13 PROCEDURE — 97112 NEUROMUSCULAR REEDUCATION: CPT | Mod: KX | Performed by: PHYSICAL THERAPIST

## 2025-08-13 PROCEDURE — 97140 MANUAL THERAPY 1/> REGIONS: CPT | Mod: KX | Performed by: PHYSICAL THERAPIST

## 2025-08-18 ENCOUNTER — CLINICAL SUPPORT (OUTPATIENT)
Dept: REHABILITATION | Facility: HOSPITAL | Age: 65
End: 2025-08-18
Payer: MEDICARE

## 2025-08-18 DIAGNOSIS — R29.898 UPPER EXTREMITY WEAKNESS: ICD-10-CM

## 2025-08-18 DIAGNOSIS — M54.2 CERVICALGIA: Primary | ICD-10-CM

## 2025-08-18 DIAGNOSIS — M43.6 STIFFNESS OF CERVICAL SPINE: ICD-10-CM

## 2025-08-18 PROCEDURE — 97530 THERAPEUTIC ACTIVITIES: CPT | Mod: KX | Performed by: PHYSICAL THERAPIST

## 2025-08-18 PROCEDURE — 97112 NEUROMUSCULAR REEDUCATION: CPT | Mod: KX | Performed by: PHYSICAL THERAPIST

## 2025-08-22 ENCOUNTER — CLINICAL SUPPORT (OUTPATIENT)
Dept: REHABILITATION | Facility: HOSPITAL | Age: 65
End: 2025-08-22
Payer: MEDICARE

## 2025-08-22 DIAGNOSIS — R29.898 UPPER EXTREMITY WEAKNESS: ICD-10-CM

## 2025-08-22 DIAGNOSIS — M43.6 STIFFNESS OF CERVICAL SPINE: ICD-10-CM

## 2025-08-22 DIAGNOSIS — M54.2 CERVICALGIA: Primary | ICD-10-CM

## 2025-08-22 PROCEDURE — 97140 MANUAL THERAPY 1/> REGIONS: CPT | Mod: KX

## 2025-08-22 PROCEDURE — 97112 NEUROMUSCULAR REEDUCATION: CPT | Mod: KX

## 2025-08-22 PROCEDURE — 97535 SELF CARE MNGMENT TRAINING: CPT | Mod: KX

## 2025-08-25 ENCOUNTER — CLINICAL SUPPORT (OUTPATIENT)
Dept: REHABILITATION | Facility: HOSPITAL | Age: 65
End: 2025-08-25
Payer: MEDICARE

## 2025-08-25 DIAGNOSIS — M54.2 CERVICALGIA: Primary | ICD-10-CM

## 2025-08-25 DIAGNOSIS — R29.898 UPPER EXTREMITY WEAKNESS: ICD-10-CM

## 2025-08-25 DIAGNOSIS — M43.6 STIFFNESS OF CERVICAL SPINE: ICD-10-CM

## 2025-08-25 PROCEDURE — 97112 NEUROMUSCULAR REEDUCATION: CPT | Mod: KX

## 2025-08-25 PROCEDURE — 97140 MANUAL THERAPY 1/> REGIONS: CPT | Mod: KX

## 2025-08-27 ENCOUNTER — CLINICAL SUPPORT (OUTPATIENT)
Dept: REHABILITATION | Facility: HOSPITAL | Age: 65
End: 2025-08-27
Payer: MEDICARE

## 2025-08-27 DIAGNOSIS — M54.2 CERVICALGIA: Primary | ICD-10-CM

## 2025-08-27 DIAGNOSIS — R29.898 UPPER EXTREMITY WEAKNESS: ICD-10-CM

## 2025-08-27 DIAGNOSIS — M43.6 STIFFNESS OF CERVICAL SPINE: ICD-10-CM

## 2025-08-27 PROCEDURE — 97112 NEUROMUSCULAR REEDUCATION: CPT | Mod: KX | Performed by: PHYSICAL THERAPIST

## 2025-08-27 PROCEDURE — 97530 THERAPEUTIC ACTIVITIES: CPT | Mod: KX | Performed by: PHYSICAL THERAPIST

## 2025-08-29 DIAGNOSIS — M54.12 CERVICAL RADICULOPATHY: ICD-10-CM

## 2025-08-29 DIAGNOSIS — G89.29 CHRONIC LEFT SHOULDER PAIN: ICD-10-CM

## 2025-08-29 DIAGNOSIS — M50.30 DDD (DEGENERATIVE DISC DISEASE), CERVICAL: ICD-10-CM

## 2025-08-29 DIAGNOSIS — M47.812 CERVICAL SPONDYLOSIS: ICD-10-CM

## 2025-08-29 DIAGNOSIS — M54.2 CERVICAL PAIN: ICD-10-CM

## 2025-08-29 DIAGNOSIS — M54.2 CERVICALGIA: ICD-10-CM

## 2025-08-29 DIAGNOSIS — M25.512 CHRONIC LEFT SHOULDER PAIN: ICD-10-CM

## 2025-09-02 ENCOUNTER — CLINICAL SUPPORT (OUTPATIENT)
Dept: REHABILITATION | Facility: HOSPITAL | Age: 65
End: 2025-09-02
Payer: MEDICARE

## 2025-09-02 DIAGNOSIS — M43.6 STIFFNESS OF CERVICAL SPINE: ICD-10-CM

## 2025-09-02 DIAGNOSIS — M54.2 CERVICALGIA: Primary | ICD-10-CM

## 2025-09-02 DIAGNOSIS — R29.898 UPPER EXTREMITY WEAKNESS: ICD-10-CM

## 2025-09-02 PROCEDURE — 20560 NDL INSJ W/O NJX 1 OR 2 MUSC: CPT | Mod: KX,CG

## 2025-09-02 PROCEDURE — 97112 NEUROMUSCULAR REEDUCATION: CPT | Mod: KX

## 2025-09-02 RX ORDER — PREGABALIN 50 MG/1
50 CAPSULE ORAL NIGHTLY
Qty: 30 CAPSULE | Refills: 1 | Status: SHIPPED | OUTPATIENT
Start: 2025-09-02